# Patient Record
Sex: FEMALE | Race: WHITE | NOT HISPANIC OR LATINO | Employment: OTHER | URBAN - METROPOLITAN AREA
[De-identification: names, ages, dates, MRNs, and addresses within clinical notes are randomized per-mention and may not be internally consistent; named-entity substitution may affect disease eponyms.]

---

## 2017-01-05 ENCOUNTER — TELEPHONE (OUTPATIENT)
Dept: RHEUMATOLOGY | Facility: CLINIC | Age: 67
End: 2017-01-05

## 2017-01-05 NOTE — TELEPHONE ENCOUNTER
----- Message from Tiffany Grant sent at 1/5/2017  9:40 AM CST -----  Contact: pt georgie Pichardo   States he is calling to have pt's appt possibly moved to an earlier time and can be reached at [phone]

## 2017-01-11 ENCOUNTER — OFFICE VISIT (OUTPATIENT)
Dept: RHEUMATOLOGY | Facility: CLINIC | Age: 67
End: 2017-01-11
Payer: MEDICARE

## 2017-01-11 ENCOUNTER — LAB VISIT (OUTPATIENT)
Dept: LAB | Facility: HOSPITAL | Age: 67
End: 2017-01-11
Attending: PHYSICIAN ASSISTANT
Payer: MEDICARE

## 2017-01-11 VITALS
WEIGHT: 112.88 LBS | BODY MASS INDEX: 18.81 KG/M2 | HEIGHT: 65 IN | DIASTOLIC BLOOD PRESSURE: 83 MMHG | HEART RATE: 77 BPM | SYSTOLIC BLOOD PRESSURE: 125 MMHG

## 2017-01-11 DIAGNOSIS — N18.9 CKD (CHRONIC KIDNEY DISEASE), UNSPECIFIED STAGE: ICD-10-CM

## 2017-01-11 DIAGNOSIS — E87.5 HYPERKALEMIA: ICD-10-CM

## 2017-01-11 DIAGNOSIS — I71.40 ABDOMINAL AORTIC ANEURYSM (AAA) WITHOUT RUPTURE: Chronic | ICD-10-CM

## 2017-01-11 DIAGNOSIS — E79.0 HYPERURICEMIA: ICD-10-CM

## 2017-01-11 DIAGNOSIS — Z51.81 MEDICATION MONITORING ENCOUNTER: ICD-10-CM

## 2017-01-11 DIAGNOSIS — D63.8 ANEMIA OF OTHER CHRONIC DISEASE: ICD-10-CM

## 2017-01-11 DIAGNOSIS — N18.30 CKD (CHRONIC KIDNEY DISEASE) STAGE 3, GFR 30-59 ML/MIN: ICD-10-CM

## 2017-01-11 DIAGNOSIS — M15.9 PRIMARY OSTEOARTHRITIS INVOLVING MULTIPLE JOINTS: ICD-10-CM

## 2017-01-11 DIAGNOSIS — M81.8 OSTEOPOROSIS, IDIOPATHIC: Primary | ICD-10-CM

## 2017-01-11 DIAGNOSIS — N17.9 AKI (ACUTE KIDNEY INJURY): ICD-10-CM

## 2017-01-11 DIAGNOSIS — M81.8 OSTEOPOROSIS, IDIOPATHIC: ICD-10-CM

## 2017-01-11 LAB
25(OH)D3+25(OH)D2 SERPL-MCNC: 58 NG/ML
ALBUMIN SERPL BCP-MCNC: 3.7 G/DL
ALP SERPL-CCNC: 90 U/L
ALT SERPL W/O P-5'-P-CCNC: 12 U/L
ANION GAP SERPL CALC-SCNC: 12 MMOL/L
AST SERPL-CCNC: 28 U/L
BASOPHILS # BLD AUTO: 0.01 K/UL
BASOPHILS NFR BLD: 0.1 %
BILIRUB SERPL-MCNC: 0.4 MG/DL
BUN SERPL-MCNC: 30 MG/DL
CALCIUM SERPL-MCNC: 10.1 MG/DL
CHLORIDE SERPL-SCNC: 107 MMOL/L
CO2 SERPL-SCNC: 23 MMOL/L
CREAT SERPL-MCNC: 1.4 MG/DL
DIFFERENTIAL METHOD: ABNORMAL
EOSINOPHIL # BLD AUTO: 0.2 K/UL
EOSINOPHIL NFR BLD: 1.7 %
ERYTHROCYTE [DISTWIDTH] IN BLOOD BY AUTOMATED COUNT: 14.3 %
EST. GFR  (AFRICAN AMERICAN): 46 ML/MIN/1.73 M^2
EST. GFR  (NON AFRICAN AMERICAN): 40 ML/MIN/1.73 M^2
GLUCOSE SERPL-MCNC: 119 MG/DL
HCT VFR BLD AUTO: 34.7 %
HGB BLD-MCNC: 11.1 G/DL
LYMPHOCYTES # BLD AUTO: 2 K/UL
LYMPHOCYTES NFR BLD: 21.9 %
MCH RBC QN AUTO: 31.4 PG
MCHC RBC AUTO-ENTMCNC: 32 %
MCV RBC AUTO: 98 FL
MONOCYTES # BLD AUTO: 0.6 K/UL
MONOCYTES NFR BLD: 6.5 %
NEUTROPHILS # BLD AUTO: 6.4 K/UL
NEUTROPHILS NFR BLD: 69.8 %
PHOSPHATE SERPL-MCNC: 3.5 MG/DL
PLATELET # BLD AUTO: 239 K/UL
PMV BLD AUTO: 9.3 FL
POTASSIUM SERPL-SCNC: 4.2 MMOL/L
PROT SERPL-MCNC: 6.9 G/DL
RBC # BLD AUTO: 3.53 M/UL
SODIUM SERPL-SCNC: 142 MMOL/L
URATE SERPL-MCNC: 9.2 MG/DL
WBC # BLD AUTO: 9.22 K/UL

## 2017-01-11 PROCEDURE — 99499 UNLISTED E&M SERVICE: CPT | Mod: S$GLB,,, | Performed by: PHYSICIAN ASSISTANT

## 2017-01-11 PROCEDURE — 1159F MED LIST DOCD IN RCRD: CPT | Mod: S$GLB,,, | Performed by: PHYSICIAN ASSISTANT

## 2017-01-11 PROCEDURE — 3074F SYST BP LT 130 MM HG: CPT | Mod: S$GLB,,, | Performed by: PHYSICIAN ASSISTANT

## 2017-01-11 PROCEDURE — 3079F DIAST BP 80-89 MM HG: CPT | Mod: S$GLB,,, | Performed by: PHYSICIAN ASSISTANT

## 2017-01-11 PROCEDURE — 99214 OFFICE O/P EST MOD 30 MIN: CPT | Mod: S$GLB,,, | Performed by: PHYSICIAN ASSISTANT

## 2017-01-11 PROCEDURE — 99999 PR PBB SHADOW E&M-EST. PATIENT-LVL IV: CPT | Mod: PBBFAC,,, | Performed by: PHYSICIAN ASSISTANT

## 2017-01-11 PROCEDURE — 96372 THER/PROPH/DIAG INJ SC/IM: CPT | Mod: S$GLB,,, | Performed by: PHYSICIAN ASSISTANT

## 2017-01-11 RX ORDER — MUPIROCIN 20 MG/G
OINTMENT TOPICAL 2 TIMES DAILY
Qty: 30 G | Refills: 1 | Status: SHIPPED | OUTPATIENT
Start: 2017-01-11 | End: 2017-01-21

## 2017-01-11 RX ORDER — TRAMADOL HYDROCHLORIDE AND ACETAMINOPHEN 37.5; 325 MG/1; MG/1
1 TABLET, FILM COATED ORAL 2 TIMES DAILY
Refills: 0 | COMMUNITY
Start: 2016-12-28 | End: 2017-01-12 | Stop reason: SDUPTHER

## 2017-01-11 NOTE — PROGRESS NOTES
Prolia 60mg/cc to right lower abdomen. Labs checked: Calcium 10.1, Creatinine 1.4. Pt tolerated well. No acute reaction noted to site. Pt instructed on signs and symptoms of reaction to report. Pt verbalized understanding.     Lot:  [id number]  Exp:  1/2019

## 2017-01-11 NOTE — LETTER
January 11, 2017      Geovanna Sanchez MD  [address] [address] [address]           [facility] - Rheumatology  [address] [facility]  [place] [address]  Phone: [phone]  Fax: [phone]          Patient: Ibeth Schroeder   MR Number: [id number]   YOB: 1950   Date of Visit: 1/11/2017       Dear Dr. Geovanna Sanchez:    Thank you for referring Ibeth Schroeder to me for evaluation. Attached you will find relevant portions of my assessment and plan of care.    If you have questions, please do not hesitate to call me. I look forward to following Ibeth Schroeder along with you.    Sincerely,    Elizabeth Escalante, LPN    Enclosure  CC:  No Recipients    If you would like to receive this communication electronically, please contact [email or web address] or [phone] to request more information on [employer] Link access.    For providers and/or their staff who would like to refer a patient to [facility], please contact us through our one-stop-shop provider referral line, [facility] , at [phone].    If you feel you have received this communication in error or would no longer like to receive these types of communications, please e-mail [email or web address]

## 2017-01-11 NOTE — PROGRESS NOTES
Subjective:       Patient ID: Ibeth Schroeder is a 66 y.o. female.    Chief Complaint: Osteoporosis    HPI Comments: Ibeth is back for rheumatology follow-up she was last seen in the clinic 2013 she's been lost to follow-up.  At that time was supposed to start Prolia for osteoporosis.  She does have a history of hyperuricemia with gout attacks but none recently it's been almost 7 years since her last attack.  She is not on daily maintenance Treatment.  He pain level today 0/10.  She does have some chronic kidney disease so the bisphosphonates thought not to be the best option.  She did have some low calcium is now on over-the-counter supplement of calcium twice daily which includes vitamin D.  Calcium level is back within normal limits.  She did fall sustaining a pelvic fracture in the last year.  This has healed.  Had a repeat bone density done today and here to review results.  She does have osteoarthritis affecting multiple joints as well as her spine has had epidural injections in the past with some help.    Review of Systems   Constitutional: Negative.  Negative for activity change, appetite change, chills, fatigue and fever.   HENT: Negative.  Negative for mouth sores and trouble swallowing.         No dry mouth   Eyes: Negative.  Negative for photophobia, pain and redness.        No swollen or red eyes, no dry eye     Respiratory: Negative.  Negative for chest tightness, shortness of breath, wheezing and stridor.    Cardiovascular: Negative.  Negative for chest pain.   Gastrointestinal: Negative.  Negative for abdominal pain, blood in stool, diarrhea, nausea and vomiting.   Genitourinary: Negative.  Negative for dysuria, frequency, hematuria and urgency.   Musculoskeletal: Negative.  Negative for arthralgias, back pain, gait problem, joint swelling, myalgias, neck pain and neck stiffness.   Skin: Negative.  Negative for color change, pallor and rash.   Neurological: Negative.  Negative for weakness.  "  Hematological: Negative for adenopathy.   Psychiatric/Behavioral: Negative for suicidal ideas.         Objective:     Visit Vitals    /83    Pulse 77    Ht 5' 5" (1.651 m)    Wt 51.2 kg (112 lb 14 oz)    BMI 18.78 kg/m2        Physical Exam   Constitutional: She is oriented to person, place, and time and well-developed, well-nourished, and in no distress. No distress.   HENT:   Head: Normocephalic and atraumatic.   Right Ear: External ear normal.   Left Ear: External ear normal.   Mouth/Throat: No oropharyngeal exudate.   Eyes: Conjunctivae and EOM are normal. Pupils are equal, round, and reactive to light. No scleral icterus.   Neck: Normal range of motion. Neck supple. No thyromegaly present.   Cardiovascular: Normal rate, regular rhythm and normal heart sounds.    No murmur heard.  Pulmonary/Chest: Effort normal and breath sounds normal. She exhibits no tenderness.   Abdominal: Soft. Bowel sounds are normal.   Lymphadenopathy:     She has no cervical adenopathy.   Neurological: She is alert and oriented to person, place, and time. She displays normal reflexes. No cranial nerve deficit. She exhibits normal muscle tone. Gait normal.   Skin: Skin is warm and dry. No rash noted.     Musculoskeletal: Normal range of motion. She exhibits no edema or tenderness.   She does have some mild kyphosis in the thoracic spine but no tenderness to palpation  Some mild osteoarthritic changes in her hands                 Recent Results (from the past 168 hour(s))   Urinalysis    Collection Time: 01/11/17  2:28 PM   Result Value Ref Range    Specimen UA Urine, Clean Catch     Color, UA Yellow Yellow, Straw, Gina    Appearance, UA Clear Clear    pH, UA 6.0 5.0 - 8.0    Specific Gravity, UA 1.015 1.005 - 1.030    Protein, UA 1+ (A) Negative    Glucose, UA Negative Negative    Ketones, UA Negative Negative    Bilirubin (UA) Negative Negative    Occult Blood UA Trace (A) Negative    Nitrite, UA Negative Negative    " Leukocytes, UA 3+ (A) Negative   Urinalysis Microscopic    Collection Time: 01/11/17  2:28 PM   Result Value Ref Range    RBC, UA 2 0 - 4 /hpf    WBC, UA >100 (H) 0 - 5 /hpf    WBC Clumps, UA Occasional (A) None-Rare    Bacteria, UA Occasional None-Occ /hpf    Squam Epithel, UA 5 /hpf    Non-Squam Epith 3 (A) <1/hpf /hpf    Hyaline Casts, UA 0 0-1/lpf /lpf    Microscopic Comment SEE COMMENT    Comprehensive metabolic panel    Collection Time: 01/11/17  2:49 PM   Result Value Ref Range    Sodium 142 136 - 145 mmol/L    Potassium 4.2 3.5 - 5.1 mmol/L    Chloride 107 95 - 110 mmol/L    CO2 23 23 - 29 mmol/L    Glucose 119 (H) 70 - 110 mg/dL    BUN, Bld 30 (H) 8 - 23 mg/dL    Creatinine 1.4 0.5 - 1.4 mg/dL    Calcium 10.1 8.7 - 10.5 mg/dL    Total Protein 6.9 6.0 - 8.4 g/dL    Albumin 3.7 3.5 - 5.2 g/dL    Total Bilirubin 0.4 0.1 - 1.0 mg/dL    Alkaline Phosphatase 90 55 - 135 U/L    AST 28 10 - 40 U/L    ALT 12 10 - 44 U/L    Anion Gap 12 8 - 16 mmol/L    eGFR if African American 46 (A) >60 mL/min/1.73 m^2    eGFR if non African American 40 (A) >60 mL/min/1.73 m^2   Uric acid    Collection Time: 01/11/17  2:49 PM   Result Value Ref Range    Uric Acid 9.2 (H) 2.4 - 5.7 mg/dL   CBC auto differential    Collection Time: 01/11/17  2:49 PM   Result Value Ref Range    WBC 9.22 3.90 - 12.70 K/uL    RBC 3.53 (L) 4.00 - 5.40 M/uL    Hemoglobin 11.1 (L) 12.0 - 16.0 g/dL    Hematocrit 34.7 (L) 37.0 - 48.5 %    MCV 98 82 - 98 fL    MCH 31.4 (H) 27.0 - 31.0 pg    MCHC 32.0 32.0 - 36.0 %    RDW 14.3 11.5 - 14.5 %    Platelets 239 150 - 350 K/uL    MPV 9.3 9.2 - 12.9 fL    Gran # 6.4 1.8 - 7.7 K/uL    Lymph # 2.0 1.0 - 4.8 K/uL    Mono # 0.6 0.3 - 1.0 K/uL    Eos # 0.2 0.0 - 0.5 K/uL    Baso # 0.01 0.00 - 0.20 K/uL    Gran% 69.8 38.0 - 73.0 %    Lymph% 21.9 18.0 - 48.0 %    Mono% 6.5 4.0 - 15.0 %    Eosinophil% 1.7 0.0 - 8.0 %    Basophil% 0.1 0.0 - 1.9 %    Differential Method Automated    Phosphorus    Collection Time: 01/11/17   2:49 PM   Result Value Ref Range    Phosphorus 3.5 2.7 - 4.5 mg/dL       DEXA 7/6/16 total femur T score -2.7, femur neck -2.7, spine +1.9 impression osteoporosis  DEXA 6/5/13 total femur T score -3.1, spine +1.7 impression osteoporosis    Assessment:       1. Osteoporosis, idiopathic    2. CKD (chronic kidney disease) stage 3, GFR 30-59 ml/min    3. Primary osteoarthritis involving multiple joints    4. Medication monitoring encounter          1.  Severe osteoporosis at the hip with recent pelvic fracture not on treatment for the last 3 years because she has been lost to follow-up -- CKD so now on Prolia 1st injection done 7/6/16  2.  Hyperuricemia with no recent gout attacks currently not on long-term treatment last gout attack 7 years ago currently no indication to add treatment  3.  Chronic kidney disease  4.  Generalized osteoarthritis  5. Medication Monitoring- no current issues, no evidence of toxicity      Plan:         Ok for Prolia 60 mg subcutaneous injection today for osteoporosis   Continue Prolia Q 6 months  increase dietary intake of calcium for next 10 days and recheck bmp in 10 days     No need to treat hyperuricemia currently if she develops gout attacks then consider allopurinol    Avoid non-steroidal's, okay for Tylenol for pain    Return to clinic in 6 months with labs and Prolia    Call with any questions, changes, or concerns.

## 2017-01-11 NOTE — MR AVS SNAPSHOT
[facility] - Rheumatology  [address]  [place] [address]  Phone: [phone]  Fax: [phone]                  Ibeth Schroeder   2017 3:00 PM   Office Visit    Description:  Female : 1950   Provider:  Patria Melara PA-C   Department:  [facility] - Rheumatology           Reason for Visit     Osteoporosis           Diagnoses this Visit        Comments    Osteoporosis, idiopathic    -  Primary     CKD (chronic kidney disease) stage 3, GFR 30-59 ml/min         Primary osteoarthritis involving multiple joints         Medication monitoring encounter                To Do List           Future Appointments        Provider Department Dept Phone    2017 5:50 PM SPECIMEN, [facility] [phone]    2017 3:40 PM JUAN [facility] - Interventional Pain [phone]    2017 4:20 PM [facility]ATORY, [facility]     2017 3:00 PM MD JOAN [facility] - Nephrology [phone]    3/16/2017 2:45 PM ONLH XR1-DR [facility] [phone]      Goals (5 Years of Data)     None       These Medications        Disp Refills Start End    mupirocin (BACTROBAN) 2 % ointment 30 g 1 2017    Apply topically 2 (two) times daily. - Topical (Top)    Pharmacy: [facility] MICHAEL ALBRECHT [address] Ph #: [phone]         [facility]     [facility] On [facility] Nurse Care Line -  Assistance  Registered nurses in the [facility] On [facility] Center provide clinical advisement, health education, appointment booking, and other advisory services.  Call for this free service at [phone].             Medications           Message regarding Medications     Verify the changes and/or additions to your medication regime listed below are the same as discussed with your clinician today.  If any of these changes or additions are incorrect, please notify your healthcare  provider.        START taking these NEW medications        Refills    mupirocin (BACTROBAN) 2 % ointment 1    Sig: Apply topically 2 (two) times daily.    Class: Normal    Route: Topical (Top)           Verify that the below list of medications is an accurate representation of the medications you are currently taking.  If none reported, the list may be blank. If incorrect, please contact your healthcare provider. Carry this list with you in case of emergency.           Current Medications     acetaminophen (TYLENOL) 325 MG tablet Take 325 mg by mouth every 6 (six) hours as needed for Pain.    amlodipine (NORVASC) 5 MG tablet Take 1 tablet (5 mg total) by mouth once daily.    busPIRone (BUSPAR) 15 MG tablet 1 tab po tid    calcitRIOL (ROCALTROL) 0.25 MCG Cap Take 1 capsule (0.25 mcg total) by mouth once daily.    CALCIUM CARBONATE/VITAMIN D3 (CALTRATE 600 + D ORAL) Take 2 tablets by mouth once daily.    duloxetine (CYMBALTA) 60 MG capsule take 1 capsule by mouth once daily    ferrous sulfate 325 (65 FE) MG EC tablet Take 1 tablet (325 mg total) by mouth once a week.    fluticasone (FLONASE) 50 mcg/actuation nasal spray instill 2 sprays into each nostril once daily    fluticasone-salmeterol (ADVAIR HFA) 115-21 mcg/actuation HFAA inhale 2 puffs INTO THE LUNGS twice a day    ipratropium-albuterol (COMBIVENT RESPIMAT)  mcg/actuation inhaler inhale 1 puff INTO THE LUNGS four times a day    levothyroxine (SYNTHROID) 100 MCG tablet Take 1 tablet (100 mcg total) by mouth once daily.    metoprolol succinate (TOPROL-XL) 50 MG 24 hr tablet Take 1 tablet (50 mg total) by mouth once daily.    MULTIVITAMIN W-MINERALS/LUTEIN (CENTRUM SILVER ORAL) Take 1 tablet by mouth once daily.    pantoprazole (PROTONIX) 40 MG tablet Take 1 tablet (40 mg total) by mouth once daily.    pravastatin (PRAVACHOL) 10 MG tablet Take 1 tablet (10 mg total) by mouth once daily.    ropinirole (REQUIP) 1 MG tablet Take 1 tablet (1 mg total) by mouth  "nightly.    thiamine 250 MG tablet Take 500 mg by mouth once daily.    tramadol-acetaminophen 37.5-325 mg (ULTRACET) 37.5-325 mg Tab Take 1 tablet by mouth 2 (two) times daily.    diclofenac sodium (VOLTAREN) 1 % Gel Apply 4 g topically 4 (four) times daily. Pt has shoulder pain and knee pain    mupirocin (BACTROBAN) 2 % ointment Apply topically 2 (two) times daily.           Clinical Reference Information           Vital Signs - Last Recorded  Most recent update: 1/11/2017  3:11 PM by Elizabeth Escalante LPN    BP Pulse Ht Wt BMI    125/83 77 5' 5" (1.651 m) 51.2 kg (112 lb 14 oz) 18.78 kg/m2      Blood Pressure          Most Recent Value    BP  125/83      Allergies as of 1/11/2017     Nsaids (Non-steroidal Anti-inflammatory Drug)    Pcn [Penicillins]    Sulfa (Sulfonamide Antibiotics)    Macrodantin [Nitrofurantoin Macrocrystalline]    Wellbutrin [Bupropion Hcl]      Immunizations Administered on Date of Encounter - 1/11/2017     None      Orders Placed During Today's Visit     Recurring Lab Work Interval Expires    Basic metabolic panel   3/12/2018      "

## 2017-01-12 ENCOUNTER — OFFICE VISIT (OUTPATIENT)
Dept: PAIN MEDICINE | Facility: CLINIC | Age: 67
End: 2017-01-12
Payer: MEDICARE

## 2017-01-12 VITALS
HEIGHT: 65 IN | DIASTOLIC BLOOD PRESSURE: 87 MMHG | HEART RATE: 91 BPM | WEIGHT: 112 LBS | SYSTOLIC BLOOD PRESSURE: 133 MMHG | RESPIRATION RATE: 16 BRPM | BODY MASS INDEX: 18.66 KG/M2

## 2017-01-12 DIAGNOSIS — M47.817 LUMBOSACRAL SPONDYLOSIS WITHOUT MYELOPATHY: Primary | ICD-10-CM

## 2017-01-12 DIAGNOSIS — G89.4 CHRONIC PAIN DISORDER: ICD-10-CM

## 2017-01-12 DIAGNOSIS — R29.898 WEAKNESS OF BOTH HIPS: ICD-10-CM

## 2017-01-12 DIAGNOSIS — M47.817 SPONDYLOSIS OF LUMBOSACRAL REGION WITHOUT MYELOPATHY OR RADICULOPATHY: Primary | ICD-10-CM

## 2017-01-12 DIAGNOSIS — M51.36 DDD (DEGENERATIVE DISC DISEASE), LUMBAR: ICD-10-CM

## 2017-01-12 PROCEDURE — 99999 PR PBB SHADOW E&M-EST. PATIENT-LVL V: CPT | Mod: PBBFAC,,, | Performed by: PHYSICIAN ASSISTANT

## 2017-01-12 PROCEDURE — 3079F DIAST BP 80-89 MM HG: CPT | Mod: S$GLB,,, | Performed by: PHYSICIAN ASSISTANT

## 2017-01-12 PROCEDURE — 99499 UNLISTED E&M SERVICE: CPT | Mod: S$GLB,,, | Performed by: PHYSICIAN ASSISTANT

## 2017-01-12 PROCEDURE — 3075F SYST BP GE 130 - 139MM HG: CPT | Mod: S$GLB,,, | Performed by: PHYSICIAN ASSISTANT

## 2017-01-12 PROCEDURE — 1159F MED LIST DOCD IN RCRD: CPT | Mod: S$GLB,,, | Performed by: PHYSICIAN ASSISTANT

## 2017-01-12 PROCEDURE — 99214 OFFICE O/P EST MOD 30 MIN: CPT | Mod: S$GLB,,, | Performed by: PHYSICIAN ASSISTANT

## 2017-01-12 RX ORDER — TRAMADOL HYDROCHLORIDE AND ACETAMINOPHEN 37.5; 325 MG/1; MG/1
1 TABLET, FILM COATED ORAL 2 TIMES DAILY PRN
Qty: 60 TABLET | Refills: 2 | Status: SHIPPED | OUTPATIENT
Start: 2017-01-12 | End: 2017-01-12 | Stop reason: DRUGHIGH

## 2017-01-12 RX ORDER — TRAMADOL HYDROCHLORIDE 50 MG/1
50 TABLET ORAL 2 TIMES DAILY PRN
Qty: 60 TABLET | Refills: 2 | Status: SHIPPED | OUTPATIENT
Start: 2017-01-12 | End: 2017-02-11

## 2017-01-12 NOTE — MR AVS SNAPSHOT
[facility] - Interventional Pain  [address] [facility]  [place] [address]  Phone: [phone]  Fax: [phone]                  Ibeth Schroeder   2017 3:40 PM   Office Visit    Description:  Female : 1950   Provider:  Juana Schuster PA-C   Department:  [facility] - Interventional Pain           Reason for Visit     Low-back Pain           Diagnoses this Visit        Comments    Spondylosis of lumbosacral region without myelopathy or radiculopathy    -  Primary     DDD (degenerative disc disease), lumbar         Weakness of both hips         Chronic pain disorder                To Do List           Future Appointments        Provider Department Dept Phone    2017 4:20 PM [facility]ATORY, [facility]     2017 3:00 PM Cristina Crowder MD [facility] - Nephrology [phone]    3/16/2017 2:45 PM ONLH XR1-DR [facility] [phone]    3/16/2017 3:00 PM PULMONARY LAB, [facility] - Pulm Function [facility] [phone]    3/16/2017 3:20 PM PULMONARY LAB, [facility] - Pulm Function [facility] [phone]      Goals (5 Years of Data)     None      Follow-Up and Disposition     Return in about 12 weeks (around 2017) for Medication refill, Injection follow-up.       These Medications        Disp Refills Start End    tramadol (ULTRAM) 50 mg tablet 60 tablet 2 2017    Take 1 tablet (50 mg total) by mouth 2 (two) times daily as needed for Pain. - Oral    Pharmacy: [facility] [place] [id number] [facility] Ph #: [phone]         [facility]     [facility] On Call Nurse Care Line -  Assistance  Registered nurses in the [facility] Center provide clinical advisement, health education, appointment booking, and other advisory services.  Call for this free service at [phone].             Medications           Message regarding Medications     Verify the changes and/or additions to your  medication regime listed below are the same as discussed with your clinician today.  If any of these changes or additions are incorrect, please notify your healthcare provider.        START taking these NEW medications        Refills    tramadol (ULTRAM) 50 mg tablet 2    Sig: Take 1 tablet (50 mg total) by mouth 2 (two) times daily as needed for Pain.    Class: Print    Route: Oral      STOP taking these medications     tramadol-acetaminophen 37.5-325 mg (ULTRACET) 37.5-325 mg Tab Take 1 tablet by mouth 2 (two) times daily.           Verify that the below list of medications is an accurate representation of the medications you are currently taking.  If none reported, the list may be blank. If incorrect, please contact your healthcare provider. Carry this list with you in case of emergency.           Current Medications     acetaminophen (TYLENOL) 325 MG tablet Take 325 mg by mouth every 6 (six) hours as needed for Pain.    amlodipine (NORVASC) 5 MG tablet Take 1 tablet (5 mg total) by mouth once daily.    busPIRone (BUSPAR) 15 MG tablet 1 tab po tid    calcitRIOL (ROCALTROL) 0.25 MCG Cap Take 1 capsule (0.25 mcg total) by mouth once daily.    CALCIUM CARBONATE/VITAMIN D3 (CALTRATE 600 + D ORAL) Take 2 tablets by mouth once daily.    diclofenac sodium (VOLTAREN) 1 % Gel Apply 4 g topically 4 (four) times daily. Pt has shoulder pain and knee pain    duloxetine (CYMBALTA) 60 MG capsule take 1 capsule by mouth once daily    ferrous sulfate 325 (65 FE) MG EC tablet Take 1 tablet (325 mg total) by mouth once a week.    fluticasone (FLONASE) 50 mcg/actuation nasal spray instill 2 sprays into each nostril once daily    fluticasone-salmeterol (ADVAIR HFA) 115-21 mcg/actuation HFAA inhale 2 puffs INTO THE LUNGS twice a day    ipratropium-albuterol (COMBIVENT RESPIMAT)  mcg/actuation inhaler inhale 1 puff INTO THE LUNGS four times a day    levothyroxine (SYNTHROID) 100 MCG tablet Take 1 tablet (100 mcg total) by mouth  "once daily.    metoprolol succinate (TOPROL-XL) 50 MG 24 hr tablet Take 1 tablet (50 mg total) by mouth once daily.    MULTIVITAMIN W-MINERALS/LUTEIN (CENTRUM SILVER ORAL) Take 1 tablet by mouth once daily.    mupirocin (BACTROBAN) 2 % ointment Apply topically 2 (two) times daily.    pantoprazole (PROTONIX) 40 MG tablet Take 1 tablet (40 mg total) by mouth once daily.    pravastatin (PRAVACHOL) 10 MG tablet Take 1 tablet (10 mg total) by mouth once daily.    ropinirole (REQUIP) 1 MG tablet Take 1 tablet (1 mg total) by mouth nightly.    thiamine 250 MG tablet Take 500 mg by mouth once daily.    tramadol (ULTRAM) 50 mg tablet Take 1 tablet (50 mg total) by mouth 2 (two) times daily as needed for Pain.           Clinical Reference Information           Vital Signs - Last Recorded  Most recent update: 1/12/2017  3:26 PM by Chelsea Whelan MA    BP Pulse Resp Ht Wt BMI    133/87 (BP Location: Right arm, Patient Position: Sitting, BP Method: Automatic) 91 16 5' 5" (1.651 m) 50.8 kg (112 lb) 18.64 kg/m2      Blood Pressure          Most Recent Value    BP  133/87      Allergies as of 1/12/2017     Nsaids (Non-steroidal Anti-inflammatory Drug)    Pcn [Penicillins]    Sulfa (Sulfonamide Antibiotics)    Macrodantin [Nitrofurantoin Macrocrystalline]    Wellbutrin [Bupropion Hcl]      Immunizations Administered on Date of Encounter - 1/12/2017     None      Orders Placed During Today's Visit     Future Labs/Procedures Expected by Expires    IR Facet Inj Lumbar 1st Vert Uni  1/12/2017 1/12/2018    IR Facet Inj Lumbar 1st Vert Uni  1/12/2017 1/12/2018    IR Facet Inj Lumbar 2nd Vert Uni  1/12/2017 1/12/2018    IR Facet Inj Lumbar 2nd Vert Uni  1/12/2017 1/12/2018    IR Facet Inj Lumbar 3rd Vert Uni  1/12/2017 1/12/2018    IR Facet Inj Lumbar 3rd Vert Uni  1/12/2017 1/12/2018      "

## 2017-01-12 NOTE — PROGRESS NOTES
Chief Pain Complaint:  Low Back Pain    History of Present Illness:  This patient is a 66 y.o. female who presents today complaining of the above noted pain/s. The patient describes this pain as follows.    - duration of pain: > 7 years   - timing: intermittent   - character: sharp, aching  - radiating, dermatomal: pain is nonradiating  - antecedent trauma, prior spinal surgery: no prior trauma, no prior spinal surgery, left hip replacement & revision  - pertinent negatives: No fever, No chills, No weight loss, No bladder dysfunction, No bowel dysfunction, No extremity weakness, No saddle anesthesia  - pertinent positives: none    - medications, other therapies tried (physical therapy, injections):     >> Tylenol, Ultracet    >> Has previously undergone Physical Therapy    >> Has previously undergone spinal injection/s: what sounds like 2 lumbar MBBs at [facility] with great relief      IMAGING / Labs / Studies (reviewed on 1/12/2017):    8/11/16 X-Ray Lumbar Spine AP And Lateral  Comparison: 09/24/2013  Technique: AP, lateral, lateral flexion, lateral extension, bilateral oblique, and lumbosacral coned down views were obtained of the lumbar spine  Findings:   Vertebral body heights and alignment are within normal limits.  No change in spinal alignment with flexion or extension to suggest instability. Multilevel degenerative disc height loss again noted throughout the lumbar spine, most severe at the levels of T12-L1, L1.  Moderate disc loss present at L4-L5 and L5-S1.  There multilevel degenerative endplate changes and osteophyte production.  Findings appear essentially unchanged from prior.  Bilateral facet arthropathy present at L4-L5 and L5-S1.  No pars defects visualized.  Posterior elements appear grossly intact. No acute fractures or subluxations are demonstrated.  IVC filter again noted.  Aortic vascular calcifications present.  Previously described aneurysmal dilatation of the distal abdominal aorta  "appear similar to prior measuring up to approximately 4.2 cm in diameter.       9/24/13 X-Ray Lumbar Spine AP And Lateral    Narrative Findings:  Comparison is with 6/16/09.  Mild dextroscoliosis and diffuse changes of degenerative disk disease as well as diffuse facet joint hypertrophy are again demonstrated.  These changes of degenerative disk disease are particularly pronounced at the T12-L1, L1-2, L4-5 and L5-S1 levels.  AP alignment appears maintained with no acute compression fractures identified.  Again demonstrated are surgical clips consistent with prior cholecystectomy, IVC filter and left hip prosthesis.  Also again demonstrated is diffuse calcification of the abdominal aorta including distal aneurysmal dilatation measured at 4.4 cm in the AP axis at the level of the superior endplate of L4. This dilatation appears to have slightly increased in the interim previously measured at this level at approximately 3.3 cm.       Review of Systems:  CONSTITUTIONAL: patient denies any fever, chills, or weight loss  SKIN: patient denies any rash or itching  RESPIRATORY: patient denies having any shortness of breath  GASTROINTESTINAL: patient denies having any diarrhea, constipation, or bowel incontinence  GENITOURINARY: patient denies having any abnormal bladder function    MUSCULOSKELETAL:  - patient complains of the above noted pain/s (see chief pain complaint)    NEUROLOGICAL:   - pain as above  - strength in Lower extremities is intact, BILATERALLY  - sensation in Lower extremities is intact, BILATERALLY  - patient denies any loss of bowel or bladder control      PSYCHIATRIC: patient reports a history of anxiety and depression      Physical Exam:  Visit Vitals    /87 (BP Location: Right arm, Patient Position: Sitting, BP Method: Automatic)    Pulse 91    Resp 16    Ht 5' 5" (1.651 m)    Wt 50.8 kg (112 lb)    BMI 18.64 kg/m2    (reviewed on 1/12/2017)    General: alert and oriented, in no apparent " distress, poorly nourished  Gait: normal gait  Skin: No rashes, No discoloration, No obvious lesions  HEENT: EOMI  Respiratory: respirations nonlabored    Musculoskeletal:  - Any pain on flexion, extension, rotation:    >> pain on extension and rotation    >> facet loading causes pain bilaterally, R>L  - Straight Leg Raise:     >> LEFT :: negative    >> RIGHT :: negative  - Any tenderness to palpation across paraspinal muscles, joints, bursae:     >> across lumbar paraspinals    Neuro:  - Extremity Strength:     >> LEFT :: 5/5    >> RIGHT :: 5/5  - Extremity Reflexes:    >> LEFT  :: 2+    >> RIGHT :: 2+  - Sensory (sensation to light touch):    >> LEFT :: intact    >> RIGHT :: intact     Psych:  Mood and affect is appropriate      Assessment:  Lumbar Spondylosis   Lumbar facet arthropathy  Chronic Pain Syndrome      Plan:  Patient returns for follow-up. She complains of low back pain, lumbar facet pain primarily.   - Schedule right then left lumbar MBB.  - Will switch to tramadol 50mg BID PRN pain. D/c Ultracet 37.5mg BID PRN - due to inadequate relief.   - She has also been taking Tylenol OTC in addition to her pain medication. We will stop Ultracet since it has acetaminophen in it, and she was told to stop OTC. She reports having a liver issue a few years back.   - Also takes Cymbalta 60mg QD.  RTC in 3 months for injection follow-up and med refill. I discussed the risks, benefits, and alternatives to potential treatment options. All questions and concerns were fully addressed today in clinic. Dr. Rivera was consulted regarding the patient plan and agrees.        >> PDI:  1/12/2017 :: 48    >>Opioid Risk Tool:  1/12/2017 :: 1

## 2017-01-17 ENCOUNTER — TELEPHONE (OUTPATIENT)
Dept: PAIN MEDICINE | Facility: CLINIC | Age: 67
End: 2017-01-17

## 2017-01-17 NOTE — TELEPHONE ENCOUNTER
----- Message from Akilah Isaac sent at 1/17/2017 10:41 AM CST -----  Contact: Patient  Patient stated that she has a small bump that is in and around her nose she think it might be Impetigo. She said that she had it about two weeks but it is starting to clear up, Please call her at [phone].    Thanks  Td

## 2017-01-17 NOTE — TELEPHONE ENCOUNTER
Spoke with patient who states she had a problem with her big toenail bleeding last night.  She states she has had problems with it for about 2 weeks now.  States it is loose but not infected.  She states she is calling to make appt with podiatrist today.  Asking if still ok for injection.  I informed her that as long as there is no infection, she should be fine with the injection. She acknowledged.

## 2017-01-17 NOTE — TELEPHONE ENCOUNTER
Spoke with patient who states she has impetigo but has finished her antibiotic therapy. I spoke with Dr. Rivera who states she is good to go for her injection. I relayed this to the patient and she acknowledged.

## 2017-01-17 NOTE — TELEPHONE ENCOUNTER
----- Message from Vikki Sanches sent at 1/17/2017  9:43 AM CST -----  Pt at [phone]//states she is going to have a procedure tomorrow//1-18-17//she is needing to know about her toe that was bleeding last night//please call to discuss//dinah/saleem

## 2017-01-18 ENCOUNTER — SURGERY (OUTPATIENT)
Age: 67
End: 2017-01-18

## 2017-01-18 ENCOUNTER — HOSPITAL ENCOUNTER (OUTPATIENT)
Facility: HOSPITAL | Age: 67
Discharge: HOME | End: 2017-01-18
Attending: ANESTHESIOLOGY | Admitting: ANESTHESIOLOGY
Payer: MEDICARE

## 2017-01-18 ENCOUNTER — HOSPITAL ENCOUNTER (OUTPATIENT)
Dept: RADIOLOGY | Facility: HOSPITAL | Age: 67
Discharge: HOME | End: 2017-01-18
Attending: ANESTHESIOLOGY | Admitting: ANESTHESIOLOGY
Payer: MEDICARE

## 2017-01-18 VITALS
WEIGHT: 117 LBS | OXYGEN SATURATION: 99 % | HEIGHT: 65 IN | BODY MASS INDEX: 19.49 KG/M2 | SYSTOLIC BLOOD PRESSURE: 157 MMHG | RESPIRATION RATE: 14 BRPM | TEMPERATURE: 99 F | HEART RATE: 62 BPM | DIASTOLIC BLOOD PRESSURE: 99 MMHG

## 2017-01-18 DIAGNOSIS — M47.816 LUMBAR SPONDYLOSIS: ICD-10-CM

## 2017-01-18 DIAGNOSIS — M47.817 SPONDYLOSIS OF LUMBOSACRAL REGION WITHOUT MYELOPATHY OR RADICULOPATHY: Primary | ICD-10-CM

## 2017-01-18 DIAGNOSIS — M47.817 SPONDYLOSIS OF LUMBOSACRAL REGION WITHOUT MYELOPATHY OR RADICULOPATHY: ICD-10-CM

## 2017-01-18 PROCEDURE — 64495 INJ PARAVERT F JNT L/S 3 LEV: CPT | Mod: RT,,, | Performed by: ANESTHESIOLOGY

## 2017-01-18 PROCEDURE — 64495 INJ PARAVERT F JNT L/S 3 LEV: CPT

## 2017-01-18 PROCEDURE — 63600175 PHARM REV CODE 636 W HCPCS: Performed by: ANESTHESIOLOGY

## 2017-01-18 PROCEDURE — 64493 INJ PARAVERT F JNT L/S 1 LEV: CPT | Mod: RT,,, | Performed by: ANESTHESIOLOGY

## 2017-01-18 PROCEDURE — 63600175 PHARM REV CODE 636 W HCPCS

## 2017-01-18 PROCEDURE — 64494 INJ PARAVERT F JNT L/S 2 LEV: CPT

## 2017-01-18 PROCEDURE — 25000003 PHARM REV CODE 250: Performed by: ANESTHESIOLOGY

## 2017-01-18 PROCEDURE — 64494 INJ PARAVERT F JNT L/S 2 LEV: CPT | Mod: RT,,, | Performed by: ANESTHESIOLOGY

## 2017-01-18 PROCEDURE — 25000003 PHARM REV CODE 250

## 2017-01-18 PROCEDURE — 64493 INJ PARAVERT F JNT L/S 1 LEV: CPT

## 2017-01-18 RX ORDER — METHYLPREDNISOLONE ACETATE 80 MG/ML
INJECTION, SUSPENSION INTRA-ARTICULAR; INTRALESIONAL; INTRAMUSCULAR; SOFT TISSUE
Status: DISCONTINUED | OUTPATIENT
Start: 2017-01-18 | End: 2017-01-18 | Stop reason: HOSPADM

## 2017-01-18 RX ORDER — LIDOCAINE HYDROCHLORIDE 20 MG/ML
INJECTION, SOLUTION INFILTRATION; PERINEURAL
Status: DISCONTINUED | OUTPATIENT
Start: 2017-01-18 | End: 2017-01-18 | Stop reason: HOSPADM

## 2017-01-18 RX ADMIN — METHYLPREDNISOLONE ACETATE 80 MG: 80 INJECTION, SUSPENSION INTRA-ARTICULAR; INTRALESIONAL; INTRAMUSCULAR; SOFT TISSUE at 08:01

## 2017-01-18 RX ADMIN — LIDOCAINE HYDROCHLORIDE 10 ML: 20 INJECTION, SOLUTION INFILTRATION; PERINEURAL at 08:01

## 2017-01-18 NOTE — OP NOTE
Procedure: Lumbar Medial Branch Block under Fluorsocopic Guidance    Side: right     Level:  Sacral ala (Corresponding to the L5 dorsal ramus), L5 transverse process (Corresponding to the L4 medial branch), L4 transverse process (Corresponding to the L3 medial branch) and L3 transverse process (Corresponding to the L2 medial branch)     PROCEDURE DATE: 1/18/2017    Pre-operative Diagnosis: Lumbar Spondylosis  Post-operative Diagnosis: Lumbar Spondylosis    Provider: Hari Rivera MD  Assistant(s): none    Anesthesia: Local, No Sedation    >> 0 mg of VERSED    >> 0 mcg of FENTANYL     Indication: Low back pain without radiculopathy. Symptoms unresponsive to conservative treatments. Fluoroscopy was used to optimize visualization of needle placement and to maximize safety.     Procedure Description / Technique:  The patient was seen and identified in the preoperative area. Risks, benefits, complications, and alternatives were discussed with the patient. The patient agreed to proceed with the procedure and signed the consent. The site and side of the procedure was identified and marked. No iv was started.     The patient was taken to the procedural suite and positioned in prone orientation on the procedure table. A pillow was placed under the abdomen to reduce lumbar lordosis. A time out was performed. The procedure, site, side, and allergies were stated and agreed to by all present. The lumbosacral area was widely prepped with ChloraPrep. The procedural site was draped in usual sterile fashion. Vital signs were closely monitored throughout this procedure. Conscious sedation was NOT used for this procedure.    The Right sacral ala and superior articular process was identified and marked on AP fluoroscopic imaging. Subcutaneous tissues were localized using 1% PF Lidocaine to improve patient comfort. A 25 gauge 3.5 inch spinal needle was advance until the needle rested on OS at the interface of the sacral ala and the  "base of the sacral superior articular process. After negative aspiration, 0.75 mL of a solution containing 5 mL of 1% PF Lidocaine and 2 mL of Methylprednisolone (40 mg/mL) was injected. No pain or paresthesia was noted on injection. After right side injection, the fluoroscope was obliqued to the Right until the devin dog outline of the L5 vertebrae came into view. The spinal needle was advanced to the "eye of the devin dog" and after negative aspiration a 0.75 mL of the above noted solution was injected. No pain or paresthesia was noted. This technique was repeated at each of the above noted levels. The spinal needle was removed intact following injection at each targeted site. The stylet was replaced prior to needle removal at each site.    Description of Findings: Not applicable    Prosthetic devices, grafts, tissues, or devices implanted: None    Specimen Removed: No    ESTIMATED BLOOD LOSS: minimal    COMPLICATIONS: None    DISPOSITION / PLANS: The patient was transferred to the recovery area in a stable condition for observation. The patient was reexamined prior to discharge. There was no evidence of acute neurologic injury following the procedure.  Patient was discharged from the recovery room after meeting discharge criteria. Home discharge instructions were given to the patient by the staff.  "

## 2017-01-18 NOTE — INTERVAL H&P NOTE
The patient has been examined and the H&P has been reviewed:    I concur with the findings and no changes have occurred since H&P was written.    Anesthesia/Surgery risks, benefits and alternative options discussed and understood by patient/family.          Active Hospital Problems    Diagnosis  POA    Lumbar spondylosis [M47.816]  Yes     Priority: High      Resolved Hospital Problems    Diagnosis Date Resolved POA   No resolved problems to display.

## 2017-01-18 NOTE — PLAN OF CARE
Problem: Patient Care Overview  Goal: Plan of Care Review  Outcome: Outcome(s) achieved Date Met:  01/18/17  Patient d/c home in stable condition via wheelchair with ride. Verbalized understanding of d/c instructions. Patient voiced no complaints at this time. Patient stood at side of bed, walked steps with no new motor deficits. Neurologically intact.

## 2017-01-18 NOTE — DISCHARGE SUMMARY
[facility]  Discharge Note       Description of Procedure: Lumbar Medial Branch Block under Fluoroscopic Guidance    Procedure Date: 1/18/2017    Admit Date: 1/18/2017  Discharge Date: 1/18/2017     Attending Physician: Hari Rivera   Discharge Provider: Hari Rivera    Preoperative Diagnosis:   Active Hospital Problems    Diagnosis  POA    Lumbar spondylosis [M47.816]  Yes     Priority: High      Resolved Hospital Problems    Diagnosis Date Resolved POA   No resolved problems to display.     Postoperative Diagnosis:   Active Hospital Problems    Diagnosis  POA    Lumbar spondylosis [M47.816]  Yes     Priority: High      Resolved Hospital Problems    Diagnosis Date Resolved POA   No resolved problems to display.       Discharged Condition: Stable    Hospital Course: Patient was admitted for an outpatient procedure. The procedure was tolerated well with no complications.    Final Diagnoses: Same as principal problem.    Disposition: Home, self-care.    Follow up/Patient Instructions:  Follow-up in clinic in 2-3 weeks or as needed if not having pain or any pain related issues.    Medications: No medications were prescribed today. The patient was advised to resume normal medication regimen without change.  Specific information was provided regarding restarting any anticoagulants.    Discharge Procedure Orders (must include Diet, Follow-up, Activity):  Light activity for the remainder of the day, resume normal activity tomorrow. Resume normal diet. Follow-up in clinic in 2-3 weeks or as needed.

## 2017-01-20 ENCOUNTER — LAB VISIT (OUTPATIENT)
Dept: LAB | Facility: HOSPITAL | Age: 67
End: 2017-01-20
Attending: INTERNAL MEDICINE
Payer: MEDICARE

## 2017-01-20 DIAGNOSIS — N18.30 CKD (CHRONIC KIDNEY DISEASE) STAGE 3, GFR 30-59 ML/MIN: ICD-10-CM

## 2017-01-20 DIAGNOSIS — M81.8 OSTEOPOROSIS, IDIOPATHIC: ICD-10-CM

## 2017-01-20 DIAGNOSIS — Z51.81 MEDICATION MONITORING ENCOUNTER: ICD-10-CM

## 2017-01-20 LAB
ANION GAP SERPL CALC-SCNC: 9 MMOL/L
BUN SERPL-MCNC: 43 MG/DL
CALCIUM SERPL-MCNC: 10.5 MG/DL
CHLORIDE SERPL-SCNC: 108 MMOL/L
CO2 SERPL-SCNC: 25 MMOL/L
CREAT SERPL-MCNC: 1.7 MG/DL
EST. GFR  (AFRICAN AMERICAN): 36.2 ML/MIN/1.73 M^2
EST. GFR  (NON AFRICAN AMERICAN): 31.4 ML/MIN/1.73 M^2
GLUCOSE SERPL-MCNC: 136 MG/DL
POTASSIUM SERPL-SCNC: 4 MMOL/L
SODIUM SERPL-SCNC: 142 MMOL/L

## 2017-01-20 PROCEDURE — 80048 BASIC METABOLIC PNL TOTAL CA: CPT

## 2017-01-20 PROCEDURE — 36415 COLL VENOUS BLD VENIPUNCTURE: CPT | Mod: PO

## 2017-01-25 ENCOUNTER — OFFICE VISIT (OUTPATIENT)
Dept: NEPHROLOGY | Facility: CLINIC | Age: 67
End: 2017-01-25
Payer: MEDICARE

## 2017-01-25 VITALS
BODY MASS INDEX: 18.55 KG/M2 | WEIGHT: 111.31 LBS | HEART RATE: 80 BPM | DIASTOLIC BLOOD PRESSURE: 78 MMHG | SYSTOLIC BLOOD PRESSURE: 120 MMHG | HEIGHT: 65 IN

## 2017-01-25 DIAGNOSIS — N17.9 AKI (ACUTE KIDNEY INJURY): ICD-10-CM

## 2017-01-25 DIAGNOSIS — E87.5 HYPERKALEMIA: ICD-10-CM

## 2017-01-25 DIAGNOSIS — N18.30 CKD (CHRONIC KIDNEY DISEASE) STAGE 3, GFR 30-59 ML/MIN: Primary | ICD-10-CM

## 2017-01-25 DIAGNOSIS — N25.81 HYPERPARATHYROIDISM, SECONDARY RENAL: ICD-10-CM

## 2017-01-25 DIAGNOSIS — D63.8 ANEMIA OF OTHER CHRONIC DISEASE: ICD-10-CM

## 2017-01-25 PROCEDURE — 3078F DIAST BP <80 MM HG: CPT | Mod: S$GLB,,, | Performed by: INTERNAL MEDICINE

## 2017-01-25 PROCEDURE — 99214 OFFICE O/P EST MOD 30 MIN: CPT | Mod: S$GLB,,, | Performed by: INTERNAL MEDICINE

## 2017-01-25 PROCEDURE — 3074F SYST BP LT 130 MM HG: CPT | Mod: S$GLB,,, | Performed by: INTERNAL MEDICINE

## 2017-01-25 PROCEDURE — 99999 PR PBB SHADOW E&M-EST. PATIENT-LVL III: CPT | Mod: PBBFAC,,, | Performed by: INTERNAL MEDICINE

## 2017-01-25 PROCEDURE — 1159F MED LIST DOCD IN RCRD: CPT | Mod: S$GLB,,, | Performed by: INTERNAL MEDICINE

## 2017-01-25 PROCEDURE — 99499 UNLISTED E&M SERVICE: CPT | Mod: S$GLB,,, | Performed by: INTERNAL MEDICINE

## 2017-01-25 RX ORDER — DIPHENOXYLATE HYDROCHLORIDE AND ATROPINE SULFATE 2.5; .025 MG/1; MG/1
2 TABLET ORAL 4 TIMES DAILY PRN
Qty: 60 TABLET | Refills: 2 | Status: SHIPPED | OUTPATIENT
Start: 2017-01-25 | End: 2017-10-25 | Stop reason: SDUPTHER

## 2017-01-25 RX ORDER — CIPROFLOXACIN 500 MG/1
500 TABLET ORAL 2 TIMES DAILY
Qty: 20 TABLET | Refills: 0 | Status: SHIPPED | OUTPATIENT
Start: 2017-01-25 | End: 2017-02-04

## 2017-01-25 RX ORDER — METOPROLOL SUCCINATE 50 MG/1
50 TABLET, EXTENDED RELEASE ORAL DAILY
Qty: 90 TABLET | Refills: 3 | Status: SHIPPED | OUTPATIENT
Start: 2017-01-25 | End: 2017-08-09 | Stop reason: SDUPTHER

## 2017-01-25 RX ORDER — CALCITRIOL 0.25 UG/1
0.25 CAPSULE ORAL DAILY
Qty: 90 CAPSULE | Refills: 3 | Status: SHIPPED | OUTPATIENT
Start: 2017-01-25 | End: 2017-04-25

## 2017-01-25 NOTE — PATIENT INSTRUCTIONS
In order to prevent recurrent infections, we would recommend, frequent scheduled urinations which would help eliminate urine effectively without having any residual urine in the urinary bladder and help reduce the incidence of bladder infections.  Take cranberry juice at least half a glass 3 times a day to prevent bladder infections

## 2017-01-25 NOTE — MR AVS SNAPSHOT
[facility]  [address] [address]  Phone: [phone]  Fax: [phone]                  Ibeth Schroeder   2017 3:00 PM   Office Visit    Description:  Female : 1950   Provider:  Cristina Crowder MD   Department:  [facility]           Reason for Visit     Follow-up     Chronic Kidney Disease           Diagnoses this Visit        Comments    CKD (chronic kidney disease) stage 3, GFR 30-59 ml/min    -  Primary     Anemia of other chronic disease         Hyperparathyroidism, secondary renal         Hyperkalemia         CAYETANO (acute kidney injury)                To Do List           Future Appointments        Provider Department Dept Phone    2017 8:30 AM [facility] PAIN1 [facility] -  [phone]    3/16/2017 2:45 PM [facility] XR1- [facility] [phone]    3/16/2017 3:00 PM PULMONARY LAB, [facility] [phone]    3/16/2017 3:20 PM [facility], [facility] [phone]    3/22/2017 2:40 PM Andrea Barrett MD [facility] [phone]      Your Future Surgeries/Procedures     2017   Surgery with Hari Rivera MD   [facility] -  ([facility])    [address] [address] [phone]              Goals (5 Years of Data)     None      Follow-Up and Disposition     Return in about 3 months (around 2017).    Follow-up and Disposition History       These Medications        Disp Refills Start End    metoprolol succinate (TOPROL-XL) 50 MG 24 hr tablet 90 tablet 3 2017    Take 1 tablet (50 mg total) by mouth once daily. - Oral    Pharmacy: [facility] - [facility] [address] #: [phone]       calcitRIOL (ROCALTROL) 0.25 MCG Cap 90 capsule 3 2017    Take 1 capsule (0.25 mcg total) by mouth once daily. - Oral     Pharmacy: [facility] [address] #: [phone]       ciprofloxacin HCl (CIPRO) 500 MG tablet 20 tablet 0 1/25/2017 2/4/2017    Take 1 tablet (500 mg total) by mouth 2 (two) times daily. - Oral    Pharmacy: [id number] [address] Ph #: [phone]       diphenoxylate-atropine 2.5-0.025 mg (LOMOTIL) 2.5-0.025 mg per tablet 60 tablet 2 1/25/2017 2/4/2017    Take 2 tablets by mouth 4 (four) times daily as needed for Diarrhea. - Oral    Pharmacy: [id number] [address] Ph #: [phone]         [facility]     [facility] On Call Nurse Care Line - 24/7 Assistance  Registered nurses in the [facility] provide clinical advisement, health education, appointment booking, and other advisory services.  Call for this free service at [phone].             Medications           Message regarding Medications     Verify the changes and/or additions to your medication regime listed below are the same as discussed with your clinician today.  If any of these changes or additions are incorrect, please notify your healthcare provider.        START taking these NEW medications        Refills    ciprofloxacin HCl (CIPRO) 500 MG tablet 0    Sig: Take 1 tablet (500 mg total) by mouth 2 (two) times daily.    Class: Normal    Route: Oral    diphenoxylate-atropine 2.5-0.025 mg (LOMOTIL) 2.5-0.025 mg per tablet 2    Sig: Take 2 tablets by mouth 4 (four) times daily as needed for Diarrhea.    Class: Normal    Route: Oral      STOP taking these medications     CALCIUM CARBONATE/VITAMIN D3 (CALTRATE 600 + D ORAL) Take 2 tablets by mouth once daily.           Verify that the below list of medications is an accurate representation of the medications you are currently taking.  If none reported, the list may be blank. If incorrect, please contact your healthcare provider. Carry this  list with you in case of emergency.           Current Medications     acetaminophen (TYLENOL) 325 MG tablet Take 325 mg by mouth every 6 (six) hours as needed for Pain.    amlodipine (NORVASC) 5 MG tablet Take 1 tablet (5 mg total) by mouth once daily.    busPIRone (BUSPAR) 15 MG tablet 1 tab po tid    calcitRIOL (ROCALTROL) 0.25 MCG Cap Take 1 capsule (0.25 mcg total) by mouth once daily.    duloxetine (CYMBALTA) 60 MG capsule take 1 capsule by mouth once daily    ferrous sulfate 325 (65 FE) MG EC tablet Take 1 tablet (325 mg total) by mouth once a week.    fluticasone (FLONASE) 50 mcg/actuation nasal spray instill 2 sprays into each nostril once daily    fluticasone-salmeterol (ADVAIR HFA) 115-21 mcg/actuation HFAA inhale 2 puffs INTO THE LUNGS twice a day    ipratropium-albuterol (COMBIVENT RESPIMAT)  mcg/actuation inhaler inhale 1 puff INTO THE LUNGS four times a day    levothyroxine (SYNTHROID) 100 MCG tablet Take 1 tablet (100 mcg total) by mouth once daily.    metoprolol succinate (TOPROL-XL) 50 MG 24 hr tablet Take 1 tablet (50 mg total) by mouth once daily.    MULTIVITAMIN W-MINERALS/LUTEIN (CENTRUM SILVER ORAL) Take 1 tablet by mouth once daily.    pantoprazole (PROTONIX) 40 MG tablet Take 1 tablet (40 mg total) by mouth once daily.    pravastatin (PRAVACHOL) 10 MG tablet Take 1 tablet (10 mg total) by mouth once daily.    ropinirole (REQUIP) 1 MG tablet Take 1 tablet (1 mg total) by mouth nightly.    thiamine 250 MG tablet Take 500 mg by mouth once daily.    tramadol (ULTRAM) 50 mg tablet Take 1 tablet (50 mg total) by mouth 2 (two) times daily as needed for Pain.    ciprofloxacin HCl (CIPRO) 500 MG tablet Take 1 tablet (500 mg total) by mouth 2 (two) times daily.    diclofenac sodium (VOLTAREN) 1 % Gel Apply 4 g topically 4 (four) times daily. Pt has shoulder pain and knee pain    diphenoxylate-atropine 2.5-0.025 mg (LOMOTIL) 2.5-0.025 mg per tablet Take 2 tablets by mouth 4 (four) times daily as  "needed for Diarrhea.           Clinical Reference Information           Vital Signs - Last Recorded  Most recent update: 1/25/2017  3:02 PM by Gina Guo LPN    BP Pulse Ht Wt BMI    120/78 80 5' 5" (1.651 m) 50.5 kg (111 lb 5.3 oz) 18.53 kg/m2      Blood Pressure          Most Recent Value    BP  120/78      Allergies as of 1/25/2017     Nsaids (Non-steroidal Anti-inflammatory Drug)    Pcn [Penicillins]    Sulfa (Sulfonamide Antibiotics)    Macrodantin [Nitrofurantoin Macrocrystalline]    Wellbutrin [Bupropion Hcl]      Immunizations Administered on Date of Encounter - 1/25/2017     None      Instructions    In order to prevent recurrent infections, we would recommend, frequent scheduled urinations which would help eliminate urine effectively without having any residual urine in the urinary bladder and help reduce the incidence of bladder infections.  Take cranberry juice at least half a glass 3 times a day to prevent bladder infections           "

## 2017-01-30 ENCOUNTER — INITIAL CONSULT (OUTPATIENT)
Dept: PSYCHIATRY | Facility: CLINIC | Age: 67
End: 2017-01-30
Payer: MEDICARE

## 2017-01-30 DIAGNOSIS — F32.89 OTHER DEPRESSION: Primary | ICD-10-CM

## 2017-01-30 PROCEDURE — 99499 UNLISTED E&M SERVICE: CPT | Mod: S$GLB,,, | Performed by: PSYCHIATRY & NEUROLOGY

## 2017-01-30 PROCEDURE — 90792 PSYCH DIAG EVAL W/MED SRVCS: CPT | Mod: S$GLB,,, | Performed by: PSYCHIATRY & NEUROLOGY
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
## 2017-02-08 ENCOUNTER — HOSPITAL ENCOUNTER (OUTPATIENT)
Dept: RADIOLOGY | Facility: HOSPITAL | Age: 67
Discharge: HOME | End: 2017-02-08
Attending: ANESTHESIOLOGY | Admitting: ANESTHESIOLOGY
Payer: MEDICARE

## 2017-02-08 ENCOUNTER — SURGERY (OUTPATIENT)
Age: 67
End: 2017-02-08

## 2017-02-08 ENCOUNTER — HOSPITAL ENCOUNTER (OUTPATIENT)
Facility: HOSPITAL | Age: 67
Discharge: HOME | End: 2017-02-08
Attending: ANESTHESIOLOGY | Admitting: ANESTHESIOLOGY
Payer: MEDICARE

## 2017-02-08 VITALS
OXYGEN SATURATION: 99 % | HEIGHT: 65 IN | WEIGHT: 120 LBS | DIASTOLIC BLOOD PRESSURE: 88 MMHG | TEMPERATURE: 98 F | BODY MASS INDEX: 19.99 KG/M2 | SYSTOLIC BLOOD PRESSURE: 152 MMHG | HEART RATE: 70 BPM | RESPIRATION RATE: 16 BRPM

## 2017-02-08 DIAGNOSIS — M47.817 SPONDYLOSIS OF LUMBOSACRAL REGION WITHOUT MYELOPATHY OR RADICULOPATHY: ICD-10-CM

## 2017-02-08 DIAGNOSIS — M47.817 SPONDYLOSIS OF LUMBOSACRAL REGION WITHOUT MYELOPATHY OR RADICULOPATHY: Primary | ICD-10-CM

## 2017-02-08 DIAGNOSIS — M47.816 LUMBAR SPONDYLOSIS: ICD-10-CM

## 2017-02-08 PROCEDURE — 25000003 PHARM REV CODE 250

## 2017-02-08 PROCEDURE — 64494 INJ PARAVERT F JNT L/S 2 LEV: CPT

## 2017-02-08 PROCEDURE — 64493 INJ PARAVERT F JNT L/S 1 LEV: CPT | Mod: LT,,, | Performed by: ANESTHESIOLOGY

## 2017-02-08 PROCEDURE — 64493 INJ PARAVERT F JNT L/S 1 LEV: CPT

## 2017-02-08 PROCEDURE — 63600175 PHARM REV CODE 636 W HCPCS

## 2017-02-08 PROCEDURE — 63600175 PHARM REV CODE 636 W HCPCS: Performed by: ANESTHESIOLOGY

## 2017-02-08 PROCEDURE — 64495 INJ PARAVERT F JNT L/S 3 LEV: CPT | Mod: LT,,, | Performed by: ANESTHESIOLOGY

## 2017-02-08 PROCEDURE — 64494 INJ PARAVERT F JNT L/S 2 LEV: CPT | Mod: LT,,, | Performed by: ANESTHESIOLOGY

## 2017-02-08 PROCEDURE — 25000003 PHARM REV CODE 250: Performed by: ANESTHESIOLOGY

## 2017-02-08 PROCEDURE — 64495 INJ PARAVERT F JNT L/S 3 LEV: CPT

## 2017-02-08 RX ORDER — LIDOCAINE HYDROCHLORIDE 20 MG/ML
INJECTION, SOLUTION INFILTRATION; PERINEURAL
Status: DISCONTINUED | OUTPATIENT
Start: 2017-02-08 | End: 2017-02-08 | Stop reason: HOSPADM

## 2017-02-08 RX ORDER — METHYLPREDNISOLONE ACETATE 80 MG/ML
INJECTION, SUSPENSION INTRA-ARTICULAR; INTRALESIONAL; INTRAMUSCULAR; SOFT TISSUE
Status: DISCONTINUED | OUTPATIENT
Start: 2017-02-08 | End: 2017-02-08 | Stop reason: HOSPADM

## 2017-02-08 RX ADMIN — METHYLPREDNISOLONE ACETATE 80 MG: 80 INJECTION, SUSPENSION INTRA-ARTICULAR; INTRALESIONAL; INTRAMUSCULAR; SOFT TISSUE at 08:02

## 2017-02-08 RX ADMIN — LIDOCAINE HYDROCHLORIDE 10 ML: 20 INJECTION, SOLUTION INFILTRATION; PERINEURAL at 08:02

## 2017-02-08 NOTE — PLAN OF CARE
Problem: Patient Care Overview  Goal: Plan of Care Review  Outcome: Outcome(s) achieved Date Met:  02/08/17  Patient discharged home in stable condition via wheelchair with ride. Verbalized understanding of discharge instructions. Patient voiced no complaints at this time. Patient stood at side of bed, walked steps with no new motor or sensory deficits. Neurologically intact.

## 2017-02-08 NOTE — DISCHARGE SUMMARY
[facility]  Discharge Note       Description of Procedure: Lumbar Medial Branch Block under Fluoroscopic Guidance    Procedure Date: 2/8/2017    Admit Date: 2/8/2017  Discharge Date: 2/8/2017     Attending Physician: Hari Rivera   Discharge Provider: Hari Rivera    Preoperative Diagnosis:   Active Hospital Problems    Diagnosis  POA    Lumbar spondylosis [M47.816]  Yes     Priority: High      Resolved Hospital Problems    Diagnosis Date Resolved POA   No resolved problems to display.     Postoperative Diagnosis:   Active Hospital Problems    Diagnosis  POA    Lumbar spondylosis [M47.816]  Yes     Priority: High      Resolved Hospital Problems    Diagnosis Date Resolved POA   No resolved problems to display.       Discharged Condition: Stable    Hospital Course: Patient was admitted for an outpatient procedure. The procedure was tolerated well with no complications.    Final Diagnoses: Same as principal problem.    Disposition: Home, self-care.    Follow up/Patient Instructions:  Follow-up in clinic in 2-3 weeks or as needed if not having pain or any pain related issues.    Medications: No medications were prescribed today. The patient was advised to resume normal medication regimen without change.  Specific information was provided regarding restarting any anticoagulants.    Discharge Procedure Orders (must include Diet, Follow-up, Activity):  Light activity for the remainder of the day, resume normal activity tomorrow. Resume normal diet. Follow-up in clinic in 2-3 weeks or as needed.

## 2017-02-08 NOTE — OP NOTE
Procedure: Lumbar Medial Branch Block under Fluorsocopic Guidance    Side: left     Level:  Sacral ala (Corresponding to the L5 dorsal ramus), L5 transverse process (Corresponding to the L4 medial branch), L4 transverse process (Corresponding to the L3 medial branch) and L3 transverse process (Corresponding to the L2 medial branch)     PROCEDURE DATE: 2/8/2017    Pre-operative Diagnosis: Lumbar Spondylosis  Post-operative Diagnosis: Lumbar Spondylosis    Provider: Hari Rivera MD  Assistant(s): none    Anesthesia: Local, No Sedation    >> 0 mg of VERSED    >> 0 mcg of FENTANYL     Indication: Low back pain without radiculopathy. Symptoms unresponsive to conservative treatments. Fluoroscopy was used to optimize visualization of needle placement and to maximize safety.     Procedure Description / Technique:  The patient was seen and identified in the preoperative area. Risks, benefits, complications, and alternatives were discussed with the patient. The patient agreed to proceed with the procedure and signed the consent. The site and side of the procedure was identified and marked. No iv was started.     The patient was taken to the procedural suite and positioned in prone orientation on the procedure table. A pillow was placed under the abdomen to reduce lumbar lordosis. A time out was performed. The procedure, site, side, and allergies were stated and agreed to by all present. The lumbosacral area was widely prepped with ChloraPrep. The procedural site was draped in usual sterile fashion. Vital signs were closely monitored throughout this procedure. Conscious sedation was NOT used for this procedure.    The Left sacral ala and superior articular process was identified and marked on AP fluoroscopic imaging. Subcutaneous tissues were localized using 1% PF Lidocaine to improve patient comfort. A 25 gauge 3.5 inch spinal needle was advance until the needle rested on OS at the interface of the sacral ala and the base  "of the sacral superior articular process. After negative aspiration, 0.75 mL of a solution containing 5 mL of 1% PF Lidocaine and 2 mL of Methylprednisolone (40 mg/mL) was injected. No pain or paresthesia was noted on injection. After left side injection, the fluoroscope was obliqued to the Left until the devin dog outline of the L5 vertebrae came into view. The spinal needle was advanced to the "eye of the devin dog" and after negative aspiration a 0.75 mL of the above noted solution was injected. No pain or paresthesia was noted. This technique was repeated at each of the above noted levels. The spinal needle was removed intact following injection at each targeted site. The stylet was replaced prior to needle removal at each site.    Description of Findings: Not applicable    Prosthetic devices, grafts, tissues, or devices implanted: None    Specimen Removed: No    ESTIMATED BLOOD LOSS: minimal    COMPLICATIONS: None    DISPOSITION / PLANS: The patient was transferred to the recovery area in a stable condition for observation. The patient was reexamined prior to discharge. There was no evidence of acute neurologic injury following the procedure.  Patient was discharged from the recovery room after meeting discharge criteria. Home discharge instructions were given to the patient by the staff.    "

## 2017-02-13 ENCOUNTER — OFFICE VISIT (OUTPATIENT)
Dept: FAMILY MEDICINE | Facility: CLINIC | Age: 67
End: 2017-02-13
Payer: MEDICARE

## 2017-02-13 VITALS
WEIGHT: 109.13 LBS | HEART RATE: 78 BPM | DIASTOLIC BLOOD PRESSURE: 82 MMHG | SYSTOLIC BLOOD PRESSURE: 130 MMHG | OXYGEN SATURATION: 93 % | TEMPERATURE: 92 F | HEIGHT: 65 IN | BODY MASS INDEX: 18.18 KG/M2

## 2017-02-13 DIAGNOSIS — I71.20 THORACIC AORTIC ANEURYSM WITHOUT RUPTURE: Primary | ICD-10-CM

## 2017-02-13 DIAGNOSIS — K43.9 ABDOMINAL WALL HERNIA: ICD-10-CM

## 2017-02-13 DIAGNOSIS — F32.0 MILD SINGLE CURRENT EPISODE OF MAJOR DEPRESSIVE DISORDER: ICD-10-CM

## 2017-02-13 DIAGNOSIS — J44.9 COPD MIXED TYPE: ICD-10-CM

## 2017-02-13 PROCEDURE — 99999 PR PBB SHADOW E&M-EST. PATIENT-LVL III: CPT | Mod: PBBFAC,,, | Performed by: FAMILY MEDICINE

## 2017-02-13 PROCEDURE — 3075F SYST BP GE 130 - 139MM HG: CPT | Mod: S$GLB,,, | Performed by: FAMILY MEDICINE

## 2017-02-13 PROCEDURE — 3079F DIAST BP 80-89 MM HG: CPT | Mod: S$GLB,,, | Performed by: FAMILY MEDICINE

## 2017-02-13 PROCEDURE — 99214 OFFICE O/P EST MOD 30 MIN: CPT | Mod: S$GLB,,, | Performed by: FAMILY MEDICINE

## 2017-02-13 PROCEDURE — 99499 UNLISTED E&M SERVICE: CPT | Mod: S$GLB,,, | Performed by: FAMILY MEDICINE

## 2017-02-13 RX ORDER — TRAMADOL HYDROCHLORIDE 50 MG/1
50 TABLET ORAL EVERY 6 HOURS PRN
COMMUNITY
End: 2017-04-11 | Stop reason: CLARIF

## 2017-02-13 NOTE — PROGRESS NOTES
Subjective:       Patient ID: Ibeth Schroeder is a 66 y.o. female.    Chief Complaint: abdominal lump    HPI Comments: 66 y old  Copd, depression  , abdominal aortic aneurysm here for f.u but mainly to discuss lump on upper abdominal wall . Reg COPD , it is severe,;last  PFT was in 2015 , not on any oxygen ,  Non longer smoking ,  sob has improved . Due to see pulm in 1 m . Last aneurysm us/ was  On 10 /16 , it showed an abdominal aortic aneurysm with maximal diameter of 4 cm.seeing  Dr Liz, depression is fine , not crying , seeing  Psych also . Reg Knot , she noted it 6 m ago , non tender.     Review of Systems   Constitutional: Negative.    HENT: Negative.    Respiratory: Negative.    Cardiovascular: Negative.    Gastrointestinal: Negative.    Genitourinary: Negative.        Objective:      Physical Exam   Constitutional: She is oriented to person, place, and time. She appears well-developed and well-nourished. No distress.   HENT:   Head: Normocephalic and atraumatic.   Right Ear: External ear normal.   Left Ear: External ear normal.   Mouth/Throat: No oropharyngeal exudate.   Eyes: Conjunctivae and EOM are normal. Pupils are equal, round, and reactive to light. Right eye exhibits no discharge. Left eye exhibits no discharge. No scleral icterus.   Neck: Normal range of motion. Neck supple. No JVD present. No tracheal deviation present. No thyromegaly present.   Cardiovascular: Normal rate, regular rhythm and normal heart sounds.  Exam reveals no gallop and no friction rub.    No murmur heard.  Pulmonary/Chest: Effort normal and breath sounds normal. No stridor. No respiratory distress. She has no wheezes. She has no rales. She exhibits no tenderness.   Abdominal: Soft. Bowel sounds are normal. She exhibits mass. She exhibits no distension. There is no tenderness. There is no rebound and no guarding.       3 cm in diam  Non tender soft . nodule    Musculoskeletal: Normal range of motion.   Lymphadenopathy:      She has no cervical adenopathy.   Neurological: She is alert and oriented to person, place, and time.   Skin: Skin is warm and dry. She is not diaphoretic.   Psychiatric: She has a normal mood and affect. Her behavior is normal. Judgment and thought content normal.       Assessment:         Ibeth was seen today for abdominal lump.    Diagnoses and all orders for this visit:    Thoracic aortic aneurysm without rupture    Mild single current episode of major depressive disorder    COPD mixed type    Abdominal wall hernia  -     Ambulatory consult to General Surgery      Plan:     Ibeth was seen today for abdominal lump.    Diagnoses and all orders for this visit:    Thoracic aortic aneurysm without rupture    Mild single current episode of major depressive disorder    COPD mixed type     Stable . F.u   With Vasc. Con statin   Stable . Cont med .   Stable . F.u with pulm  See Gen surge

## 2017-02-13 NOTE — MR AVS SNAPSHOT
[facility]  [address]  [facility] [address]  Phone: [phone]  Fax: [phone]                  Ibeth Schroeder   2017 1:40 PM   Office Visit    Description:  Female : 1950   Provider:  Geovanna Sanchez MD   Department:  [facility]           Reason for Visit     abdominal lump           Diagnoses this Visit        Comments    Thoracic aortic aneurysm without rupture    -  Primary     Mild single current episode of major depressive disorder         COPD mixed type         Abdominal wall hernia                To Do List           Future Appointments        Provider Department Dept Phone    3/16/2017 1:00 PM Nj Angulo MD [facility] Surgery [phone]    3/16/2017 2:45 PM ONL XR1-DR [facility] [phone]    3/16/2017 3:00 PM PULMONARY LAB, [facility]m [facility] [phone]    3/16/2017 3:20 PM PULMONARY LAB, [facility] Pulm Function [facility] [phone]    3/22/2017 2:40 PM Andrea Barrett MD [facility] [phone]      Goals (5 Years of Data)     None      [facility]     [facility] - 24/7 Assistance  Registered nurses in the [facility] provide clinical advisement, health education, appointment booking, and other advisory services.  Call for this free service at [phone].             Medications           Message regarding Medications     Verify the changes and/or additions to your medication regime listed below are the same as discussed with your clinician today.  If any of these changes or additions are incorrect, please notify your healthcare provider.             Verify that the below list of medications is an accurate representation of the medications you are currently taking.  If none reported, the list may be blank. If incorrect, please contact your healthcare provider. Carry this list with you in case of  "emergency.           Current Medications     acetaminophen (TYLENOL) 325 MG tablet Take 325 mg by mouth every 6 (six) hours as needed for Pain.    amlodipine (NORVASC) 5 MG tablet Take 1 tablet (5 mg total) by mouth once daily.    busPIRone (BUSPAR) 15 MG tablet 1 tab po tid    calcitRIOL (ROCALTROL) 0.25 MCG Cap Take 1 capsule (0.25 mcg total) by mouth once daily.    ferrous sulfate 325 (65 FE) MG EC tablet Take 1 tablet (325 mg total) by mouth once a week.    fluticasone (FLONASE) 50 mcg/actuation nasal spray instill 2 sprays into each nostril once daily    fluticasone-salmeterol (ADVAIR HFA) 115-21 mcg/actuation HFAA inhale 2 puffs INTO THE LUNGS twice a day    ipratropium-albuterol (COMBIVENT RESPIMAT)  mcg/actuation inhaler inhale 1 puff INTO THE LUNGS four times a day    levothyroxine (SYNTHROID) 100 MCG tablet Take 1 tablet (100 mcg total) by mouth once daily.    metoprolol succinate (TOPROL-XL) 50 MG 24 hr tablet Take 1 tablet (50 mg total) by mouth once daily.    MULTIVITAMIN W-MINERALS/LUTEIN (CENTRUM SILVER ORAL) Take 1 tablet by mouth once daily.    pantoprazole (PROTONIX) 40 MG tablet Take 1 tablet (40 mg total) by mouth once daily.    ropinirole (REQUIP) 1 MG tablet Take 1 tablet (1 mg total) by mouth nightly.    thiamine 250 MG tablet Take 500 mg by mouth once daily.    tramadol (ULTRAM) 50 mg tablet Take 50 mg by mouth every 6 (six) hours as needed for Pain.    diclofenac sodium (VOLTAREN) 1 % Gel Apply 4 g topically 4 (four) times daily. Pt has shoulder pain and knee pain    duloxetine (CYMBALTA) 60 MG capsule take 1 capsule by mouth once daily    pravastatin (PRAVACHOL) 10 MG tablet Take 1 tablet (10 mg total) by mouth once daily.           Clinical Reference Information           Your Vitals Were     BP Pulse Temp Height Weight SpO2    130/82 (BP Location: Left arm, Patient Position: Sitting, BP Method: Manual) 78 92 °F (33.3 °C) (Tympanic) 5' 5" (1.651 m) 49.5 kg (109 lb 2 oz) 93%    BMI    "             18.16 kg/m2          Blood Pressure          Most Recent Value    BP  130/82      Allergies as of 2/13/2017     Nsaids (Non-steroidal Anti-inflammatory Drug)    Pcn [Penicillins]    Sulfa (Sulfonamide Antibiotics)    Aspirin    Macrodantin [Nitrofurantoin Macrocrystalline]    Wellbutrin [Bupropion Hcl]      Immunizations Administered on Date of Encounter - 2/13/2017     None      Orders Placed During Today's Visit      Normal Orders This Visit    Ambulatory consult to General Surgery       Language Assistance Services     ATTENTION: Language assistance services are available, free of charge. Please call [phone].      ATENCIÓN: Si habla ervin, tiene a chan disposición servicios gratuitos de asistencia lingüística. Llame al [phone].     CHÚ Ý: N?u b?n nói Ti?ng Vi?t, có các d?ch v? h? tr? ngôn ng? mi?n phí dành cho b?n. G?i s? [phone].         [facility] complies with applicable Federal civil rights laws and does not discriminate on the basis of race, color, national origin, age, disability, or sex.

## 2017-02-21 ENCOUNTER — TELEPHONE (OUTPATIENT)
Dept: FAMILY MEDICINE | Facility: CLINIC | Age: 67
End: 2017-02-21

## 2017-02-21 NOTE — TELEPHONE ENCOUNTER
----- Message from Tiffany Grant sent at 2/21/2017  9:35 AM CST -----  Contact: pt   States she is calling rg having a Dr's office call her on yesterday and has forgotten the name of the Dr/ pt states she would like to discuss what was discussed on the call yesterday / states she was referred to a Dr by Dr Christopher and can be reached at [phone]//thanks/dbw

## 2017-02-21 NOTE — TELEPHONE ENCOUNTER
Returned call. Spoke with patient. Rescheduled appt for patient, per patients request to see dr stoddard for hernia. She accidentally canceled because she forgot we had sent her to patient

## 2017-02-27 RX ORDER — DULOXETIN HYDROCHLORIDE 60 MG/1
60 CAPSULE, DELAYED RELEASE ORAL DAILY
Qty: 90 CAPSULE | Refills: 0 | OUTPATIENT
Start: 2017-02-27

## 2017-02-27 RX ORDER — DULOXETIN HYDROCHLORIDE 60 MG/1
CAPSULE, DELAYED RELEASE ORAL
Qty: 90 CAPSULE | Refills: 0 | Status: SHIPPED | OUTPATIENT
Start: 2017-02-27 | End: 2017-05-25 | Stop reason: SDUPTHER

## 2017-03-06 ENCOUNTER — OFFICE VISIT (OUTPATIENT)
Dept: SURGERY | Facility: CLINIC | Age: 67
End: 2017-03-06
Payer: MEDICARE

## 2017-03-06 VITALS — BODY MASS INDEX: 17.87 KG/M2 | TEMPERATURE: 98 F | WEIGHT: 107.38 LBS

## 2017-03-06 DIAGNOSIS — K43.2 INCISIONAL HERNIA, WITHOUT OBSTRUCTION OR GANGRENE: Primary | ICD-10-CM

## 2017-03-06 PROCEDURE — 99999 PR PBB SHADOW E&M-EST. PATIENT-LVL III: CPT | Mod: PBBFAC,,, | Performed by: SURGERY

## 2017-03-06 PROCEDURE — 99202 OFFICE O/P NEW SF 15 MIN: CPT | Mod: S$GLB,,, | Performed by: SURGERY

## 2017-03-06 PROCEDURE — 1160F RVW MEDS BY RX/DR IN RCRD: CPT | Mod: S$GLB,,, | Performed by: SURGERY

## 2017-03-06 PROCEDURE — 99499 UNLISTED E&M SERVICE: CPT | Mod: S$GLB,,, | Performed by: SURGERY

## 2017-03-06 NOTE — PROGRESS NOTES
Subjective:       Patient ID: Ibeth Schroeder is a 66 y.o. female.    Small incisional hernia    Chief Complaint: Hernia    HPI Comments: she noted a small lump on her upper abdomen.  This is a site of an old feeding tube.  The patient has fairly severe COPD.  The last time she required any intervention she was in the hospital for 7 weeks with liver and COPD issues    Review of Systems   Respiratory: Positive for cough and shortness of breath.    Gastrointestinal:        Small bulge of the upper abdomen       Objective:      Physical Exam   Constitutional: No distress.   Frail chronically ill in appearance   Cardiovascular: Normal rate.    Pulmonary/Chest: Effort normal. She has no wheezes. She has no rales.   Decreased breath sounds throughout   Abdominal: Soft.   There is a half centimeter opening in the upper outer mid in the epigastrium at the site of a old gastrostomy tube.  There is a slightly larger associated bowel   Vitals reviewed.      Assessment:        incisional hernia at the site of an old percutaneous endoscopic gastrostomy tube.  It is likely that subfascial there is a gastric connection a gastric tract that could be inadvertently opened if the hernia repair was attempted.  It is unlikely that anything will incarcerated through this as the fascial defect is approximately 1/2 cm in size   Plan:       If it her comorbidities I recommend observation.  I've asked the patient to notify us if the hernia becomes significantly larger, painful, area turns red or she develops drainage.

## 2017-03-06 NOTE — PATIENT INSTRUCTIONS
You have a small hernia at the site of-year-old feeding tube.  Given your lung issues I would not recommend any surgical intervention.    If the area gets significantly larger, turns red or becomes painful, or there is drainage let us know    It is okay to exercise    Hernia (Adult)    A hernia can happen when there is a weakness or defect in the wall of the abdomen or groin. Intestines or nearby tissues may move from their usual location and push through the weakness in the wall. This can cause a hernia (bulge) you may see or feel.  Causes and Risk Factors   A hernia may be present at birth. Or it may be caused by the wear and tear of daily living. Certain factors can make a hernia more likely. These can include:  · Heavy lifting  · Straining, whether from lifting, movement, or constipation  · Chronic cough  · Injury to the abdominal wall  · Excess weight  · Pregnancy  · Prior surgery  · Older age  · Family history of hernia  Symptoms  Symptoms of a hernia may come on suddenly. Or they may appear slowly over time. Some common symptoms include:  · Bulge in the groin area, around the navel, or in the scrotum (the bulge may get bigger when you stand and go away when you lie down)  · Pain or pressure around the bulge  · Pain during activities such as lifting, coughing, or sneezing  · A feeling of weakness or pressure in the groin  · Pain or swelling in the scrotum  Types of hernias  There are different types of hernia. The type you have depends on its location:  · Inguinal: This type is in the groin or scrotum. It is more common in men.  · Femoral: This type is in the groin, upper thigh (where the leg bends), or labia. It is more common in women.  · Ventral: This type is in the abdominal wall.  · Umbilical: This type occurs around the navel (belly button).  · Incisional: This type occurs at the site of a previous surgery.  The condition of the hernia can help determine how urgently it needs to be treated.  · Reducible:  It goes back in by itself, or it can be pushed back in.  · Irreducible: It cant be pushed back in.  · Incarcerated/Strangulated: The intestine is trapped (incarcerated). If this happens, you wont be able to push the bulge back in. If the incarcerated hernia isnt treated, it may become strangulated. This means the area loses blood supply and the tissue may die. This requires emergency surgery! Treatment is needed right away!  In most cases, a hernia will not heal on its own. Surgery is usually needed to repair the defect in the abdominal wall or groin. Youll be told more about surgery, if needed.  If your symptoms are not severe, treatment may sometimes be delayed. In such cases, regular follow-up visits with the provider will be needed. Youll be asked to keep track of your symptoms and to watch for signs of more serious problems. You may also be given guidelines similar to the home care instructions below.  Home Care  To help keep a hernia from getting worse, you may be advised to:  · Avoid heavy lifting and straining as directed.  · Take steps to prevent constipation, such as eating more fiber and drinking more water. This may help reduce straining that can occur when having a bowel movement. Reducing straining may help keep your symptoms from getting worse.  · Maintain a healthy weight or lose excess weight. This can help reduce strain on abdominal muscles and tissues.  · Stop smoking. This can help prevent coughing that may also strain abdominal muscles and tissues.  Follow-up care  Follow up with your healthcare provider, or as directed. If imaging tests were done, they will be reviewed a doctor. You will be told the results and any new findings that may affect your care.  When to seek medical advice  Call your healthcare provider right away if any of these occur:  · Hernia hardens, swells, or grows larger  · Hernia can no longer be pushed back in  · Pain moves to the lower right abdomen (just below the  waistline), or spreads to the back  Call 911  Call 911 right away if any of these occur:  · Nausea and vomiting  · Severe pain, redness, or tenderness in the area near the hernia  · Pain worsens quickly and doesnt get better  · Inability to have a bowel movement or pass gas  · Fever of 100.4°F (38°C) or higher  · Trouble breathing  · Fainting  · Rapid heart rate  · Vomiting blood  · Large amounts of blood in stool  Date Last Reviewed: 6/9/2015  © 7899-4080 [employer]. [address] [place] [address], [place] [address]. All rights reserved. This information is not intended as a substitute for professional medical care. Always follow your healthcare professional's instructions.

## 2017-03-06 NOTE — MR AVS SNAPSHOT
[facility]  [address]  [place] [address]  Phone: [phone]  Fax: [phone]                  Ibeth Schroeder   3/6/2017 4:40 PM   Office Visit    Description:  Female : 1950   Provider:  Nj Angulo MD   Department:  [facility]           Reason for Visit     Hernia                To Do List           Future Appointments        Provider Department Dept Phone    3/16/2017 2:45 PM ONLH XR1-DR [facility] [phone]    3/16/2017 3:00 PM PULMONARY LAB, [facility] - Pulm Function [facility] [phone]    3/16/2017 3:20 PM [facility] [phone]    3/22/2017 2:40 PM Andrea Barrett MD [facility] - Pulmonary Services [phone]    2017 2:40 PM Juana Schuster PA-C [facility] - Interventional Pain [phone]      Goals (5 Years of Data)     None      [facility]     [facility] Care [facility] -  Assistance  Registered nurses in the [facility] provide clinical advisement, health education, appointment booking, and other advisory services.  Call for this free service at [phone].             Medications           Message regarding Medications     Verify the changes and/or additions to your medication regime listed below are the same as discussed with your clinician today.  If any of these changes or additions are incorrect, please notify your healthcare provider.             Verify that the below list of medications is an accurate representation of the medications you are currently taking.  If none reported, the list may be blank. If incorrect, please contact your healthcare provider. Carry this list with you in case of emergency.           Current Medications     acetaminophen (TYLENOL) 325 MG tablet Take 325 mg by mouth every 6 (six) hours as needed for Pain.    amlodipine (NORVASC) 5 MG tablet Take 1 tablet (5 mg total) by mouth once daily.    busPIRone  (BUSPAR) 15 MG tablet 1 tab po tid    calcitRIOL (ROCALTROL) 0.25 MCG Cap Take 1 capsule (0.25 mcg total) by mouth once daily.    duloxetine (CYMBALTA) 60 MG capsule take 1 capsule by mouth once daily    ferrous sulfate 325 (65 FE) MG EC tablet Take 1 tablet (325 mg total) by mouth once a week.    fluticasone (FLONASE) 50 mcg/actuation nasal spray instill 2 sprays into each nostril once daily    fluticasone-salmeterol (ADVAIR HFA) 115-21 mcg/actuation HFAA inhale 2 puffs INTO THE LUNGS twice a day    ipratropium-albuterol (COMBIVENT RESPIMAT)  mcg/actuation inhaler inhale 1 puff INTO THE LUNGS four times a day    levothyroxine (SYNTHROID) 100 MCG tablet Take 1 tablet (100 mcg total) by mouth once daily.    metoprolol succinate (TOPROL-XL) 50 MG 24 hr tablet Take 1 tablet (50 mg total) by mouth once daily.    MULTIVITAMIN W-MINERALS/LUTEIN (CENTRUM SILVER ORAL) Take 1 tablet by mouth once daily.    pantoprazole (PROTONIX) 40 MG tablet Take 1 tablet (40 mg total) by mouth once daily.    pravastatin (PRAVACHOL) 10 MG tablet Take 1 tablet (10 mg total) by mouth once daily.    ropinirole (REQUIP) 1 MG tablet Take 1 tablet (1 mg total) by mouth nightly.    thiamine 250 MG tablet Take 500 mg by mouth once daily.    tramadol (ULTRAM) 50 mg tablet Take 50 mg by mouth every 6 (six) hours as needed for Pain.    diclofenac sodium (VOLTAREN) 1 % Gel Apply 4 g topically 4 (four) times daily. Pt has shoulder pain and knee pain           Clinical Reference Information           Your Vitals Were     Temp Weight BMI          98.2 °F (36.8 °C) (Oral) 48.7 kg (107 lb 5.8 oz) 17.87 kg/m2        Allergies as of 3/6/2017     Nsaids (Non-steroidal Anti-inflammatory Drug)    Pcn [Penicillins]    Sulfa (Sulfonamide Antibiotics)    Aspirin    Macrodantin [Nitrofurantoin Macrocrystalline]    Wellbutrin [Bupropion Hcl]      Immunizations Administered on Date of Encounter - 3/6/2017     None      Instructions    You have a small hernia at  the site of-year-old feeding tube.  Given your lung issues I would not recommend any surgical intervention.    If the area gets significantly larger, turns red or becomes painful, or there is drainage let us know    It is okay to exercise    Hernia (Adult)    A hernia can happen when there is a weakness or defect in the wall of the abdomen or groin. Intestines or nearby tissues may move from their usual location and push through the weakness in the wall. This can cause a hernia (bulge) you may see or feel.  Causes and Risk Factors   A hernia may be present at birth. Or it may be caused by the wear and tear of daily living. Certain factors can make a hernia more likely. These can include:  · Heavy lifting  · Straining, whether from lifting, movement, or constipation  · Chronic cough  · Injury to the abdominal wall  · Excess weight  · Pregnancy  · Prior surgery  · Older age  · Family history of hernia  Symptoms  Symptoms of a hernia may come on suddenly. Or they may appear slowly over time. Some common symptoms include:  · Bulge in the groin area, around the navel, or in the scrotum (the bulge may get bigger when you stand and go away when you lie down)  · Pain or pressure around the bulge  · Pain during activities such as lifting, coughing, or sneezing  · A feeling of weakness or pressure in the groin  · Pain or swelling in the scrotum  Types of hernias  There are different types of hernia. The type you have depends on its location:  · Inguinal: This type is in the groin or scrotum. It is more common in men.  · Femoral: This type is in the groin, upper thigh (where the leg bends), or labia. It is more common in women.  · Ventral: This type is in the abdominal wall.  · Umbilical: This type occurs around the navel (belly button).  · Incisional: This type occurs at the site of a previous surgery.  The condition of the hernia can help determine how urgently it needs to be treated.  · Reducible: It goes back in by itself,  or it can be pushed back in.  · Irreducible: It cant be pushed back in.  · Incarcerated/Strangulated: The intestine is trapped (incarcerated). If this happens, you wont be able to push the bulge back in. If the incarcerated hernia isnt treated, it may become strangulated. This means the area loses blood supply and the tissue may die. This requires emergency surgery! Treatment is needed right away!  In most cases, a hernia will not heal on its own. Surgery is usually needed to repair the defect in the abdominal wall or groin. Youll be told more about surgery, if needed.  If your symptoms are not severe, treatment may sometimes be delayed. In such cases, regular follow-up visits with the provider will be needed. Youll be asked to keep track of your symptoms and to watch for signs of more serious problems. You may also be given guidelines similar to the home care instructions below.  Home Care  To help keep a hernia from getting worse, you may be advised to:  · Avoid heavy lifting and straining as directed.  · Take steps to prevent constipation, such as eating more fiber and drinking more water. This may help reduce straining that can occur when having a bowel movement. Reducing straining may help keep your symptoms from getting worse.  · Maintain a healthy weight or lose excess weight. This can help reduce strain on abdominal muscles and tissues.  · Stop smoking. This can help prevent coughing that may also strain abdominal muscles and tissues.  Follow-up care  Follow up with your healthcare provider, or as directed. If imaging tests were done, they will be reviewed a doctor. You will be told the results and any new findings that may affect your care.  When to seek medical advice  Call your healthcare provider right away if any of these occur:  · Hernia hardens, swells, or grows larger  · Hernia can no longer be pushed back in  · Pain moves to the lower right abdomen (just below the waistline), or spreads to the  back  Call 911  Call 911 right away if any of these occur:  · Nausea and vomiting  · Severe pain, redness, or tenderness in the area near the hernia  · Pain worsens quickly and doesnt get better  · Inability to have a bowel movement or pass gas  · Fever of 100.4°F (38°C) or higher  · Trouble breathing  · Fainting  · Rapid heart rate  · Vomiting blood  · Large amounts of blood in stool  Date Last Reviewed: 6/9/2015 © 2000-2016 [employer]. [address] [place] [address], [place] [address]. All rights reserved. This information is not intended as a substitute for professional medical care. Always follow your healthcare professional's instructions.             Language Assistance Services     ATTENTION: Language assistance services are available, free of charge. Please call [phone].      ATENCIÓN: Si habla español, tiene a chan disposición servicios gratuitos de asistencia lingüística. Llame al [phone].     CHÚ Ý: N?u b?n nói Ti?ng Vi?t, có các d?ch v? h? tr? ngôn ng? mi?n phí dành cho b?n. G?i s? [phone].         [facility] complies with applicable Federal civil rights laws and does not discriminate on the basis of race, color, national origin, age, disability, or sex.

## 2017-03-06 NOTE — LETTER
March 6, 2017      Geovanna Sanchez [place]  [address] [address] [address]           [facility]  [address] [place] [address]  Phone: [phone]  Fax: [phone]          Patient: Ibeth Schroeder   MR Number: [id number]   YOB: 1950   Date of Visit: 3/6/2017       Dear Dr. Geovanna Sanchez:    Thank you for referring Ibeth Schroeder to me for evaluation. Attached you will find relevant portions of my assessment and plan of care.    If you have questions, please do not hesitate to call me. I look forward to following Ibeth Schroeder along with you.    Sincerely,    Nj Angulo MD    Enclosure  CC:  No Recipients    If you would like to receive this communication electronically, please contact [email or web address] or [phone] to request more information on [employer] access.    For providers and/or their staff who would like to refer a patient to [facility], please contact us through our one-stop-shop provider referral line, [facility], at [phone].    If you feel you have received this communication in error or would no longer like to receive these types of communications, please e-mail [email or web address]

## 2017-03-16 ENCOUNTER — HOSPITAL ENCOUNTER (OUTPATIENT)
Dept: RADIOLOGY | Facility: HOSPITAL | Age: 67
Discharge: HOME | End: 2017-03-16
Attending: INTERNAL MEDICINE
Payer: MEDICARE

## 2017-03-16 ENCOUNTER — PROCEDURE VISIT (OUTPATIENT)
Dept: PULMONOLOGY | Facility: CLINIC | Age: 67
End: 2017-03-16
Payer: MEDICARE

## 2017-03-16 VITALS — BODY MASS INDEX: 18.69 KG/M2 | HEIGHT: 65 IN | WEIGHT: 112.19 LBS

## 2017-03-16 DIAGNOSIS — J43.9 NOCTURNAL HYPOXEMIA DUE TO EMPHYSEMA: ICD-10-CM

## 2017-03-16 DIAGNOSIS — J44.9 MODERATE COPD (CHRONIC OBSTRUCTIVE PULMONARY DISEASE): Chronic | ICD-10-CM

## 2017-03-16 DIAGNOSIS — G47.36 NOCTURNAL HYPOXEMIA DUE TO EMPHYSEMA: ICD-10-CM

## 2017-03-16 PROCEDURE — 71020 XR CHEST PA AND LATERAL: CPT | Mod: 26,,, | Performed by: RADIOLOGY

## 2017-03-16 PROCEDURE — 94010 BREATHING CAPACITY TEST: CPT | Mod: 59,S$GLB,, | Performed by: INTERNAL MEDICINE

## 2017-03-16 PROCEDURE — 94620 PR PULMONARY STRESS TESTING,SIMPLE: CPT | Mod: S$GLB,,, | Performed by: INTERNAL MEDICINE

## 2017-03-16 NOTE — PROCEDURES
"[facility]  Six Minute Walk     SUMMARY     Ordering Provider: Dr. Murray   Interpreting Provider: Dr. Murray  Performing nurse/tech/RT: E[place]  Diagnosis: COPD  Height: 5' 5" (165.1 cm)  Weight: 50.9 kg (112 lb 3.4 oz)  BMI (Calculated): 18.7   Patient Race:             Phase Oxygen Assessment Supplemental O2 Heart   Rate Blood Pressure Fe Dyspnea Scale Rating   Resting 94 % Room Air 72 bpm 115/64 0   Exercise        Minute        1 92 % Room Air 77 bpm     2 87 % Room Air 102 bpm     3 92 % 2 L/M 78 bpm     4 88 % 2 L/M 102 bpm     5 89 % 3 L/M 103 bpm     6    3 L/M 103 bpm (!) 136/94 7-8   Recovery        Minute        1 94 % 3 L/M 94 bpm     2 97 % 3 L/M 82 bpm     3 97 % 3 L/M 79 bpm     4 97 % 3 L/M 48 bpm 116/76 0     Six Minute Walk Summary  6MWT Status: not completed  Patient Reported: Dyspnea     Interpretation:  Did the patient stop or pause?: Yes  How many times did the patient stop or pause?: 3  Stop Time 1: 120  Restart Time 1: 141  Pause Time 1: 21 seconds  Stop Time 2: 252  Restart Time 2: 261.6  Pause Time 2: 9.6 seconds  Stop Time 3: 319.2 seconds  Restart Time 3: 0 seconds  Pause Time 3: 40.8 seconds           Total Time Walked (Calculated): 288.6 seconds  Final Partial Lap Distance (feet): 150 feet  Total Distance Meters (Calculated): 289.56 meters  Predicted Distance Meters (Calculated): 528.88 meters  Percentage of Predicted (Calculated): 54.75  Peak VO2 (Calculated): 12.67  Mets: 3.62  Has The Patient Had a Previous Six Minute Walk Test?: No       Previous 6MWT Results  Has The Patient Had a Previous Six Minute Walk Test?: No     REPORT     Six minute walk distance is 289 / 528meters(54% predicted) with moderate dyspnea.   Peak VO2 during walking was 12.67 Ml/min/kg [ 3.62 METS].   During exercise, there was   significant desaturation while breathing room air.  SpO2 krishan was 87% at 2nd minute. Supplementary oxygen added 2-3 LPM.   Blood pressure destini to  " mmHg and Heart rate increased to 103bpm with walking.   The patient reported no non-pulmonary symptoms during exercise  .      The patient did complete the study, walking 288 seconds of the 360 second test.   No previous study performed.   Based upon age and body mass index, exercise capacity is LESS than predicted  Supplementary oxygen required.    Andrea Barrett MD

## 2017-03-17 LAB
PRE FEF 25 75: 0.26 L/S (ref 1.59–2.87)
PRE FET 100: 17.58 S
PRE FEV1 FVC: 36 %
PRE FEV1: 0.91 L (ref 2.24–2.84)
PRE FIF 50: 2.56 L/S
PRE FVC: 2.56 L (ref 2.95–3.67)
PRE PEF: 2.03 L/S (ref 5.33–7.09)
PREDICTED FEV1 FVC: 77.21 % (ref 72.31–82.11)
PREDICTED FEV1: 2.54 L (ref 2.24–2.84)
PREDICTED FVC: 3.31 L (ref 2.95–3.67)
PROVOCATION PROTOCOL: ABNORMAL

## 2017-03-21 ENCOUNTER — TELEPHONE (OUTPATIENT)
Dept: RHEUMATOLOGY | Facility: CLINIC | Age: 67
End: 2017-03-21

## 2017-03-21 DIAGNOSIS — M81.8 OSTEOPOROSIS, IDIOPATHIC: Primary | ICD-10-CM

## 2017-03-21 NOTE — TELEPHONE ENCOUNTER
----- Message from Elizabeth Escalante LPN sent at 1/12/2017 12:24 PM CST -----  Last prolia 1/11/17 due7/2017

## 2017-04-05 ENCOUNTER — OFFICE VISIT (OUTPATIENT)
Dept: PULMONOLOGY | Facility: CLINIC | Age: 67
End: 2017-04-05
Payer: MEDICARE

## 2017-04-05 VITALS
DIASTOLIC BLOOD PRESSURE: 62 MMHG | SYSTOLIC BLOOD PRESSURE: 110 MMHG | OXYGEN SATURATION: 93 % | HEART RATE: 42 BPM | WEIGHT: 109.81 LBS | BODY MASS INDEX: 18.3 KG/M2 | HEIGHT: 65 IN | RESPIRATION RATE: 18 BRPM

## 2017-04-05 DIAGNOSIS — J43.9 NOCTURNAL HYPOXEMIA DUE TO EMPHYSEMA: Chronic | ICD-10-CM

## 2017-04-05 DIAGNOSIS — G47.36 NOCTURNAL HYPOXEMIA DUE TO EMPHYSEMA: Chronic | ICD-10-CM

## 2017-04-05 DIAGNOSIS — J44.9 STAGE 3 SEVERE COPD BY GOLD CLASSIFICATION: Primary | Chronic | ICD-10-CM

## 2017-04-05 DIAGNOSIS — R09.02 EXERCISE HYPOXEMIA: ICD-10-CM

## 2017-04-05 PROCEDURE — 99499 UNLISTED E&M SERVICE: CPT | Mod: S$GLB,,, | Performed by: INTERNAL MEDICINE

## 2017-04-05 PROCEDURE — 3074F SYST BP LT 130 MM HG: CPT | Mod: S$GLB,,, | Performed by: INTERNAL MEDICINE

## 2017-04-05 PROCEDURE — 3078F DIAST BP <80 MM HG: CPT | Mod: S$GLB,,, | Performed by: INTERNAL MEDICINE

## 2017-04-05 PROCEDURE — 99999 PR PBB SHADOW E&M-EST. PATIENT-LVL III: CPT | Mod: PBBFAC,,, | Performed by: INTERNAL MEDICINE

## 2017-04-05 PROCEDURE — 1160F RVW MEDS BY RX/DR IN RCRD: CPT | Mod: S$GLB,,, | Performed by: INTERNAL MEDICINE

## 2017-04-05 PROCEDURE — 99214 OFFICE O/P EST MOD 30 MIN: CPT | Mod: S$GLB,,, | Performed by: INTERNAL MEDICINE

## 2017-04-05 NOTE — ASSESSMENT & PLAN NOTE
CAT score 21    COPD ROS: taking medications as instructed ( advair and Combivent respimat), no medication side effects noted, no significant ongoing wheezing or shortness of breath, using bronchodilator MDI less than twice a week.     New concerns: None.     Exam: appears well, vitals normal, no respiratory distress, acyanotic, normal RR, chest clear, no wheezing, crepitations, rhonchi, normal symmetric air entry.     Assessment: COPD reasonably well controlled.     Plan: current treatment plan is effective, no change in therapy.

## 2017-04-05 NOTE — MR AVS SNAPSHOT
[facility]  [address] [address] [address]  Phone: [phone]  Fax: [phone]                  Ibeth Schroeder   2017 3:40 PM   Office Visit    Description:  Female : 1950   Provider:  Andrea Barrett MD   Department:  [facility]           Reason for Visit     COPD           Diagnoses this Visit        Comments    Stage 3 severe COPD by GOLD classification    -  Primary     Nocturnal hypoxemia due to emphysema         Exercise hypoxemia                To Do List           Future Appointments        Provider Department Dept Phone    2017 2:40 PM Juana Schuster PA-C [facility] Inter[facility] [phone]    2017 3:30 PM SPECIMEN, [facility] [phone]    2017 3:40 PM LABORATORY, [address] [facility] [phone]    2017 3:00 PM Cristina Crowder MD [facility] [phone]    2017 4:00 PM LABORATORY, [address] [facility] [phone]      Goals (5 Years of Data)     None      Follow-Up and Disposition     Return in about 1 year (around 2018) for cxr, kamlesh, Oxygen, immunisations in fall.      [facility]     [facility] Nurse Care [facility] -  Assistance  Unless otherwise directed by your provider, please contact [facility], our nurse care line that is available for  assistance.     Registered nurses in the [facility] provide: appointment scheduling, clinical advisement, health education, and other advisory services.  Call: [phone] (toll free)               Medications           Message regarding Medications     Verify the changes and/or additions to your medication regime listed below are the same as discussed with your clinician today.  If any of these changes or additions are incorrect, please notify your healthcare provider.        STOP taking these medications     acetaminophen  (TYLENOL) 325 MG tablet Take 325 mg by mouth every 6 (six) hours as needed for Pain.           Verify that the below list of medications is an accurate representation of the medications you are currently taking.  If none reported, the list may be blank. If incorrect, please contact your healthcare provider. Carry this list with you in case of emergency.           Current Medications     amlodipine (NORVASC) 5 MG tablet Take 1 tablet (5 mg total) by mouth once daily.    busPIRone (BUSPAR) 15 MG tablet 1 tab po tid    calcitRIOL (ROCALTROL) 0.25 MCG Cap Take 1 capsule (0.25 mcg total) by mouth once daily.    duloxetine (CYMBALTA) 60 MG capsule take 1 capsule by mouth once daily    ferrous sulfate 325 (65 FE) MG EC tablet Take 1 tablet (325 mg total) by mouth once a week.    fluticasone (FLONASE) 50 mcg/actuation nasal spray instill 2 sprays into each nostril once daily    fluticasone-salmeterol (ADVAIR HFA) 115-21 mcg/actuation HFAA inhale 2 puffs INTO THE LUNGS twice a day    ipratropium-albuterol (COMBIVENT RESPIMAT)  mcg/actuation inhaler inhale 1 puff INTO THE LUNGS four times a day    levothyroxine (SYNTHROID) 100 MCG tablet Take 1 tablet (100 mcg total) by mouth once daily.    metoprolol succinate (TOPROL-XL) 50 MG 24 hr tablet Take 1 tablet (50 mg total) by mouth once daily.    MULTIVITAMIN W-MINERALS/LUTEIN (CENTRUM SILVER ORAL) Take 1 tablet by mouth once daily.    pantoprazole (PROTONIX) 40 MG tablet Take 1 tablet (40 mg total) by mouth once daily.    pravastatin (PRAVACHOL) 10 MG tablet Take 1 tablet (10 mg total) by mouth once daily.    ropinirole (REQUIP) 1 MG tablet Take 1 tablet (1 mg total) by mouth nightly.    thiamine 250 MG tablet Take 500 mg by mouth once daily.    tramadol (ULTRAM) 50 mg tablet Take 50 mg by mouth every 6 (six) hours as needed for Pain.    diclofenac sodium (VOLTAREN) 1 % Gel Apply 4 g topically 4 (four) times daily. Pt has shoulder pain and knee pain           Clinical  "Reference Information           Your Vitals Were     BP Pulse Resp Height Weight SpO2    110/62 42 18 5' 5" (1.651 m) 49.8 kg (109 lb 12.6 oz) 93%    BMI                18.27 kg/m2          Blood Pressure          Most Recent Value    BP  110/62      Allergies as of 4/5/2017     Nsaids (Non-steroidal Anti-inflammatory Drug)    Pcn [Penicillins]    Sulfa (Sulfonamide Antibiotics)    Aspirin    Macrodantin [Nitrofurantoin Macrocrystalline]    Wellbutrin [Bupropion Hcl]      Immunizations Administered on Date of Encounter - 4/5/2017     None      Orders Placed During Today's Visit      Normal Orders This Visit    OXYGEN FOR HOME USE     Future Labs/Procedures Expected by Expires    X-Ray Chest PA And Lateral  4/5/2017 4/5/2018    Spirometry with/without bronchodilator  As directed 4/5/2018    Stress test, pulmonary  As directed 4/5/2018      Language Assistance Services     ATTENTION: Language assistance services are available, free of charge. Please call [phone].      ATENCIÓN: Si habla español, tiene a chan disposición servicios gratuitos de asistencia lingüística. Llame al [phone].     CHÚ Ý: N?u b?n nói Ti?ng Vi?t, có các d?ch v? h? tr? ngôn ng? mi?n phí dành cho b?n. G?i s? [phone].         [facility] complies with applicable Federal civil rights laws and does not discriminate on the basis of race, color, national origin, age, disability, or sex.        "

## 2017-04-05 NOTE — PROGRESS NOTES
Subjective:      Ibeth Schroeder is a 66 y.o. female with known moderate to severe COPD  Follow-up appointment. Last visit 3/16/2016, spouse with her  COPD test score is 21  No respiratory symptoms no cough no wheezing no shortness of breath  Main concern: poor memory  Immunizations she got her flu shot at [facility]  She is on Advair and Combivent Respimat  She has nocturnal oxygen  She did  meet criteria for daytime oxygen based 6 minute walk  Her lowest oxygen saturation during ambulation was 87% 2 L to 3 L was added  X-ray was reviewed stable.  Spirometry did show some decline in FEV1  She is active walking her dog and also going to [employer]        The following portions of the patient's history were reviewed and updated as appropriate:   She  has a past medical history of Alcohol dependence; Allergy; Anemia; Anxiety; Arthritis (6/24/2013); Asthma; Colon polyp; Colon polyp; COPD (chronic obstructive pulmonary disease); Depression; Diverticulosis; Diverticulosis; Hiatal hernia; Hyperlipidemia; Hypertension; Hypocalcemia (7/6/2016); Hypothyroidism (6/24/2013); Osteoporosis; Pulmonary embolism; Restless leg syndrome; RLS (restless legs syndrome); Seizure (6/24/2013); Tobacco dependence; and Trouble in sleeping.  She  does not have any pertinent problems on file.  She  has a past surgical history that includes Finger surgery; Joint replacement; Tonsillectomy; Adenoidectomy; Cholecystectomy; Appendectomy; Hernia repair; Colonoscopy (2013); Total hip arthroplasty; and Hysterectomy.  Her family history includes Asthma in her father; Cancer in her maternal aunt, maternal aunt, maternal grandmother, and mother; Colon cancer in her maternal grandmother; Diabetes in her mother and son; Heart disease in her maternal aunt and maternal aunt; Hypertension in her mother and son; Kidney disease in her mother; Parkinsonism in her mother. There is no history of Stroke.  She  reports that she quit smoking about 6 years ago.  Her smoking use included Cigarettes. She has a 30.00 pack-year smoking history. She quit smokeless tobacco use about 6 years ago. She reports that she drinks about 0.6 - 1.2 oz of alcohol per week  She reports that she does not use illicit drugs.  She has a current medication list which includes the following prescription(s): amlodipine, buspirone, calcitriol, duloxetine, ferrous sulfate, fluticasone, fluticasone-salmeterol, ipratropium-albuterol, levothyroxine, metoprolol succinate, folic acid/multivit-min/lutein, pantoprazole, pravastatin, ropinirole, thiamine, tramadol, and diclofenac sodium, and the following Facility-Administered Medications: denosumab.  Current Outpatient Prescriptions on File Prior to Visit   Medication Sig Dispense Refill    amlodipine (NORVASC) 5 MG tablet Take 1 tablet (5 mg total) by mouth once daily. 90 tablet 3    busPIRone (BUSPAR) 15 MG tablet 1 tab po tid 90 tablet 1    calcitRIOL (ROCALTROL) 0.25 MCG Cap Take 1 capsule (0.25 mcg total) by mouth once daily. 90 capsule 3    duloxetine (CYMBALTA) 60 MG capsule take 1 capsule by mouth once daily 90 capsule 0    ferrous sulfate 325 (65 FE) MG EC tablet Take 1 tablet (325 mg total) by mouth once a week. 90 tablet 3    fluticasone (FLONASE) 50 mcg/actuation nasal spray instill 2 sprays into each nostril once daily 16 g 11    fluticasone-salmeterol (ADVAIR HFA) 115-21 mcg/actuation HFAA inhale 2 puffs INTO THE LUNGS twice a day 12 g 12    ipratropium-albuterol (COMBIVENT RESPIMAT)  mcg/actuation inhaler inhale 1 puff INTO THE LUNGS four times a day 3 Package 4    levothyroxine (SYNTHROID) 100 MCG tablet Take 1 tablet (100 mcg total) by mouth once daily. 90 tablet 3    metoprolol succinate (TOPROL-XL) 50 MG 24 hr tablet Take 1 tablet (50 mg total) by mouth once daily. 90 tablet 3    MULTIVITAMIN W-MINERALS/LUTEIN (CENTRUM SILVER ORAL) Take 1 tablet by mouth once daily.      pantoprazole (PROTONIX) 40 MG tablet Take 1 tablet  "(40 mg total) by mouth once daily. 90 tablet 3    pravastatin (PRAVACHOL) 10 MG tablet Take 1 tablet (10 mg total) by mouth once daily. 90 tablet 3    ropinirole (REQUIP) 1 MG tablet Take 1 tablet (1 mg total) by mouth nightly. 90 tablet 3    thiamine 250 MG tablet Take 500 mg by mouth once daily.      tramadol (ULTRAM) 50 mg tablet Take 50 mg by mouth every 6 (six) hours as needed for Pain.      diclofenac sodium (VOLTAREN) 1 % Gel Apply 4 g topically 4 (four) times daily. Pt has shoulder pain and knee pain 200 g 5    [DISCONTINUED] acetaminophen (TYLENOL) 325 MG tablet Take 325 mg by mouth every 6 (six) hours as needed for Pain.       Current Facility-Administered Medications on File Prior to Visit   Medication Dose Route Frequency Provider Last Rate Last Dose    denosumab (PROLIA) injection 60 mg  60 mg Subcutaneous Q6 Months Patria Melara PA-C   60 mg at 01/11/17 1610     She is allergic to nsaids (non-steroidal anti-inflammatory drug); pcn [penicillins]; sulfa (sulfonamide antibiotics); aspirin; macrodantin [nitrofurantoin macrocrystalline]; and wellbutrin [bupropion hcl]..    Review of Systems  A comprehensive review of systems was negative.       Objective:      /62  Pulse (!) 42  Resp 18  Ht 5' 5" (1.651 m)  Wt 49.8 kg (109 lb 12.6 oz)  SpO2 (!) 93%  BMI 18.27 kg/m2  FEV1: 0.91 L, 36 % of predicted  FEV1/FVC: 35 %  General appearance: alert, appears stated age and cooperative  Head: Normocephalic, without obvious abnormality  Eyes: negative findings: conjunctivae and sclerae normal  Nose: no discharge  Throat: lips, mucosa, and tongue normal; teeth and gums normal  Neck: no adenopathy, no carotid bruit, no JVD, supple, symmetrical, trachea midline and thyroid not enlarged, symmetric, no tenderness/mass/nodules  Lungs: clear to auscultation bilaterally and normal percussion bilaterally  Heart: regular rate and rhythm, S1, S2 normal, no murmur, click, rub or gallop  Abdomen: soft, " non-tender; bowel sounds normal; no masses,  no organomegaly  Extremities: extremities normal, atraumatic, no cyanosis or edema  Pulses: 2+ and symmetric  Skin: Skin color, texture, turgor normal. No rashes or lesions  Lymph nodes: Cervical, supraclavicular, and axillary nodes normal.  Neurologic: Grossly normal       Spirometry  FEV1 0.91 L (36% predicted) FVC is 2.56 L (72% predicted) FEV1/FVC was 35.    CXR  Findings: The cardiac and mediastinal silhouettes are within normal limits. Large hiatal hernia again demonstrated.  Bandlike opacities in the left lung base may represent scarring versus subsegmental atelectasis and appear unchanged from prior.  Right lung appears clear. Remote left-sided rib fracture deformities again noted.  There is a partially healed fracture involving the right posterior 5th rib which appears new from prior. Multilevel degenerative changes noted throughout the visualized spine.   Impression     As above.        6MWD  REPORT     Six minute walk distance is 289 / 528meters(54% predicted) with   moderate dyspnea.   Peak VO2 during walking was 12.67 Ml/min/kg [ 3.62 METS].   During exercise, there was   significant desaturation while   breathing room air.  SpO2 krishan was 87% at 2nd minute. Supplementary oxygen added 2-3   LPM.   Blood pressure destini to  mmHg and Heart rate increased to   103bpm with walking.   The patient reported no non-pulmonary symptoms during exercise  .      The patient did complete the study, walking 288 seconds of the   360 second test.   No previous study performed.   Based upon age and body mass index, exercise capacity is LESS   than predicted  Supplementary oxygen required.       Assessment:      Problem List Items Addressed This Visit     Nocturnal hypoxemia due to emphysema (Chronic)     Nocturnal Oxygen         Stage 3 severe COPD by GOLD classification - Primary (Chronic)     CAT score 21    COPD ROS: taking medications as instructed ( advair and  Combivent respimat), no medication side effects noted, no significant ongoing wheezing or shortness of breath, using bronchodilator MDI less than twice a week.     New concerns: None.     Exam: appears well, vitals normal, no respiratory distress, acyanotic, normal RR, chest clear, no wheezing, crepitations, rhonchi, normal symmetric air entry.     Assessment: COPD reasonably well controlled.     Plan: current treatment plan is effective, no change in therapy.            Relevant Orders    OXYGEN FOR HOME USE    X-Ray Chest PA And Lateral    Spirometry with/without bronchodilator    Stress test, pulmonary    Exercise hypoxemia    Relevant Orders    OXYGEN FOR HOME USE         STEPH Index Score = 4    Interpretation: This result is indicative of a 52 month mortality of approximately 60%.      Plan:    Return in about 1 year (around 4/5/2018) for cxr, kamlesh, Oxygen, immunisations in fall.  Continue exercise at [employer]  This note was prepared using voice recognition system and is likely to have sound alike errors that may have been overlooked even after proof reading.  Please call me with any questions    Discussed diagnosis, its evaluation, treatment and usual course. All questions answered.  Thank you for the courtesy of participating in the care of this patient    Andrea Barrett MD

## 2017-04-07 RX ORDER — BUSPIRONE HYDROCHLORIDE 15 MG/1
TABLET ORAL
Qty: 90 TABLET | Refills: 1 | Status: SHIPPED | OUTPATIENT
Start: 2017-04-07 | End: 2017-08-28 | Stop reason: SDUPTHER

## 2017-04-10 RX ORDER — AMLODIPINE BESYLATE 5 MG/1
TABLET ORAL
Qty: 90 TABLET | Refills: 3 | Status: SHIPPED | OUTPATIENT
Start: 2017-04-10 | End: 2017-04-19

## 2017-04-10 RX ORDER — PRAVASTATIN SODIUM 10 MG/1
TABLET ORAL
Qty: 30 TABLET | Refills: 0 | Status: SHIPPED | OUTPATIENT
Start: 2017-04-10 | End: 2017-05-31 | Stop reason: SDUPTHER

## 2017-04-11 ENCOUNTER — LAB VISIT (OUTPATIENT)
Dept: LAB | Facility: HOSPITAL | Age: 67
End: 2017-04-11
Attending: INTERNAL MEDICINE
Payer: MEDICARE

## 2017-04-11 ENCOUNTER — OFFICE VISIT (OUTPATIENT)
Dept: PAIN MEDICINE | Facility: CLINIC | Age: 67
End: 2017-04-11
Payer: MEDICARE

## 2017-04-11 VITALS
BODY MASS INDEX: 18.16 KG/M2 | HEIGHT: 65 IN | SYSTOLIC BLOOD PRESSURE: 111 MMHG | WEIGHT: 109 LBS | DIASTOLIC BLOOD PRESSURE: 77 MMHG | HEART RATE: 92 BPM

## 2017-04-11 DIAGNOSIS — M47.817 LUMBOSACRAL SPONDYLOSIS WITHOUT MYELOPATHY: Primary | ICD-10-CM

## 2017-04-11 DIAGNOSIS — G89.4 CHRONIC PAIN DISORDER: ICD-10-CM

## 2017-04-11 DIAGNOSIS — N18.30 CKD (CHRONIC KIDNEY DISEASE) STAGE 3, GFR 30-59 ML/MIN: ICD-10-CM

## 2017-04-11 DIAGNOSIS — E87.5 HYPERKALEMIA: ICD-10-CM

## 2017-04-11 DIAGNOSIS — I71.40 ABDOMINAL AORTIC ANEURYSM (AAA) WITHOUT RUPTURE: Chronic | ICD-10-CM

## 2017-04-11 DIAGNOSIS — D63.8 ANEMIA OF OTHER CHRONIC DISEASE: ICD-10-CM

## 2017-04-11 DIAGNOSIS — N17.9 AKI (ACUTE KIDNEY INJURY): ICD-10-CM

## 2017-04-11 DIAGNOSIS — M51.36 DDD (DEGENERATIVE DISC DISEASE), LUMBAR: ICD-10-CM

## 2017-04-11 DIAGNOSIS — M47.816 LUMBAR FACET ARTHROPATHY: ICD-10-CM

## 2017-04-11 LAB
ALBUMIN SERPL BCP-MCNC: 3.4 G/DL
ANION GAP SERPL CALC-SCNC: 11 MMOL/L
BASOPHILS # BLD AUTO: 0.03 K/UL
BASOPHILS NFR BLD: 0.3 %
BUN SERPL-MCNC: 31 MG/DL
CALCIUM SERPL-MCNC: 9.4 MG/DL
CHLORIDE SERPL-SCNC: 111 MMOL/L
CO2 SERPL-SCNC: 21 MMOL/L
CREAT SERPL-MCNC: 1.8 MG/DL
DIFFERENTIAL METHOD: ABNORMAL
EOSINOPHIL # BLD AUTO: 0.3 K/UL
EOSINOPHIL NFR BLD: 2.8 %
ERYTHROCYTE [DISTWIDTH] IN BLOOD BY AUTOMATED COUNT: 14.2 %
EST. GFR  (AFRICAN AMERICAN): 33.8 ML/MIN/1.73 M^2
EST. GFR  (NON AFRICAN AMERICAN): 29.3 ML/MIN/1.73 M^2
GLUCOSE SERPL-MCNC: 76 MG/DL
HCT VFR BLD AUTO: 38.8 %
HGB BLD-MCNC: 12.6 G/DL
LYMPHOCYTES # BLD AUTO: 2.8 K/UL
LYMPHOCYTES NFR BLD: 26.5 %
MCH RBC QN AUTO: 31.2 PG
MCHC RBC AUTO-ENTMCNC: 32.5 %
MCV RBC AUTO: 96 FL
MONOCYTES # BLD AUTO: 0.8 K/UL
MONOCYTES NFR BLD: 7.9 %
NEUTROPHILS # BLD AUTO: 6.4 K/UL
NEUTROPHILS NFR BLD: 61.9 %
PHOSPHATE SERPL-MCNC: 3 MG/DL
PLATELET # BLD AUTO: 278 K/UL
PMV BLD AUTO: 10.2 FL
POTASSIUM SERPL-SCNC: 4.5 MMOL/L
RBC # BLD AUTO: 4.04 M/UL
SODIUM SERPL-SCNC: 143 MMOL/L
WBC # BLD AUTO: 10.4 K/UL

## 2017-04-11 PROCEDURE — 99999 PR PBB SHADOW E&M-EST. PATIENT-LVL IV: CPT | Mod: PBBFAC,,, | Performed by: PHYSICIAN ASSISTANT

## 2017-04-11 PROCEDURE — 99499 UNLISTED E&M SERVICE: CPT | Mod: S$GLB,,, | Performed by: PHYSICIAN ASSISTANT

## 2017-04-11 PROCEDURE — 3074F SYST BP LT 130 MM HG: CPT | Mod: S$GLB,,, | Performed by: PHYSICIAN ASSISTANT

## 2017-04-11 PROCEDURE — 3078F DIAST BP <80 MM HG: CPT | Mod: S$GLB,,, | Performed by: PHYSICIAN ASSISTANT

## 2017-04-11 PROCEDURE — 1160F RVW MEDS BY RX/DR IN RCRD: CPT | Mod: S$GLB,,, | Performed by: PHYSICIAN ASSISTANT

## 2017-04-11 PROCEDURE — 99214 OFFICE O/P EST MOD 30 MIN: CPT | Mod: S$GLB,,, | Performed by: PHYSICIAN ASSISTANT

## 2017-04-11 RX ORDER — TRAMADOL HYDROCHLORIDE AND ACETAMINOPHEN 37.5; 325 MG/1; MG/1
TABLET, FILM COATED ORAL
Refills: 0 | COMMUNITY
Start: 2017-03-13 | End: 2017-04-11 | Stop reason: CLARIF

## 2017-04-11 RX ORDER — DIPHENOXYLATE HYDROCHLORIDE AND ATROPINE SULFATE 2.5; .025 MG/1; MG/1
TABLET ORAL
Refills: 0 | Status: ON HOLD | COMMUNITY
Start: 2017-03-10 | End: 2017-07-18 | Stop reason: HOSPADM

## 2017-04-11 RX ORDER — TRAMADOL HYDROCHLORIDE 50 MG/1
50 TABLET ORAL 2 TIMES DAILY PRN
Qty: 60 TABLET | Refills: 2 | Status: SHIPPED | OUTPATIENT
Start: 2017-04-11 | End: 2017-05-11

## 2017-04-11 NOTE — PROGRESS NOTES
Chief Pain Complaint:  Low Back Pain    History of Present Illness:  This patient is a 66 y.o. female who presents today complaining of the above noted pain/s. The patient describes this pain as follows.    - duration of pain: > 7 years   - timing: intermittent   - character: sharp, aching  - radiating, dermatomal: pain is nonradiating  - antecedent trauma, prior spinal surgery: no prior trauma, no prior spinal surgery, left hip replacement & revision  - pertinent negatives: No fever, No chills, No weight loss, No bladder dysfunction, No bowel dysfunction, No extremity weakness, No saddle anesthesia  - pertinent positives: none    - medications, other therapies tried (physical therapy, injections):     >> Tylenol, Ultracet    >> Has previously undergone Physical Therapy    >> Has previously undergone spinal injection/s: what sounds like 2 lumbar MBBs at [facility] with great relief; right lumbar MBB on 1-18-17 and left lumbar MBB on 2-8-17 with excellent relief      IMAGING / Labs / Studies (reviewed on 4/11/2017):    8/11/16 X-Ray Lumbar Spine AP And Lateral  Comparison: 09/24/2013  Technique: AP, lateral, lateral flexion, lateral extension, bilateral oblique, and lumbosacral coned down views were obtained of the lumbar spine  Findings:   Vertebral body heights and alignment are within normal limits.  No change in spinal alignment with flexion or extension to suggest instability. Multilevel degenerative disc height loss again noted throughout the lumbar spine, most severe at the levels of T12-L1, L1.  Moderate disc loss present at L4-L5 and L5-S1.  There multilevel degenerative endplate changes and osteophyte production.  Findings appear essentially unchanged from prior.  Bilateral facet arthropathy present at L4-L5 and L5-S1.  No pars defects visualized.  Posterior elements appear grossly intact. No acute fractures or subluxations are demonstrated.  IVC filter again noted.  Aortic vascular calcifications  "present.  Previously described aneurysmal dilatation of the distal abdominal aorta appear similar to prior measuring up to approximately 4.2 cm in diameter.       9/24/13 X-Ray Lumbar Spine AP And Lateral    Narrative Findings:  Comparison is with 6/16/09.  Mild dextroscoliosis and diffuse changes of degenerative disk disease as well as diffuse facet joint hypertrophy are again demonstrated.  These changes of degenerative disk disease are particularly pronounced at the T12-L1, L1-2, L4-5 and L5-S1 levels.  AP alignment appears maintained with no acute compression fractures identified.  Again demonstrated are surgical clips consistent with prior cholecystectomy, IVC filter and left hip prosthesis.  Also again demonstrated is diffuse calcification of the abdominal aorta including distal aneurysmal dilatation measured at 4.4 cm in the AP axis at the level of the superior endplate of L4. This dilatation appears to have slightly increased in the interim previously measured at this level at approximately 3.3 cm.       Review of Systems:  CONSTITUTIONAL: patient denies any fever, chills, or weight loss  SKIN: patient denies any rash or itching  RESPIRATORY: patient denies having any shortness of breath  GASTROINTESTINAL: patient denies having any diarrhea, constipation, or bowel incontinence  GENITOURINARY: patient denies having any abnormal bladder function    MUSCULOSKELETAL:  - patient complains of the above noted pain/s (see chief pain complaint)    NEUROLOGICAL:   - pain as above  - strength in Lower extremities is intact, BILATERALLY  - sensation in Lower extremities is intact, BILATERALLY  - patient denies any loss of bowel or bladder control      PSYCHIATRIC: patient reports a history of anxiety and depression      Physical Exam:    Vitals:  /77  Pulse 92  Ht 5' 5" (1.651 m)  Wt 49.4 kg (109 lb)  BMI 18.14 kg/m2   (reviewed on 4/11/2017)    General: alert and oriented, in no apparent distress, poorly " nourished  Gait: normal gait  Skin: No rashes, No discoloration, No obvious lesions  HEENT: EOMI  Respiratory: respirations nonlabored    Musculoskeletal:  - Any pain on flexion, extension, rotation:    >> pain on extension and rotation, improved since injection  - Straight Leg Raise:     >> LEFT :: negative    >> RIGHT :: negative  - Any tenderness to palpation across paraspinal muscles, joints, bursae:     >> across lumbar paraspinals, mild    Neuro:  - Extremity Strength:     >> LEFT :: 5/5    >> RIGHT :: 5/5  - Extremity Reflexes:    >> LEFT  :: 2+    >> RIGHT :: 2+  - Sensory (sensation to light touch):    >> LEFT :: intact    >> RIGHT :: intact     Psych:  Mood and affect is appropriate      Assessment:  Lumbar Spondylosis   Lumbar Facet Joint Syndrome  Lumbar DDD  Chronic Pain Syndrome        Plan:  Patient returns for follow-up. She complains of low back pain, lumbar facet pain primarily.   - S/p right lumbar MBB on 1-18-17 and left lumbar MBB on 2-8-17 with excellent relief. Her back pain feels better overall.  - She is mostly complaining of headaches at this time. She feels they have been more frequent, and she is going to see her PCP next week about this. We can order cervical x-ray at her next visit to evaluate once other sources have been mostly ruled out.  - Continue tramadol 50mg BID PRN pain x 3 months. Also takes Cymbalta 60mg QD.  - She continues to have liver dysfunction, which she follows up with primary care about. She inquires about NSAIDs that she can take since she feels Tylenol helps with her headaches, but I discouraged any NSAID use in light of her liver issues, which her  confirms.   - LA  appropriate.  RTC in 3 months for injection follow-up and med refill. I discussed the risks, benefits, and alternatives to potential treatment options. All questions and concerns were fully addressed today in clinic. Dr. Rivera was consulted regarding the patient plan and  agrees.        >>Pain Disability Questionnaire:   1/12/2017 :: 48    >> Pain Disability Index:  4/12/2017 :: 33    >>Opioid Risk Tool:  1/12/2017 :: 1

## 2017-04-11 NOTE — MR AVS SNAPSHOT
[facility]  [address] [facility] [address]  Phone: [phone]  Fax: [phone]                  Ibeth Schroeder   2017 3:40 PM   Office Visit    Description:  Female : 1950   Provider:  Juana Schuster PA-C   Department:  [facility]                To Do List           Future Appointments        Provider Department Dept Phone    2017 4:40 PM Geovanna Sanchez MD [facility] [phone]    2017 3:00 PM Cristina Crowder MD [facility] - Nephrology [phone]    2017 9:00 AM Juana Schuster PA-C [facility] - Interventional Pain [phone]    2017 4:00 PM LABORATORY, [address] [facility] [phone]    2017 4:20 PM Phoebe Saeed MD [facility] - Hematology Oncology [phone]      Goals (5 Years of Data)     None       These Medications        Disp Refills Start End    tramadol (ULTRAM) 50 mg tablet 60 tablet 2 2017    Take 1 tablet (50 mg total) by mouth 2 (two) times daily as needed for Pain. - Oral    Pharmacy: [facility] #: [phone]         [facility]     [facility] Nurse Care [facility] -  Assistance  Unless otherwise directed by your provider, please contact [facility], our nurse care line that is available for  assistance.     Registered nurses in the [facility] provide: appointment scheduling, clinical advisement, health education, and other advisory services.  Call: [phone] (toll free)               Medications           Message regarding Medications     Verify the changes and/or additions to your medication regime listed below are the same as discussed with your clinician today.  If any of these changes or additions are incorrect, please notify your healthcare provider.        START taking these NEW medications        Refills     tramadol (ULTRAM) 50 mg tablet 2    Sig: Take 1 tablet (50 mg total) by mouth 2 (two) times daily as needed for Pain.    Class: Print    Route: Oral      STOP taking these medications     tramadol-acetaminophen 37.5-325 mg (ULTRACET) 37.5-325 mg Tab            Verify that the below list of medications is an accurate representation of the medications you are currently taking.  If none reported, the list may be blank. If incorrect, please contact your healthcare provider. Carry this list with you in case of emergency.           Current Medications     amlodipine (NORVASC) 5 MG tablet TAKE 1 TABLET ONE TIME DAILY    busPIRone (BUSPAR) 15 MG tablet 1 tab po tid    calcitRIOL (ROCALTROL) 0.25 MCG Cap Take 1 capsule (0.25 mcg total) by mouth once daily.    diphenoxylate-atropine 2.5-0.025 mg (LOMOTIL) 2.5-0.025 mg per tablet     duloxetine (CYMBALTA) 60 MG capsule take 1 capsule by mouth once daily    ferrous sulfate 325 (65 FE) MG EC tablet Take 1 tablet (325 mg total) by mouth once a week.    fluticasone (FLONASE) 50 mcg/actuation nasal spray instill 2 sprays into each nostril once daily    fluticasone-salmeterol (ADVAIR HFA) 115-21 mcg/actuation HFAA inhale 2 puffs INTO THE LUNGS twice a day    ipratropium-albuterol (COMBIVENT RESPIMAT)  mcg/actuation inhaler inhale 1 puff INTO THE LUNGS four times a day    levothyroxine (SYNTHROID) 100 MCG tablet Take 1 tablet (100 mcg total) by mouth once daily.    metoprolol succinate (TOPROL-XL) 50 MG 24 hr tablet Take 1 tablet (50 mg total) by mouth once daily.    MULTIVITAMIN W-MINERALS/LUTEIN (CENTRUM SILVER ORAL) Take 1 tablet by mouth once daily.    pantoprazole (PROTONIX) 40 MG tablet Take 1 tablet (40 mg total) by mouth once daily.    pravastatin (PRAVACHOL) 10 MG tablet TAKE 1 TABLET ONE TIME DAILY    ropinirole (REQUIP) 1 MG tablet Take 1 tablet (1 mg total) by mouth nightly.    thiamine 250 MG tablet Take 500 mg by mouth once daily.    diclofenac sodium (VOLTAREN) 1  "% Gel Apply 4 g topically 4 (four) times daily. Pt has shoulder pain and knee pain    tramadol (ULTRAM) 50 mg tablet Take 1 tablet (50 mg total) by mouth 2 (two) times daily as needed for Pain.           Clinical Reference Information           Your Vitals Were     BP Pulse Height Weight BMI    111/77 92 5' 5" (1.651 m) 49.4 kg (109 lb) 18.14 kg/m2      Blood Pressure          Most Recent Value    BP  111/77      Allergies as of 4/11/2017     Nsaids (Non-steroidal Anti-inflammatory Drug)    Pcn [Penicillins]    Sulfa (Sulfonamide Antibiotics)    Aspirin    Macrodantin [Nitrofurantoin Macrocrystalline]    Wellbutrin [Bupropion Hcl]      Immunizations Administered on Date of Encounter - 4/11/2017     None      Language Assistance Services     ATTENTION: Language assistance services are available, free of charge. Please call [phone].      ATENCIÓN: Si habla ervin, tiene a chan disposición servicios gratuitos de asistencia lingüística. Llame al [phone].     CHÚ Ý: N?u b?n nói Ti?ng Vi?t, có các d?ch v? h? tr? ngôn ng? mi?n phí dành cho b?n. G?i s? [phone].         [facility] complies with applicable Federal civil rights laws and does not discriminate on the basis of race, color, national origin, age, disability, or sex.        "

## 2017-04-18 ENCOUNTER — TELEPHONE (OUTPATIENT)
Dept: FAMILY MEDICINE | Facility: CLINIC | Age: 67
End: 2017-04-18

## 2017-04-18 NOTE — TELEPHONE ENCOUNTER
----- Message from Tiffany Grant sent at 4/18/2017 10:44 AM CDT -----  Contact: Alyssa from [employer]   States she is calling for a refill req on amlodipine 5mg and can be reached at [phone] fax # [phone]/Thanks/dbw

## 2017-04-19 ENCOUNTER — LAB VISIT (OUTPATIENT)
Dept: LAB | Facility: HOSPITAL | Age: 67
End: 2017-04-19
Attending: FAMILY MEDICINE
Payer: MEDICARE

## 2017-04-19 ENCOUNTER — OFFICE VISIT (OUTPATIENT)
Dept: NEPHROLOGY | Facility: CLINIC | Age: 67
End: 2017-04-19
Payer: MEDICARE

## 2017-04-19 VITALS
HEIGHT: 64 IN | WEIGHT: 113.13 LBS | HEART RATE: 88 BPM | SYSTOLIC BLOOD PRESSURE: 100 MMHG | DIASTOLIC BLOOD PRESSURE: 60 MMHG | BODY MASS INDEX: 19.31 KG/M2

## 2017-04-19 DIAGNOSIS — E03.9 ACQUIRED HYPOTHYROIDISM: ICD-10-CM

## 2017-04-19 DIAGNOSIS — M1A.3710 CHRONIC GOUT DUE TO RENAL IMPAIRMENT INVOLVING TOE OF RIGHT FOOT WITHOUT TOPHUS: ICD-10-CM

## 2017-04-19 DIAGNOSIS — E78.5 DYSLIPIDEMIA: ICD-10-CM

## 2017-04-19 DIAGNOSIS — E87.20 ACIDOSIS: ICD-10-CM

## 2017-04-19 DIAGNOSIS — N18.30 CKD (CHRONIC KIDNEY DISEASE) STAGE 3, GFR 30-59 ML/MIN: Primary | ICD-10-CM

## 2017-04-19 DIAGNOSIS — E87.5 HYPERKALEMIA: ICD-10-CM

## 2017-04-19 DIAGNOSIS — R80.1 PERSISTENT PROTEINURIA: ICD-10-CM

## 2017-04-19 DIAGNOSIS — N25.81 HYPERPARATHYROIDISM, SECONDARY RENAL: ICD-10-CM

## 2017-04-19 LAB
ALBUMIN SERPL BCP-MCNC: 3.5 G/DL
ALP SERPL-CCNC: 92 U/L
ALT SERPL W/O P-5'-P-CCNC: 12 U/L
ANION GAP SERPL CALC-SCNC: 8 MMOL/L
AST SERPL-CCNC: 26 U/L
BILIRUB SERPL-MCNC: 0.6 MG/DL
BUN SERPL-MCNC: 35 MG/DL
CALCIUM SERPL-MCNC: 8.7 MG/DL
CHLORIDE SERPL-SCNC: 109 MMOL/L
CHOLEST/HDLC SERPL: 3.2 {RATIO}
CO2 SERPL-SCNC: 25 MMOL/L
CREAT SERPL-MCNC: 1.6 MG/DL
EST. GFR  (AFRICAN AMERICAN): 39 ML/MIN/1.73 M^2
EST. GFR  (NON AFRICAN AMERICAN): 33.8 ML/MIN/1.73 M^2
GLUCOSE SERPL-MCNC: 108 MG/DL
HDL/CHOLESTEROL RATIO: 31.4 %
HDLC SERPL-MCNC: 210 MG/DL
HDLC SERPL-MCNC: 66 MG/DL
LDLC SERPL CALC-MCNC: 113.4 MG/DL
NONHDLC SERPL-MCNC: 144 MG/DL
POTASSIUM SERPL-SCNC: 4.5 MMOL/L
PROT SERPL-MCNC: 6.9 G/DL
SODIUM SERPL-SCNC: 142 MMOL/L
TRIGL SERPL-MCNC: 153 MG/DL
TSH SERPL DL<=0.005 MIU/L-ACNC: 2.03 UIU/ML

## 2017-04-19 PROCEDURE — 1160F RVW MEDS BY RX/DR IN RCRD: CPT | Mod: S$GLB,,, | Performed by: INTERNAL MEDICINE

## 2017-04-19 PROCEDURE — 99999 PR PBB SHADOW E&M-EST. PATIENT-LVL IV: CPT | Mod: PBBFAC,,, | Performed by: INTERNAL MEDICINE

## 2017-04-19 PROCEDURE — 99499 UNLISTED E&M SERVICE: CPT | Mod: S$GLB,,, | Performed by: INTERNAL MEDICINE

## 2017-04-19 PROCEDURE — 99214 OFFICE O/P EST MOD 30 MIN: CPT | Mod: S$GLB,,, | Performed by: INTERNAL MEDICINE

## 2017-04-19 PROCEDURE — 3074F SYST BP LT 130 MM HG: CPT | Mod: S$GLB,,, | Performed by: INTERNAL MEDICINE

## 2017-04-19 PROCEDURE — 3078F DIAST BP <80 MM HG: CPT | Mod: S$GLB,,, | Performed by: INTERNAL MEDICINE

## 2017-04-19 RX ORDER — ALLOPURINOL 100 MG/1
100 TABLET ORAL DAILY
Qty: 90 TABLET | Refills: 5 | Status: SHIPPED | OUTPATIENT
Start: 2017-04-19 | End: 2017-08-28 | Stop reason: SDUPTHER

## 2017-04-19 NOTE — PROGRESS NOTES
Subjective:       Patient ID: Ibeth Schroeder is a 66 y.o. White female who presents for re-evaluation of progression of Chronic Kidney Disease and Hypertension    Hypertension   Pertinent negatives include no chest pain, headaches, palpitations or shortness of breath.        Patient is a 66-year-old white female with severe COPD and history of acute kidney injury status post hemodialysis in the year 2012.  She has been followed in the nephrology division.  She was last seen by Dr. Jara in August 2016 when her creatinine was higher.  Today she comes back for follow-up as a new patient to me.  Patient has been having some wheezing.  Patient has lost 5 pounds in last 6 months unintentionally.  Patient has not been drinking very well.  Her creatinine is higher at 1.9.  Patient overall is a very poor historian.  Denies any fevers and chills.  Denied any urinary symptoms.  Denies any cough or sputum.    September 2016 acute kidney injury--- ACE inhibitor was stopped    October 2016 creatinine came down to 1.6; renal ultrasound showed echogenic kidneys with possible renal artery stenosis; AAA of size of 4.0 cm    January 2017: Recurrent UTI instructions given, mild hypercalcemia over-the-counter vitamin D stopped, continued with calcitriol for hyperparathyroidism      Review of Systems   Constitutional: Negative for activity change, appetite change, chills, diaphoresis, fatigue, fever and unexpected weight change.   HENT: Negative for congestion, dental problem, drooling, postnasal drip, rhinorrhea and voice change.    Eyes: Negative for discharge.   Respiratory: Negative for apnea, cough, choking, chest tightness, shortness of breath, wheezing and stridor.    Cardiovascular: Negative for chest pain, palpitations and leg swelling.   Gastrointestinal: Negative for abdominal distention, blood in stool, constipation, diarrhea, nausea, rectal pain and vomiting.   Endocrine: Negative for cold intolerance, heat intolerance,  "polydipsia and polyuria.   Genitourinary: Negative for decreased urine volume, difficulty urinating, dysuria, enuresis, flank pain, frequency, hematuria and urgency.   Musculoskeletal: Negative for arthralgias, back pain, gait problem and joint swelling.        Hx of right elbow painful swelling and big toe swelling    Skin: Negative for rash.   Allergic/Immunologic: Negative for food allergies and immunocompromised state.   Neurological: Negative for dizziness, tremors, syncope, numbness and headaches.   Hematological: Does not bruise/bleed easily.   Psychiatric/Behavioral: Negative for agitation, behavioral problems and self-injury. The patient is not nervous/anxious and is not hyperactive.    All other systems reviewed and are negative.      Objective:     /60  Pulse 88  Ht 5' 4" (1.626 m)  Wt 51.3 kg (113 lb 1.5 oz)  BMI 19.41 kg/m2     Physical Exam   Constitutional: She is oriented to person, place, and time. She appears well-developed and well-nourished.   HENT:   Head: Normocephalic and atraumatic.   Eyes: Conjunctivae and EOM are normal. Pupils are equal, round, and reactive to light.   Neck: Normal range of motion and full passive range of motion without pain. Neck supple. Carotid bruit is not present. No edema present. No thyroid mass and no thyromegaly present.   Cardiovascular: Normal rate, regular rhythm, S1 normal, S2 normal, normal heart sounds, intact distal pulses and normal pulses.  PMI is not displaced.  Exam reveals no friction rub.    No murmur heard.  Pulmonary/Chest: Effort normal and breath sounds normal. No accessory muscle usage. No respiratory distress. She has no wheezes. She has no rales. She exhibits no tenderness.   Abdominal: Soft. Bowel sounds are normal. She exhibits no distension and no mass. There is no hepatosplenomegaly. There is no tenderness. There is no rebound and no CVA tenderness. No hernia.   Musculoskeletal: Normal range of motion. She exhibits no edema or " tenderness.   Toe swelling    Neurological: She is alert and oriented to person, place, and time. She has normal reflexes. She displays normal reflexes. No cranial nerve deficit or sensory deficit. She exhibits normal muscle tone. Coordination normal.   Skin: Skin is warm and dry. No bruising, no ecchymosis and no rash noted. No cyanosis or erythema. No pallor. Nails show no clubbing.   Psychiatric: She has a normal mood and affect. Her speech is normal and behavior is normal. Judgment and thought content normal.         Lab Results   Component Value Date    CREATININE 1.8 (H) 04/11/2017    BUN 31 (H) 04/11/2017     04/11/2017    K 4.5 04/11/2017     (H) 04/11/2017    CO2 21 (L) 04/11/2017     Lab Results   Component Value Date    WBC 10.40 04/11/2017    HGB 12.6 04/11/2017    HCT 38.8 04/11/2017    MCV 96 04/11/2017     04/11/2017     Lab Results   Component Value Date    .0 (H) 09/09/2016    CALCIUM 9.4 04/11/2017    CAION 0.90 (L) 09/18/2012    PHOS 3.0 04/11/2017     Lab Results   Component Value Date    URICACID 9.2 (H) 01/11/2017       Assessment:    )    1. CKD (chronic kidney disease) stage 3, GFR 30-59 ml/min    2. Hyperparathyroidism, secondary renal    3. Hyperkalemia    4. Acidosis    5. Persistent proteinuria    6. 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 Chronic kidney disease stage III/4: No heavy proteinuria.  Continue current conservative management.  No intervention for renal artery stenosis at this time.  Avoid ACE inhibitor and ARB at this time.  Check Cystatin C on follow-up; GFR 25-30 %       2.  Hypertension: Low BP today with + ETOH;  Possible TOMMY on USD.  No ACE inhibitor is in no ARB for now. Stop Norvasc for now and restartif BP >160     3.  Anemia:on PO iron and IV iron per dr. Saeed     4.  AAA last ultrasound show size was 4 cm.  We will repeat the ultrasound in 6 months --dr. Liz is is following  her       5.  Proteinuria: Stable without  ARB or ACE inhibitor    6.  Hyperparathyroidism:  calcitriol 0.25 mg ; patient's vitamin D levels are normal, hypercalcemia is better    7. Gout: start allopurinol 100 mg             Follow up 3-4 months     Cristina Crowder MD

## 2017-04-19 NOTE — MR AVS SNAPSHOT
[facility]  [address] [facility] [address]  Phone: [phone]  Fax: [phone]                  Ibeth Schroeder   2017 3:00 PM   Office Visit    Description:  Female : 1950   Provider:  Critsina Crowder MD   Department:  [facility]           Reason for Visit     Chronic Kidney Disease     Hypertension           Diagnoses this Visit        Comments    CKD (chronic kidney disease) stage 3, GFR 30-59 ml/min    -  Primary     Hyperparathyroidism, secondary renal         Hyperkalemia         Acidosis         Persistent proteinuria         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                To Do List           Future Appointments        Provider Department Dept Phone    2017 3:00 PM Cristina Crowder MD [facility] Nephrology [phone]    2017 9:00 AM Juana Schuster PA-C [facility] Interventional Pain [phone]    2017 4:00 PM LABORATORY, [address] [facility] [phone]    2017 4:20 PM Phoebe Saeed MD [facility] Hematology Oncology [phone]    2017 3:00 PM SPECIMEN, [facility] [facility] [phone]      Goals (5 Years of Data)     None      Follow-Up and Disposition     Return in about 3 months (around 2017).    Follow-up and Disposition History       These Medications        Disp Refills Start End    allopurinol (ZYLOPRIM) 100 MG tablet 90 tablet 5 2017     Take 1 tablet (100 mg total) by mouth once daily. - Oral    Pharmacy: [facility] [address] [place] [address] #: [phone]         [facility]     [facility] Care [facility] -  Assistance  Unless otherwise directed by your provider, please contact [facility], our nurse care line that is available for  assistance.     Registered nurses in the [facility] provide: appointment scheduling, clinical  advisement, health education, and other advisory services.  Call: [phone] (toll free)               Medications           Message regarding Medications     Verify the changes and/or additions to your medication regime listed below are the same as discussed with your clinician today.  If any of these changes or additions are incorrect, please notify your healthcare provider.        START taking these NEW medications        Refills    allopurinol (ZYLOPRIM) 100 MG tablet 5    Sig: Take 1 tablet (100 mg total) by mouth once daily.    Class: Normal    Route: Oral      These medications were administered today        Dose Freq    neomycin-bacitracin-polymyxin ointment  2 times daily    Sig: Apply topically 2 (two) times daily.    Class: Normal    Route: Topical      STOP taking these medications     amlodipine (NORVASC) 5 MG tablet TAKE 1 TABLET ONE TIME DAILY           Verify that the below list of medications is an accurate representation of the medications you are currently taking.  If none reported, the list may be blank. If incorrect, please contact your healthcare provider. Carry this list with you in case of emergency.           Current Medications     busPIRone (BUSPAR) 15 MG tablet 1 tab po tid    calcitRIOL (ROCALTROL) 0.25 MCG Cap Take 1 capsule (0.25 mcg total) by mouth once daily.    diphenoxylate-atropine 2.5-0.025 mg (LOMOTIL) 2.5-0.025 mg per tablet     duloxetine (CYMBALTA) 60 MG capsule take 1 capsule by mouth once daily    ferrous sulfate 325 (65 FE) MG EC tablet Take 1 tablet (325 mg total) by mouth once a week.    fluticasone (FLONASE) 50 mcg/actuation nasal spray instill 2 sprays into each nostril once daily    fluticasone-salmeterol (ADVAIR HFA) 115-21 mcg/actuation HFAA inhale 2 puffs INTO THE LUNGS twice a day    ipratropium-albuterol (COMBIVENT RESPIMAT)  mcg/actuation inhaler inhale 1 puff INTO THE LUNGS four times a day    levothyroxine (SYNTHROID) 100 MCG tablet Take 1 tablet  "(100 mcg total) by mouth once daily.    metoprolol succinate (TOPROL-XL) 50 MG 24 hr tablet Take 1 tablet (50 mg total) by mouth once daily.    MULTIVITAMIN W-MINERALS/LUTEIN (CENTRUM SILVER ORAL) Take 1 tablet by mouth once daily.    pantoprazole (PROTONIX) 40 MG tablet Take 1 tablet (40 mg total) by mouth once daily.    pravastatin (PRAVACHOL) 10 MG tablet TAKE 1 TABLET ONE TIME DAILY    ropinirole (REQUIP) 1 MG tablet Take 1 tablet (1 mg total) by mouth nightly.    thiamine 250 MG tablet Take 500 mg by mouth once daily.    tramadol (ULTRAM) 50 mg tablet Take 1 tablet (50 mg total) by mouth 2 (two) times daily as needed for Pain.    allopurinol (ZYLOPRIM) 100 MG tablet Take 1 tablet (100 mg total) by mouth once daily.    diclofenac sodium (VOLTAREN) 1 % Gel Apply 4 g topically 4 (four) times daily. Pt has shoulder pain and knee pain           Clinical Reference Information           Your Vitals Were     BP Pulse Height Weight BMI    100/60 88 5' 4" (1.626 m) 51.3 kg (113 lb 1.5 oz) 19.41 kg/m2      Blood Pressure          Most Recent Value    BP  100/60      Allergies as of 4/19/2017     Nsaids (Non-steroidal Anti-inflammatory Drug)    Pcn [Penicillins]    Sulfa (Sulfonamide Antibiotics)    Aspirin    Macrodantin [Nitrofurantoin Macrocrystalline]    Wellbutrin [Bupropion Hcl]      Immunizations Administered on Date of Encounter - 4/19/2017     None      Orders Placed During Today's Visit     Future Labs/Procedures Expected by Expires    Cystatin C, Serum  4/19/2017 6/18/2018    Recurring Lab Work Interval Expires    CBC auto differential   6/18/2018    PTH, intact   6/18/2018    Renal function panel   6/18/2018    Uric acid   6/18/2018    Protein / creatinine ratio, urine   6/18/2018    Urinalysis   6/18/2018      Instructions    Avoid nonsteroidals; no Advil or Motrin; okay to take Tylenol 1-2 per day for pain    1. Chronic kidney disease stage III/4: No heavy proteinuria.  Continue current conservative " management.  No intervention for renal artery stenosis at this time.  Avoid ACE inhibitor and ARB at this time.  Check Cystatin C on follow-up; GFR 25-30 %       2.  Hypertension: Low BP today with + ETOH;  Possible TOMMY on USD.  No ACE inhibitor is in no ARB for now. Stop Norvasc for now and restartif BP >160     3.  Anemia:on PO iron and IV iron per dr. Saeed     4.  AAA last ultrasound show size was 4 cm.  We will repeat the ultrasound in 6 months --dr. Liz is is following her       5.  Proteinuria: Stable without  ARB or ACE inhibitor    6.  Hyperparathyroidism:  calcitriol 0.25 mg ; patient's vitamin D levels are normal, hypercalcemia is better    7. Gout: start allopurinol 100 mg             Follow up 3-4 months              Language Assistance Services     ATTENTION: Language assistance services are available, free of charge. Please call [phone].      ATENCIÓN: Si habla ervin, tiene a chan disposición servicios gratuitos de asistencia lingüística. Llame al [phone].     [facility] Ý: N?u b?n nói Ti?ng Vi?t, có các d?ch v? h? tr? ngôn ng? mi?n phí dành cho b?n. G?i s? [phone].         [facility] complies with applicable Federal civil rights laws and does not discriminate on the basis of race, color, national origin, age, disability, or sex.

## 2017-04-19 NOTE — PATIENT INSTRUCTIONS
Avoid nonsteroidals; no Advil or Motrin; okay to take Tylenol 1-2 per day for pain    1. Chronic kidney disease stage III/4: No heavy proteinuria.  Continue current conservative management.  No intervention for renal artery stenosis at this time.  Avoid ACE inhibitor and ARB at this time.  Check Cystatin C on follow-up; GFR 25-30 %       2.  Hypertension: Low BP today with + ETOH;  Possible TOMMY on USD.  No ACE inhibitor is in no ARB for now. Stop Norvasc for now and restartif BP >160     3.  Anemia:on PO iron and IV iron per dr. Saeed     4.  AAA last ultrasound show size was 4 cm.  We will repeat the ultrasound in 6 months --dr. Liz is is following her       5.  Proteinuria: Stable without  ARB or ACE inhibitor    6.  Hyperparathyroidism:  calcitriol 0.25 mg ; patient's vitamin D levels are normal, hypercalcemia is better    7. Gout: start allopurinol 100 mg             Follow up 3-4 months

## 2017-05-25 DIAGNOSIS — J44.9 MODERATE COPD (CHRONIC OBSTRUCTIVE PULMONARY DISEASE): Chronic | ICD-10-CM

## 2017-05-25 RX ORDER — FLUTICASONE PROPIONATE AND SALMETEROL XINAFOATE 115; 21 UG/1; UG/1
AEROSOL, METERED RESPIRATORY (INHALATION)
Qty: 12 G | Refills: 12 | Status: SHIPPED | OUTPATIENT
Start: 2017-05-25 | End: 2018-03-07

## 2017-05-25 RX ORDER — DULOXETIN HYDROCHLORIDE 60 MG/1
60 CAPSULE, DELAYED RELEASE ORAL DAILY
Qty: 90 CAPSULE | Refills: 0 | Status: SHIPPED | OUTPATIENT
Start: 2017-05-25 | End: 2017-08-09 | Stop reason: SDUPTHER

## 2017-05-31 ENCOUNTER — PATIENT MESSAGE (OUTPATIENT)
Dept: FAMILY MEDICINE | Facility: CLINIC | Age: 67
End: 2017-05-31

## 2017-05-31 RX ORDER — PRAVASTATIN SODIUM 10 MG/1
TABLET ORAL
Qty: 30 TABLET | Refills: 0 | OUTPATIENT
Start: 2017-05-31

## 2017-05-31 RX ORDER — PRAVASTATIN SODIUM 10 MG/1
TABLET ORAL
Qty: 90 TABLET | Refills: 0 | Status: SHIPPED | OUTPATIENT
Start: 2017-05-31 | End: 2017-08-05 | Stop reason: SDUPTHER

## 2017-05-31 RX ORDER — LEVOTHYROXINE SODIUM 100 UG/1
TABLET ORAL
Qty: 90 TABLET | Refills: 3 | OUTPATIENT
Start: 2017-05-31

## 2017-05-31 RX ORDER — LEVOTHYROXINE SODIUM 100 UG/1
TABLET ORAL
Qty: 90 TABLET | Refills: 3 | Status: SHIPPED | OUTPATIENT
Start: 2017-05-31 | End: 2017-05-31 | Stop reason: SDUPTHER

## 2017-05-31 RX ORDER — LEVOTHYROXINE SODIUM 100 UG/1
TABLET ORAL
Qty: 90 TABLET | Refills: 3 | Status: SHIPPED | OUTPATIENT
Start: 2017-05-31 | End: 2017-08-09 | Stop reason: SDUPTHER

## 2017-07-06 RX ORDER — TRAMADOL HYDROCHLORIDE 50 MG/1
50 TABLET ORAL 2 TIMES DAILY PRN
Qty: 60 TABLET | Refills: 2 | Status: ON HOLD | OUTPATIENT
Start: 2017-07-06 | End: 2017-07-18 | Stop reason: HOSPADM

## 2017-07-07 ENCOUNTER — OFFICE VISIT (OUTPATIENT)
Dept: PAIN MEDICINE | Facility: CLINIC | Age: 67
End: 2017-07-07
Payer: MEDICARE

## 2017-07-07 VITALS — HEART RATE: 98 BPM | DIASTOLIC BLOOD PRESSURE: 92 MMHG | SYSTOLIC BLOOD PRESSURE: 129 MMHG

## 2017-07-07 DIAGNOSIS — M47.816 LUMBAR FACET ARTHROPATHY: ICD-10-CM

## 2017-07-07 DIAGNOSIS — G89.4 CHRONIC PAIN DISORDER: ICD-10-CM

## 2017-07-07 DIAGNOSIS — M47.817 LUMBOSACRAL SPONDYLOSIS WITHOUT MYELOPATHY: Primary | ICD-10-CM

## 2017-07-07 DIAGNOSIS — M47.817 SPONDYLOSIS OF LUMBOSACRAL REGION WITHOUT MYELOPATHY OR RADICULOPATHY: ICD-10-CM

## 2017-07-07 DIAGNOSIS — M51.36 DDD (DEGENERATIVE DISC DISEASE), LUMBAR: ICD-10-CM

## 2017-07-07 PROCEDURE — 99999 PR PBB SHADOW E&M-EST. PATIENT-LVL III: CPT | Mod: PBBFAC,,, | Performed by: PHYSICIAN ASSISTANT

## 2017-07-07 PROCEDURE — 99214 OFFICE O/P EST MOD 30 MIN: CPT | Mod: S$GLB,,, | Performed by: PHYSICIAN ASSISTANT

## 2017-07-07 PROCEDURE — 99499 UNLISTED E&M SERVICE: CPT | Mod: S$GLB,,, | Performed by: PHYSICIAN ASSISTANT

## 2017-07-07 RX ORDER — METHOCARBAMOL 500 MG/1
500 TABLET, FILM COATED ORAL 2 TIMES DAILY PRN
Qty: 60 TABLET | Refills: 1 | Status: ON HOLD | OUTPATIENT
Start: 2017-07-07 | End: 2017-07-18 | Stop reason: HOSPADM

## 2017-07-07 RX ORDER — METHOCARBAMOL 500 MG/1
500 TABLET, FILM COATED ORAL 2 TIMES DAILY PRN
Qty: 60 TABLET | Refills: 1 | Status: SHIPPED | OUTPATIENT
Start: 2017-07-07 | End: 2017-07-07 | Stop reason: SDUPTHER

## 2017-07-07 NOTE — PROGRESS NOTES
Chief Pain Complaint:  Low Back Pain    History of Present Illness:  This patient is a 66 y.o. female who presents today complaining of the above noted pain/s. The patient describes this pain as follows.    - duration of pain: > 7 years   - timing: intermittent   - character: sharp, aching  - radiating, dermatomal: pain is nonradiating  - antecedent trauma, prior spinal surgery: no prior trauma, no prior spinal surgery, left hip replacement & revision  - pertinent negatives: No fever, No chills, No weight loss, No bladder dysfunction, No bowel dysfunction, No extremity weakness, No saddle anesthesia  - pertinent positives: none    - medications, other therapies tried (physical therapy, injections):     >> Tylenol, Ultracet    >> Has previously undergone Physical Therapy    >> Has previously undergone spinal injection/s: what sounds like 2 lumbar MBBs at [facility] with great relief; right lumbar MBB on 1-18-17 and left lumbar MBB on 2-8-17 with excellent relief      IMAGING / Labs / Studies (reviewed on 7/7/2017):    8/11/16 X-Ray Lumbar Spine AP And Lateral  Comparison: 09/24/2013  Technique: AP, lateral, lateral flexion, lateral extension, bilateral oblique, and lumbosacral coned down views were obtained of the lumbar spine  Findings:   Vertebral body heights and alignment are within normal limits.  No change in spinal alignment with flexion or extension to suggest instability. Multilevel degenerative disc height loss again noted throughout the lumbar spine, most severe at the levels of T12-L1, L1.  Moderate disc loss present at L4-L5 and L5-S1.  There multilevel degenerative endplate changes and osteophyte production.  Findings appear essentially unchanged from prior.  Bilateral facet arthropathy present at L4-L5 and L5-S1.  No pars defects visualized.  Posterior elements appear grossly intact. No acute fractures or subluxations are demonstrated.  IVC filter again noted.  Aortic vascular calcifications  present.  Previously described aneurysmal dilatation of the distal abdominal aorta appear similar to prior measuring up to approximately 4.2 cm in diameter.       9/24/13 X-Ray Lumbar Spine AP And Lateral    Narrative Findings:  Comparison is with 6/16/09.  Mild dextroscoliosis and diffuse changes of degenerative disk disease as well as diffuse facet joint hypertrophy are again demonstrated.  These changes of degenerative disk disease are particularly pronounced at the T12-L1, L1-2, L4-5 and L5-S1 levels.  AP alignment appears maintained with no acute compression fractures identified.  Again demonstrated are surgical clips consistent with prior cholecystectomy, IVC filter and left hip prosthesis.  Also again demonstrated is diffuse calcification of the abdominal aorta including distal aneurysmal dilatation measured at 4.4 cm in the AP axis at the level of the superior endplate of L4. This dilatation appears to have slightly increased in the interim previously measured at this level at approximately 3.3 cm.       Review of Systems:  CONSTITUTIONAL: patient denies any fever, chills, or weight loss  SKIN: patient denies any rash or itching  RESPIRATORY: patient denies having any shortness of breath  GASTROINTESTINAL: patient denies having any diarrhea, constipation, or bowel incontinence  GENITOURINARY: patient denies having any abnormal bladder function    MUSCULOSKELETAL:  - patient complains of the above noted pain/s (see chief pain complaint)    NEUROLOGICAL:   - pain as above  - strength in Lower extremities is intact, BILATERALLY  - sensation in Lower extremities is intact, BILATERALLY  - patient denies any loss of bowel or bladder control      PSYCHIATRIC: patient reports a history of anxiety and depression      Physical Exam:    Vitals:  BP (!) 129/92 (BP Location: Right arm, Patient Position: Sitting, BP Method: Automatic)   Pulse 98    (reviewed on 7/7/2017)    General: alert and oriented, in no apparent  distress, poorly nourished  Gait: normal gait  Skin: No rashes, No discoloration, No obvious lesions  HEENT: EOMI  Respiratory: respirations nonlabored    Musculoskeletal:  - Any pain on flexion, extension, rotation:    >> pain on extension and rotation, improved since injection  - Straight Leg Raise:     >> LEFT :: negative    >> RIGHT :: negative  - Any tenderness to palpation across paraspinal muscles, joints, bursae:     >> across lumbar paraspinals, mild    Neuro:  - Extremity Strength:     >> LEFT :: 5/5    >> RIGHT :: 5/5  - Extremity Reflexes:    >> LEFT  :: 2+    >> RIGHT :: 2+  - Sensory (sensation to light touch):    >> LEFT :: intact    >> RIGHT :: intact     Psych:  Mood and affect is appropriate      Assessment:  Lumbar Spondylosis   Lumbar Facet Joint Syndrome  Lumbar DDD  Chronic Pain Syndrome        Plan:  Patient returns for follow-up. She complains of low back pain, lumbar facet pain primarily.   - S/p right lumbar MBB on 1-18-17 and left lumbar MBB on 2-8-17 with excellent relief. Her back pain feels better overall.  - She is mostly complaining of headaches at this time. She feels they have been more frequent, and she is going to see her PCP next week about this. We can order cervical x-ray at her next visit to evaluate once other sources have been mostly ruled out.  - Continue tramadol 50mg BID PRN pain x 3 months. Also takes Cymbalta 60mg QD.  - She continues to have liver dysfunction, which she follows up with primary care about. She inquires about NSAIDs that she can take since she feels Tylenol helps with her headaches, but I discouraged any NSAID use in light of her liver issues, which her  confirms.   - LA  appropriate.  RTC in 3 months for injection follow-up and med refill. I discussed the risks, benefits, and alternatives to potential treatment options. All questions and concerns were fully addressed today in clinic. Dr. Rivera was consulted regarding the patient plan and  agrees.        >>Pain Disability Questionnaire:   1/12/2017 :: 48    >> Pain Disability Index:  4/12/2017 :: 33    >>Opioid Risk Tool:  1/12/2017 :: 1

## 2017-07-07 NOTE — PROGRESS NOTES
Chief Pain Complaint:  Low Back Pain    History of Present Illness:  This patient is a 66 y.o. female who presents today complaining of the above noted pain/s. The patient describes this pain as follows.    - duration of pain: pain for years   - timing: intermittent   - character: sharp, aching  - radiating, dermatomal: pain is nonradiating  - antecedent trauma, prior spinal surgery: no prior trauma, no prior spinal surgery, left hip replacement & revision  - pertinent negatives: No fever, No chills, No weight loss, No bladder dysfunction, No bowel dysfunction, No extremity weakness, No saddle anesthesia  - pertinent positives: none    - medications, other therapies tried (physical therapy, injections):     >> Tylenol, Ultracet    >> Has previously undergone Physical Therapy    >> Has previously undergone spinal injection/s: what sounds like 2 lumbar [facility]s [facility] with great relief; right lumbar MBB on 1-18-17 and left lumbar MBB on 2-8-17 with excellent relief      IMAGING / Labs / Studies (reviewed on 7/7/2017):    8/11/16 X-Ray Lumbar Spine AP And Lateral  Comparison: 09/24/2013  Technique: AP, lateral, lateral flexion, lateral extension, bilateral oblique, and lumbosacral coned down views were obtained of the lumbar spine  Findings:   Vertebral body heights and alignment are within normal limits.  No change in spinal alignment with flexion or extension to suggest instability. Multilevel degenerative disc height loss again noted throughout the lumbar spine, most severe at the levels of T12-L1, L1.  Moderate disc loss present at L4-L5 and L5-S1.  There multilevel degenerative endplate changes and osteophyte production.  Findings appear essentially unchanged from prior.  Bilateral facet arthropathy present at L4-L5 and L5-S1.  No pars defects visualized.  Posterior elements appear grossly intact. No acute fractures or subluxations are demonstrated.  IVC filter again noted.  Aortic vascular calcifications  present.  Previously described aneurysmal dilatation of the distal abdominal aorta appear similar to prior measuring up to approximately 4.2 cm in diameter.       9/24/13 X-Ray Lumbar Spine AP And Lateral    Narrative Findings:  Comparison is with 6/16/09.  Mild dextroscoliosis and diffuse changes of degenerative disk disease as well as diffuse facet joint hypertrophy are again demonstrated.  These changes of degenerative disk disease are particularly pronounced at the T12-L1, L1-2, L4-5 and L5-S1 levels.  AP alignment appears maintained with no acute compression fractures identified.  Again demonstrated are surgical clips consistent with prior cholecystectomy, IVC filter and left hip prosthesis.  Also again demonstrated is diffuse calcification of the abdominal aorta including distal aneurysmal dilatation measured at 4.4 cm in the AP axis at the level of the superior endplate of L4. This dilatation appears to have slightly increased in the interim previously measured at this level at approximately 3.3 cm.       Review of Systems:  CONSTITUTIONAL: patient denies any fever, chills, or weight loss  SKIN: patient denies any rash or itching  RESPIRATORY: patient denies having any shortness of breath  GASTROINTESTINAL: patient denies having any diarrhea, constipation, or bowel incontinence  GENITOURINARY: patient denies having any abnormal bladder function    MUSCULOSKELETAL:  - patient complains of the above noted pain/s (see chief pain complaint)    NEUROLOGICAL:   - pain as above  - strength in Lower extremities is intact, BILATERALLY  - sensation in Lower extremities is intact, BILATERALLY  - patient denies any loss of bowel or bladder control      PSYCHIATRIC: patient reports a history of anxiety and depression      Physical Exam:  Vitals:  BP (!) 129/92 (BP Location: Right arm, Patient Position: Sitting, BP Method: Automatic)   Pulse 98    (reviewed on 7/7/2017)    General: alert and oriented, in no apparent  distress, poorly nourished  Gait: normal gait  Skin: No rashes, No discoloration, No obvious lesions  HEENT: EOMI  Respiratory: respirations nonlabored    Musculoskeletal:  - Any pain on flexion, extension, rotation:    >> pain on extension and rotation, improved since injection  - Straight Leg Raise:     >> LEFT :: negative    >> RIGHT :: negative  - Any tenderness to palpation across paraspinal muscles, joints, bursae:     >> across lumbar paraspinals, mild    Neuro:  - Extremity Strength:     >> LEFT :: 5/5    >> RIGHT :: 5/5  - Extremity Reflexes:    >> LEFT  :: 2+    >> RIGHT :: 2+  - Sensory (sensation to light touch):    >> LEFT :: intact    >> RIGHT :: intact     Psych:  Mood and affect is appropriate        Assessment:  Lumbar Spondylosis   Lumbar Facet Joint Syndrome  Lumbar DDD  Chronic Pain Syndrome        Plan:  Patient returns for follow-up. She complains of low back pain, lumbar facet pain primarily.   - She had great relief from right lumbar MBB on 1-18-17 and left lumbar MBB on 2-8-17. She feels it is still providing relief, but we can consider repeating this vs. RFA in the future for returning pain.  - She had a fall about a week ago when she missed a step. She fell on concrete, so she is a little sore.  - Will send in robaxin 500mg BID PRN for increased muscle pain after fall.  - Continue tramadol 50mg BID PRN pain x 3 months. Also takes Cymbalta 60mg QD.  - She continues to have liver dysfunction, which she follows up with primary care about. No NSAIDs.  - LA  appropriate. Last filled 5-13-17.  RTC in 3 months for injection follow-up and med refill. I discussed the risks, benefits, and alternatives to potential treatment options. All questions and concerns were fully addressed today in clinic. Dr. Rivera was consulted regarding the patient plan and agrees.        >>Pain Disability Questionnaire:   1/12/2017 :: 48    >> Pain Disability Index:  4/12/2017 :: 33  7/7/2017 :: 56    >>Opioid Risk  Tool:  1/12/2017 :: 1

## 2017-07-10 ENCOUNTER — HOSPITAL ENCOUNTER (INPATIENT)
Facility: HOSPITAL | Age: 67
LOS: 8 days | Discharge: NURSING, REHAB OR LONG-TERM CARE | DRG: 511 | End: 2017-07-18
Attending: EMERGENCY MEDICINE | Admitting: ORTHOPAEDIC SURGERY
Payer: MEDICARE

## 2017-07-10 DIAGNOSIS — R06.02 SOB (SHORTNESS OF BREATH): ICD-10-CM

## 2017-07-10 DIAGNOSIS — S52.202E RADIUS/ULNA FRACTURE, LEFT, OPEN TYPE I OR II, WITH ROUTINE HEALING, SUBSEQUENT ENCOUNTER: Primary | ICD-10-CM

## 2017-07-10 DIAGNOSIS — R00.0 TACHYCARDIA: ICD-10-CM

## 2017-07-10 DIAGNOSIS — S52.502A CLOSED FRACTURE OF DISTAL END OF LEFT RADIUS, UNSPECIFIED FRACTURE MORPHOLOGY, INITIAL ENCOUNTER: ICD-10-CM

## 2017-07-10 DIAGNOSIS — S52.92XE RADIUS/ULNA FRACTURE, LEFT, OPEN TYPE I OR II, WITH ROUTINE HEALING, SUBSEQUENT ENCOUNTER: Primary | ICD-10-CM

## 2017-07-10 DIAGNOSIS — S52.90XA RADIUS/ULNA FRACTURE: ICD-10-CM

## 2017-07-10 DIAGNOSIS — S52.209A RADIUS/ULNA FRACTURE: ICD-10-CM

## 2017-07-10 PROCEDURE — 11000001 HC ACUTE MED/SURG PRIVATE ROOM

## 2017-07-10 PROCEDURE — 25000003 PHARM REV CODE 250: Performed by: EMERGENCY MEDICINE

## 2017-07-10 PROCEDURE — 63600175 PHARM REV CODE 636 W HCPCS: Performed by: EMERGENCY MEDICINE

## 2017-07-10 PROCEDURE — 99284 EMERGENCY DEPT VISIT MOD MDM: CPT

## 2017-07-10 RX ORDER — MORPHINE SULFATE 2 MG/ML
2 INJECTION, SOLUTION INTRAMUSCULAR; INTRAVENOUS EVERY 4 HOURS PRN
Status: DISCONTINUED | OUTPATIENT
Start: 2017-07-10 | End: 2017-07-11

## 2017-07-10 RX ORDER — ACETAMINOPHEN 325 MG/1
650 TABLET ORAL EVERY 8 HOURS PRN
Status: DISCONTINUED | OUTPATIENT
Start: 2017-07-10 | End: 2017-07-18 | Stop reason: HOSPADM

## 2017-07-10 RX ORDER — HYDROCODONE BITARTRATE AND ACETAMINOPHEN 10; 325 MG/1; MG/1
1 TABLET ORAL
Status: COMPLETED | OUTPATIENT
Start: 2017-07-10 | End: 2017-07-10

## 2017-07-10 RX ORDER — ONDANSETRON 2 MG/ML
4 INJECTION INTRAMUSCULAR; INTRAVENOUS EVERY 12 HOURS PRN
Status: DISCONTINUED | OUTPATIENT
Start: 2017-07-10 | End: 2017-07-14

## 2017-07-10 RX ADMIN — HYDROCODONE BITARTRATE AND ACETAMINOPHEN 1 TABLET: 10; 325 TABLET ORAL at 10:07

## 2017-07-10 RX ADMIN — MORPHINE SULFATE 2 MG: 2 INJECTION, SOLUTION INTRAMUSCULAR; INTRAVENOUS at 06:07

## 2017-07-10 NOTE — NURSING
Patient states she is forgetful and that writer has to ask  about health.   is no longer at bedside.

## 2017-07-10 NOTE — ED NOTES
Patient reports pain to left arm.    Patient identifiers verified and correct for Ibeth Schroeder.    LOC: The patient is awake, alert and aware of environment with an appropriate affect, the patient is oriented x 3 and speaking appropriately.  APPEARANCE: Patient resting comfortably and in no acute distress, patient is clean and well groomed, patient's clothing is properly fastened.  SKIN: The skin is warm and dry, color consistent with ethnicity, patient has normal skin turgor and moist mucus membranes, skin intact, no breakdown noted. Bruising with swelling noted to left hand.  MUSCULOSKELETAL: Patient moving all extremities spontaneously. Splint to left arm.  RESPIRATORY: Airway is open and patent, respirations are spontaneous.  CARDIAC: Patient has a normal rate, no periphreal edema noted, capillary refill < 3 seconds.  ABDOMEN: Soft and non tender to palpation.

## 2017-07-10 NOTE — ED NOTES
The patient is sitting quietly in wheelchair by patient choice, arouses easily to stimuli. Airway is open and patent, respirations are spontaneous, normal respiratory effort and rate noted, skin warm and dry, appearance: in no acute distress and resting comfortably.

## 2017-07-10 NOTE — PLAN OF CARE
SW met with patient and spouse in ED. Patient spouse is her primary caregiver.  Patient does have issues with transportation (spouse has to work) however patient [facility] provides transportation sometimes.  Patient doesn't use her inhaler as she should, do to its cost. (correction) patient does have problems with the cost of RX.   Case mgt to follow up with d/c needs.     07/10/17 1519   Discharge Assessment   Assessment Type Discharge Planning Assessment   Confirmed/corrected address and phone number on facesheet? Yes   Assessment information obtained from? Patient;Caregiver   Current cognitive status: Alert/Oriented   Current Functional Status: Assistive Equipment   Arrived From home or self-care   Lives With spouse   Able to Return to Prior Arrangements yes   Is patient able to care for self after discharge? Unable to determine at this time (comments)   How many people do you have in your home that can help with your care after discharge? 1   Who are your caregiver(s) and their phone number(s)? Marino Alexandre (spouse) [phone]   Patient's perception of discharge disposition home or selfcare   Readmission Within The Last 30 Days no previous admission in last 30 days   Patient currently being followed by outpatient case management? No   Patient currently receives home health services? No   Does the patient currently use HME? Yes   Name and contact number for HME provider: [facility]   Patient currently receives private duty nursing? No   Patient currently receives any other outside agency services? No   Equipment Currently Used at Home wheelchair;walker, standard;oxygen;walker, rolling;respiratory supplies  (Patient stated that she has a long list of equipment from [facility] dme)   Do you have any problems affording any of your prescribed medications? No   Is the patient taking medications as prescribed? no   If no, which medications is patient not taking? patient not using inhealer as prescribed do to  cost.   Do you have any financial concerns preventing you from receiving the healthcare you need? No   Does the patient have transportation to healthcare appointments? Yes   Transportation Available family or friend will provide;agency transportation;other (see comments)  ( [facility] provides transportion sometimes. patient does have problems/issues with transportation. )   On Dialysis? No   Does the patient receive services at the [facility]? No   Discharge Plan A Home with family

## 2017-07-10 NOTE — ED PROVIDER NOTES
SCRIBE #1 NOTE: I, Ray Grace, am scribing for, and in the presence of, Angelo House MD. I have scribed the entire note.      History      Chief Complaint   Patient presents with    Arm Pain     patient seen on Saturday for a fall down the stairs and to follow up with Dr. Lizarraga, patient called Dr. Lizarraga this morning and he told her to check in the ED       Review of patient's allergies indicates:   Allergen Reactions    Nsaids (non-steroidal anti-inflammatory drug) Hives and Shortness Of Breath    Pcn [penicillins] Hives and Shortness Of Breath    Sulfa (sulfonamide antibiotics) Hives and Shortness Of Breath    Aspirin     Macrodantin [nitrofurantoin macrocrystalline] Hives    Wellbutrin [bupropion hcl]         HPI   HPI    7/10/2017, 10:20 AM   History obtained from the patient      History of Present Illness: Ibeth Schroeder is a 66 y.o. female patient who presents to the Emergency Department for left arm pain which onset gradually 2 days ago. Symptoms are constant and moderate in severity. Pt was seen in the ED 2 days ago for similar sx after falling down some stairs. Pt was supposed to f/u with Dr. Lizarraga today but was told to come into the ED because he was going to be in the ED. No mitigating or exacerbating factors reported. Associated sxs include back pain. Patient denies any fever, chills, n/v/d, HA, lightheadedness, dizziness, numbness, weakness, abdominal pain, CP, SOB, and all other sxs at this time. No further complaints or concerns at this time.         Arrival mode: Personal vehicle     PCP: SAGAR BETTS MD       Past Medical History:  Past Medical History:   Diagnosis Date    Alcohol dependence     per patient in the past    Allergy     Anemia     Anxiety     Arthritis 6/24/2013    Asthma     per patient    Colon polyp     Colon polyp     colonoscopy 8/26/2013    COPD (chronic obstructive pulmonary disease)     Depression     Diverticulosis     Diverticulosis      colonoscopy 8/26/2013    Hiatal hernia     CXR 2/25/2015--Large hiatal hernia.     Hyperlipidemia     Hypertension     Hypocalcemia 7/6/2016    Hypothyroidism 6/24/2013    Osteoporosis     Pulmonary embolism     per patient unsure of date    Restless leg syndrome     RLS (restless legs syndrome)     Seizure 6/24/2013    Tobacco dependence     resolved    Trouble in sleeping        Past Surgical History:  Past Surgical History:   Procedure Laterality Date    ADENOIDECTOMY      APPENDECTOMY      CHOLECYSTECTOMY      COLONOSCOPY  2013    FINGER SURGERY      HERNIA REPAIR      HYSTERECTOMY      JOINT REPLACEMENT      TONSILLECTOMY      TOTAL HIP ARTHROPLASTY      L x2         Family History:  Family History   Problem Relation Age of Onset    Diabetes Mother     Hypertension Mother     Cancer Mother     Kidney disease Mother      stones    Parkinsonism Mother     Asthma Father     Diabetes Son     Hypertension Son     Heart disease Maternal Aunt      CAD     Cancer Maternal Aunt      Breast Ca    Colon cancer Maternal Grandmother     Cancer Maternal Grandmother     Cancer Maternal Aunt      breast    Heart disease Maternal Aunt     Stroke Neg Hx        Social History:  Social History     Social History Main Topics    Smoking status: Former Smoker     Packs/day: 1.00     Years: 30.00     Types: Cigarettes     Quit date: 8/5/2010    Smokeless tobacco: Former User     Quit date: 8/5/2010    Alcohol use 0.6 - 1.2 oz/week     1 - 2 Shots of liquor per week    Drug use: No    Sexual activity: Not Currently     Partners: Male       ROS   Review of Systems   Constitutional: Negative for chills and fever.   HENT: Negative for sore throat.    Respiratory: Negative for shortness of breath.    Cardiovascular: Negative for chest pain.   Gastrointestinal: Negative for abdominal pain, diarrhea, nausea and vomiting.   Genitourinary: Negative for dysuria.   Musculoskeletal: Positive for back  "pain.        +  Left arm pain   Skin: Negative for rash.   Neurological: Negative for dizziness, weakness, light-headedness, numbness and headaches.   Hematological: Does not bruise/bleed easily.       Physical Exam      Initial Vitals [07/10/17 1020]   BP Pulse Resp Temp SpO2   100/67 98 16 97.9 °F (36.6 °C) (!) 93 %      MAP       78           Physical Exam  Nursing Notes and Vital Signs Reviewed.  Constitutional: Patient is in no acute distress. Well-developed and well-nourished.  Head: Atraumatic. Normocephalic.  Eyes: PERRL. EOM intact. Conjunctivae are not pale. No scleral icterus.  ENT: Mucous membranes are moist. Oropharynx is clear and symmetric.    Neck: Supple. Full ROM. No lymphadenopathy.  Cardiovascular: Regular rate. Regular rhythm. No murmurs, rubs, or gallops. Distal pulses are 2+ and symmetric.  Pulmonary/Chest: No respiratory distress. Clear to auscultation bilaterally. No wheezing, rales, or rhonchi.  Abdominal: Soft and non-distended.  There is no tenderness.  No rebound, guarding, or rigidity.  Musculoskeletal: Moves all extremities. No obvious deformities. No edema. Left upper extremity is in a sling and splint. Amputation of the left distal index finger.  Skin: Warm and dry.  Neurological:  Alert, awake, and appropriate.  Normal speech.  No acute focal neurological deficits are appreciated.  Psychiatric: Normal affect. Good eye contact. Appropriate in content.    ED Course    Procedures  ED Vital Signs:  Vitals:    07/10/17 1020 07/10/17 1217   BP: 100/67 111/70   Pulse: 98 78   Resp: 16 20   Temp: 97.9 °F (36.6 °C)    SpO2: (!) 93% 95%   Weight: 52.2 kg (115 lb)    Height: 5' 6" (1.676 m)                 The Emergency Provider reviewed the vital signs and test results, which are outlined above.    ED Discussion     1:23 PM: Dr. House discussed the pt's case with Dr. Lizarraga (Orthopedics) who recommends to admit the pt to him and keep the pt NPO after midnight.    1:23 PM: Discussed case with " Dr. Lizarraga (General Surgery). Dr. Lizarraga agrees with current care and management of pt and accepts admission.   Admitting Service: General Surgery  Admitting Physician: Dr. Lizarraga  Admit to: Med-surg    1:34 PM: Re-evaluated pt. I have discussed test results, shared treatment plan, and the need for admission with patient and family at bedside. Pt and family express understanding at this time and agree with all information. All questions answered. Pt and family have no further questions or concerns at this time. Pt is ready for admit.            ED Medication(s):  Medications   hydrocodone-acetaminophen 10-325mg per tablet 1 tablet (1 tablet Oral Given 7/10/17 1030)       New Prescriptions    No medications on file             Medical Decision Making              Scribe Attestation:   Scribe #1: I performed the above scribed service and the documentation accurately describes the services I performed. I attest to the accuracy of the note.    Attending:   Physician Attestation Statement for Scribe #1: I, Angelo House MD, personally performed the services described in this documentation, as scribed by Ray Grace, in my presence, and it is both accurate and complete.          Clinical Impression       ICD-10-CM ICD-9-CM   1. Radius/ulna fracture, left, open type I or II, with routine healing, subsequent encounter S52.502E V54.12    S52.602E     S52.202E        Disposition:   Disposition: Admitted  Condition: Fair         Angelo House MD  07/10/17 0267

## 2017-07-10 NOTE — TELEPHONE ENCOUNTER
Patient's prescriptions were printed and signed by Dr. Rivera prior to the patient's clinic appointment.

## 2017-07-11 ENCOUNTER — TELEPHONE (OUTPATIENT)
Dept: NEPHROLOGY | Facility: CLINIC | Age: 67
End: 2017-07-11

## 2017-07-11 ENCOUNTER — TELEPHONE (OUTPATIENT)
Dept: RHEUMATOLOGY | Facility: CLINIC | Age: 67
End: 2017-07-11

## 2017-07-11 PROBLEM — F10.11 HISTORY OF ETOH ABUSE: Status: ACTIVE | Noted: 2017-07-11

## 2017-07-11 LAB
ALBUMIN SERPL BCP-MCNC: 2.4 G/DL
ALP SERPL-CCNC: 79 U/L
ALT SERPL W/O P-5'-P-CCNC: 13 U/L
ANION GAP SERPL CALC-SCNC: 13 MMOL/L
AST SERPL-CCNC: 23 U/L
BASOPHILS # BLD AUTO: 0.02 K/UL
BASOPHILS NFR BLD: 0.2 %
BILIRUB SERPL-MCNC: 0.5 MG/DL
BUN SERPL-MCNC: 38 MG/DL
CALCIUM SERPL-MCNC: 8.7 MG/DL
CHLORIDE SERPL-SCNC: 104 MMOL/L
CO2 SERPL-SCNC: 21 MMOL/L
CREAT SERPL-MCNC: 1.7 MG/DL
DIFFERENTIAL METHOD: ABNORMAL
EOSINOPHIL # BLD AUTO: 0.1 K/UL
EOSINOPHIL NFR BLD: 1 %
ERYTHROCYTE [DISTWIDTH] IN BLOOD BY AUTOMATED COUNT: 13.7 %
EST. GFR  (AFRICAN AMERICAN): 36 ML/MIN/1.73 M^2
EST. GFR  (NON AFRICAN AMERICAN): 31 ML/MIN/1.73 M^2
GLUCOSE SERPL-MCNC: 130 MG/DL
HCT VFR BLD AUTO: 29.7 %
HGB BLD-MCNC: 9.7 G/DL
LYMPHOCYTES # BLD AUTO: 1.7 K/UL
LYMPHOCYTES NFR BLD: 16.9 %
MCH RBC QN AUTO: 31.8 PG
MCHC RBC AUTO-ENTMCNC: 32.7 %
MCV RBC AUTO: 97 FL
MONOCYTES # BLD AUTO: 1 K/UL
MONOCYTES NFR BLD: 9.6 %
NEUTROPHILS # BLD AUTO: 7.1 K/UL
NEUTROPHILS NFR BLD: 72.3 %
PLATELET # BLD AUTO: 192 K/UL
PMV BLD AUTO: 10.6 FL
POTASSIUM SERPL-SCNC: 4.3 MMOL/L
PROT SERPL-MCNC: 6.2 G/DL
RBC # BLD AUTO: 3.05 M/UL
SODIUM SERPL-SCNC: 138 MMOL/L
TSH SERPL DL<=0.005 MIU/L-ACNC: 1.27 UIU/ML
WBC # BLD AUTO: 9.86 K/UL

## 2017-07-11 PROCEDURE — 80053 COMPREHEN METABOLIC PANEL: CPT

## 2017-07-11 PROCEDURE — 84443 ASSAY THYROID STIM HORMONE: CPT

## 2017-07-11 PROCEDURE — 25000003 PHARM REV CODE 250: Performed by: ORTHOPAEDIC SURGERY

## 2017-07-11 PROCEDURE — 11000001 HC ACUTE MED/SURG PRIVATE ROOM

## 2017-07-11 PROCEDURE — 63600175 PHARM REV CODE 636 W HCPCS: Performed by: NURSE PRACTITIONER

## 2017-07-11 PROCEDURE — 85025 COMPLETE CBC W/AUTO DIFF WBC: CPT

## 2017-07-11 PROCEDURE — 36415 COLL VENOUS BLD VENIPUNCTURE: CPT

## 2017-07-11 PROCEDURE — 25000003 PHARM REV CODE 250: Performed by: NURSE PRACTITIONER

## 2017-07-11 PROCEDURE — 63600175 PHARM REV CODE 636 W HCPCS: Performed by: EMERGENCY MEDICINE

## 2017-07-11 PROCEDURE — 94640 AIRWAY INHALATION TREATMENT: CPT

## 2017-07-11 RX ORDER — SODIUM CHLORIDE 9 MG/ML
INJECTION, SOLUTION INTRAVENOUS CONTINUOUS
Status: DISCONTINUED | OUTPATIENT
Start: 2017-07-11 | End: 2017-07-18 | Stop reason: HOSPADM

## 2017-07-11 RX ORDER — DULOXETIN HYDROCHLORIDE 30 MG/1
60 CAPSULE, DELAYED RELEASE ORAL DAILY
Status: DISCONTINUED | OUTPATIENT
Start: 2017-07-12 | End: 2017-07-18 | Stop reason: HOSPADM

## 2017-07-11 RX ORDER — PROMETHAZINE HYDROCHLORIDE 25 MG/1
25 TABLET ORAL EVERY 6 HOURS PRN
Status: DISCONTINUED | OUTPATIENT
Start: 2017-07-11 | End: 2017-07-14

## 2017-07-11 RX ORDER — LEVOTHYROXINE SODIUM 100 UG/1
100 TABLET ORAL
Status: DISCONTINUED | OUTPATIENT
Start: 2017-07-12 | End: 2017-07-18 | Stop reason: HOSPADM

## 2017-07-11 RX ORDER — ARFORMOTEROL TARTRATE 15 UG/2ML
15 SOLUTION RESPIRATORY (INHALATION) 2 TIMES DAILY
Status: DISCONTINUED | OUTPATIENT
Start: 2017-07-11 | End: 2017-07-12

## 2017-07-11 RX ORDER — PANTOPRAZOLE SODIUM 40 MG/1
40 TABLET, DELAYED RELEASE ORAL DAILY
Status: DISCONTINUED | OUTPATIENT
Start: 2017-07-12 | End: 2017-07-18 | Stop reason: HOSPADM

## 2017-07-11 RX ORDER — MORPHINE SULFATE 4 MG/ML
4 INJECTION, SOLUTION INTRAMUSCULAR; INTRAVENOUS
Status: DISCONTINUED | OUTPATIENT
Start: 2017-07-12 | End: 2017-07-18 | Stop reason: HOSPADM

## 2017-07-11 RX ORDER — BUDESONIDE 0.25 MG/2ML
0.25 INHALANT ORAL EVERY 12 HOURS
Status: DISCONTINUED | OUTPATIENT
Start: 2017-07-11 | End: 2017-07-18 | Stop reason: HOSPADM

## 2017-07-11 RX ORDER — HYDROCODONE BITARTRATE AND ACETAMINOPHEN 10; 325 MG/1; MG/1
2 TABLET ORAL EVERY 6 HOURS PRN
Status: DISCONTINUED | OUTPATIENT
Start: 2017-07-11 | End: 2017-07-18 | Stop reason: HOSPADM

## 2017-07-11 RX ORDER — IPRATROPIUM BROMIDE AND ALBUTEROL SULFATE 2.5; .5 MG/3ML; MG/3ML
3 SOLUTION RESPIRATORY (INHALATION) EVERY 6 HOURS PRN
Status: DISCONTINUED | OUTPATIENT
Start: 2017-07-11 | End: 2017-07-18 | Stop reason: HOSPADM

## 2017-07-11 RX ORDER — METOPROLOL SUCCINATE 50 MG/1
50 TABLET, EXTENDED RELEASE ORAL DAILY
Status: DISCONTINUED | OUTPATIENT
Start: 2017-07-12 | End: 2017-07-14

## 2017-07-11 RX ORDER — PRAVASTATIN SODIUM 10 MG/1
10 TABLET ORAL NIGHTLY
Status: DISCONTINUED | OUTPATIENT
Start: 2017-07-11 | End: 2017-07-18 | Stop reason: HOSPADM

## 2017-07-11 RX ADMIN — PROMETHAZINE HYDROCHLORIDE 25 MG: 25 TABLET ORAL at 11:07

## 2017-07-11 RX ADMIN — ARFORMOTEROL TARTRATE 15 MCG: 15 SOLUTION RESPIRATORY (INHALATION) at 08:07

## 2017-07-11 RX ADMIN — HYDROCODONE BITARTRATE AND ACETAMINOPHEN 2 TABLET: 10; 325 TABLET ORAL at 11:07

## 2017-07-11 RX ADMIN — BUDESONIDE 0.25 MG: 0.25 SUSPENSION RESPIRATORY (INHALATION) at 08:07

## 2017-07-11 RX ADMIN — MORPHINE SULFATE 2 MG: 2 INJECTION, SOLUTION INTRAMUSCULAR; INTRAVENOUS at 12:07

## 2017-07-11 RX ADMIN — SODIUM CHLORIDE: 0.9 INJECTION, SOLUTION INTRAVENOUS at 09:07

## 2017-07-11 RX ADMIN — PRAVASTATIN SODIUM 10 MG: 10 TABLET ORAL at 08:07

## 2017-07-11 RX ADMIN — MORPHINE SULFATE 2 MG: 2 INJECTION, SOLUTION INTRAMUSCULAR; INTRAVENOUS at 09:07

## 2017-07-11 NOTE — CONSULTS
[facility] Hospital Medicine  Consult Note    Patient Name: Ibeth Schroeder  MRN: [id number]  Admission Date: 7/10/2017  Hospital Length of Stay: 1 days  Attending Physician: Binu Lizarraga Sr., MD   Primary Care Provider: SAGAR BETTS MD           Patient information was obtained from patient and ER records.     Consults  Subjective:     Principal Problem: Radius/ulna fracture    Chief Complaint:   Chief Complaint   Patient presents with    Arm Pain     patient seen on Saturday for a fall down the stairs and to follow up with Dr. Lizarraga, patient called Dr. Lizarraga this morning and he told her to check in the ED        HPI: Ibeth Schroeder is 66 year old female with PMHx of Hypothyroid, HTN, HLD, COPD, Asthma, and ETOH abuse who present for surgical intervention per Orthopedic Surgery for left wrist fracture post fall.  Hospital Medicine consulted for medical management.  Pt states that she is complaint with prescribed medications.  Pt reports drinking 1-2 alcoholic drinks per night made with Rum.  Pt is not followed outpatient by Pulmonology.      Past Medical History:   Diagnosis Date    Alcohol dependence     per patient in the past    Allergy     Anemia     Anxiety     Arthritis 6/24/2013    Asthma     per patient    Colon polyp     Colon polyp     colonoscopy 8/26/2013    COPD (chronic obstructive pulmonary disease)     Depression     Diverticulosis     Diverticulosis     colonoscopy 8/26/2013    Hiatal hernia     CXR 2/25/2015--Large hiatal hernia.     Hyperlipidemia     Hypertension     Hypocalcemia 7/6/2016    Hypothyroidism 6/24/2013    Osteoporosis     Pulmonary embolism     per patient unsure of date    Restless leg syndrome     RLS (restless legs syndrome)     Seizure 6/24/2013    Tobacco dependence     resolved    Trouble in sleeping        Past Surgical History:   Procedure Laterality Date    ADENOIDECTOMY      APPENDECTOMY      CHOLECYSTECTOMY       COLONOSCOPY  2013    FINGER SURGERY      HERNIA REPAIR      HYSTERECTOMY      JOINT REPLACEMENT      TONSILLECTOMY      TOTAL HIP ARTHROPLASTY      L x2       Review of patient's allergies indicates:   Allergen Reactions    Nsaids (non-steroidal anti-inflammatory drug) Hives and Shortness Of Breath    Pcn [penicillins] Hives and Shortness Of Breath    Sulfa (sulfonamide antibiotics) Hives and Shortness Of Breath    Aspirin     Macrodantin [nitrofurantoin macrocrystalline] Hives    Wellbutrin [bupropion hcl]        No current facility-administered medications on file prior to encounter.      Current Outpatient Prescriptions on File Prior to Encounter   Medication Sig    allopurinol (ZYLOPRIM) 100 MG tablet Take 1 tablet (100 mg total) by mouth once daily.    busPIRone (BUSPAR) 15 MG tablet 1 tab po tid    clindamycin (CLEOCIN) 150 MG capsule Take 2 capsules (300 mg total) by mouth every 8 (eight) hours.    diclofenac sodium (VOLTAREN) 1 % Gel Apply 4 g topically 4 (four) times daily. Pt has shoulder pain and knee pain    diphenoxylate-atropine 2.5-0.025 mg (LOMOTIL) 2.5-0.025 mg per tablet     duloxetine (CYMBALTA) 60 MG capsule Take 1 capsule (60 mg total) by mouth once daily.    ferrous sulfate 325 (65 FE) MG EC tablet Take 1 tablet (325 mg total) by mouth once a week.    fluticasone (FLONASE) 50 mcg/actuation nasal spray instill 2 sprays into each nostril once daily    fluticasone-salmeterol (ADVAIR HFA) 115-21 mcg/actuation HFAA inhale 2 puffs INTO THE LUNGS twice a day    hydrocodone-acetaminophen 7.5-325mg (NORCO) 7.5-325 mg per tablet Take 1 tablet by mouth every 6 (six) hours as needed for Pain.    ipratropium-albuterol (COMBIVENT RESPIMAT)  mcg/actuation inhaler inhale 1 puff INTO THE LUNGS four times a day    levothyroxine (SYNTHROID) 100 MCG tablet TAKE 1 TABLET ONE TIME DAILY    methocarbamol (ROBAXIN) 500 MG Tab Take 1 tablet (500 mg total) by mouth 2 (two) times daily as  needed (for muscle spasms.).    metoprolol succinate (TOPROL-XL) 50 MG 24 hr tablet Take 1 tablet (50 mg total) by mouth once daily.    MULTIVITAMIN W-MINERALS/LUTEIN (CENTRUM SILVER ORAL) Take 1 tablet by mouth once daily.    pantoprazole (PROTONIX) 40 MG tablet Take 1 tablet (40 mg total) by mouth once daily.    pravastatin (PRAVACHOL) 10 MG tablet TAKE 1 TABLET ONE TIME DAILY    ropinirole (REQUIP) 1 MG tablet Take 1 tablet (1 mg total) by mouth nightly.    thiamine 250 MG tablet Take 500 mg by mouth once daily.    tramadol (ULTRAM) 50 mg tablet Take 1 tablet (50 mg total) by mouth 2 (two) times daily as needed for Pain.     Family History     Problem Relation (Age of Onset)    Asthma Father    Cancer Mother, Maternal Aunt, Maternal Grandmother, Maternal Aunt    Colon cancer Maternal Grandmother    Diabetes Mother, Son    Heart disease Maternal Aunt, Maternal Aunt    Hypertension Mother, Son    Kidney disease Mother    Parkinsonism Mother        Social History Main Topics    Smoking status: Former Smoker     Packs/day: 1.00     Years: 30.00     Types: Cigarettes     Quit date: 8/5/2010    Smokeless tobacco: Former User     Quit date: 8/5/2010    Alcohol use 0.6 - 1.2 oz/week     1 - 2 Shots of liquor per week    Drug use: No    Sexual activity: Not Currently     Partners: Male     Review of Systems   Constitutional: Positive for activity change. Negative for appetite change, chills, fatigue and fever.   HENT: Negative for congestion, postnasal drip, rhinorrhea, sinus pressure, sneezing, sore throat and trouble swallowing.    Eyes: Negative.    Respiratory: Positive for wheezing. Negative for apnea, cough and shortness of breath.    Cardiovascular: Negative for chest pain, palpitations and leg swelling.   Gastrointestinal: Negative for abdominal distention, abdominal pain, diarrhea, nausea and vomiting.   Endocrine: Negative for cold intolerance and heat intolerance.   Genitourinary: Negative for  difficulty urinating, dysuria, flank pain, frequency and urgency.   Musculoskeletal: Positive for arthralgias and myalgias. Negative for back pain.   Skin: Positive for color change (LUE) and wound.   Allergic/Immunologic: Negative for environmental allergies and food allergies.   Neurological: Negative for dizziness, syncope, weakness and headaches.   Hematological: Does not bruise/bleed easily.   Psychiatric/Behavioral: Negative for agitation, confusion and sleep disturbance. The patient is not nervous/anxious.      Objective:     Vital Signs (Most Recent):  Temp: 97.8 °F (36.6 °C) (07/11/17 1125)  Pulse: 82 (07/11/17 1125)  Resp: 18 (07/11/17 1125)  BP: 139/61 (07/11/17 1125)  SpO2: 97 % (07/11/17 0732) Vital Signs (24h Range):  Temp:  [97.8 °F (36.6 °C)-98.5 °F (36.9 °C)] 97.8 °F (36.6 °C)  Pulse:  [] 82  Resp:  [18-20] 18  SpO2:  [95 %-99 %] 97 %  BP: (100-139)/(50-99) 139/61     Weight: 52.2 kg (115 lb)  Body mass index is 18.56 kg/m².    Physical Exam   Constitutional: She is oriented to person, place, and time. She appears well-developed and well-nourished.   HENT:   Head: Normocephalic.   Nose: Nose normal.   Mouth/Throat: Oropharynx is clear and moist.   Eyes: Conjunctivae and EOM are normal.   Neck: Normal range of motion. Neck supple.   Cardiovascular: Normal heart sounds and intact distal pulses.  Exam reveals no friction rub.    No murmur heard.  Pulmonary/Chest: Effort normal. No respiratory distress. She has wheezes. She has no rales.   Abdominal: Soft. Bowel sounds are normal. She exhibits no distension. There is no tenderness.   Genitourinary:   Genitourinary Comments: Deferred   Musculoskeletal: Normal range of motion. She exhibits edema (L hand 2+) and tenderness (LUE and L hand).   Limited ROM to LUE secondary to fracture  Soft cast in place   Neurological: She is alert and oriented to person, place, and time.   Skin: Skin is warm and dry. Capillary refill takes 2 to 3 seconds.    Psychiatric: She has a normal mood and affect. Her behavior is normal. Thought content normal.       Significant Labs: CBC: No results for input(s): WBC, HGB, HCT, PLT in the last 48 hours.  CMP: No results for input(s): NA, K, CL, CO2, GLU, BUN, CREATININE, CALCIUM, PROT, ALBUMIN, BILITOT, ALKPHOS, AST, ALT, ANIONGAP, EGFRNONAA in the last 48 hours.    Invalid input(s): [place]    Significant Imaging:   Imaging Results    None       Assessment/Plan:     History of ETOH abuse    -pt drinks 1-2 alcoholic drinks nightly  -will monitor for sxs of withdrawal  -pt counseled on alcohol cessation with understanding verbalized            Dyslipidemia    -Statin continued           Depression    -will resume home medications           Stage 3 severe COPD by GOLD classification    -Budesonide and arformoterol nebulizer treatment  -Duonebs prn               Anemia of other chronic disease    -H/H stable  -will monitor  -will transfuse if needed   -repeat CBC in am   -will resume iron supplementation post procedure          CKD (chronic kidney disease) stage 3, GFR 30-59 ml/min    -creatinine 1.7  -IV Hydration  -will continue to monitor  -repeat CMP in am           History of seizure    -no activity witnessed  -seizure precautions           Essential hypertension    -controlled  -Toprol continued          Hypothyroidism    -Synthroid continued  -TSH pending          * Radius/ulna fracture    -Pt admitted to Medical Surgical Unit   -Orthopedic Surgery -primary team  -analgesia prn   -neurovascular checks  -pt NPO after MN for planned procedure tomorrow  -Echo results pending due to hx of valvular regurgitation  -chest xray showed left lung base infiltrate and/or atelectasis and possible tiny left pleural effusion.  -EKG showed NSR with HR 77  -Functional Capacity: 4-10 METs  -pt has a low risk for major cardiac event including MI, Pulmonary Embolism, Ventricular fibrillation, primary cardiac risk, and complete heart block  based on the revised cardiac index during this intermediate risk surgery   -Surgeon should proceed with surgery as planned pending 2D ECHO results              VTE Risk Mitigation         Ordered     Place sequential compression device  Until discontinued      07/11/17 1727     Medium Risk of VTE  Once      07/10/17 1339        Thank you for your consult. I will follow-up with patient. Please contact us if you have any additional questions.    Yahaira Peguero NP  Department of Hospital Medicine   [facility]

## 2017-07-11 NOTE — ASSESSMENT & PLAN NOTE
-Pt admitted to Medical Surgical Unit   -Orthopedic Surgery -primary team  -analgesia prn   -neurovascular checks  -pt NPO after MN for planned procedure tomorrow  -Echo results pending due to hx of valvular regurgitation  -chest xray showed left lung base infiltrate and/or atelectasis and possible tiny left pleural effusion.  -EKG showed NSR with HR 77  -Functional Capacity: 4-10 METs  -pt has a low risk for major cardiac event including MI, Pulmonary Embolism, Ventricular fibrillation, primary cardiac risk, and complete heart block based on the revised cardiac index during this intermediate risk surgery   -Surgeon should proceed with surgery as planned pending 2D ECHO results

## 2017-07-11 NOTE — NURSING
Patient screaming for .  Went in room with charge nurse, LEANNA Freed.  Patient states staff is holding her prisoner and she does not know what is going on.  Patient refusing care.  Called  for patient to speak with.  Patient screaming at  on phone.  Threw phone across the room on the couch.  Explained to patient she should not yell or throw hospital equipment.  Charge nurse in room for entire interaction.  Will notify providers of patient behavior.  Will continue to monitor and follow any orders.

## 2017-07-11 NOTE — SUBJECTIVE & OBJECTIVE
Past Medical History:   Diagnosis Date    Alcohol dependence     per patient in the past    Allergy     Anemia     Anxiety     Arthritis 6/24/2013    Asthma     per patient    Colon polyp     Colon polyp     colonoscopy 8/26/2013    COPD (chronic obstructive pulmonary disease)     Depression     Diverticulosis     Diverticulosis     colonoscopy 8/26/2013    Hiatal hernia     CXR 2/25/2015--Large hiatal hernia.     Hyperlipidemia     Hypertension     Hypocalcemia 7/6/2016    Hypothyroidism 6/24/2013    Osteoporosis     Pulmonary embolism     per patient unsure of date    Restless leg syndrome     RLS (restless legs syndrome)     Seizure 6/24/2013    Tobacco dependence     resolved    Trouble in sleeping        Past Surgical History:   Procedure Laterality Date    ADENOIDECTOMY      APPENDECTOMY      CHOLECYSTECTOMY      COLONOSCOPY  2013    FINGER SURGERY      HERNIA REPAIR      HYSTERECTOMY      JOINT REPLACEMENT      TONSILLECTOMY      TOTAL HIP ARTHROPLASTY      L x2       Review of patient's allergies indicates:   Allergen Reactions    Nsaids (non-steroidal anti-inflammatory drug) Hives and Shortness Of Breath    Pcn [penicillins] Hives and Shortness Of Breath    Sulfa (sulfonamide antibiotics) Hives and Shortness Of Breath    Aspirin     Macrodantin [nitrofurantoin macrocrystalline] Hives    Wellbutrin [bupropion hcl]        No current facility-administered medications on file prior to encounter.      Current Outpatient Prescriptions on File Prior to Encounter   Medication Sig    allopurinol (ZYLOPRIM) 100 MG tablet Take 1 tablet (100 mg total) by mouth once daily.    busPIRone (BUSPAR) 15 MG tablet 1 tab po tid    clindamycin (CLEOCIN) 150 MG capsule Take 2 capsules (300 mg total) by mouth every 8 (eight) hours.    diclofenac sodium (VOLTAREN) 1 % Gel Apply 4 g topically 4 (four) times daily. Pt has shoulder pain and knee pain    diphenoxylate-atropine 2.5-0.025 mg  (LOMOTIL) 2.5-0.025 mg per tablet     duloxetine (CYMBALTA) 60 MG capsule Take 1 capsule (60 mg total) by mouth once daily.    ferrous sulfate 325 (65 FE) MG EC tablet Take 1 tablet (325 mg total) by mouth once a week.    fluticasone (FLONASE) 50 mcg/actuation nasal spray instill 2 sprays into each nostril once daily    fluticasone-salmeterol (ADVAIR HFA) 115-21 mcg/actuation HFAA inhale 2 puffs INTO THE LUNGS twice a day    hydrocodone-acetaminophen 7.5-325mg (NORCO) 7.5-325 mg per tablet Take 1 tablet by mouth every 6 (six) hours as needed for Pain.    ipratropium-albuterol (COMBIVENT RESPIMAT)  mcg/actuation inhaler inhale 1 puff INTO THE LUNGS four times a day    levothyroxine (SYNTHROID) 100 MCG tablet TAKE 1 TABLET ONE TIME DAILY    methocarbamol (ROBAXIN) 500 MG Tab Take 1 tablet (500 mg total) by mouth 2 (two) times daily as needed (for muscle spasms.).    metoprolol succinate (TOPROL-XL) 50 MG 24 hr tablet Take 1 tablet (50 mg total) by mouth once daily.    MULTIVITAMIN W-MINERALS/LUTEIN (CENTRUM SILVER ORAL) Take 1 tablet by mouth once daily.    pantoprazole (PROTONIX) 40 MG tablet Take 1 tablet (40 mg total) by mouth once daily.    pravastatin (PRAVACHOL) 10 MG tablet TAKE 1 TABLET ONE TIME DAILY    ropinirole (REQUIP) 1 MG tablet Take 1 tablet (1 mg total) by mouth nightly.    thiamine 250 MG tablet Take 500 mg by mouth once daily.    tramadol (ULTRAM) 50 mg tablet Take 1 tablet (50 mg total) by mouth 2 (two) times daily as needed for Pain.     Family History     Problem Relation (Age of Onset)    Asthma Father    Cancer Mother, Maternal Aunt, Maternal Grandmother, Maternal Aunt    Colon cancer Maternal Grandmother    Diabetes Mother, Son    Heart disease Maternal Aunt, Maternal Aunt    Hypertension Mother, Son    Kidney disease Mother    Parkinsonism Mother        Social History Main Topics    Smoking status: Former Smoker     Packs/day: 1.00     Years: 30.00     Types: Cigarettes      Quit date: 8/5/2010    Smokeless tobacco: Former User     Quit date: 8/5/2010    Alcohol use 0.6 - 1.2 oz/week     1 - 2 Shots of liquor per week    Drug use: No    Sexual activity: Not Currently     Partners: Male     Review of Systems   Constitutional: Positive for activity change. Negative for appetite change, chills, fatigue and fever.   HENT: Negative for congestion, postnasal drip, rhinorrhea, sinus pressure, sneezing, sore throat and trouble swallowing.    Eyes: Negative.    Respiratory: Positive for wheezing. Negative for apnea, cough and shortness of breath.    Cardiovascular: Negative for chest pain, palpitations and leg swelling.   Gastrointestinal: Negative for abdominal distention, abdominal pain, diarrhea, nausea and vomiting.   Endocrine: Negative for cold intolerance and heat intolerance.   Genitourinary: Negative for difficulty urinating, dysuria, flank pain, frequency and urgency.   Musculoskeletal: Positive for arthralgias and myalgias. Negative for back pain.   Skin: Positive for color change (LUE) and wound.   Allergic/Immunologic: Negative for environmental allergies and food allergies.   Neurological: Negative for dizziness, syncope, weakness and headaches.   Hematological: Does not bruise/bleed easily.   Psychiatric/Behavioral: Negative for agitation, confusion and sleep disturbance. The patient is not nervous/anxious.      Objective:     Vital Signs (Most Recent):  Temp: 97.8 °F (36.6 °C) (07/11/17 1125)  Pulse: 82 (07/11/17 1125)  Resp: 18 (07/11/17 1125)  BP: 139/61 (07/11/17 1125)  SpO2: 97 % (07/11/17 0732) Vital Signs (24h Range):  Temp:  [97.8 °F (36.6 °C)-98.5 °F (36.9 °C)] 97.8 °F (36.6 °C)  Pulse:  [] 82  Resp:  [18-20] 18  SpO2:  [95 %-99 %] 97 %  BP: (100-139)/(50-99) 139/61     Weight: 52.2 kg (115 lb)  Body mass index is 18.56 kg/m².    Physical Exam   Constitutional: She is oriented to person, place, and time. She appears well-developed and well-nourished.   HENT:    Head: Normocephalic.   Nose: Nose normal.   Mouth/Throat: Oropharynx is clear and moist.   Eyes: Conjunctivae and EOM are normal.   Neck: Normal range of motion. Neck supple.   Cardiovascular: Normal heart sounds and intact distal pulses.  Exam reveals no friction rub.    No murmur heard.  Pulmonary/Chest: Effort normal. No respiratory distress. She has wheezes. She has no rales.   Abdominal: Soft. Bowel sounds are normal. She exhibits no distension. There is no tenderness.   Genitourinary:   Genitourinary Comments: Deferred   Musculoskeletal: Normal range of motion. She exhibits edema (L hand 2+) and tenderness (LUE and L hand).   Limited ROM to LUE secondary to fracture  Soft cast in place   Neurological: She is alert and oriented to person, place, and time.   Skin: Skin is warm and dry. Capillary refill takes 2 to 3 seconds.   Psychiatric: She has a normal mood and affect. Her behavior is normal. Thought content normal.       Significant Labs: CBC: No results for input(s): WBC, HGB, HCT, PLT in the last 48 hours.  CMP: No results for input(s): NA, K, CL, CO2, GLU, BUN, CREATININE, CALCIUM, PROT, ALBUMIN, BILITOT, ALKPHOS, AST, ALT, ANIONGAP, EGFRNONAA in the last 48 hours.    Invalid input(s): [place]    Significant Imaging:   Imaging Results    None

## 2017-07-11 NOTE — TELEPHONE ENCOUNTER
Patient recently broke her wrist and is about to have surgery. Currently on Prolia. Appointment rescheduled for 08/17/2017 with Patria Melara PA-C for 1:30pm and lab for 1:00pm. Reminder letter mailed.

## 2017-07-11 NOTE — TELEPHONE ENCOUNTER
----- Message from Tiffany Grant sent at 7/11/2017  7:48 AM CDT -----  Contact: pt  - Marino   States he's calling to see if pt needs to have labs done prior to her appt in August with  and can be reached at [phone]//[email or web address]

## 2017-07-11 NOTE — PLAN OF CARE
"Problem: Patient Care Overview  Goal: Plan of Care Review  Outcome: Ongoing (interventions implemented as appropriate)  Pt alert to self. When asked where she was, she stated she was at a hotel. When asked what year we are in, pt stated, "1991." Reoriented pt as needed during shift. Pt complained of pain at beginning of shift, but refused medication. Bed alarm activated. Fall precautions in place. Generalized bruising noted. Incontinence care performed during shift. Pt repositioned independently with encouragement during shift. Pt NPO since midnight. VSS. Call light and personal items within reach. Will continue to monitor.         "

## 2017-07-11 NOTE — TELEPHONE ENCOUNTER
----- Message from Tiffany Grant sent at 7/11/2017  7:45 AM CDT -----  Contact: Pt  - kenan   [place] [facility] [place] and is having surgery and needs to resched . Wants to come in prior to the next avail in November. Can be reached at [phone]

## 2017-07-11 NOTE — H&P
CC:This is a 66 year old female that complains of left wrist.    HPI:The patient sustained a fracture to the left wrist in a fall.  Her fracture was reduced and placed in           A splint.  She was discharge to home and developed some dependent swelling.  She complains of tingling of the left hand.     PMH:    Past Medical History:   Diagnosis Date    Alcohol dependence     per patient in the past    Allergy     Anemia     Anxiety     Arthritis 6/24/2013    Asthma     per patient    Colon polyp     Colon polyp     colonoscopy 8/26/2013    COPD (chronic obstructive pulmonary disease)     Depression     Diverticulosis     Diverticulosis     colonoscopy 8/26/2013    Hiatal hernia     CXR 2/25/2015--Large hiatal hernia.     Hyperlipidemia     Hypertension     Hypocalcemia 7/6/2016    Hypothyroidism 6/24/2013    Osteoporosis     Pulmonary embolism     per patient unsure of date    Restless leg syndrome     RLS (restless legs syndrome)     Seizure 6/24/2013    Tobacco dependence     resolved    Trouble in sleeping        PSH:    Past Surgical History:   Procedure Laterality Date    ADENOIDECTOMY      APPENDECTOMY      CHOLECYSTECTOMY      COLONOSCOPY  2013    FINGER SURGERY      HERNIA REPAIR      HYSTERECTOMY      JOINT REPLACEMENT      TONSILLECTOMY      TOTAL HIP ARTHROPLASTY      L x2       Family Hx:    Family History   Problem Relation Age of Onset    Diabetes Mother     Hypertension Mother     Cancer Mother     Kidney disease Mother      stones    Parkinsonism Mother     Asthma Father     Diabetes Son     Hypertension Son     Heart disease Maternal Aunt      CAD     Cancer Maternal Aunt      Breast Ca    Colon cancer Maternal Grandmother     Cancer Maternal Grandmother     Cancer Maternal Aunt      breast    Heart disease Maternal Aunt     Stroke Neg Hx        Allergy:    Review of patient's allergies indicates:   Allergen Reactions    Nsaids (non-steroidal  "anti-inflammatory drug) Hives and Shortness Of Breath    Pcn [penicillins] Hives and Shortness Of Breath    Sulfa (sulfonamide antibiotics) Hives and Shortness Of Breath    Aspirin     Macrodantin [nitrofurantoin macrocrystalline] Hives    Wellbutrin [bupropion hcl]        Medication:    Current Facility-Administered Medications:     acetaminophen tablet 650 mg, 650 mg, Oral, Q8H PRN, Angelo House MD    morphine injection 2 mg, 2 mg, Intravenous, Q4H PRN, Angelo House MD, 2 mg at 07/10/17 1826    ondansetron injection 4 mg, 4 mg, Intravenous, Q12H PRN, Angelo House MD    promethazine (PHENERGAN) 6.25 mg in dextrose 5 % 50 mL IVPB, 6.25 mg, Intravenous, Q6H PRN, Angelo House MD    Social History:    Social History     Social History    Marital status:      Spouse name: N/A    Number of children: N/A    Years of education: N/A     Occupational History    Not on file.     Social History Main Topics    Smoking status: Former Smoker     Packs/day: 1.00     Years: 30.00     Types: Cigarettes     Quit date: 8/5/2010    Smokeless tobacco: Former User     Quit date: 8/5/2010    Alcohol use 0.6 - 1.2 oz/week     1 - 2 Shots of liquor per week    Drug use: No    Sexual activity: Not Currently     Partners: Male     Other Topics Concern    Not on file     Social History Narrative    No narrative on file       Vitals:   /62 (BP Location: Left arm, Patient Position: Lying, BP Method: Automatic)   Pulse 90   Temp 98.5 °F (36.9 °C) (Oral)   Resp 18   Ht 5' 6" (1.676 m)   Wt 52.2 kg (115 lb)   LMP  (LMP Unknown)   SpO2 96%   Breastfeeding? No   BMI 18.56 kg/m²      ROS:  GENERAL: No fever, chills, fatigability or weight loss.  SKIN: No rashes, itching or changes in color or texture of skin.  HEAD: No headaches or recent head trauma.  EYES: Visual acuity fine. No photophobia, ocular pain or diplopia.  EARS: Denies ear pain, discharge or vertigo.  NOSE: No loss of " smell, no epistaxis or postnasal drip.  MOUTH & THROAT: No hoarseness or change in voice. No excessive gum bleeding.  NODES: Denies swollen glands.  CHEST: Denies BRADY, cyanosis, wheezing, cough and sputum production.  CARDIOVASCULAR: Denies chest pain, PND, orthopnea or reduced exercise tolerance.  ABDOMEN: Appetite fine. No weight loss. Denies diarrhea, abdominal pain, hematemesis or blood in stool.  URINARY: No flank pain, dysuria or hematuria.  PERIPHERAL VASCULAR: No claudication or cyanosis.  NEUROLOGIC: No history of seizures, paralysis, alteration of gait or coordination.  MUSCULOSKELETAL: See HPI    PE:  APPEARANCE: Well nourished, well developed, in no acute distress.   HEAD: Normocephalic, atraumatic.  EYES: PERRL. EOMI.   EARS: TM's intact. Light reflex normal. No retraction or perforation.   NOSE: Mucosa pink. Airway clear.  MOUTH & THROAT: No tonsillar enlargement. No pharyngeal erythema or exudate. No stridor.  NECK: Supple.   NODES: No cervical, axillary or inguinal lymph node enlargement.  CHEST: Lungs clear to auscultation.  CARDIOVASCULAR: Normal S1, S2. No rubs, murmurs or gallops.  ABDOMEN: Bowel sounds normal. Not distended. Soft. No tenderness or masses.  NEUROLOGIC: Cranial Nerves: II-XII grossly intact, also see MUSCULOSKELETAL  MUSCULOSKELETAL:          Left  Wrist   Dressing intact, 2 plus radial  artery and ulnar artery pulses, light touch intact Left upper extremity.  All digits are warm. No erythema, no warmth, no drainage, mild swelling, no significant tenderness. Less than 2 seconds capillary refill all digits.            Assessment:           Diagnosis:              1.left distal radius fracture                    Diagnostic Studies  MRI-Yes  X-Ray-No  EMG/NCV-No  Arthrogram-No  Bone Scan-No  CT Scan-No  Doppler-No  ESR-No  CRP-No  CBC with Diff-No   Rheumatoid/Arthritis Panel-No      Plan:                                                 1. PT-no                                                  2.OT-no                                          3.NSAID-no                                        4. Narcotics-yes                                     5. Wound care-No                                 6. Rest-yes                                           7. Surgery-yes, left radius orif and possible Carpal tunnel release                                         8. MALICK Hose-no                                    9. Anticoagulation therapy-no               10. Elevation-no                                     11. Crutches-no                                    12. Walker-no             13. Cane no                        14. Referral-no                                     15.Injection-no                            16. Splint   /    Cast   /   Cast Shoe-No              17. RICE-none

## 2017-07-11 NOTE — PROGRESS NOTES
"   07/11/17 1015   Wound Care Screen Assessment   Mobility Bedbound   Continent of Bowel No   Continent of Bladder No   Contractures No   Previous History of pressure ulcers No   Sensory Perception 3-->slightly limited   Moisture 3-->occasionally moist   Activity 1-->bedfast   Mobility 2-->very limited   Nutrition 2-->probably inadequate   Friction and Shear 2-->potential problem   Jaylon Score 13   Feeding Tube No   S/P Flap or Graft Surgery No   Skin Condition intact   Wound(s) Present No   Wound acquired during current hospitalization No   Evaluation by Wound Care Team required? Yes     Wound Care Screen completed due to documented Jaylon Scale score <=14.  Skin assessment completed at this time. Bilateral heels intact with blanchable redness, no breakdown. Recommend floating off mattress with pillow.  Patient turned with max assist to left, sacrum/buttock intact with no redness or breakdown noted.  Recommend foam wedge for support.   Spoke with primary nurse JUNG Greenberg RN and discussed importance of documenting the indicated / performed skin interventions in EPIC flow sheet (ie: turning q 2 hours with actual position documented, heels floated, skin moisturizer utilized, all incontinence care, mattress surface, incontinence pad utilized, moisture barriers utilized, preventive dressings, etc.). Nurse verbalizes understanding.  Wound care recommends the following for Pressure Injury Prevention:  Skin Care Precautions / Pressure Injury Prevention:  1. Follow "Guidelines for Prevention of Pressure Ulcers in at Risk Patients"  2. Document wound assessment in [facility] using guidelines in Ludy's "Assessment : Wound" procedure  3. Limit the amount of linen/underpad between patient and mattress surface to ONE fitted sheet and ONE covidien underpad - NO draw sheet/briefs.  4. Obtain Easi Cleans Foam Wipes for providing meka care - avoid the use of wash cloths to areas affected by IAD.  5. Apply Clear Barrier Ointment to " perineal / perirectal areas in a thin even layer to clean dry skin BID and after each episode of pericare  6. Apply sween 24 moisturizer cream to all dry skin after daily bath and prn  7. Obtain foam wedge from materials management to assist with maintaining proper position changes at least q 2hours and document actual position in EPIC q 2hours  8. Please elevate heels off mattress and document in EPIC Flow Sheets every 2 hours.  9. Do NOT elevate HOB greater than 30 degrees unless contraindicated.

## 2017-07-11 NOTE — PLAN OF CARE
IMM form signed.         07/11/17 7570   Medicare Message   Important Message from Medicare regarding Discharge Appeal Rights Given to patient/caregiver;Explained to patient/caregiver;Signed/date by patient/caregiver   Date IMM was signed 07/11/17   Time IMM was signed [id number]

## 2017-07-11 NOTE — ASSESSMENT & PLAN NOTE
-pt drinks 1-2 alcoholic drinks nightly  -will monitor for sxs of withdrawal  -pt counseled on alcohol cessation with understanding verbalized

## 2017-07-11 NOTE — PLAN OF CARE
Problem: Patient Care Overview  Goal: Plan of Care Review  Outcome: Ongoing (interventions implemented as appropriate)  Patient cognitively impaired.  Needs reinforcement on plan of care. Call bell and personal items in reach.  Bed in low position and locked.  Side rails up x3.  Encouraged to call if any assistance needed.  Will continue to monitor.

## 2017-07-11 NOTE — ASSESSMENT & PLAN NOTE
-H/H stable  -will monitor  -will transfuse if needed   -repeat CBC in am   -will resume iron supplementation post procedure

## 2017-07-11 NOTE — HPI
Ibeth Schroeder is 66 year old female with PMHx of HTN, HLD, CKD, depression, hypothyroidism, COPD, and EtOH abuse, who presented to the ED with c/o left wrist swelling and pain due to left radial and ulnar fracture after suffering a fall down a stairway.  Hospital Medicine consulted for medical management.  Patient stated she was complaint with prescribed medications.  She reports drinking 1-2 alcoholic drinks per night made with Rum.  She is prescribed home oxygen by Pulmonology.  She denies any chest pain, palpitations, SOB/BRADY, orthopnea, PND, edema, lightheadedness/dizziness, N/V/D, syncope, or fever.

## 2017-07-12 ENCOUNTER — ANESTHESIA EVENT (OUTPATIENT)
Dept: SURGERY | Facility: HOSPITAL | Age: 67
DRG: 511 | End: 2017-07-12
Payer: MEDICARE

## 2017-07-12 ENCOUNTER — TELEPHONE (OUTPATIENT)
Dept: FAMILY MEDICINE | Facility: CLINIC | Age: 67
End: 2017-07-12

## 2017-07-12 ENCOUNTER — PATIENT MESSAGE (OUTPATIENT)
Dept: FAMILY MEDICINE | Facility: CLINIC | Age: 67
End: 2017-07-12

## 2017-07-12 ENCOUNTER — ANESTHESIA (OUTPATIENT)
Dept: SURGERY | Facility: HOSPITAL | Age: 67
DRG: 511 | End: 2017-07-12
Payer: MEDICARE

## 2017-07-12 LAB
ALBUMIN SERPL BCP-MCNC: 2.3 G/DL
ALBUMIN SERPL BCP-MCNC: 2.6 G/DL
ALLENS TEST: ABNORMAL
ALP SERPL-CCNC: 68 U/L
ALP SERPL-CCNC: 81 U/L
ALT SERPL W/O P-5'-P-CCNC: 10 U/L
ALT SERPL W/O P-5'-P-CCNC: 12 U/L
AMMONIA PLAS-SCNC: 16 UMOL/L
ANION GAP SERPL CALC-SCNC: 11 MMOL/L
ANION GAP SERPL CALC-SCNC: 12 MMOL/L
APTT BLDCRRT: 28.5 SEC
AST SERPL-CCNC: 22 U/L
AST SERPL-CCNC: 23 U/L
BASOPHILS # BLD AUTO: 0.01 K/UL
BASOPHILS # BLD AUTO: 0.01 K/UL
BASOPHILS NFR BLD: 0.1 %
BASOPHILS NFR BLD: 0.1 %
BILIRUB SERPL-MCNC: 0.5 MG/DL
BILIRUB SERPL-MCNC: 0.6 MG/DL
BNP SERPL-MCNC: 86 PG/ML
BUN SERPL-MCNC: 29 MG/DL
BUN SERPL-MCNC: 34 MG/DL
CALCIUM SERPL-MCNC: 8.3 MG/DL
CALCIUM SERPL-MCNC: 8.6 MG/DL
CHLORIDE SERPL-SCNC: 107 MMOL/L
CHLORIDE SERPL-SCNC: 108 MMOL/L
CK MB SERPL-MCNC: 3.9 NG/ML
CK MB SERPL-RTO: 3.6 %
CK SERPL-CCNC: 108 U/L
CO2 SERPL-SCNC: 18 MMOL/L
CO2 SERPL-SCNC: 21 MMOL/L
CREAT SERPL-MCNC: 1.3 MG/DL
CREAT SERPL-MCNC: 1.3 MG/DL
D DIMER PPP IA.FEU-MCNC: 8.44 MG/L FEU
DELSYS: ABNORMAL
DIFFERENTIAL METHOD: ABNORMAL
DIFFERENTIAL METHOD: ABNORMAL
EOSINOPHIL # BLD AUTO: 0.1 K/UL
EOSINOPHIL # BLD AUTO: 0.2 K/UL
EOSINOPHIL NFR BLD: 1.6 %
EOSINOPHIL NFR BLD: 1.7 %
ERYTHROCYTE [DISTWIDTH] IN BLOOD BY AUTOMATED COUNT: 13.8 %
ERYTHROCYTE [DISTWIDTH] IN BLOOD BY AUTOMATED COUNT: 14 %
EST. GFR  (AFRICAN AMERICAN): 50 ML/MIN/1.73 M^2
EST. GFR  (AFRICAN AMERICAN): 50 ML/MIN/1.73 M^2
EST. GFR  (NON AFRICAN AMERICAN): 43 ML/MIN/1.73 M^2
EST. GFR  (NON AFRICAN AMERICAN): 43 ML/MIN/1.73 M^2
ESTIMATED PA SYSTOLIC PRESSURE: 50.25
FLOW: 2
GLUCOSE SERPL-MCNC: 164 MG/DL
GLUCOSE SERPL-MCNC: 95 MG/DL
HCO3 UR-SCNC: 20.4 MMOL/L (ref 24–28)
HCT VFR BLD AUTO: 29.3 %
HCT VFR BLD AUTO: 30.5 %
HGB BLD-MCNC: 9.4 G/DL
HGB BLD-MCNC: 9.9 G/DL
INR PPP: 1
INR PPP: 1
LYMPHOCYTES # BLD AUTO: 1.2 K/UL
LYMPHOCYTES # BLD AUTO: 2 K/UL
LYMPHOCYTES NFR BLD: 15.3 %
LYMPHOCYTES NFR BLD: 23 %
MCH RBC QN AUTO: 31.9 PG
MCH RBC QN AUTO: 32.3 PG
MCHC RBC AUTO-ENTMCNC: 32.1 %
MCHC RBC AUTO-ENTMCNC: 32.5 %
MCV RBC AUTO: 101 FL
MCV RBC AUTO: 98 FL
MODE: ABNORMAL
MONOCYTES # BLD AUTO: 0.8 K/UL
MONOCYTES # BLD AUTO: 1.2 K/UL
MONOCYTES NFR BLD: 13.4 %
MONOCYTES NFR BLD: 9.2 %
NEUTROPHILS # BLD AUTO: 5.3 K/UL
NEUTROPHILS # BLD AUTO: 6 K/UL
NEUTROPHILS NFR BLD: 61.8 %
NEUTROPHILS NFR BLD: 73.8 %
PCO2 BLDA: 34.9 MMHG (ref 35–45)
PH SMN: 7.38 [PH] (ref 7.35–7.45)
PLATELET # BLD AUTO: 163 K/UL
PLATELET # BLD AUTO: 194 K/UL
PMV BLD AUTO: 10.3 FL
PMV BLD AUTO: 9.9 FL
PO2 BLDA: 69 MMHG (ref 80–100)
POC BE: -5 MMOL/L
POC SATURATED O2: 93 % (ref 95–100)
POTASSIUM SERPL-SCNC: 4 MMOL/L
POTASSIUM SERPL-SCNC: 4.2 MMOL/L
PROT SERPL-MCNC: 5.5 G/DL
PROT SERPL-MCNC: 6.4 G/DL
PROTHROMBIN TIME: 10.3 SEC
PROTHROMBIN TIME: 10.5 SEC
RBC # BLD AUTO: 2.91 M/UL
RBC # BLD AUTO: 3.1 M/UL
RETIRED EF AND QEF - SEE NOTES: 55 (ref 55–65)
SAMPLE: ABNORMAL
SITE: ABNORMAL
SODIUM SERPL-SCNC: 137 MMOL/L
SODIUM SERPL-SCNC: 140 MMOL/L
TROPONIN I SERPL DL<=0.01 NG/ML-MCNC: 0.01 NG/ML
WBC # BLD AUTO: 8.12 K/UL
WBC # BLD AUTO: 8.58 K/UL

## 2017-07-12 PROCEDURE — 63600175 PHARM REV CODE 636 W HCPCS: Performed by: NURSE PRACTITIONER

## 2017-07-12 PROCEDURE — 94761 N-INVAS EAR/PLS OXIMETRY MLT: CPT

## 2017-07-12 PROCEDURE — 93306 TTE W/DOPPLER COMPLETE: CPT | Mod: 26,,, | Performed by: INTERNAL MEDICINE

## 2017-07-12 PROCEDURE — 80053 COMPREHEN METABOLIC PANEL: CPT | Mod: 91

## 2017-07-12 PROCEDURE — 85025 COMPLETE CBC W/AUTO DIFF WBC: CPT

## 2017-07-12 PROCEDURE — 0PSL04Z REPOSITION LEFT ULNA WITH INTERNAL FIXATION DEVICE, OPEN APPROACH: ICD-10-PCS | Performed by: ORTHOPAEDIC SURGERY

## 2017-07-12 PROCEDURE — 63600175 PHARM REV CODE 636 W HCPCS: Performed by: INTERNAL MEDICINE

## 2017-07-12 PROCEDURE — 25607 OPTX DST RD XARTC FX/EPI SEP: CPT | Mod: LT,,, | Performed by: ORTHOPAEDIC SURGERY

## 2017-07-12 PROCEDURE — 85610 PROTHROMBIN TIME: CPT

## 2017-07-12 PROCEDURE — 36600 WITHDRAWAL OF ARTERIAL BLOOD: CPT

## 2017-07-12 PROCEDURE — 84484 ASSAY OF TROPONIN QUANT: CPT

## 2017-07-12 PROCEDURE — 27201423 OPTIME MED/SURG SUP & DEVICES STERILE SUPPLY: Performed by: ORTHOPAEDIC SURGERY

## 2017-07-12 PROCEDURE — 11000001 HC ACUTE MED/SURG PRIVATE ROOM

## 2017-07-12 PROCEDURE — 37000009 HC ANESTHESIA EA ADD 15 MINS: Performed by: ORTHOPAEDIC SURGERY

## 2017-07-12 PROCEDURE — C1769 GUIDE WIRE: HCPCS | Performed by: ORTHOPAEDIC SURGERY

## 2017-07-12 PROCEDURE — 85379 FIBRIN DEGRADATION QUANT: CPT

## 2017-07-12 PROCEDURE — 0PSJ04Z REPOSITION LEFT RADIUS WITH INTERNAL FIXATION DEVICE, OPEN APPROACH: ICD-10-PCS | Performed by: ORTHOPAEDIC SURGERY

## 2017-07-12 PROCEDURE — 96372 THER/PROPH/DIAG INJ SC/IM: CPT

## 2017-07-12 PROCEDURE — 37000008 HC ANESTHESIA 1ST 15 MINUTES: Performed by: ORTHOPAEDIC SURGERY

## 2017-07-12 PROCEDURE — 83880 ASSAY OF NATRIURETIC PEPTIDE: CPT

## 2017-07-12 PROCEDURE — 36000710: Performed by: ORTHOPAEDIC SURGERY

## 2017-07-12 PROCEDURE — 82803 BLOOD GASES ANY COMBINATION: CPT

## 2017-07-12 PROCEDURE — 63600175 PHARM REV CODE 636 W HCPCS: Performed by: NURSE ANESTHETIST, CERTIFIED REGISTERED

## 2017-07-12 PROCEDURE — 82553 CREATINE MB FRACTION: CPT

## 2017-07-12 PROCEDURE — 25000003 PHARM REV CODE 250: Performed by: INTERNAL MEDICINE

## 2017-07-12 PROCEDURE — 93306 TTE W/DOPPLER COMPLETE: CPT

## 2017-07-12 PROCEDURE — 94640 AIRWAY INHALATION TREATMENT: CPT

## 2017-07-12 PROCEDURE — 93010 ELECTROCARDIOGRAM REPORT: CPT | Mod: ,,, | Performed by: INTERNAL MEDICINE

## 2017-07-12 PROCEDURE — 80053 COMPREHEN METABOLIC PANEL: CPT

## 2017-07-12 PROCEDURE — 82140 ASSAY OF AMMONIA: CPT

## 2017-07-12 PROCEDURE — 25000003 PHARM REV CODE 250: Performed by: NURSE PRACTITIONER

## 2017-07-12 PROCEDURE — 85610 PROTHROMBIN TIME: CPT | Mod: 91

## 2017-07-12 PROCEDURE — 99900035 HC TECH TIME PER 15 MIN (STAT)

## 2017-07-12 PROCEDURE — 25652 OPTX ULNAR STYLOID FRACTURE: CPT | Mod: 51,LT,, | Performed by: ORTHOPAEDIC SURGERY

## 2017-07-12 PROCEDURE — 93005 ELECTROCARDIOGRAM TRACING: CPT

## 2017-07-12 PROCEDURE — 36415 COLL VENOUS BLD VENIPUNCTURE: CPT

## 2017-07-12 PROCEDURE — 63600175 PHARM REV CODE 636 W HCPCS: Performed by: ORTHOPAEDIC SURGERY

## 2017-07-12 PROCEDURE — 25000003 PHARM REV CODE 250: Performed by: ORTHOPAEDIC SURGERY

## 2017-07-12 PROCEDURE — 25000003 PHARM REV CODE 250: Performed by: NURSE ANESTHETIST, CERTIFIED REGISTERED

## 2017-07-12 PROCEDURE — 71000033 HC RECOVERY, INTIAL HOUR: Performed by: ORTHOPAEDIC SURGERY

## 2017-07-12 PROCEDURE — C1713 ANCHOR/SCREW BN/BN,TIS/BN: HCPCS | Performed by: ORTHOPAEDIC SURGERY

## 2017-07-12 PROCEDURE — 27000221 HC OXYGEN, UP TO 24 HOURS

## 2017-07-12 PROCEDURE — 36000711: Performed by: ORTHOPAEDIC SURGERY

## 2017-07-12 PROCEDURE — 85730 THROMBOPLASTIN TIME PARTIAL: CPT

## 2017-07-12 DEVICE — K-WIRE TRCR PT1.25MM D 150MM L: Type: IMPLANTABLE DEVICE | Site: RADIUS | Status: FUNCTIONAL

## 2017-07-12 DEVICE — SCREW LOCKING 2.4 X 20MM: Type: IMPLANTABLE DEVICE | Site: RADIUS | Status: FUNCTIONAL

## 2017-07-12 DEVICE — SCREW LOCKING 2.4 X 14MM: Type: IMPLANTABLE DEVICE | Site: RADIUS | Status: FUNCTIONAL

## 2017-07-12 DEVICE — SCREW LOCKING 2.4 X 18MM: Type: IMPLANTABLE DEVICE | Site: RADIUS | Status: FUNCTIONAL

## 2017-07-12 DEVICE — SCREW VA LOCK STARDRIVE 2.4X12: Type: IMPLANTABLE DEVICE | Site: RADIUS | Status: FUNCTIONAL

## 2017-07-12 DEVICE — SCREW STRDRV REC T8 2.7X12 SS: Type: IMPLANTABLE DEVICE | Site: RADIUS | Status: FUNCTIONAL

## 2017-07-12 DEVICE — SCREW STRDRV REC T8 2.7X10 SS: Type: IMPLANTABLE DEVICE | Site: RADIUS | Status: FUNCTIONAL

## 2017-07-12 DEVICE — IMPLANTABLE DEVICE: Type: IMPLANTABLE DEVICE | Site: RADIUS | Status: FUNCTIONAL

## 2017-07-12 DEVICE — SCREW LOCKING 2.4 X 16MM: Type: IMPLANTABLE DEVICE | Site: RADIUS | Status: FUNCTIONAL

## 2017-07-12 RX ORDER — PROPOFOL 10 MG/ML
VIAL (ML) INTRAVENOUS
Status: DISCONTINUED | OUTPATIENT
Start: 2017-07-12 | End: 2017-07-12

## 2017-07-12 RX ORDER — DIPHENHYDRAMINE HYDROCHLORIDE 50 MG/ML
12.5 INJECTION INTRAMUSCULAR; INTRAVENOUS EVERY 6 HOURS PRN
Status: DISCONTINUED | OUTPATIENT
Start: 2017-07-12 | End: 2017-07-12 | Stop reason: HOSPADM

## 2017-07-12 RX ORDER — ONDANSETRON 8 MG/1
8 TABLET, ORALLY DISINTEGRATING ORAL EVERY 8 HOURS PRN
Status: DISCONTINUED | OUTPATIENT
Start: 2017-07-12 | End: 2017-07-18 | Stop reason: HOSPADM

## 2017-07-12 RX ORDER — BUSPIRONE HYDROCHLORIDE 5 MG/1
15 TABLET ORAL 3 TIMES DAILY
Status: DISCONTINUED | OUTPATIENT
Start: 2017-07-12 | End: 2017-07-18 | Stop reason: HOSPADM

## 2017-07-12 RX ORDER — ENOXAPARIN SODIUM 100 MG/ML
1 INJECTION SUBCUTANEOUS
Status: DISCONTINUED | OUTPATIENT
Start: 2017-07-12 | End: 2017-07-12

## 2017-07-12 RX ORDER — OXYCODONE AND ACETAMINOPHEN 10; 325 MG/1; MG/1
1 TABLET ORAL EVERY 4 HOURS PRN
Qty: 90 TABLET | Refills: 0 | OUTPATIENT
Start: 2017-07-12 | End: 2017-07-18 | Stop reason: HOSPADM

## 2017-07-12 RX ORDER — MORPHINE SULFATE 10 MG/ML
2 INJECTION INTRAMUSCULAR; INTRAVENOUS; SUBCUTANEOUS EVERY 5 MIN PRN
Status: DISCONTINUED | OUTPATIENT
Start: 2017-07-12 | End: 2017-07-12 | Stop reason: HOSPADM

## 2017-07-12 RX ORDER — HYDROMORPHONE HYDROCHLORIDE 2 MG/ML
0.2 INJECTION, SOLUTION INTRAMUSCULAR; INTRAVENOUS; SUBCUTANEOUS EVERY 5 MIN PRN
Status: DISCONTINUED | OUTPATIENT
Start: 2017-07-12 | End: 2017-07-12 | Stop reason: HOSPADM

## 2017-07-12 RX ORDER — QUETIAPINE FUMARATE 25 MG/1
25 TABLET, FILM COATED ORAL NIGHTLY PRN
Status: DISCONTINUED | OUTPATIENT
Start: 2017-07-12 | End: 2017-07-13

## 2017-07-12 RX ORDER — ONDANSETRON 2 MG/ML
4 INJECTION INTRAMUSCULAR; INTRAVENOUS DAILY PRN
Status: DISCONTINUED | OUTPATIENT
Start: 2017-07-12 | End: 2017-07-12 | Stop reason: HOSPADM

## 2017-07-12 RX ORDER — ENOXAPARIN SODIUM 100 MG/ML
1 INJECTION SUBCUTANEOUS
Status: DISCONTINUED | OUTPATIENT
Start: 2017-07-13 | End: 2017-07-18 | Stop reason: HOSPADM

## 2017-07-12 RX ORDER — ROPIVACAINE HYDROCHLORIDE 5 MG/ML
INJECTION, SOLUTION EPIDURAL; INFILTRATION; PERINEURAL
Status: DISPENSED
Start: 2017-07-12 | End: 2017-07-13

## 2017-07-12 RX ORDER — MEPERIDINE HYDROCHLORIDE 50 MG/ML
12.5 INJECTION INTRAMUSCULAR; INTRAVENOUS; SUBCUTANEOUS ONCE AS NEEDED
Status: DISCONTINUED | OUTPATIENT
Start: 2017-07-12 | End: 2017-07-12 | Stop reason: HOSPADM

## 2017-07-12 RX ORDER — MORPHINE SULFATE 10 MG/ML
2 INJECTION INTRAMUSCULAR; INTRAVENOUS; SUBCUTANEOUS EVERY 4 HOURS PRN
Status: DISCONTINUED | OUTPATIENT
Start: 2017-07-12 | End: 2017-07-18 | Stop reason: HOSPADM

## 2017-07-12 RX ORDER — ONDANSETRON 2 MG/ML
INJECTION INTRAMUSCULAR; INTRAVENOUS
Status: DISCONTINUED | OUTPATIENT
Start: 2017-07-12 | End: 2017-07-12

## 2017-07-12 RX ORDER — SODIUM CHLORIDE 0.9 % (FLUSH) 0.9 %
3 SYRINGE (ML) INJECTION
Status: DISCONTINUED | OUTPATIENT
Start: 2017-07-12 | End: 2017-07-12 | Stop reason: HOSPADM

## 2017-07-12 RX ORDER — LIDOCAINE HCL/PF 100 MG/5ML
SYRINGE (ML) INTRAVENOUS
Status: DISCONTINUED | OUTPATIENT
Start: 2017-07-12 | End: 2017-07-12

## 2017-07-12 RX ORDER — OXYCODONE HYDROCHLORIDE 5 MG/1
10 TABLET ORAL
Status: DISCONTINUED | OUTPATIENT
Start: 2017-07-12 | End: 2017-07-18 | Stop reason: HOSPADM

## 2017-07-12 RX ORDER — LORAZEPAM 2 MG/ML
1 INJECTION INTRAMUSCULAR
Status: DISCONTINUED | OUTPATIENT
Start: 2017-07-12 | End: 2017-07-18 | Stop reason: HOSPADM

## 2017-07-12 RX ORDER — CLINDAMYCIN PHOSPHATE 900 MG/50ML
900 INJECTION, SOLUTION INTRAVENOUS
Status: COMPLETED | OUTPATIENT
Start: 2017-07-12 | End: 2017-07-12

## 2017-07-12 RX ORDER — PROPOFOL 10 MG/ML
VIAL (ML) INTRAVENOUS CONTINUOUS PRN
Status: DISCONTINUED | OUTPATIENT
Start: 2017-07-12 | End: 2017-07-12

## 2017-07-12 RX ORDER — LORAZEPAM 2 MG/ML
1 INJECTION INTRAMUSCULAR EVERY 6 HOURS PRN
Status: DISCONTINUED | OUTPATIENT
Start: 2017-07-12 | End: 2017-07-12

## 2017-07-12 RX ORDER — HALOPERIDOL 5 MG/ML
2 INJECTION INTRAMUSCULAR 3 TIMES DAILY PRN
Status: DISCONTINUED | OUTPATIENT
Start: 2017-07-12 | End: 2017-07-18 | Stop reason: HOSPADM

## 2017-07-12 RX ORDER — ROPIVACAINE HYDROCHLORIDE 2 MG/ML
INJECTION, SOLUTION EPIDURAL; INFILTRATION; PERINEURAL
Status: DISCONTINUED
Start: 2017-07-12 | End: 2017-07-12 | Stop reason: WASHOUT

## 2017-07-12 RX ORDER — MUPIROCIN 20 MG/G
1 OINTMENT TOPICAL
Status: DISCONTINUED | OUTPATIENT
Start: 2017-07-12 | End: 2017-07-12

## 2017-07-12 RX ORDER — MIDAZOLAM HYDROCHLORIDE 5 MG/ML
INJECTION INTRAMUSCULAR; INTRAVENOUS
Status: DISCONTINUED | OUTPATIENT
Start: 2017-07-12 | End: 2017-07-12

## 2017-07-12 RX ORDER — LORAZEPAM 2 MG/ML
2 INJECTION INTRAMUSCULAR EVERY 10 MIN PRN
Status: DISCONTINUED | OUTPATIENT
Start: 2017-07-12 | End: 2017-07-18 | Stop reason: HOSPADM

## 2017-07-12 RX ORDER — SODIUM CHLORIDE, SODIUM LACTATE, POTASSIUM CHLORIDE, CALCIUM CHLORIDE 600; 310; 30; 20 MG/100ML; MG/100ML; MG/100ML; MG/100ML
INJECTION, SOLUTION INTRAVENOUS CONTINUOUS PRN
Status: DISCONTINUED | OUTPATIENT
Start: 2017-07-12 | End: 2017-07-12

## 2017-07-12 RX ADMIN — LORAZEPAM 1 MG: 2 INJECTION INTRAMUSCULAR; INTRAVENOUS at 06:07

## 2017-07-12 RX ADMIN — ENOXAPARIN SODIUM 50 MG: 100 INJECTION SUBCUTANEOUS at 10:07

## 2017-07-12 RX ADMIN — SODIUM CHLORIDE: 0.9 INJECTION, SOLUTION INTRAVENOUS at 07:07

## 2017-07-12 RX ADMIN — PROPOFOL 35 MCG/KG/MIN: 10 INJECTION, EMULSION INTRAVENOUS at 01:07

## 2017-07-12 RX ADMIN — Medication 1000 MG: at 06:07

## 2017-07-12 RX ADMIN — LIDOCAINE HYDROCHLORIDE 80 MG: 20 INJECTION, SOLUTION INTRAVENOUS at 01:07

## 2017-07-12 RX ADMIN — QUETIAPINE FUMARATE 25 MG: 25 TABLET, FILM COATED ORAL at 08:07

## 2017-07-12 RX ADMIN — ONDANSETRON 4 MG: 2 INJECTION, SOLUTION INTRAMUSCULAR; INTRAVENOUS at 01:07

## 2017-07-12 RX ADMIN — PROPOFOL 20 MG: 10 INJECTION, EMULSION INTRAVENOUS at 01:07

## 2017-07-12 RX ADMIN — BUDESONIDE 0.25 MG: 0.25 SUSPENSION RESPIRATORY (INHALATION) at 08:07

## 2017-07-12 RX ADMIN — SODIUM CHLORIDE 650 ML: 0.9 INJECTION, SOLUTION INTRAVENOUS at 09:07

## 2017-07-12 RX ADMIN — ARFORMOTEROL TARTRATE 15 MCG: 15 SOLUTION RESPIRATORY (INHALATION) at 08:07

## 2017-07-12 RX ADMIN — SODIUM CHLORIDE: 0.9 INJECTION, SOLUTION INTRAVENOUS at 10:07

## 2017-07-12 RX ADMIN — FOLIC ACID: 5 INJECTION, SOLUTION INTRAMUSCULAR; INTRAVENOUS; SUBCUTANEOUS at 01:07

## 2017-07-12 RX ADMIN — HALOPERIDOL LACTATE 2 MG: 5 INJECTION, SOLUTION INTRAMUSCULAR at 08:07

## 2017-07-12 RX ADMIN — SODIUM CHLORIDE, SODIUM LACTATE, POTASSIUM CHLORIDE, AND CALCIUM CHLORIDE: 600; 310; 30; 20 INJECTION, SOLUTION INTRAVENOUS at 01:07

## 2017-07-12 RX ADMIN — CLINDAMYCIN IN 5 PERCENT DEXTROSE 900 MG: 18 INJECTION, SOLUTION INTRAVENOUS at 01:07

## 2017-07-12 RX ADMIN — MORPHINE SULFATE 4 MG: 4 INJECTION, SOLUTION INTRAMUSCULAR; INTRAVENOUS at 12:07

## 2017-07-12 RX ADMIN — BUSPIRONE HYDROCHLORIDE 15 MG: 5 TABLET ORAL at 09:07

## 2017-07-12 RX ADMIN — MIDAZOLAM HYDROCHLORIDE 2 MG: 5 INJECTION, SOLUTION INTRAMUSCULAR; INTRAVENOUS at 01:07

## 2017-07-12 RX ADMIN — DULOXETINE 60 MG: 30 CAPSULE, DELAYED RELEASE ORAL at 08:07

## 2017-07-12 RX ADMIN — PRAVASTATIN SODIUM 10 MG: 10 TABLET ORAL at 08:07

## 2017-07-12 RX ADMIN — LORAZEPAM 1 MG: 2 INJECTION INTRAMUSCULAR; INTRAVENOUS at 01:07

## 2017-07-12 NOTE — ANESTHESIA PROCEDURE NOTES
Peripheral    Patient location during procedure: pre-op    Start time: 7/12/2017 1:02 PM  Timeout: 7/12/2017 1:00 PM   End time: 7/12/2017 1:12 PM  Surgery related to: Lt  Radias fracture   Preanesthetic Checklist  Completed: patient identified, site marked, surgical consent, pre-op evaluation, timeout performed, IV checked, risks and benefits discussed and monitors and equipment checked  Peripheral Block  Patient position: supine  Prep: ChloraPrep  Patient monitoring: heart rate, continuous pulse ox and frequent blood pressure checks  Block type: axillary  Laterality: left  Injection technique: single shot  Needle  Needle gauge: 22 G  Needle length: 2 in  Needle localization: nerve stimulator and ultrasound guidance  Needle insertion depth: 1.6 cm     Assessment  Injection assessment: negative aspiration and negative parasthesia (Nerve stimulaotr ,loss of twitch at .6 MA,no parasthesia, injection increment of 3 cc ,total 30 cc of 0.5% ropivacaine)  Paresthesia pain: none  Heart rate change: no  Slow fractionated injection: no  Medications:  Bolus administered: 30 mL of 0.5 ropivacaine  Epinephrine added: none

## 2017-07-12 NOTE — PROGRESS NOTES
Spoke with Dr Marquis regarding clinical findings of hand. Will increase PRN pain meds, add ice, place in shoulder splint, and continue to monitor.

## 2017-07-12 NOTE — PLAN OF CARE
Pt resting on stretcher s/p ORIF to lt radius and ulna performed under MAC anesthesia by Dr. Lizarraga. Respirations even and unlabored n 2.5 L O2 via NC with O2 sats of 98%. VSS. Neurovascular checks remain intact. Will cont to monitor. See flow sheet for detailed assessment.

## 2017-07-12 NOTE — PROGRESS NOTES
Pt's pain has not decreased with PRN morphine and appears to have increased in swelling. Asked Martin, RONN, to assess pts' LUE. Will continue to monitor.

## 2017-07-12 NOTE — OP NOTE
OPERATIVE DICTATION:    DATE OF SERVICE: 07/12/2017      Preoperative Diagnosis: Left radius and ulna fracture     Postoperative Diagnosis:   Left radius and ulna fracture    Procedure: Open reduction and internal fixation of left radius and ulna fracture    Indication for surgery: Left radius and ulna fracture    Anesthesia:  Gen. anesthesia    Complications:  None    Surgeon: Binu Lizarraga M.D.     Specimen: None    Assistant: AMRITA Murray.  His assistance was critical and necessary with positioning of the extremity throughout the surgical procedure.   The physician assistant allowed me to access areas of the knee that could not be possible without the assistance of a trained orthopedic physician assistant.  His assistance was critical to allowing me to provide the highest level of care to the patient.      Operative procedure:  Open reduction and internal fixation of left radius and ulna fracture    The patient was brought to operating room after appropriate consent and placed under general anesthesia with intubation.  A zigzag incision was made over the volar aspect of the distal radius extending 6 cm.  Dissection carried through the subcutaneous tissue and the flexor carpi radialis was identified.  Blunt dissection was carried down to the pronator quadratus which was reflected over the anterior aspect of the radius towards the ulnar side.  The fracture site identified.  The patient noted to have significant comminution at the distal radius fracture site.  Distraction performed the volar flexion performed and the radial plate was contoured to the anterior aspect of the radius.  9 appropriate length screws were placed through the plate to stabilize the fracture.  Intraoperative x-ray showed good alignment of the distal radius fracture site with restoration of the radial length.  There was some restoration of the volar tilt of the articular surfaces well.  Irrigation was performed.  The subtenon's tissue  opposed with #1 Vicryl sutures.  The overlying skin opposed with a running 3-0 nylon suture.  An incision made over the dorsal aspect of the ulna.  The dissection carried down to the distal ulna.  The extensor carpi ulnaris was displaced radially.  The capsule incised.  The fracture identified and reduced.  The sensory branch of the ulnar nerve was identified and protected.  2 percutaneous pins were placed across the fracture site which stabilized the ulna styloid fracture.  The ulna styloid was rotated to its normal anatomic location.  The patient did have comminution of the distal ulna fracture with loss of height.  The small comminuted fragments were excised.  Irrigation was performed.  The overlying skin opposed with a running 3-0 nylon suture.  Betadine placed over the incision sites.  The patient placed with a cast padding and a long arm coaptation splint.  The tourniquet deflated and the patient noted to have less than 2 seconds capillary refill of all digits.  She was placed in a shoulder immobilizer and left the operating room in good condition.                 Binu Lizarraga M.D.

## 2017-07-12 NOTE — PROGRESS NOTES
Gave pt Norco and Phenergan per MD order. Placed shoulder in splint and applied ice to affected extremity. Pt is extremely irritable, confused,  and refused to be repositioned. Will continue to monitor.

## 2017-07-12 NOTE — PROGRESS NOTES
Notified Dr Persaud of agitation and forgetfulness. Pt thinks she is here for her leg, and not arm. Can't remember how she came to be here. Ativan and banana bag ordered.

## 2017-07-12 NOTE — PLAN OF CARE
Problem: Patient Care Overview  Goal: Plan of Care Review  Outcome: Ongoing (interventions implemented as appropriate)  Fall precautions maintained, pt free from injuries/fall, side rails x 3 up, bed alarm on, assisted in repositioning. Pt disoriented to time, place, and situation. Pt had surgery on left upper extremity today. POC and meds reviewed w/ pt and spouse, spouse verbalizes understanding. Chart check done. Will cont to monitor.

## 2017-07-12 NOTE — ANESTHESIA POSTPROCEDURE EVALUATION
"Anesthesia Post Evaluation    Patient: Ibeth Schroeder    Procedure(s) Performed: Procedure(s) (LRB):  OPEN REDUCTION INTERNAL FIXATION-RADIUS (Left)  OPEN REDUCTION INTERNAL FIXATION-ULNA (Left)    Final Anesthesia Type: regional  Patient location during evaluation: PACU  Patient participation: Yes- Able to Participate  Level of consciousness: awake  Post-procedure vital signs: reviewed and stable  Pain management: adequate  Airway patency: patent  PONV status at discharge: No PONV  Anesthetic complications: no      Cardiovascular status: stable  Respiratory status: unassisted and nasal cannula          Visit Vitals  /85   Pulse 82   Temp 36.6 °C (97.9 °F) (Temporal)   Resp 15   Ht 5' 6" (1.676 m)   Wt 52.2 kg (115 lb)   LMP  (LMP Unknown)   SpO2 98%   Breastfeeding? No   BMI 18.56 kg/m²       Pain/Kylah Score: Pain Assessment Performed: Yes (7/12/2017  5:56 PM)  Presence of Pain: denies (7/12/2017  5:56 PM)  Pain Rating Prior to Med Admin: 10 (7/12/2017 12:51 AM)  Pain Rating Post Med Admin: 7 (7/12/2017  1:21 AM)  Kylah Score: 9 (7/12/2017  4:00 PM)      "
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
## 2017-07-12 NOTE — PROGRESS NOTES
"Pt extremely agitated and refuses me to assess her skin on her sacral area/heels for skin breakdown. Pt is verbalizing anger at the doctor for not "fixing her broken leg." She also kept grabbing at her dressing to LUE and telling me, "Get my phone out of my bandage" multiple times. Attempted to re-orient pt several times. Pt's LUE appears swollen, with cap refill greater than 3 seconds. It is red and hot to the touch. It is extremely tender. Pt is able to wiggle fingers and can feel pressure when I press on fingers. Will continue to monitor.  "
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
## 2017-07-12 NOTE — PLAN OF CARE
Problem: Patient Care Overview  Goal: Plan of Care Review  Outcome: Ongoing (interventions implemented as appropriate)  Bed alarm activated. Pt refused to turn during shift. Pt disoriented during shift. Neurovascular checks to LUE Q4 during shift. Ativan given for agitation during shift. VSS. Will continue to monitor.

## 2017-07-12 NOTE — PROGRESS NOTES
P/t c/o increase pain to LUE. Pt has increased swelling to L hand with redness, decreased cap refill, and decreased sensation. Dr Persaud assessed hand, ordered to call Ortho and make Ortho aware of pt situation. Dr Marquis paged at this time.

## 2017-07-12 NOTE — INTERVAL H&P NOTE
The patient has been examined and the H&P has been reviewed:    I concur with the findings and no changes have occurred since H&P was written.    Anesthesia/Surgery risks, benefits and alternative options discussed and understood by patient/family.          Active Hospital Problems    Diagnosis  POA    *Radius/ulna fracture [S52.509A, S52.609A]  Yes    History of ETOH abuse [Z87.898]  Unknown    Dyslipidemia [E78.5]  Yes     Chronic    Depression [F32.9]  Yes    Stage 3 severe COPD by GOLD classification [J44.9]  Yes     Chronic     Spirometry 3/16/2016---Physician Interpretation  Moderately severe obstruction (FEV1>50 and <59% of predicted).compared to previous study  performed 2/25/2015 FEV1 improved by 24%, FVC improved by 15%.      Anemia of other chronic disease [D63.8]  Yes    CKD (chronic kidney disease) stage 3, GFR 30-59 ml/min [N18.3]  Yes    Hypothyroidism [E03.9]  Yes     Chronic    Essential hypertension [I10]  Yes     Chronic    History of seizure [Z87.898]  Not Applicable     Chronic      Resolved Hospital Problems    Diagnosis Date Resolved POA   No resolved problems to display.

## 2017-07-12 NOTE — ANESTHESIA PREPROCEDURE EVALUATION
07/12/2017  Ibeth Schroeder is a 66 y.o., female.    Anesthesia Evaluation    I have reviewed the Patient Summary Reports.    I have reviewed the Nursing Notes.   I have reviewed the Medications.     Review of Systems  Anesthesia Hx:  No problems with previous Anesthesia  History of prior surgery of interest to airway management or planning: Denies Family Hx of Anesthesia complications.   Denies Personal Hx of Anesthesia complications.   Social:  Former Smoker, Alcohol Use Alcohol Dependence   Hematology/Oncology:         -- Anemia:   Cardiovascular:   Hypertension hyperlipidemia ECG has been reviewed. EKG 07/17  Normal sinus rhythm  Normal ECG  When compared with ECG of 23-FEB-2013 13:10,   Pulmonary:   COPD Asthma Hx PE (pt now has IVC Filter)   Renal/:   Chronic Renal Disease, CRI Renal cyst   Hepatic/GI:   Hiatal Hernia, Diverticulosis    IBS   Musculoskeletal:   Arthritis  Lumbar spondylosis    Osteoporosis Spine Disorders: lumbar    Neurological:   Neuromuscular Disease, Seizures Restless leg syndrome    Memory loss   Endocrine:   Hypothyroidism    Psych:   Psychiatric History anxiety depression          Physical Exam  General:  Cachexia    Airway/Jaw/Neck:  Airway Findings: Mouth Opening: Normal Tongue: Normal  General Airway Assessment: Adult  Mallampati: II  TM Distance: Normal, at least 6 cm          Chest/Lungs:  Chest/Lungs Findings: Normal Respiratory Rate     Heart/Vascular:  Heart Findings: Rate: Normal             Anesthesia Plan  Type of Anesthesia, risks & benefits discussed:  Anesthesia Type:  general  Patient's Preference:   Intra-op Monitoring Plan: standard ASA monitors  Intra-op Monitoring Plan Comments:   Post Op Pain Control Plan:   Post Op Pain Control Plan Comments:   Induction:   IV  Beta Blocker:  Patient is on a Beta-Blocker and has received one dose within the past 24 hours  (No further documentation required).       Informed Consent: Patient understands risks and agrees with Anesthesia plan.  Questions answered. Anesthesia consent signed with patient.  ASA Score: 3     Day of Surgery Review of History & Physical:    H&P update referred to the surgeon.         Ready For Surgery From Anesthesia Perspective.

## 2017-07-12 NOTE — TELEPHONE ENCOUNTER
Pt fell down the stairs and now is admitted to [facility]. The  wants to come in and speak with you in regards to the patient present condition and the possibility of her being admitted to rehab due to the fall after she gets released from the hospital. Please advise

## 2017-07-12 NOTE — TRANSFER OF CARE
"Anesthesia Transfer of Care Note    Patient: Ibeth Schroeder    Procedure(s) Performed: Procedure(s) (LRB):  OPEN REDUCTION INTERNAL FIXATION-RADIUS (Left)  OPEN REDUCTION INTERNAL FIXATION-ULNA (Left)    Patient location: PACU    Anesthesia Type: regional    Transport from OR: Transported from OR on room air with adequate spontaneous ventilation    Post pain: adequate analgesia    Post assessment: no apparent anesthetic complications and tolerated procedure well    Post vital signs: stable    Level of consciousness: awake and confused    Nausea/Vomiting: no nausea/vomiting    Complications: none          Last vitals:   Visit Vitals  BP (!) 134/100   Pulse 83   Temp 36.4 °C (97.5 °F) (Temporal)   Resp 18   Ht 5' 6" (1.676 m)   Wt 52.2 kg (115 lb)   LMP  (LMP Unknown)   SpO2 96%   Breastfeeding? No   BMI 18.56 kg/m²     "

## 2017-07-13 PROBLEM — R41.0 DELIRIUM: Status: ACTIVE | Noted: 2017-07-13

## 2017-07-13 PROBLEM — F10.939 ALCOHOL WITHDRAWAL: Status: ACTIVE | Noted: 2017-07-13

## 2017-07-13 PROBLEM — D62 ACUTE BLOOD LOSS ANEMIA: Status: ACTIVE | Noted: 2017-07-13

## 2017-07-13 LAB
ALBUMIN SERPL BCP-MCNC: 2.5 G/DL
ANION GAP SERPL CALC-SCNC: 12 MMOL/L
BASOPHILS # BLD AUTO: 0 K/UL
BASOPHILS NFR BLD: 0 %
BUN SERPL-MCNC: 25 MG/DL
CALCIUM SERPL-MCNC: 8.2 MG/DL
CHLORIDE SERPL-SCNC: 108 MMOL/L
CO2 SERPL-SCNC: 20 MMOL/L
CREAT SERPL-MCNC: 1.3 MG/DL
DIFFERENTIAL METHOD: ABNORMAL
EOSINOPHIL # BLD AUTO: 0 K/UL
EOSINOPHIL NFR BLD: 0.4 %
ERYTHROCYTE [DISTWIDTH] IN BLOOD BY AUTOMATED COUNT: 13.6 %
EST. GFR  (AFRICAN AMERICAN): 50 ML/MIN/1.73 M^2
EST. GFR  (NON AFRICAN AMERICAN): 43 ML/MIN/1.73 M^2
GLUCOSE SERPL-MCNC: 147 MG/DL
HCT VFR BLD AUTO: 27.4 %
HGB BLD-MCNC: 9 G/DL
LYMPHOCYTES # BLD AUTO: 0.9 K/UL
LYMPHOCYTES NFR BLD: 9.5 %
MCH RBC QN AUTO: 31.7 PG
MCHC RBC AUTO-ENTMCNC: 32.8 %
MCV RBC AUTO: 97 FL
MONOCYTES # BLD AUTO: 0.9 K/UL
MONOCYTES NFR BLD: 9.2 %
NEUTROPHILS # BLD AUTO: 7.7 K/UL
NEUTROPHILS NFR BLD: 80.9 %
PHOSPHATE SERPL-MCNC: 2 MG/DL
PLATELET # BLD AUTO: 191 K/UL
PMV BLD AUTO: 10.1 FL
POTASSIUM SERPL-SCNC: 3.8 MMOL/L
RBC # BLD AUTO: 2.84 M/UL
SODIUM SERPL-SCNC: 140 MMOL/L
WBC # BLD AUTO: 9.49 K/UL

## 2017-07-13 PROCEDURE — 27000221 HC OXYGEN, UP TO 24 HOURS

## 2017-07-13 PROCEDURE — 25000003 PHARM REV CODE 250: Performed by: INTERNAL MEDICINE

## 2017-07-13 PROCEDURE — 36415 COLL VENOUS BLD VENIPUNCTURE: CPT

## 2017-07-13 PROCEDURE — 97163 PT EVAL HIGH COMPLEX 45 MIN: CPT

## 2017-07-13 PROCEDURE — 63600175 PHARM REV CODE 636 W HCPCS: Performed by: INTERNAL MEDICINE

## 2017-07-13 PROCEDURE — 25000242 PHARM REV CODE 250 ALT 637 W/ HCPCS: Performed by: NURSE PRACTITIONER

## 2017-07-13 PROCEDURE — 94640 AIRWAY INHALATION TREATMENT: CPT

## 2017-07-13 PROCEDURE — 94761 N-INVAS EAR/PLS OXIMETRY MLT: CPT

## 2017-07-13 PROCEDURE — G8979 MOBILITY GOAL STATUS: HCPCS | Mod: CL

## 2017-07-13 PROCEDURE — G8978 MOBILITY CURRENT STATUS: HCPCS | Mod: CM

## 2017-07-13 PROCEDURE — 80069 RENAL FUNCTION PANEL: CPT

## 2017-07-13 PROCEDURE — 25000003 PHARM REV CODE 250: Performed by: NURSE PRACTITIONER

## 2017-07-13 PROCEDURE — 97165 OT EVAL LOW COMPLEX 30 MIN: CPT

## 2017-07-13 PROCEDURE — G8988 SELF CARE GOAL STATUS: HCPCS | Mod: CL

## 2017-07-13 PROCEDURE — 85025 COMPLETE CBC W/AUTO DIFF WBC: CPT

## 2017-07-13 PROCEDURE — 97530 THERAPEUTIC ACTIVITIES: CPT

## 2017-07-13 PROCEDURE — 21400001 HC TELEMETRY ROOM

## 2017-07-13 PROCEDURE — G8987 SELF CARE CURRENT STATUS: HCPCS | Mod: CN

## 2017-07-13 PROCEDURE — 11000001 HC ACUTE MED/SURG PRIVATE ROOM

## 2017-07-13 PROCEDURE — 63600175 PHARM REV CODE 636 W HCPCS: Performed by: ORTHOPAEDIC SURGERY

## 2017-07-13 RX ORDER — FUROSEMIDE 10 MG/ML
40 INJECTION INTRAMUSCULAR; INTRAVENOUS ONCE
Status: COMPLETED | OUTPATIENT
Start: 2017-07-13 | End: 2017-07-13

## 2017-07-13 RX ORDER — QUETIAPINE FUMARATE 25 MG/1
25 TABLET, FILM COATED ORAL NIGHTLY
Status: DISCONTINUED | OUTPATIENT
Start: 2017-07-13 | End: 2017-07-18 | Stop reason: HOSPADM

## 2017-07-13 RX ADMIN — BUDESONIDE 0.25 MG: 0.25 SUSPENSION RESPIRATORY (INHALATION) at 08:07

## 2017-07-13 RX ADMIN — SODIUM CHLORIDE: 0.9 INJECTION, SOLUTION INTRAVENOUS at 08:07

## 2017-07-13 RX ADMIN — ENOXAPARIN SODIUM 50 MG: 100 INJECTION SUBCUTANEOUS at 11:07

## 2017-07-13 RX ADMIN — PRAVASTATIN SODIUM 10 MG: 10 TABLET ORAL at 08:07

## 2017-07-13 RX ADMIN — BUDESONIDE 0.25 MG: 0.25 SUSPENSION RESPIRATORY (INHALATION) at 09:07

## 2017-07-13 RX ADMIN — IPRATROPIUM BROMIDE AND ALBUTEROL SULFATE 3 ML: .5; 3 SOLUTION RESPIRATORY (INHALATION) at 09:07

## 2017-07-13 RX ADMIN — LEVOTHYROXINE SODIUM 100 MCG: 25 TABLET ORAL at 05:07

## 2017-07-13 RX ADMIN — LORAZEPAM 1 MG: 2 INJECTION INTRAMUSCULAR; INTRAVENOUS at 04:07

## 2017-07-13 RX ADMIN — BUSPIRONE HYDROCHLORIDE 15 MG: 5 TABLET ORAL at 02:07

## 2017-07-13 RX ADMIN — ENOXAPARIN SODIUM 50 MG: 100 INJECTION SUBCUTANEOUS at 10:07

## 2017-07-13 RX ADMIN — FUROSEMIDE 40 MG: 10 INJECTION, SOLUTION INTRAVENOUS at 02:07

## 2017-07-13 RX ADMIN — QUETIAPINE FUMARATE 25 MG: 25 TABLET, FILM COATED ORAL at 08:07

## 2017-07-13 RX ADMIN — BUSPIRONE HYDROCHLORIDE 15 MG: 5 TABLET ORAL at 05:07

## 2017-07-13 RX ADMIN — BUSPIRONE HYDROCHLORIDE 15 MG: 5 TABLET ORAL at 09:07

## 2017-07-13 NOTE — PROGRESS NOTES
Referrals sent via [facility] care to [facility] and [facility] for SNF placement.      CM spoke with Em in admissions at [facility] who informed CM that they currently have no SNF beds available.  No open beds anticipated until 7/19/17.    CM spoke to Patria at [facility] who notified that facility is currently not willing to accept the pt after review of records and seeing that pt was recorded as being agitated, refusing care, and having a history of alcohol abuse.      CM requested that facility look at pt again, and Patria states they would be willing to review updated notes on pt in 1 to 2 days, which may reflect decrease in agitation and compliance with care.      [facility] called in and level 1 PASRR faxed to [facility].

## 2017-07-13 NOTE — PT/OT/SLP EVAL
Physical Therapy  Evaluation    Ibeth Schroeder   MRN: [id number]   Admitting Diagnosis: Radius/ulna fracture    PT Received On: 07/13/17  PT Start Time: 1005     PT Stop Time: 1035    PT Total Time (min): 30 min       Billable Minutes:  Evaluation 15 and Therapeutic Activity 15    Diagnosis: Radius/ulna fracture  P.T. DX: DEBILITY    Past Medical History:   Diagnosis Date    Alcohol dependence     per patient in the past    Allergy     Anemia     Anxiety     Arthritis 6/24/2013    Asthma     per patient    Colon polyp     Colon polyp     colonoscopy 8/26/2013    COPD (chronic obstructive pulmonary disease)     Depression     Diverticulosis     Diverticulosis     colonoscopy 8/26/2013    Hiatal hernia     CXR 2/25/2015--Large hiatal hernia.     Hyperlipidemia     Hypertension     Hypocalcemia 7/6/2016    Hypothyroidism 6/24/2013    Osteoporosis     Pulmonary embolism     per patient unsure of date    Restless leg syndrome     RLS (restless legs syndrome)     Seizure 6/24/2013    Tobacco dependence     resolved    Trouble in sleeping       Past Surgical History:   Procedure Laterality Date    ADENOIDECTOMY      APPENDECTOMY      CHOLECYSTECTOMY      COLONOSCOPY  2013    FINGER SURGERY      HERNIA REPAIR      HYSTERECTOMY      JOINT REPLACEMENT      TONSILLECTOMY      TOTAL HIP ARTHROPLASTY      L x2       Referring physician: DEV  Date referred to PT: 7/13/2017      General Precautions: Standard, fall  Orthopedic Precautions: LUE non weight bearing   Braces:  (L UE IN SOFT CAST)            Patient History:  Lives With: spouse  Living Arrangements: house  Home Accessibility: stairs within home  Number of Stairs Within Home: 10  Stair Railings at Home: inside, present at both sides  Transportation Available: family or friend will provide  Living Environment Comment: PT WAS LIVING AT HOME WITH  AND WAS LIVING ON 2ND STORY D/T 1ST FLOOR BEING DAMAGED IN FLOOD  DME owned (not  currently used): none    Previous Level of Function:  Ambulation Skills:  (PT PLOF UNKNOWN)    Subjective:  Communicated with NURSE AND EPIC CHART REVIEW  prior to session.   PT AGREED TO EVAL AND TX   Chief Complaint: NONE STATED   Patient goals: NONE STATED     Pain/Comfort  Pain Rating 1: 0/10      Objective:   Patient found with: peripheral IV, bed alarm, SCD     Cognitive Exam:  Oriented to: Person    Follows Commands/attention: Inattentive  Communication: UNCLEAR/ MUMBLING   Safety awareness/insight to disability: impaired    Physical Exam:  Postural examination/scapula alignment: Rounded shoulder, Head forward and Kyphosis    Skin integrity: Bruising of BACK  Edema: None noted     Sensation:   Intact      Lower Extremity Range of Motion:  Right Lower Extremity: LIMITED  Left Lower Extremity: LIMITED    Lower Extremity Strength:  Right Lower Extremity: LIMITED  Left Lower Extremity: LIMITED       Functional Mobility:  Bed Mobility:  Rolling/Turning to Left: Total assistance  Scooting/Bridging: Total Assistance  Supine to Sit: Total Assistance  Sit to Supine: Total Assistance    Transfers:  Sit <> Stand Assistance: Total Assistance    Gait:   Gait Distance: UNABLE    Therapeutic Activities and Exercises:  PT SEATED EOB AND ROM ASSESSED. PT SLING DONNED APPROPRIATELY  PT STOOD WITH TOTAL A X 2 AND POST LEAN WITH PT EYES CLOSED. PT RETURNED SUP IN BED AND LEFT WITH SCD'S ON AND BED ALARM ON .     AM-PAC 6 CLICK MOBILITY  How much help from another person does this patient currently need?   1 = Unable, Total/Dependent Assistance  2 = A lot, Maximum/Moderate Assistance  3 = A little, Minimum/Contact Guard/Supervision  4 = None, Modified [place]/Independent    Turning over in bed (including adjusting bedclothes, sheets and blankets)?: 1  Sitting down on and standing up from a chair with arms (e.g., wheelchair, bedside commode, etc.): 1  Moving from lying on back to sitting on the side of the bed?: 1  Moving to  and from a bed to a chair (including a wheelchair)?: 1  Need to walk in hospital room?: 1  Climbing 3-5 steps with a railing?: 1  Total Score: 6     AM-PAC Raw Score CMS G-Code Modifier Level of Impairment Assistance   6 % Total / Unable   7 - 9 CM 80 - 100% Maximal Assist   10 - 14 CL 60 - 80% Moderate Assist   15 - 19 CK 40 - 60% Moderate Assist   20 - 22 CJ 20 - 40% Minimal Assist   23 CI 1-20% SBA / CGA   24 CH 0% Independent/ Mod I     Patient left supine with call button in reach.    Assessment:   Ibeth Schroeder is a 66 y.o. female with a medical diagnosis of Radius/ulna fracture and presents with DEBILITY AND WILL BENEFIT FROM P.T. TO ADDRESS IMPAIRMENTS LISTED.     Rehab identified problem list/impairments: Rehab identified problem list/impairments: weakness, impaired endurance, gait instability, impaired functional mobilty, impaired self care skills, impaired balance, decreased coordination, decreased lower extremity function, decreased upper extremity function, decreased safety awareness, decreased ROM, impaired skin, orthopedic precautions, impaired cognition    Rehab potential is fair.    Activity tolerance: Poor    Discharge recommendations: Discharge Facility/Level Of Care Needs: nursing facility, skilled     Barriers to discharge: Barriers to Discharge: Decreased caregiver support, Inaccessible home environment    Equipment recommendations:       GOALS:    Physical Therapy Goals        Problem: Physical Therapy Goal    Goal Priority Disciplines Outcome Goal Variances Interventions   Physical Therapy Goal     PT/OT, PT      Description:  PT WILL BE SEEN FOR P.T. FOR A MIN OF 5 OUT OF 7 DAYS A WEEK  LT17  1. PT WILL COMPLETE BED MOBILITY WITH MOD A.  2. PT WILL STAND MAX A X 1.  3. PT WILL T/F TO CHAIR WITH MAX A X 1  4. PT WILL COMPLETE B LE TE X 20 REPS FOR STRENGTHENING.  5. PT WILL GT TRAIN WITH APPROPRIATE AD X 10' WITH MAX A.                      PLAN:    Patient to be seen   to  address the above listed problems via gait training, therapeutic activities, therapeutic exercises  Plan of Care expires: 07/20/17  Plan of Care reviewed with: patient    Functional Assessment Tool Used: TALI SNIDER PAC  Score: CM  Functional Limitation: Mobility: Walking and moving around  Mobility: Walking and Moving Around Current Status (): CM  Mobility: Walking and Moving Around Goal Status (): TATIANA Reardon, PT  07/13/2017

## 2017-07-13 NOTE — SUBJECTIVE & OBJECTIVE
Interval History:  Episode yesterday evening of confusion and agitation.  Plan for surgical repair of left forearm fracture today.    Review of Systems   Constitutional: Negative for chills and fever.   HENT: Negative for congestion and sore throat.    Eyes: Negative for visual disturbance.   Respiratory: Negative for cough, shortness of breath and wheezing.    Cardiovascular: Negative for chest pain, palpitations and leg swelling.   Gastrointestinal: Negative for abdominal pain, blood in stool, constipation, diarrhea, nausea and vomiting.   Genitourinary: Negative for dysuria and hematuria.   Musculoskeletal: Positive for arthralgias. Negative for back pain.   Skin: Negative for rash and wound.   Neurological: Negative for dizziness, weakness, light-headedness and numbness.   Hematological: Negative for adenopathy.   Psychiatric/Behavioral: Positive for agitation, confusion and hallucinations.     Objective:     Vital Signs (Most Recent):  Temp: 98.4 °F (36.9 °C) (07/13/17 0242)  Pulse: (!) 129 (07/13/17 0242)  Resp: 17 (07/13/17 0242)  BP: (!) 165/104 (07/13/17 0241)  SpO2: 96 % (07/13/17 0242) Vital Signs (24h Range):  Temp:  [97.1 °F (36.2 °C)-98.6 °F (37 °C)] 98.4 °F (36.9 °C)  Pulse:  [] 129  Resp:  [12-25] 17  SpO2:  [92 %-98 %] 96 %  BP: (125-165)/() 165/104     Weight: 52.2 kg (115 lb)  Body mass index is 18.56 kg/m².    Intake/Output Summary (Last 24 hours) at 07/13/17 0318  Last data filed at 07/12/17 2200   Gross per 24 hour   Intake          3203.75 ml   Output              205 ml   Net          2998.75 ml      Physical Exam   Constitutional: She appears well-developed and well-nourished. No distress.   HENT:   Head: Normocephalic and atraumatic.   Mouth/Throat: Oropharynx is clear and moist.   Eyes: Conjunctivae and EOM are normal. Pupils are equal, round, and reactive to light.   Neck: Neck supple. No JVD present. No thyromegaly present.   Cardiovascular: Normal rate and regular rhythm.   Exam reveals no gallop and no friction rub.    No murmur heard.  Pulmonary/Chest: Effort normal and breath sounds normal. She has no wheezes. She has no rales.   Abdominal: Soft. Bowel sounds are normal. She exhibits no distension. There is no tenderness. There is no rebound and no guarding.   Musculoskeletal: Normal range of motion. She exhibits no edema or deformity.   Left arm dressed clean and intact.   Lymphadenopathy:     She has no cervical adenopathy.   Neurological: She is alert. She has normal reflexes.   Skin: Skin is warm and dry. No rash noted.   Psychiatric:   Disorganized thoughts.   Nursing note and vitals reviewed.      Significant Labs:   CBC:   Recent Labs  Lab 07/11/17  1614 07/12/17  0540 07/12/17  2117   WBC 9.86 8.58 8.12   HGB 9.7* 9.4* 9.9*   HCT 29.7* 29.3* 30.5*    163 194     CMP:   Recent Labs  Lab 07/11/17  1614 07/12/17  0540 07/12/17  2117    137 140   K 4.3 4.2 4.0    108 107   CO2 21* 18* 21*   * 95 164*   BUN 38* 34* 29*   CREATININE 1.7* 1.3 1.3   CALCIUM 8.7 8.3* 8.6*   PROT 6.2 5.5* 6.4   ALBUMIN 2.4* 2.3* 2.6*   BILITOT 0.5 0.5 0.6   ALKPHOS 79 68 81   AST 23 23 22   ALT 13 10 12   ANIONGAP 13 11 12   EGFRNONAA 31* 43* 43*       Significant Imaging: I have reviewed all pertinent imaging results/findings within the past 24 hours.

## 2017-07-13 NOTE — PROGRESS NOTES
[facility] Hospital Medicine  Progress Note    Patient Name: Ibeth Schroeder  MRN: [id number]  Patient Class: IP- Inpatient   Admission Date: 7/10/2017  Length of Stay: 3 days  Attending Physician: Binu Lizarraga Sr., MD  Primary Care Provider: SAGAR BETTS MD        Subjective:     Principal Problem:Radius/ulna fracture    HPI:  Ibeth Schroeder is 66 year old female with PMHx of HTN, HLD, CKD, depression, hypothyroidism, COPD, and EtOH abuse, who presented to the ED with c/o left wrist swelling and pain due to left radial and ulnar fracture after suffering a fall.  Hospital Medicine consulted for medical management.  Patient states she is complaint with prescribed medications.  She reports drinking 1-2 alcoholic drinks per night made with Rum.  She does not follow with Pulmonology.  She denies any chest pain, palpitations, SOB/BRADY, orthopnea, PND, edema, lightheadedness/dizziness, N/V/D, syncope, or fever.    Hospital Course:  Patient was admitted per Ortho Surgery.  2D echo revealed normal LVEF of 55%, CLVH, and PAP 50 mmHg.  Patient ok to proceed with surgery at low risk for CV events.  7/12, she underwent ORIF of left wrist.  She tolerated procedure well, and was transferred to Surgical Unit for monitoring.  Social work was consulted for SNF placement at [place].  Once on surgical floor, patient had an episode of delirium with hallucinations.  Patient very agitated and combative.  PRN IV Ativan and Haldol ordered for suspected EtOH withdrawal.  Patient appears comfortable this AM, sleeping well in bed.  She is oriented and calm.  Denies any pain.  Monitoring for DTs.      Interval History:  Episode yesterday evening of confusion and agitation.  Plan for surgical repair of left forearm fracture today.    Review of Systems   Constitutional: Negative for chills, diaphoresis, fatigue and fever.   HENT: Negative for congestion and sore throat.    Eyes: Negative for visual disturbance.    Respiratory: Negative for apnea, cough, chest tightness, shortness of breath and wheezing.    Cardiovascular: Negative for chest pain, palpitations and leg swelling.   Gastrointestinal: Negative for abdominal distention, abdominal pain, blood in stool, constipation, diarrhea, nausea and vomiting.   Genitourinary: Negative for dysuria and hematuria.   Musculoskeletal: Positive for arthralgias. Negative for back pain.   Skin: Negative for pallor, rash and wound.   Neurological: Negative for dizziness, tremors, seizures, syncope, speech difficulty, weakness, light-headedness, numbness and headaches.   Hematological: Negative for adenopathy.   Psychiatric/Behavioral: Negative for agitation, confusion and hallucinations.   All other systems reviewed and are negative.    Objective:     Vital Signs (Most Recent):  Temp: 97.4 °F (36.3 °C) (07/13/17 1600)  Pulse: 103 (07/13/17 1600)  Resp: 16 (07/13/17 1600)  BP: (!) 146/87 (07/13/17 1600)  SpO2: 98 % (07/13/17 1600) Vital Signs (24h Range):  Temp:  [97.4 °F (36.3 °C)-98.6 °F (37 °C)] 97.4 °F (36.3 °C)  Pulse:  [] 103  Resp:  [14-20] 16  SpO2:  [92 %-98 %] 98 %  BP: (118-166)/() 146/87     Weight: 52.2 kg (115 lb)  Body mass index is 18.56 kg/m².    Intake/Output Summary (Last 24 hours) at 07/13/17 1737  Last data filed at 07/13/17 1202   Gross per 24 hour   Intake          2106.25 ml   Output                0 ml   Net          2106.25 ml      Physical Exam   Constitutional: She appears well-developed and well-nourished. No distress.   HENT:   Head: Normocephalic and atraumatic.   Eyes: Conjunctivae and EOM are normal. Pupils are equal, round, and reactive to light.   Neck: Normal range of motion. Neck supple. No JVD present.   Cardiovascular: Normal rate, regular rhythm, normal heart sounds and intact distal pulses.  Exam reveals no gallop and no friction rub.    No murmur heard.  Pulmonary/Chest: Effort normal and breath sounds normal. No respiratory  distress. She has no wheezes. She has no rales.   Abdominal: Soft. Bowel sounds are normal. She exhibits no distension. There is no tenderness.   Musculoskeletal: She exhibits tenderness. She exhibits no edema or deformity.   Left arm with surgical wrap/dressing, CDI.  Limited ROM to LUE due to sx.   Neurological: She is alert. No cranial nerve deficit.   Skin: Skin is warm and dry. No rash noted.   Psychiatric:   No confusion, agitation, or hallucinations noted this AM.   Nursing note and vitals reviewed.      Significant Labs:   CBC:     Recent Labs  Lab 07/12/17 0540 07/12/17 2117 07/13/17  0546   WBC 8.58 8.12 9.49   HGB 9.4* 9.9* 9.0*   HCT 29.3* 30.5* 27.4*    194 191     CMP:     Recent Labs  Lab 07/12/17 0540 07/12/17 2117 07/13/17  0546    140 140   K 4.2 4.0 3.8    107 108   CO2 18* 21* 20*   GLU 95 164* 147*   BUN 34* 29* 25*   CREATININE 1.3 1.3 1.3   CALCIUM 8.3* 8.6* 8.2*   PROT 5.5* 6.4  --    ALBUMIN 2.3* 2.6* 2.5*   BILITOT 0.5 0.6  --    ALKPHOS 68 81  --    AST 23 22  --    ALT 10 12  --    ANIONGAP 11 12 12   EGFRNONAA 43* 43* 43*       Significant Imaging: I have reviewed all pertinent imaging results/findings within the past 24 hours.    Assessment/Plan:      * Radius/ulna fracture    - S/P left radius and ulna ORIF (POD #1).  - Post-op management per Orthopedic Surgery (primary team).  - Analgesia prn.   - Social work consulted for SNF placement at discharge.        Delirium    - Suspect secondary to EtOH withdrawal.  - PRN Ativan.  - Monitor for DTs.          Acute blood loss anemia    H&H stable.  Monitor.          History of ETOH abuse    - PRN Ativan as above.  Monitor for DTs.  - Counseled on cessation.  - Scheduled Seroquel at bedtime and Haldol as needed for psychosis.        Dyslipidemia    - Statin continued.          Depression    - Resume current medications.         Stage 3 severe COPD by GOLD classification    - Budesonide and arformoterol nebulizer  treatment.  - Duonebs PRN.  - Continue home dose O2 @ 2L per NC nightly.              Anemia of other chronic disease    -H/H stable  -will monitor  -will transfuse if needed   -repeat CBC in am   -will resume iron supplementation post procedure          CKD (chronic kidney disease) stage 3, GFR 30-59 ml/min    - Cr. Stable.  Monitor.        History of seizure    - Seizure precautions.          Essential hypertension    - BP controlled.  - Toprol continued.          Hypothyroidism    - Synthroid continued.            VTE Risk Mitigation         Ordered     Medium Risk of VTE  Once      07/12/17 1644     Place MALICK hose  Until discontinued      07/12/17 1644     Place sequential compression device  Until discontinued      07/12/17 1644     Place sequential compression device  Until discontinued      07/11/17 1727          SJ Childers  Department of Hospital Medicine   [facility]

## 2017-07-13 NOTE — HOSPITAL COURSE
Patient was admitted per Ortho Surgery.  2D echo revealed normal LVEF of 55%, CLVH, and PAP 50 mmHg. Patient ok to proceed with surgery at low risk for CV events.  She underwent uneventful ORIF of left wrist.  to assist with SNF placement at [place].  During stay,  patient confusion & delirium with hallucinations.  PRN IV Ativan and Haldol ordered for suspected EtOH withdrawal.  Patient disoriented and hallucinating continued. Magnesium and phosphorous were repleted. Pt had pain and antalgic gait and CT of ABD/Pelvis revealed a left comminuted ilius fracture. Further discussion with  revealed pt has had diminishing mental status and has been evaluated by [facility] with negative findings within the last 2 months. He mentioned they found she was low on thiamine.

## 2017-07-13 NOTE — PROGRESS NOTES
[facility] Hospital Medicine  Progress Note    Patient Name: Ibeth Schroeder  MRN: [id number]  Patient Class: IP- Inpatient   Admission Date: 7/10/2017  Length of Stay: 3 days  Attending Physician: Binu Lizarraga Sr., MD  Primary Care Provider: SAGAR BETTS MD        Subjective:     Principal Problem:Radius/ulna fracture    HPI:  Ibeth Schroeder is 66 year old female with PMHx of Hypothyroid, HTN, HLD, COPD, Asthma, and ETOH abuse who present for surgical intervention per Orthopedic Surgery for left wrist fracture post fall.  Hospital Medicine consulted for medical management.  Pt states that she is complaint with prescribed medications.  Pt reports drinking 1-2 alcoholic drinks per night made with Rum.  Pt is not followed outpatient by Pulmonology.      Hospital Course:  No notes on file    Interval History:  Episode yesterday evening of confusion and agitation.  Plan for surgical repair of left forearm fracture today.    Review of Systems   Constitutional: Negative for chills and fever.   HENT: Negative for congestion and sore throat.    Eyes: Negative for visual disturbance.   Respiratory: Negative for cough, shortness of breath and wheezing.    Cardiovascular: Negative for chest pain, palpitations and leg swelling.   Gastrointestinal: Negative for abdominal pain, blood in stool, constipation, diarrhea, nausea and vomiting.   Genitourinary: Negative for dysuria and hematuria.   Musculoskeletal: Positive for arthralgias. Negative for back pain.   Skin: Negative for rash and wound.   Neurological: Negative for dizziness, weakness, light-headedness and numbness.   Hematological: Negative for adenopathy.   Psychiatric/Behavioral: Positive for agitation, confusion and hallucinations.     Objective:     Vital Signs (Most Recent):  Temp: 98.4 °F (36.9 °C) (07/13/17 0242)  Pulse: (!) 129 (07/13/17 0242)  Resp: 17 (07/13/17 0242)  BP: (!) 165/104 (07/13/17 0241)  SpO2: 96 % (07/13/17 0242) Vital  Signs (24h Range):  Temp:  [97.1 °F (36.2 °C)-98.6 °F (37 °C)] 98.4 °F (36.9 °C)  Pulse:  [] 129  Resp:  [12-25] 17  SpO2:  [92 %-98 %] 96 %  BP: (125-165)/() 165/104     Weight: 52.2 kg (115 lb)  Body mass index is 18.56 kg/m².    Intake/Output Summary (Last 24 hours) at 07/13/17 0318  Last data filed at 07/12/17 2200   Gross per 24 hour   Intake          3203.75 ml   Output              205 ml   Net          2998.75 ml      Physical Exam   Constitutional: She appears well-developed and well-nourished. No distress.   HENT:   Head: Normocephalic and atraumatic.   Mouth/Throat: Oropharynx is clear and moist.   Eyes: Conjunctivae and EOM are normal. Pupils are equal, round, and reactive to light.   Neck: Neck supple. No JVD present. No thyromegaly present.   Cardiovascular: Normal rate and regular rhythm.  Exam reveals no gallop and no friction rub.    No murmur heard.  Pulmonary/Chest: Effort normal and breath sounds normal. She has no wheezes. She has no rales.   Abdominal: Soft. Bowel sounds are normal. She exhibits no distension. There is no tenderness. There is no rebound and no guarding.   Musculoskeletal: Normal range of motion. She exhibits no edema or deformity.   Left arm dressed clean and intact.   Lymphadenopathy:     She has no cervical adenopathy.   Neurological: She is alert. She has normal reflexes.   Skin: Skin is warm and dry. No rash noted.   Psychiatric:   Disorganized thoughts.   Nursing note and vitals reviewed.      Significant Labs:   CBC:   Recent Labs  Lab 07/11/17  1614 07/12/17  0540 07/12/17  2117   WBC 9.86 8.58 8.12   HGB 9.7* 9.4* 9.9*   HCT 29.7* 29.3* 30.5*    163 194     CMP:   Recent Labs  Lab 07/11/17  1614 07/12/17  0540 07/12/17  2117    137 140   K 4.3 4.2 4.0    108 107   CO2 21* 18* 21*   * 95 164*   BUN 38* 34* 29*   CREATININE 1.7* 1.3 1.3   CALCIUM 8.7 8.3* 8.6*   PROT 6.2 5.5* 6.4   ALBUMIN 2.4* 2.3* 2.6*   BILITOT 0.5 0.5 0.6    ALKPHOS 79 68 81   AST 23 23 22   ALT 13 10 12   ANIONGAP 13 11 12   EGFRNONAA 31* 43* 43*       Significant Imaging: I have reviewed all pertinent imaging results/findings within the past 24 hours.    Assessment/Plan:      History of ETOH abuse    -pt drinks 1-2 alcoholic drinks nightly  -will monitor for sxs of withdrawal with Lorazepam as needed on order  -pt counseled on alcohol cessation with understanding verbalized  -Scheduled Seroquel at bedtime and Haldol as needed for psychosis        Dyslipidemia    -Statin continued           Depression    -will resume home medications           Stage 3 severe COPD by GOLD classification    -Budesonide and arformoterol nebulizer treatment  -Duonebs prn               Anemia of other chronic disease    -H/H stable  -will monitor  -will transfuse if needed   -repeat CBC in am   -will resume iron supplementation post procedure          CKD (chronic kidney disease) stage 3, GFR 30-59 ml/min    -stable  -will continue to monitor  -repeat CMP in am           History of seizure    -no activity witnessed  -seizure precautions           Essential hypertension    -controlled  -Toprol continued          Hypothyroidism    -Synthroid continued  -TSH pending          * Radius/ulna fracture    -Pt admitted to Medical Surgical Unit   -Orthopedic Surgery -primary team  -analgesia prn   -neurovascular checks  -pt NPO after MN for planned procedure tomorrow  -Echo results pending due to hx of valvular regurgitation  -chest xray showed left lung base infiltrate and/or atelectasis and possible tiny left pleural effusion.  -EKG showed NSR with HR 77  -Functional Capacity: 4-10 METs  -pt has a low risk for major cardiac event including MI, Pulmonary Embolism, Ventricular fibrillation, primary cardiac risk, and complete heart block based on the revised cardiac index during this intermediate risk surgery   -Surgeon should proceed with surgery as planned pending 2D ECHO results              VTE  Risk Mitigation         Ordered     Medium Risk of VTE  Once      07/12/17 1644     Place MALICK hose  Until discontinued      07/12/17 1644     Place sequential compression device  Until discontinued      07/12/17 1644     Place sequential compression device  Until discontinued      07/11/17 1727          Kosta Ramachandran MD  Department of Hospital Medicine   [facility]

## 2017-07-13 NOTE — ASSESSMENT & PLAN NOTE
- S/P left radius and ulna ORIF (POD #1).  - Post-op management per Orthopedic Surgery (primary team).  - Analgesia prn.   - Social work consulted for SNF placement at discharge.

## 2017-07-13 NOTE — PLAN OF CARE
Problem: Patient Care Overview  Goal: Plan of Care Review  PT REQUIRES TOTAL A FOR GROSS FUNC MOBILITY    Outcome: Ongoing (interventions implemented as appropriate)  Fall precautions maintained, pt free from injuries/fall, bed alarm on, avasys in place, pt repositioned every 2 hours, heels floated. C/o left arm pain during repositioning and activity, moderately relieved by rest, elevation, quiet environment. Incontinent x 2, checked during hourly rounding and changed as needed. Pt disoriented x 3, has periods of orientation and knows self, year, location, and the president but most of the time is confused, non combative. Denies nausea/vomiting. Left arm swollen, encouraged to elevate but pt repositions arm, pt denies increased pain, denies numbness/tingling. Dressing dry and intact. IVF given as needed. Tachy on tele, physician aware. POC and meds reviewed w/ pt and spouse, spouse verbalizes understanding, pt needs reinforcement. Side rails x 3, bed locked and low. Chart check done. Will cont to monitor.

## 2017-07-13 NOTE — PROGRESS NOTES
Notified Dr Persaud, on behalf of LEANNA Gotti, that pt is tachycardic and hypoxic. Orders as noted.

## 2017-07-13 NOTE — PLAN OF CARE
Problem: Patient Care Overview  Goal: Plan of Care Review  Outcome: Ongoing (interventions implemented as appropriate)  Fall prevention precautions maintained, pt remained free of falls throughout shift, call bell and personal items within reach, pt disoriented x4. 24 hour chart check completed. Will continue to monitor
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
## 2017-07-13 NOTE — ASSESSMENT & PLAN NOTE
- Budesonide and arformoterol nebulizer treatment.  - Duonebs PRN.  - Continue home dose O2 @ 2L per NC nightly.

## 2017-07-13 NOTE — PROGRESS NOTES
SUBJECTIVE:  Patient is status post Left Radius and ulna ORIF .  The patient is alert and oriented ×3.  She is receiving her breathing treatments.    The patient seems agitated about wanting to use the restroom.  Patient complains of  6 /10 pain that is better with the  pain meds and aggravated with movement.          Past Medical History:   Diagnosis Date    Alcohol dependence     per patient in the past    Allergy     Anemia     Anxiety     Arthritis 6/24/2013    Asthma     per patient    Colon polyp     Colon polyp     colonoscopy 8/26/2013    COPD (chronic obstructive pulmonary disease)     Depression     Diverticulosis     Diverticulosis     colonoscopy 8/26/2013    Hiatal hernia     CXR 2/25/2015--Large hiatal hernia.     Hyperlipidemia     Hypertension     Hypocalcemia 7/6/2016    Hypothyroidism 6/24/2013    Osteoporosis     Pulmonary embolism     per patient unsure of date    Restless leg syndrome     RLS (restless legs syndrome)     Seizure 6/24/2013    Tobacco dependence     resolved    Trouble in sleeping      Past Surgical History:   Procedure Laterality Date    ADENOIDECTOMY      APPENDECTOMY      CHOLECYSTECTOMY      COLONOSCOPY  2013    FINGER SURGERY      HERNIA REPAIR      HYSTERECTOMY      JOINT REPLACEMENT      TONSILLECTOMY      TOTAL HIP ARTHROPLASTY      L x2     Review of patient's allergies indicates:   Allergen Reactions    Nsaids (non-steroidal anti-inflammatory drug) Hives and Shortness Of Breath    Pcn [penicillins] Hives and Shortness Of Breath    Sulfa (sulfonamide antibiotics) Hives and Shortness Of Breath    Aspirin     Macrodantin [nitrofurantoin macrocrystalline] Hives    Wellbutrin [bupropion hcl]      No current facility-administered medications on file prior to encounter.      Current Outpatient Prescriptions on File Prior to Encounter   Medication Sig Dispense Refill    allopurinol (ZYLOPRIM) 100 MG tablet Take 1 tablet (100 mg total) by  mouth once daily. 90 tablet 5    busPIRone (BUSPAR) 15 MG tablet 1 tab po tid 90 tablet 1    clindamycin (CLEOCIN) 150 MG capsule Take 2 capsules (300 mg total) by mouth every 8 (eight) hours. 42 capsule 0    diclofenac sodium (VOLTAREN) 1 % Gel Apply 4 g topically 4 (four) times daily. Pt has shoulder pain and knee pain 200 g 5    diphenoxylate-atropine 2.5-0.025 mg (LOMOTIL) 2.5-0.025 mg per tablet   0    duloxetine (CYMBALTA) 60 MG capsule Take 1 capsule (60 mg total) by mouth once daily. 90 capsule 0    ferrous sulfate 325 (65 FE) MG EC tablet Take 1 tablet (325 mg total) by mouth once a week. 90 tablet 3    fluticasone (FLONASE) 50 mcg/actuation nasal spray instill 2 sprays into each nostril once daily 16 g 11    fluticasone-salmeterol (ADVAIR HFA) 115-21 mcg/actuation HFAA inhale 2 puffs INTO THE LUNGS twice a day 12 g 12    hydrocodone-acetaminophen 7.5-325mg (NORCO) 7.5-325 mg per tablet Take 1 tablet by mouth every 6 (six) hours as needed for Pain. 15 tablet 0    ipratropium-albuterol (COMBIVENT RESPIMAT)  mcg/actuation inhaler inhale 1 puff INTO THE LUNGS four times a day 3 Package 4    levothyroxine (SYNTHROID) 100 MCG tablet TAKE 1 TABLET ONE TIME DAILY 90 tablet 3    methocarbamol (ROBAXIN) 500 MG Tab Take 1 tablet (500 mg total) by mouth 2 (two) times daily as needed (for muscle spasms.). 60 tablet 1    metoprolol succinate (TOPROL-XL) 50 MG 24 hr tablet Take 1 tablet (50 mg total) by mouth once daily. 90 tablet 3    MULTIVITAMIN W-MINERALS/LUTEIN (CENTRUM SILVER ORAL) Take 1 tablet by mouth once daily.      pantoprazole (PROTONIX) 40 MG tablet Take 1 tablet (40 mg total) by mouth once daily. 90 tablet 3    pravastatin (PRAVACHOL) 10 MG tablet TAKE 1 TABLET ONE TIME DAILY 90 tablet 0    ropinirole (REQUIP) 1 MG tablet Take 1 tablet (1 mg total) by mouth nightly. 90 tablet 3    thiamine 250 MG tablet Take 500 mg by mouth once daily.      tramadol (ULTRAM) 50 mg tablet Take 1  "tablet (50 mg total) by mouth 2 (two) times daily as needed for Pain. 60 tablet 2     /72 (BP Location: Right arm, Patient Position: Lying, BP Method: Automatic)   Pulse 73   Temp 98.5 °F (36.9 °C) (Oral)   Resp 16   Ht 5' 6" (1.676 m)   Wt 52.2 kg (115 lb)   LMP  (LMP Unknown)   SpO2 (!) 92%   Breastfeeding? No   BMI 18.56 kg/m²    ROS:  GENERAL: No fever, chills, fatigability or weight loss.  SKIN: No rashes, itching or changes in color or texture of skin.  HEAD: No headaches or recent head trauma.  EYES: Visual acuity fine. No photophobia, ocular pain or diplopia.  EARS: Denies ear pain, discharge or vertigo.  NOSE: No loss of smell, no epistaxis or postnasal drip.  MOUTH & THROAT: No hoarseness or change in voice. No excessive gum bleeding.  NODES: Denies swollen glands.  CHEST: Denies BRADY, cyanosis, wheezing, cough and sputum production.  CARDIOVASCULAR: Denies chest pain, PND, orthopnea or reduced exercise tolerance.  ABDOMEN: Appetite fine. No weight loss. Denies diarrhea, abdominal pain, hematemesis or blood in stool.  URINARY: No flank pain, dysuria or hematuria.  PERIPHERAL VASCULAR: No claudication or cyanosis.  NEUROLOGIC: No history of seizures, paralysis, alteration of gait or coordination.  MUSCULOSKELETAL: See HPI    PE:  APPEARANCE: Well nourished, well developed, in no acute distress.   HEAD: Normocephalic, atraumatic.  EYES: PERRL. EOMI.   EARS: TM's intact. Light reflex normal. No retraction or perforation.   NOSE: Mucosa pink. Airway clear.  MOUTH & THROAT: No tonsillar enlargement. No pharyngeal erythema or exudate. No stridor.  NECK: Supple.   NODES: No cervical, axillary or inguinal lymph node enlargement.  CHEST: Lungs clear to auscultation.  CARDIOVASCULAR: Normal S1, S2. No rubs, murmurs or gallops.  ABDOMEN: Bowel sounds normal. Not distended. Soft. No tenderness or masses.  NEUROLOGIC: Cranial Nerves: II-XII grossly intact, also see MUSCULOSKELETAL  MUSCULOSKELETAL:     BP " "118/72 (BP Location: Right arm, Patient Position: Lying, BP Method: Automatic)   Pulse 73   Temp 98.5 °F (36.9 °C) (Oral)   Resp 16   Ht 5' 6" (1.676 m)   Wt 52.2 kg (115 lb)   LMP  (LMP Unknown)   SpO2 (!) 92%   Breastfeeding? No   BMI 18.56 kg/m²         Left  Wrist   Dressing intact, 2 plus radial  artery and ulnar artery pulses, light touch intact Left upper extremity.  All digits are warm. No erythema, no warmth, no drainage, No swelling, no significant tenderness.      ASSESSMENT:  The patient is holding the left upper extremity in the dependent position.  I have made her aware that doing so can increase the swelling of the left upper extremity.  The patient is stable and improving.      PLAN:  Discharged to home, today.  Continue pain medication  Continue wound care  Continue occupational therapy    "

## 2017-07-13 NOTE — ASSESSMENT & PLAN NOTE
- PRN Ativan as above.  Monitor for DTs.  - Counseled on cessation.  - Scheduled Seroquel at bedtime and Haldol as needed for psychosis.

## 2017-07-13 NOTE — ASSESSMENT & PLAN NOTE
-pt drinks 1-2 alcoholic drinks nightly  -will monitor for sxs of withdrawal with Lorazepam as needed on order  -pt counseled on alcohol cessation with understanding verbalized  -Scheduled Seroquel at bedtime and Haldol as needed for psychosis

## 2017-07-13 NOTE — SUBJECTIVE & OBJECTIVE
Interval History:  Episode yesterday evening of confusion and agitation.  Plan for surgical repair of left forearm fracture today.    Review of Systems   Constitutional: Negative for chills, diaphoresis, fatigue and fever.   HENT: Negative for congestion and sore throat.    Eyes: Negative for visual disturbance.   Respiratory: Negative for apnea, cough, chest tightness, shortness of breath and wheezing.    Cardiovascular: Negative for chest pain, palpitations and leg swelling.   Gastrointestinal: Negative for abdominal distention, abdominal pain, blood in stool, constipation, diarrhea, nausea and vomiting.   Genitourinary: Negative for dysuria and hematuria.   Musculoskeletal: Positive for arthralgias. Negative for back pain.   Skin: Negative for pallor, rash and wound.   Neurological: Negative for dizziness, tremors, seizures, syncope, speech difficulty, weakness, light-headedness, numbness and headaches.   Hematological: Negative for adenopathy.   Psychiatric/Behavioral: Negative for agitation, confusion and hallucinations.   All other systems reviewed and are negative.    Objective:     Vital Signs (Most Recent):  Temp: 97.4 °F (36.3 °C) (07/13/17 1600)  Pulse: 103 (07/13/17 1600)  Resp: 16 (07/13/17 1600)  BP: (!) 146/87 (07/13/17 1600)  SpO2: 98 % (07/13/17 1600) Vital Signs (24h Range):  Temp:  [97.4 °F (36.3 °C)-98.6 °F (37 °C)] 97.4 °F (36.3 °C)  Pulse:  [] 103  Resp:  [14-20] 16  SpO2:  [92 %-98 %] 98 %  BP: (118-166)/() 146/87     Weight: 52.2 kg (115 lb)  Body mass index is 18.56 kg/m².    Intake/Output Summary (Last 24 hours) at 07/13/17 1737  Last data filed at 07/13/17 1202   Gross per 24 hour   Intake          2106.25 ml   Output                0 ml   Net          2106.25 ml      Physical Exam   Constitutional: She appears well-developed and well-nourished. No distress.   HENT:   Head: Normocephalic and atraumatic.   Eyes: Conjunctivae and EOM are normal. Pupils are equal, round, and  reactive to light.   Neck: Normal range of motion. Neck supple. No JVD present.   Cardiovascular: Normal rate, regular rhythm, normal heart sounds and intact distal pulses.  Exam reveals no gallop and no friction rub.    No murmur heard.  Pulmonary/Chest: Effort normal and breath sounds normal. No respiratory distress. She has no wheezes. She has no rales.   Abdominal: Soft. Bowel sounds are normal. She exhibits no distension. There is no tenderness.   Musculoskeletal: She exhibits tenderness. She exhibits no edema or deformity.   Left arm with surgical wrap/dressing, CDI.  Limited ROM to LUE due to sx.   Neurological: She is alert. No cranial nerve deficit.   Skin: Skin is warm and dry. No rash noted.   Psychiatric:   No confusion, agitation, or hallucinations noted this AM.   Nursing note and vitals reviewed.      Significant Labs:   CBC:     Recent Labs  Lab 07/12/17  0540 07/12/17 2117 07/13/17  0546   WBC 8.58 8.12 9.49   HGB 9.4* 9.9* 9.0*   HCT 29.3* 30.5* 27.4*    194 191     CMP:     Recent Labs  Lab 07/12/17 0540 07/12/17 2117 07/13/17  0546    140 140   K 4.2 4.0 3.8    107 108   CO2 18* 21* 20*   GLU 95 164* 147*   BUN 34* 29* 25*   CREATININE 1.3 1.3 1.3   CALCIUM 8.3* 8.6* 8.2*   PROT 5.5* 6.4  --    ALBUMIN 2.3* 2.6* 2.5*   BILITOT 0.5 0.6  --    ALKPHOS 68 81  --    AST 23 22  --    ALT 10 12  --    ANIONGAP 11 12 12   EGFRNONAA 43* 43* 43*       Significant Imaging: I have reviewed all pertinent imaging results/findings within the past 24 hours.

## 2017-07-14 LAB
BASOPHILS # BLD AUTO: 0.01 K/UL
BASOPHILS NFR BLD: 0.1 %
DIFFERENTIAL METHOD: ABNORMAL
EOSINOPHIL # BLD AUTO: 0.1 K/UL
EOSINOPHIL NFR BLD: 1.1 %
ERYTHROCYTE [DISTWIDTH] IN BLOOD BY AUTOMATED COUNT: 13 %
HCT VFR BLD AUTO: 27.8 %
HGB BLD-MCNC: 9 G/DL
LYMPHOCYTES # BLD AUTO: 1.5 K/UL
LYMPHOCYTES NFR BLD: 12.3 %
MCH RBC QN AUTO: 32.3 PG
MCHC RBC AUTO-ENTMCNC: 32.4 %
MCV RBC AUTO: 100 FL
MONOCYTES # BLD AUTO: 1 K/UL
MONOCYTES NFR BLD: 8.1 %
NEUTROPHILS # BLD AUTO: 9.6 K/UL
NEUTROPHILS NFR BLD: 78.4 %
PLATELET # BLD AUTO: 212 K/UL
PMV BLD AUTO: 10.4 FL
RBC # BLD AUTO: 2.79 M/UL
WBC # BLD AUTO: 12.22 K/UL

## 2017-07-14 PROCEDURE — 96372 THER/PROPH/DIAG INJ SC/IM: CPT

## 2017-07-14 PROCEDURE — 25000003 PHARM REV CODE 250: Performed by: NURSE PRACTITIONER

## 2017-07-14 PROCEDURE — 97535 SELF CARE MNGMENT TRAINING: CPT

## 2017-07-14 PROCEDURE — 94640 AIRWAY INHALATION TREATMENT: CPT

## 2017-07-14 PROCEDURE — 97530 THERAPEUTIC ACTIVITIES: CPT

## 2017-07-14 PROCEDURE — 25000242 PHARM REV CODE 250 ALT 637 W/ HCPCS: Performed by: NURSE PRACTITIONER

## 2017-07-14 PROCEDURE — 21400001 HC TELEMETRY ROOM

## 2017-07-14 PROCEDURE — 63600175 PHARM REV CODE 636 W HCPCS: Performed by: INTERNAL MEDICINE

## 2017-07-14 PROCEDURE — 27000221 HC OXYGEN, UP TO 24 HOURS

## 2017-07-14 PROCEDURE — 63600175 PHARM REV CODE 636 W HCPCS: Performed by: ORTHOPAEDIC SURGERY

## 2017-07-14 PROCEDURE — 11000001 HC ACUTE MED/SURG PRIVATE ROOM

## 2017-07-14 PROCEDURE — 99900035 HC TECH TIME PER 15 MIN (STAT)

## 2017-07-14 PROCEDURE — 94761 N-INVAS EAR/PLS OXIMETRY MLT: CPT

## 2017-07-14 PROCEDURE — 36415 COLL VENOUS BLD VENIPUNCTURE: CPT

## 2017-07-14 PROCEDURE — 25000003 PHARM REV CODE 250: Performed by: INTERNAL MEDICINE

## 2017-07-14 PROCEDURE — 85025 COMPLETE CBC W/AUTO DIFF WBC: CPT

## 2017-07-14 RX ORDER — METOPROLOL TARTRATE 50 MG/1
50 TABLET ORAL 2 TIMES DAILY
Status: DISCONTINUED | OUTPATIENT
Start: 2017-07-14 | End: 2017-07-14

## 2017-07-14 RX ORDER — THIAMINE HCL 100 MG
100 TABLET ORAL DAILY
Status: DISCONTINUED | OUTPATIENT
Start: 2017-07-14 | End: 2017-07-18 | Stop reason: HOSPADM

## 2017-07-14 RX ORDER — METOPROLOL TARTRATE 50 MG/1
50 TABLET ORAL 2 TIMES DAILY
Status: DISCONTINUED | OUTPATIENT
Start: 2017-07-14 | End: 2017-07-14 | Stop reason: SDUPTHER

## 2017-07-14 RX ORDER — PROMETHAZINE HYDROCHLORIDE 25 MG/1
25 TABLET ORAL EVERY 6 HOURS PRN
Status: DISCONTINUED | OUTPATIENT
Start: 2017-07-14 | End: 2017-07-18 | Stop reason: HOSPADM

## 2017-07-14 RX ORDER — METOPROLOL TARTRATE 50 MG/1
50 TABLET ORAL 2 TIMES DAILY
Status: DISCONTINUED | OUTPATIENT
Start: 2017-07-14 | End: 2017-07-18 | Stop reason: HOSPADM

## 2017-07-14 RX ADMIN — PANTOPRAZOLE SODIUM 40 MG: 40 TABLET, DELAYED RELEASE ORAL at 09:07

## 2017-07-14 RX ADMIN — HALOPERIDOL LACTATE 2 MG: 5 INJECTION, SOLUTION INTRAMUSCULAR at 09:07

## 2017-07-14 RX ADMIN — METOPROLOL TARTRATE 50 MG: 50 TABLET ORAL at 01:07

## 2017-07-14 RX ADMIN — PRAVASTATIN SODIUM 10 MG: 10 TABLET ORAL at 09:07

## 2017-07-14 RX ADMIN — LEVOTHYROXINE SODIUM 100 MCG: 25 TABLET ORAL at 05:07

## 2017-07-14 RX ADMIN — IPRATROPIUM BROMIDE AND ALBUTEROL SULFATE 3 ML: .5; 3 SOLUTION RESPIRATORY (INHALATION) at 09:07

## 2017-07-14 RX ADMIN — ENOXAPARIN SODIUM 50 MG: 100 INJECTION SUBCUTANEOUS at 09:07

## 2017-07-14 RX ADMIN — BUSPIRONE HYDROCHLORIDE 15 MG: 5 TABLET ORAL at 05:07

## 2017-07-14 RX ADMIN — Medication 100 MG: at 01:07

## 2017-07-14 RX ADMIN — QUETIAPINE FUMARATE 25 MG: 25 TABLET, FILM COATED ORAL at 09:07

## 2017-07-14 RX ADMIN — BUDESONIDE 0.25 MG: 0.25 SUSPENSION RESPIRATORY (INHALATION) at 07:07

## 2017-07-14 RX ADMIN — BUSPIRONE HYDROCHLORIDE 15 MG: 5 TABLET ORAL at 01:07

## 2017-07-14 RX ADMIN — SODIUM CHLORIDE: 0.9 INJECTION, SOLUTION INTRAVENOUS at 10:07

## 2017-07-14 RX ADMIN — BUSPIRONE HYDROCHLORIDE 15 MG: 5 TABLET ORAL at 09:07

## 2017-07-14 RX ADMIN — METOPROLOL TARTRATE 50 MG: 50 TABLET ORAL at 09:07

## 2017-07-14 RX ADMIN — BUDESONIDE 0.25 MG: 0.25 SUSPENSION RESPIRATORY (INHALATION) at 09:07

## 2017-07-14 RX ADMIN — SODIUM CHLORIDE: 0.9 INJECTION, SOLUTION INTRAVENOUS at 09:07

## 2017-07-14 RX ADMIN — DULOXETINE 60 MG: 30 CAPSULE, DELAYED RELEASE ORAL at 09:07

## 2017-07-14 RX ADMIN — METOPROLOL SUCCINATE 50 MG: 50 TABLET, EXTENDED RELEASE ORAL at 09:07

## 2017-07-14 RX ADMIN — LORAZEPAM 1 MG: 2 INJECTION INTRAMUSCULAR; INTRAVENOUS at 10:07

## 2017-07-14 NOTE — PLAN OF CARE
Problem: Patient Care Overview  Goal: Plan of Care Review  PT REQUIRES TOTAL A FOR GROSS FUNC MOBILITY    Outcome: Ongoing (interventions implemented as appropriate)  PT REQUIRES MAX A X 2 FOR SIT>STAND HHA.

## 2017-07-14 NOTE — PLAN OF CARE
Problem: Patient Care Overview  Goal: Plan of Care Review  PT REQUIRES TOTAL A FOR GROSS FUNC MOBILITY    Outcome: Ongoing (interventions implemented as appropriate)  Fall precautions maintained, pt free from injuries/fall, bed alarm on, avasys in place, pt repositioned every 2 hours, heels floated. C/o left arm pain during repositioning and activity, moderately relieved by rest, elevation, quiet environment. Incontinent x 2, checked during hourly rounding and changed as needed. Pt pleasant, AAOX3 at this time. Left arm swollen, encouraged to elevate but pt repositions arm, pt denies increased pain, denies numbness/tingling. Dressing dry and intact. IVF given as needed. NSR on tele. Instructed to use IS, pt demonstrated understanding, coughed up clear sputum. Spouse not at bedside. POC and meds reviewed w/ pt, pt verbalizes understanding, needs reinforcement. Side rails x 3, bed locked and low. Chart check done. Will cont to monitor.

## 2017-07-14 NOTE — PT/OT/SLP PROGRESS
Physical Therapy  Treatment    Ibeth Schroeder   MRN: [id number]   Admitting Diagnosis: Radius/ulna fracture    PT Received On: 07/14/17  PT Start Time: 1049     PT Stop Time: 1115    PT Total Time (min): 26 min       Billable Minutes:  Therapeutic Activity 26    Treatment Type: Treatment  PT/PTA: PT             General Precautions: Standard, fall  Orthopedic Precautions: LUE non weight bearing   Braces:           Subjective:  Communicated with NURSE MAGANA AND EPIC CHART REVIEW prior to session.   PT AGREED TO TX     Pain/Comfort  Pain Rating 1: 5/10  Location - Side 1: Left  Location 1: arm  Pain Rating Post-Intervention 1: 5/10  Pain Rating 2: 5/10  Location - Side 2: Left  Location 2: leg  Pain Rating Post-Intervention 2: 5/10    Objective:   Patient found with: peripheral IV, bed alarm, SCD    Functional Mobility:  Bed Mobility:   Rolling/Turning to Left: Maximum assistance  Scooting/Bridging: Maximum Assistance  Supine to Sit: Maximum Assistance  Sit to Supine: Maximum Assistance    Transfers:  Sit <> Stand Assistance: Maximum Assistance (X2)    Gait:   Gait Distance: UNABLE    Therapeutic Activities and Exercises:   PT COMPLETED BED MOBILITY AND STOOD X 2 TRIALS WITH MAX A X 2 AND SEVERE POST LEAN. PT WITH ANT WT SHIFT CUES  AND SIDE TO SIDE SWAY TO PROGRESS STANDING BALANCE. PT SEATED FOR REST. PT COMPLETED AP AND TKE X 10 REPS FOR ROM. PT SUP IN BED WITH MAX A AND SCOOTED TO HOB WITH MAX A. PT LEFT WITH ALL NEEDS MET AND CALL BELL IN REACH.      AM-PAC 6 CLICK MOBILITY  How much help from another person does this patient currently need?   1 = Unable, Total/Dependent Assistance  2 = A lot, Maximum/Moderate Assistance  3 = A little, Minimum/Contact Guard/Supervision  4 = None, Modified Byers/Independent    Turning over in bed (including adjusting bedclothes, sheets and blankets)?: 2  Sitting down on and standing up from a chair with arms (e.g., wheelchair, bedside commode, etc.): 2  Moving from lying on  back to sitting on the side of the bed?: 2  Moving to and from a bed to a chair (including a wheelchair)?: 2  Need to walk in hospital room?: 1  Climbing 3-5 steps with a railing?: 1  Total Score: 10    AM-PAC Raw Score CMS G-Code Modifier Level of Impairment Assistance   6 % Total / Unable   7 - 9 CM 80 - 100% Maximal Assist   10 - 14 CL 60 - 80% Moderate Assist   15 - 19 CK 40 - 60% Moderate Assist   20 - 22 CJ 20 - 40% Minimal Assist   23 CI 1-20% SBA / CGA   24 CH 0% Independent/ Mod I     Patient left supine with call button in reach.    Assessment:  PT PROGRESSING WITH MOBILITY. PT WITH INC ALERTNESS TODAY.    Rehab identified problem list/impairments: Rehab identified problem list/impairments: weakness, impaired endurance, gait instability, impaired functional mobilty, impaired self care skills, impaired balance, pain, decreased ROM, decreased safety awareness, edema, impaired skin, orthopedic precautions    Rehab potential is good.    Activity tolerance: Fair    Discharge recommendations: Discharge Facility/Level Of Care Needs: nursing facility, skilled     Barriers to discharge: Barriers to Discharge: Decreased caregiver support, Inaccessible home environment    Equipment recommendations: Equipment Needed After Discharge: none     GOALS:    Physical Therapy Goals        Problem: Physical Therapy Goal    Goal Priority Disciplines Outcome Goal Variances Interventions   Physical Therapy Goal     PT/OT, PT      Description:  PT WILL BE SEEN FOR P.T. FOR A MIN OF 5 OUT OF 7 DAYS A WEEK  LT17  1. PT WILL COMPLETE BED MOBILITY WITH MOD A.  2. PT WILL STAND MAX A X 1.  3. PT WILL T/F TO CHAIR WITH MAX A X 1  4. PT WILL COMPLETE B LE TE X 20 REPS FOR STRENGTHENING.  5. PT WILL GT TRAIN WITH APPROPRIATE AD X 10' WITH MAX A.                      PLAN:    Patient to be seen    to address the above listed problems via gait training, therapeutic activities, therapeutic exercises  Plan of Care expires:  07/20/17  Plan of Care reviewed with: patient         Piper Reardon, PT  07/14/2017

## 2017-07-14 NOTE — PT/OT/SLP PROGRESS
Occupational Therapy  Treatment    Ibeth Schroeder   MRN: [id number]   Admitting Diagnosis: Radius/ulna fracture    OT Date of Treatment: 07/14/17   OT Start Time: 1055  OT Stop Time: 1125  OT Total Time (min): 30 min    Billable Minutes:  Self Care/Home Management 15 minutes and Therapeutic Activity 15 minutes    General Precautions: Standard, fall  Orthopedic Precautions: LUE non weight bearing  Braces:           Subjective:  Communicated with nurse herman and epic chart review prior to session.    Pain/Comfort  Pain Rating 1: 6/10  Location - Side 1: Left  Location - Orientation 1: upper  Location 1: arm    Objective:  Patient found with: peripheral IV, bed alarm, SCD     Functional Mobility:  Bed Mobility:  Rolling/Turning to Left: With assist of 2, With leg lift, Maximum assistance  Rolling/Turning Right: With leg lift, Maximum assistance, With assist of 2  Scooting/Bridging: Maximum Assistance, With assist of 2, With leg lift  Supine to Sit: Maximum Assistance, With assist of 2, With leg lift  Sit to Supine: Maximum Assistance, With assist of 2, With leg lift    Transfers:   Sit <> Stand Assistance: Maximum Assistance  Sit <> Stand Assistive Device: Rolling Walker    Functional Ambulation: na    Activities of Daily Living:     Feeding adaptive equipment: na     UE adaptive equipment: na     LE adaptive equipment: na  Grooming Position: Seated, EOB  Grooming Level of Assistance: Moderate assistance              Bathing adaptive equipment: na    Balance:   Static Sit: POOR: Needs MODERATE assist to maintain  Dynamic Sit: POOR: N/A  Static Stand: 0: Needs MAXIMAL assist to maintain   Dynamic stand: 0: N/A    Therapeutic Activities and Exercises:  Pt seen in room sitting EOB. Pt pt req max a x2 with bed mobility and with poor sitting balance eob . Pt req mod a with hair grooming seated eob and req max a x 2 with sit<>stand t/f's with posterior lean during standing activity  AM-PAC 6 CLICK ADL   How much help from  "another person does this patient currently need?   1 = Unable, Total/Dependent Assistance  2 = A lot, Maximum/Moderate Assistance  3 = A little, Minimum/Contact Guard/Supervision  4 = None, Modified Elkins/Independent    Putting on and taking off regular lower body clothing? : 2  Bathing (including washing, rinsing, drying)?: 1  Toileting, which includes using toilet, bedpan, or urinal? : 1  Putting on and taking off regular upper body clothing?: 2  Taking care of personal grooming such as brushing teeth?: 2  Eating meals?: 3  Total Score: 11     AM-PAC Raw Score CMS "G-Code Modifier Level of Impairment Assistance   6 % Total / Unable   7 - 8 CM 80 - 100% Maximal Assist   9-13 CL 60 - 80% Moderate Assist   14 - 19 CK 40 - 60% Moderate Assist   20 - 22 CJ 20 - 40% Minimal Assist   23 CI 1-20% SBA / CGA   24 CH 0% Independent/ Mod I       Patient left HOB elevated with all lines intact, call button in reach, bed alarm on and nurse virgil notified    ASSESSMENT:  Ibeth Schroeder is a 66 y.o. female with a medical diagnosis of Radius/ulna fracture and presents with debility and generalized weakness. Pt may continue to benefit from skilled o.t..    Rehab identified problem list/impairments: Rehab identified problem list/impairments: weakness, impaired self care skills, impaired balance, decreased safety awareness, decreased ROM, impaired endurance, impaired functional mobilty, gait instability    Rehab potential is good.    Activity tolerance: Fair    Discharge recommendations: Discharge Facility/Level Of Care Needs: nursing facility, skilled     Barriers to discharge: Barriers to Discharge: Decreased caregiver support    Equipment recommendations:  (to be determined)     GOALS:    Occupational Therapy Goals        Problem: Occupational Therapy Goal    Goal Priority Disciplines Outcome Interventions   Occupational Therapy Goal     OT, PT/OT Ongoing (interventions implemented as appropriate)    Description:  ot " goals to be met by 7-20-17  1. Mod a with ue dressing  2. Mod a with le dressing  3. Mod a with supine<>sit t/f's   4. Mod a with g/h task seated eob with fair sitting balance                    Plan:  Patient to be seen 3 x/week to address the above listed problems via self-care/home management, therapeutic activities, therapeutic exercises  Plan of Care expires: 07/20/17  Plan of Care reviewed with: patient         Charito Lindsey, OT  07/14/2017

## 2017-07-14 NOTE — PROGRESS NOTES
SNF referral faxed to [facility], [facility], [facility], and [facility] via [facility].     Mario from [facility] came to office for clinical documents and to assess pt.  Will also forward referral to [facility] to inquire about placement.     Em from [facility] followed up - bed unexpectedly available - CM faxed updated clinical documents to facility for review.    Clinicals reviewed by DON at [facility] and unable to accept due to history of alcohol abuse and agitated behaviors.    EDIS spoke with Aisha at [facility] who received referral.  She states that pt is appropriate for facility and submitted request for authorization with pt insurer, [employer].      Contacted office of Kristi Samuels LCSW to make referral for therapy - she is out of office today will follow up with CM on Monday.      CM followed up with Rebeca at [facility] - pt has been accepted for SNF and they will submit request for auth to the pt's insurance.    Pt's insurance has SNF co-pay starting on day 21 ($164.50) regardless of where she is accepted.

## 2017-07-14 NOTE — ASSESSMENT & PLAN NOTE
- S/P left radius and ulna ORIF (POD #1).  - Post-op management per Orthopedic Surgery (primary team).  - Analgesia prn.   - Case discussed with social work.  SNF placement pending.

## 2017-07-14 NOTE — ASSESSMENT & PLAN NOTE
- PRN Ativan and Haldol as above.  Monitor for DTs.  - Counseled on cessation.  - Scheduled Seroquel at bedtime.  - Will add home thiamine.

## 2017-07-14 NOTE — PROGRESS NOTES
[facility] Hospital Medicine  Progress Note    Patient Name: Ibeth Schroeder  MRN: [id number]  Patient Class: IP- Inpatient   Admission Date: 7/10/2017  Length of Stay: 4 days  Attending Physician: Binu Lizarraga Sr., MD  Primary Care Provider: SAGAR BETTS MD        Subjective:     Principal Problem:Radius/ulna fracture    HPI:  Ibeth Schroeder is 66 year old female with PMHx of HTN, HLD, CKD, depression, hypothyroidism, COPD, and EtOH abuse, who presented to the ED with c/o left wrist swelling and pain due to left radial and ulnar fracture after suffering a fall.  Hospital Medicine consulted for medical management.  Patient states she is complaint with prescribed medications.  She reports drinking 1-2 alcoholic drinks per night made with Rum.  She does not follow with Pulmonology.  She denies any chest pain, palpitations, SOB/BRADY, orthopnea, PND, edema, lightheadedness/dizziness, N/V/D, syncope, or fever.    Hospital Course:  Patient was admitted per Ortho Surgery.  2D echo revealed normal LVEF of 55%, CLVH, and PAP 50 mmHg.  Patient ok to proceed with surgery at low risk for CV events.  7/12, she underwent ORIF of left wrist.  She tolerated procedure well, and was transferred to Surgical Unit for monitoring.  Social work was consulted for SNF placement at [facility].  Once on surgical floor, patient had an episode of delirium with hallucinations.  PRN IV Ativan and Haldol ordered for suspected EtOH withdrawal.  Patient calm and cooperative over past 48 hours.  Patient appears comfortable this AM, sleeping well in bed.  She is oriented and calm.  Denies any pain.  Monitoring for DTs.  Awaiting SNF placement.      Review of Systems   Constitutional: Negative for chills, diaphoresis, fatigue and fever.   HENT: Negative for congestion and sore throat.    Eyes: Negative for visual disturbance.   Respiratory: Negative for apnea, cough, chest tightness, shortness of breath and wheezing.     Cardiovascular: Negative for chest pain, palpitations and leg swelling.   Gastrointestinal: Negative for abdominal distention, abdominal pain, blood in stool, constipation, diarrhea, nausea and vomiting.   Genitourinary: Negative for dysuria and hematuria.   Musculoskeletal: Positive for arthralgias. Negative for back pain.   Skin: Negative for pallor, rash and wound.   Neurological: Negative for dizziness, tremors, seizures, syncope, speech difficulty, weakness, light-headedness, numbness and headaches.   Hematological: Negative for adenopathy.   Psychiatric/Behavioral: Positive for decreased concentration. Negative for agitation, confusion and hallucinations.        Memory loss   All other systems reviewed and are negative.    Objective:     Vital Signs (Most Recent):  Temp: 99.1 °F (37.3 °C) (07/14/17 0918)  Pulse: 109 (07/14/17 0918)  Resp: 20 (07/14/17 0918)  BP: (!) 147/91 (07/14/17 0918)  SpO2: (!) 94 % (07/14/17 0918) Vital Signs (24h Range):  Temp:  [97.4 °F (36.3 °C)-100.3 °F (37.9 °C)] 99.1 °F (37.3 °C)  Pulse:  [] 109  Resp:  [12-20] 20  SpO2:  [92 %-98 %] 94 %  BP: (118-151)/(72-99) 147/91     Weight: 52.2 kg (115 lb)  Body mass index is 18.56 kg/m².    Intake/Output Summary (Last 24 hours) at 07/14/17 1121  Last data filed at 07/14/17 0527   Gross per 24 hour   Intake              935 ml   Output                0 ml   Net              935 ml      Physical Exam   Constitutional: She appears well-developed and well-nourished. No distress.   HENT:   Head: Normocephalic and atraumatic.   Eyes: Conjunctivae and EOM are normal. Pupils are equal, round, and reactive to light.   Neck: Normal range of motion. Neck supple. No JVD present.   Cardiovascular: Normal rate, regular rhythm, normal heart sounds and intact distal pulses.  Exam reveals no gallop and no friction rub.    No murmur heard.  Pulmonary/Chest: Effort normal and breath sounds normal. No respiratory distress. She has no wheezes. She has  no rales.   Abdominal: Soft. Bowel sounds are normal. She exhibits no distension. There is no tenderness.   Musculoskeletal: She exhibits tenderness. She exhibits no edema or deformity.   Left arm with surgical wrap/dressing, CDI.  Limited ROM to LUE due to sx.   Neurological: She is alert. No cranial nerve deficit.   Skin: Skin is warm and dry. No rash noted.   Psychiatric:   No agitation or hallucinations.  Confusion.  Disorganized thoughts.  Nursing note and vitals reviewed.      Significant Labs:   CBC:     Recent Labs  Lab 07/12/17 2117 07/13/17  0546 07/14/17  0855   WBC 8.12 9.49 12.22   HGB 9.9* 9.0* 9.0*   HCT 30.5* 27.4* 27.8*    191 212     CMP:     Recent Labs  Lab 07/12/17 2117 07/13/17  0546    140   K 4.0 3.8    108   CO2 21* 20*   * 147*   BUN 29* 25*   CREATININE 1.3 1.3   CALCIUM 8.6* 8.2*   PROT 6.4  --    ALBUMIN 2.6* 2.5*   BILITOT 0.6  --    ALKPHOS 81  --    AST 22  --    ALT 12  --    ANIONGAP 12 12   EGFRNONAA 43* 43*       Significant Imaging: I have reviewed all pertinent imaging results/findings within the past 24 hours.    Assessment/Plan:      * Radius/ulna fracture    - S/P left radius and ulna ORIF (POD #1).  - Post-op management per Orthopedic Surgery (primary team).  - Analgesia prn.   - Case discussed with social work.  SNF placement pending.        Delirium    - Suspect secondary to EtOH withdrawal.  - PRN Ativan and Haldol.  - Monitor for DTs.        Acute blood loss anemia    H&H stable.  Monitor.          History of ETOH abuse    - PRN Ativan and Haldol as above.  Monitor for DTs.  - Counseled on cessation.  - Scheduled Seroquel at bedtime.  - Will add home thiamine.        Dyslipidemia    - Statin continued.          Depression    - Resume current medications.         Stage 3 severe COPD by GOLD classification    - Budesonide and arformoterol nebulizer treatment.  - Duonebs PRN.  - Continue home dose O2 @ 2L per NC nightly.        Anemia of other  chronic disease    -H/H stable  -will monitor  -will transfuse if needed   -repeat CBC in am   -will resume iron supplementation post procedure          CKD (chronic kidney disease) stage 3, GFR 30-59 ml/min    - Cr. Stable.  Monitor.        History of seizure    - Seizure precautions.          Essential hypertension    - BP suboptimally controlled.  - Will switch Toprol to Lopressor 50mg BID.  Monitor BP trends.          Hypothyroidism    - Synthroid continued.            VTE Risk Mitigation         Ordered     Medium Risk of VTE  Once      07/12/17 1644     Place MALICK hose  Until discontinued      07/12/17 1644     Place sequential compression device  Until discontinued      07/12/17 1644     Place sequential compression device  Until discontinued      07/11/17 1727          SJ Childers  Department of Hospital Medicine   [facility]

## 2017-07-14 NOTE — SUBJECTIVE & OBJECTIVE
Review of Systems   Constitutional: Negative for chills, diaphoresis, fatigue and fever.   HENT: Negative for congestion and sore throat.    Eyes: Negative for visual disturbance.   Respiratory: Negative for apnea, cough, chest tightness, shortness of breath and wheezing.    Cardiovascular: Negative for chest pain, palpitations and leg swelling.   Gastrointestinal: Negative for abdominal distention, abdominal pain, blood in stool, constipation, diarrhea, nausea and vomiting.   Genitourinary: Negative for dysuria and hematuria.   Musculoskeletal: Positive for arthralgias. Negative for back pain.   Skin: Negative for pallor, rash and wound.   Neurological: Negative for dizziness, tremors, seizures, syncope, speech difficulty, weakness, light-headedness, numbness and headaches.   Hematological: Negative for adenopathy.   Psychiatric/Behavioral: Positive for decreased concentration. Negative for agitation, confusion and hallucinations.        Memory loss   All other systems reviewed and are negative.    Objective:     Vital Signs (Most Recent):  Temp: 99.1 °F (37.3 °C) (07/14/17 0918)  Pulse: 109 (07/14/17 0918)  Resp: 20 (07/14/17 0918)  BP: (!) 147/91 (07/14/17 0918)  SpO2: (!) 94 % (07/14/17 0918) Vital Signs (24h Range):  Temp:  [97.4 °F (36.3 °C)-100.3 °F (37.9 °C)] 99.1 °F (37.3 °C)  Pulse:  [] 109  Resp:  [12-20] 20  SpO2:  [92 %-98 %] 94 %  BP: (118-151)/(72-99) 147/91     Weight: 52.2 kg (115 lb)  Body mass index is 18.56 kg/m².    Intake/Output Summary (Last 24 hours) at 07/14/17 1121  Last data filed at 07/14/17 0500   Gross per 24 hour   Intake              935 ml   Output                0 ml   Net              935 ml      Physical Exam   Constitutional: She appears well-developed and well-nourished. No distress.   HENT:   Head: Normocephalic and atraumatic.   Eyes: Conjunctivae and EOM are normal. Pupils are equal, round, and reactive to light.   Neck: Normal range of motion. Neck supple. No JVD  present.   Cardiovascular: Normal rate, regular rhythm, normal heart sounds and intact distal pulses.  Exam reveals no gallop and no friction rub.    No murmur heard.  Pulmonary/Chest: Effort normal and breath sounds normal. No respiratory distress. She has no wheezes. She has no rales.   Abdominal: Soft. Bowel sounds are normal. She exhibits no distension. There is no tenderness.   Musculoskeletal: She exhibits tenderness. She exhibits no edema or deformity.   Left arm with surgical wrap/dressing, CDI.  Limited ROM to LUE due to sx.   Neurological: She is alert. No cranial nerve deficit.   Skin: Skin is warm and dry. No rash noted.   Psychiatric:   No agitation or hallucinations.  Confusion.   Nursing note and vitals reviewed.      Significant Labs:   CBC:     Recent Labs  Lab 07/12/17 2117 07/13/17  0546 07/14/17  0855   WBC 8.12 9.49 12.22   HGB 9.9* 9.0* 9.0*   HCT 30.5* 27.4* 27.8*    191 212     CMP:     Recent Labs  Lab 07/12/17 2117 07/13/17  0546    140   K 4.0 3.8    108   CO2 21* 20*   * 147*   BUN 29* 25*   CREATININE 1.3 1.3   CALCIUM 8.6* 8.2*   PROT 6.4  --    ALBUMIN 2.6* 2.5*   BILITOT 0.6  --    ALKPHOS 81  --    AST 22  --    ALT 12  --    ANIONGAP 12 12   EGFRNONAA 43* 43*       Significant Imaging: I have reviewed all pertinent imaging results/findings within the past 24 hours.

## 2017-07-15 PROBLEM — E83.51 HYPOCALCEMIA: Status: ACTIVE | Noted: 2017-07-15

## 2017-07-15 LAB
25(OH)D3+25(OH)D2 SERPL-MCNC: 37 NG/ML
ANION GAP SERPL CALC-SCNC: 11 MMOL/L
BASOPHILS # BLD AUTO: 0.01 K/UL
BASOPHILS NFR BLD: 0.1 %
BUN SERPL-MCNC: 16 MG/DL
CALCIUM SERPL-MCNC: 7.5 MG/DL
CHLORIDE SERPL-SCNC: 113 MMOL/L
CO2 SERPL-SCNC: 18 MMOL/L
CREAT SERPL-MCNC: 0.9 MG/DL
DIFFERENTIAL METHOD: ABNORMAL
EOSINOPHIL # BLD AUTO: 0.1 K/UL
EOSINOPHIL NFR BLD: 1.1 %
ERYTHROCYTE [DISTWIDTH] IN BLOOD BY AUTOMATED COUNT: 13.5 %
EST. GFR  (AFRICAN AMERICAN): >60 ML/MIN/1.73 M^2
EST. GFR  (NON AFRICAN AMERICAN): >60 ML/MIN/1.73 M^2
GLUCOSE SERPL-MCNC: 116 MG/DL
HCT VFR BLD AUTO: 33.8 %
HGB BLD-MCNC: 10.5 G/DL
LYMPHOCYTES # BLD AUTO: 0.9 K/UL
LYMPHOCYTES NFR BLD: 11.2 %
MAGNESIUM SERPL-MCNC: 1 MG/DL
MCH RBC QN AUTO: 29.7 PG
MCHC RBC AUTO-ENTMCNC: 31.1 %
MCV RBC AUTO: 96 FL
MONOCYTES # BLD AUTO: 0.7 K/UL
MONOCYTES NFR BLD: 8.7 %
NEUTROPHILS # BLD AUTO: 6.4 K/UL
NEUTROPHILS NFR BLD: 78.9 %
PHOSPHATE SERPL-MCNC: 1.7 MG/DL
PLATELET # BLD AUTO: 204 K/UL
PMV BLD AUTO: 10.5 FL
POTASSIUM SERPL-SCNC: 4.4 MMOL/L
RBC # BLD AUTO: 3.54 M/UL
SODIUM SERPL-SCNC: 142 MMOL/L
WBC # BLD AUTO: 8.12 K/UL

## 2017-07-15 PROCEDURE — 25000003 PHARM REV CODE 250: Performed by: NURSE PRACTITIONER

## 2017-07-15 PROCEDURE — 63600175 PHARM REV CODE 636 W HCPCS: Performed by: ORTHOPAEDIC SURGERY

## 2017-07-15 PROCEDURE — 96372 THER/PROPH/DIAG INJ SC/IM: CPT

## 2017-07-15 PROCEDURE — 94640 AIRWAY INHALATION TREATMENT: CPT

## 2017-07-15 PROCEDURE — 63600175 PHARM REV CODE 636 W HCPCS: Performed by: INTERNAL MEDICINE

## 2017-07-15 PROCEDURE — 94761 N-INVAS EAR/PLS OXIMETRY MLT: CPT

## 2017-07-15 PROCEDURE — 99900035 HC TECH TIME PER 15 MIN (STAT)

## 2017-07-15 PROCEDURE — 82306 VITAMIN D 25 HYDROXY: CPT

## 2017-07-15 PROCEDURE — 63600175 PHARM REV CODE 636 W HCPCS: Performed by: NURSE PRACTITIONER

## 2017-07-15 PROCEDURE — 25000003 PHARM REV CODE 250: Performed by: INTERNAL MEDICINE

## 2017-07-15 PROCEDURE — 97110 THERAPEUTIC EXERCISES: CPT

## 2017-07-15 PROCEDURE — 21400001 HC TELEMETRY ROOM

## 2017-07-15 PROCEDURE — 83735 ASSAY OF MAGNESIUM: CPT

## 2017-07-15 PROCEDURE — 80048 BASIC METABOLIC PNL TOTAL CA: CPT

## 2017-07-15 PROCEDURE — 36415 COLL VENOUS BLD VENIPUNCTURE: CPT

## 2017-07-15 PROCEDURE — 97530 THERAPEUTIC ACTIVITIES: CPT

## 2017-07-15 PROCEDURE — 84100 ASSAY OF PHOSPHORUS: CPT

## 2017-07-15 PROCEDURE — 85025 COMPLETE CBC W/AUTO DIFF WBC: CPT

## 2017-07-15 PROCEDURE — 11000001 HC ACUTE MED/SURG PRIVATE ROOM

## 2017-07-15 RX ORDER — MAGNESIUM SULFATE HEPTAHYDRATE 40 MG/ML
2 INJECTION, SOLUTION INTRAVENOUS ONCE
Status: COMPLETED | OUTPATIENT
Start: 2017-07-15 | End: 2017-07-15

## 2017-07-15 RX ORDER — CHLORDIAZEPOXIDE HYDROCHLORIDE 25 MG/1
50 CAPSULE, GELATIN COATED ORAL ONCE
Status: DISCONTINUED | OUTPATIENT
Start: 2017-07-15 | End: 2017-07-15

## 2017-07-15 RX ORDER — CHLORDIAZEPOXIDE HYDROCHLORIDE 25 MG/1
25 CAPSULE, GELATIN COATED ORAL 3 TIMES DAILY
Status: DISCONTINUED | OUTPATIENT
Start: 2017-07-15 | End: 2017-07-15

## 2017-07-15 RX ADMIN — ENOXAPARIN SODIUM 50 MG: 100 INJECTION SUBCUTANEOUS at 09:07

## 2017-07-15 RX ADMIN — Medication 100 MG: at 09:07

## 2017-07-15 RX ADMIN — METOPROLOL TARTRATE 50 MG: 50 TABLET ORAL at 09:07

## 2017-07-15 RX ADMIN — PANTOPRAZOLE SODIUM 40 MG: 40 TABLET, DELAYED RELEASE ORAL at 09:07

## 2017-07-15 RX ADMIN — QUETIAPINE FUMARATE 25 MG: 25 TABLET, FILM COATED ORAL at 09:07

## 2017-07-15 RX ADMIN — BUSPIRONE HYDROCHLORIDE 15 MG: 5 TABLET ORAL at 06:07

## 2017-07-15 RX ADMIN — DULOXETINE 60 MG: 30 CAPSULE, DELAYED RELEASE ORAL at 09:07

## 2017-07-15 RX ADMIN — PRAVASTATIN SODIUM 10 MG: 10 TABLET ORAL at 09:07

## 2017-07-15 RX ADMIN — BUSPIRONE HYDROCHLORIDE 15 MG: 5 TABLET ORAL at 02:07

## 2017-07-15 RX ADMIN — SODIUM PHOSPHATE, MONOBASIC, MONOHYDRATE 20.01 MMOL: 276; 142 INJECTION, SOLUTION INTRAVENOUS at 09:07

## 2017-07-15 RX ADMIN — BUDESONIDE 0.25 MG: 0.25 SUSPENSION RESPIRATORY (INHALATION) at 08:07

## 2017-07-15 RX ADMIN — BUSPIRONE HYDROCHLORIDE 15 MG: 5 TABLET ORAL at 09:07

## 2017-07-15 RX ADMIN — LEVOTHYROXINE SODIUM 100 MCG: 25 TABLET ORAL at 06:07

## 2017-07-15 RX ADMIN — MAGNESIUM SULFATE IN WATER 2 G: 40 INJECTION, SOLUTION INTRAVENOUS at 07:07

## 2017-07-15 RX ADMIN — BUDESONIDE 0.25 MG: 0.25 SUSPENSION RESPIRATORY (INHALATION) at 09:07

## 2017-07-15 RX ADMIN — LORAZEPAM 1 MG: 2 INJECTION INTRAMUSCULAR; INTRAVENOUS at 04:07

## 2017-07-15 NOTE — PT/OT/SLP PROGRESS
Occupational Therapy  Treatment    Ibeth Schroeder   MRN: [id number]   Admitting Diagnosis: Radius/ulna fracture    OT Date of Treatment: 07/15/17   OT Start Time: 0815  OT Stop Time: 0838  OT Total Time (min): 23 min    Billable Minutes:  Therapeutic Activity  x 15 min and Therapeutic Exercise  x 8 min    General Precautions: Standard, fall  Orthopedic Precautions: LUE non weight bearing  Braces:  (LUE in soft cast)         Subjective:  Communicated with pt, and nurse- Yane prior to session.    Pain/Comfort  Pain Rating 1: 7/10  Location - Side 1: Left  Location - Orientation 1: upper  Location 1: arm  Pain Addressed 1: Reposition, Distraction, Pre-medicate for activity    Objective:  Patient found with: peripheral IV, bed alarm, SCD     Functional Mobility:  Bed Mobility:  Rolling/Turning to Left: With assist of 2, Maximum assistance  Rolling/Turning Right: Maximum assistance, With assist of 2  Scooting/Bridging: Maximum Assistance  Supine to Sit: Maximum Assistance, With assist of 2  Sit to Supine: Maximum Assistance, With assist of 2    Transfers:   Sit <> Stand Assistance: Moderate Assistance (assist of 2)  Sit <> Stand Assistive Device: Other (see comments) (hand held assistance)  Bed <> Chair Transfer Assistance: Activity did not occur    Functional Ambulation:     Activities of Daily Living:     Feeding adaptive equipment:      UE adaptive equipment:      LE adaptive equipment:                     Bathing adaptive equipment:     Balance:   Static Sit: POOR: Needs MODERATE assist to maintain  Dynamic Sit: POOR: N/A  Static Stand: 0: Needs MAXIMAL assist to maintain   Dynamic stand: 0: N/A    Therapeutic Activities and Exercises: Pt requires mod A x 2 for sit to stand, exhibits posterior lean,requires mod A x 2 for standing balance,  tolerated 5 min of static standing with HHA. In Supine, pt completed therapeutic ex to RUE of AROM/AAROM and PROM/retrograde massage to LUE to all available planes to help  "with joint mobility, swelling. Pt's LUE elevated on 3 pillows to help with swelling. Pt educated to keep LUE elevated but demonstrated poor follow thru. Pt appears to have confused, disoriented, and made several delusional statements.     AM-PAC 6 CLICK ADL   How much help from another person does this patient currently need?   1 = Unable, Total/Dependent Assistance  2 = A lot, Maximum/Moderate Assistance  3 = A little, Minimum/Contact Guard/Supervision  4 = None, Modified [place]/Independent    Putting on and taking off regular lower body clothing? : 1  Bathing (including washing, rinsing, drying)?: 1  Toileting, which includes using toilet, bedpan, or urinal? : 1  Putting on and taking off regular upper body clothing?: 1  Taking care of personal grooming such as brushing teeth?: 1  Eating meals?: 1  Total Score: 6     AM-PAC Raw Score CMS "G-Code Modifier Level of Impairment Assistance   6 % Total / Unable   7 - 8 CM 80 - 100% Maximal Assist   9-13 CL 60 - 80% Moderate Assist   14 - 19 CK 40 - 60% Moderate Assist   20 - 22 CJ 20 - 40% Minimal Assist   23 CI 1-20% SBA / CGA   24 CH 0% Independent/ Mod I       Patient left supine with all lines intact, call button in reach and bed alarm on    ASSESSMENT:  Ibeth Schroeder is a 66 y.o. female with a medical diagnosis of Radius/ulna fracture and presents with impaired self care and fx mobility. Pt would benefit skilled OT to max indep and to address impairments.     Rehab identified problem list/impairments: Rehab identified problem list/impairments: impaired endurance, weakness, impaired self care skills, impaired functional mobilty, decreased coordination, impaired cognition, impaired balance, gait instability, decreased upper extremity function, decreased lower extremity function, decreased safety awareness, orthopedic precautions    Rehab potential is fair.    Activity tolerance: Fair    Discharge recommendations: Discharge Facility/Level Of Care Needs: " nursing facility, skilled     Barriers to discharge: Barriers to Discharge: Decreased caregiver support    Equipment recommendations: other (see comments) (TBA at later time)     GOALS:    Occupational Therapy Goals        Problem: Occupational Therapy Goal    Goal Priority Disciplines Outcome Interventions   Occupational Therapy Goal     OT, PT/OT Ongoing (interventions implemented as appropriate)    Description:  ot goals to be met by 7-20-17  1. Mod a with ue dressing  2. Mod a with le dressing  3. Mod a with supine<>sit t/f's   4. Mod a with g/h task seated eob with fair sitting balance                    Plan:  Patient to be seen 3 x/week to address the above listed problems via self-care/home management, therapeutic exercises, therapeutic activities  Plan of Care expires: 07/20/17  Plan of Care reviewed with: patient         Ishpark Aung, OT  07/15/2017

## 2017-07-15 NOTE — PLAN OF CARE
Problem: Patient Care Overview  Goal: Plan of Care Review  PT REQUIRES TOTAL A FOR GROSS FUNC MOBILITY    Outcome: Ongoing (interventions implemented as appropriate)  Pt is tolerating NC well.Pt is receiving nebulized medications as ordered with [facility] and also requiring/requesting PRN respiratory treatments at this time.

## 2017-07-15 NOTE — ASSESSMENT & PLAN NOTE
Most likely due to alcoholism   Calcium level 7.5  Will check Serum Phosphorus and Magnesium  Vitamin D level  Will replace   BMP in AM

## 2017-07-15 NOTE — PT/OT/SLP PROGRESS
Physical Therapy  Treatment    Ibeth Schroeder   MRN: [id number]   Admitting Diagnosis: Radius/ulna fracture    PT Received On: 07/15/17  PT Start Time: 0800     PT Stop Time: 0825    PT Total Time (min): 25 min       Billable Minutes:  Therapeutic Activity 25    Treatment Type: Treatment  PT/PTA: PTA     PTA Visit Number: 1       General Precautions: Standard, fall (Simultaneous filing. User may not have seen previous data.)  Orthopedic Precautions: LUE non weight bearing (Simultaneous filing. User may not have seen previous data.)   Braces:  (L UE in partial splint with ace wrap. Sling Simultaneous filing. User may not have seen previous data.)         Subjective:  Communicated with epic and nurse Katerina prior to session. Pt was cooperative with tx .      Pain/Comfort  Pain Rating 1:  (pt reportes pain but too confused to elaborate. Simultaneous filing. User may not have seen previous data.)    Objective:   Patient found with: SCD, bed alarm, peripheral IV (Simultaneous filing. User may not have seen previous data.)    Functional Mobility:  Bed Mobility:   Rolling/Turning to Left: Maximum assistance  Rolling/Turning Right: Maximum assistance  Scooting/Bridging: Moderate Assistance (seated scooting to move closer to the eob)  Supine to Sit: Maximum Assistance  Sit to Supine: Maximum Assistance   ** all bed mobility required max a due to patients level of confusion and inability to follow commands consistantly    Transfers:  Sit <> Stand Assistive Device: No Assistive Device (Bilateral HHA), MODERATE Assist. Leans posterior , only able to correct minimally.     Gait:   Gait Distance: took 2-3 steps at the eob. Too confused and balance too impaired to ambulate at this time.   Assistance 1: Moderate assistance, With assist of two  Gait Assistive Device:  (HHA of 2. 2nd person supporting L ARM)  Gait Deviation(s):  (pt has a very posterior lean , unable to correct wtih verbal or tacitile  facilitation)    Stairs:      Balance:   Static Sit: POOR+: Needs MINIMAL assist to maintain  Dynamic Sit: FAIR: Cannot move trunk without losing balance  Static Stand: POOR: Needs MODERATE assist to maintain  Dynamic stand: POOR: N/A     Therapeutic Activities and Exercises:  Worked on static sitting balance at the eob with L arm supported on a pillow and arm sling in place. Pt has a posterior lean and required increased verbal and tactile cues to correct. Sat eob ~ 10 min. Performed Bilateral TKE x 10 reps for ROM and strengthening but with increased difficulty due to confusion. S     AM-PAC 6 CLICK MOBILITY  How much help from another person does this patient currently need?   1 = Unable, Total/Dependent Assistance  2 = A lot, Maximum/Moderate Assistance  3 = A little, Minimum/Contact Guard/Supervision  4 = None, Modified [place]/Independent    Turning over in bed (including adjusting bedclothes, sheets and blankets)?: 2  Sitting down on and standing up from a chair with arms (e.g., wheelchair, bedside commode, etc.): 2  Moving from lying on back to sitting on the side of the bed?: 2  Moving to and from a bed to a chair (including a wheelchair)?: 2  Need to walk in hospital room?: 2  Climbing 3-5 steps with a railing?: 1  Total Score: 11    AM-PAC Raw Score CMS G-Code Modifier Level of Impairment Assistance   6 % Total / Unable   7 - 9 CM 80 - 100% Maximal Assist   10 - 14 CL 60 - 80% Moderate Assist   15 - 19 CK 40 - 60% Moderate Assist   20 - 22 CJ 20 - 40% Minimal Assist   23 CI 1-20% SBA / CGA   24 CH 0% Independent/ Mod I     Patient left HOB elevated with all lines intact, call button in reach, chair alarm on and NURSE notified. Breakfast tray set up in front of her.    Assessment:  Ibeth Schroeder is a 66 y.o. female with a medical diagnosis of Radius/ulna fracture and presents with increased confusion. She is very pleasant and is cooperative but greatly limited by her confusion. Progressed a  little today with mobility.     Rehab identified problem list/impairments: Rehab identified problem list/impairments: weakness, impaired self care skills, impaired balance, decreased coordination, decreased ROM, decreased safety awareness, impaired endurance, impaired functional mobilty, decreased upper extremity function, pain, gait instability, impaired cognition, decreased lower extremity function, orthopedic precautions (Simultaneous filing. User may not have seen previous data.)    Rehab potential is fair.    Activity tolerance: Fair    Discharge recommendations: Discharge Facility/Level Of Care Needs: nursing facility, skilled (Simultaneous filing. User may not have seen previous data.)     Barriers to discharge: Barriers to Discharge: Decreased caregiver support (Simultaneous filing. User may not have seen previous data.)    Equipment recommendations: Equipment Needed After Discharge: none (Simultaneous filing. User may not have seen previous data.)     GOALS:    Physical Therapy Goals        Problem: Physical Therapy Goal    Goal Priority Disciplines Outcome Goal Variances Interventions   Physical Therapy Goal     PT/OT, PT      Description:  PT WILL BE SEEN FOR P.T. FOR A MIN OF 5 OUT OF 7 DAYS A WEEK  LT17  1. PT WILL COMPLETE BED MOBILITY WITH MOD A.  2. PT WILL STAND MAX A X 1.  3. PT WILL T/F TO CHAIR WITH MAX A X 1  4. PT WILL COMPLETE B LE TE X 20 REPS FOR STRENGTHENING.  5. PT WILL GT TRAIN WITH APPROPRIATE AD X 10' WITH MAX A.                      PLAN:    Patient to be seen  (pt will be seen a minimum of 5-7 times as week as tolerated. )  to address the above listed problems via therapeutic activities, therapeutic exercises, gait training  Plan of Care expires: 17  Plan of Care reviewed with: patient         Katey Cee, PTA  07/15/2017

## 2017-07-15 NOTE — PLAN OF CARE
Problem: Patient Care Overview  Goal: Plan of Care Review  PT REQUIRES TOTAL A FOR GROSS FUNC MOBILITY    Outcome: Ongoing (interventions implemented as appropriate)  Patient disoriented to place, time, and situation. Confused, cursing, belittling, and yelling episodes at beginning of shift. [employer] monitor alarmed as patient was attempting to get out of bed and monitor tech called later to report pt was taking off her ACE bandage from broken arm.  Patient was hallucinating/talking to someone not present in room.  Treated with haldol and ativan, which took several hours before calming patient enough for her to fall asleep.    IVF administered as ordered. Pt. Moves herself in the bed, but does require help repositioning.   Remains free of injury. Fall precautions maintained with bed low and wheels locked. Chart review for 24 hours completed. Will continue to monitor till discharge.

## 2017-07-15 NOTE — ASSESSMENT & PLAN NOTE
- Suspect secondary to EtOH withdrawal.  - PRN Ativan and Haldol.  - Monitor for DTs  -Will consider adding Librium po

## 2017-07-15 NOTE — PROGRESS NOTES
Patient's spouse requesting update on SNF placement.  SW reviewed notes in EPIC and updated patient's spouse.  There are two accepting facilities, [facility] and [facility], per notes.  Patient's spouse (Marino) stated that he would f/u with [facility] on Monday.  Patient's spouse feels that [facility] maybe too far.  Maira Howard LMSW, [facility], [facility]  07/15/2017

## 2017-07-15 NOTE — ASSESSMENT & PLAN NOTE
- S/P left radius and ulna ORIF  - Post-op management per Orthopedic Surgery (primary team).  - Analgesia prn.   - Case discussed with social work.  SNF placement pending.  -PT/OT

## 2017-07-15 NOTE — SUBJECTIVE & OBJECTIVE
Interval History: During the night pt. Became more agitated and confused, was given Haldol and Ativan and later calmed down. This AM pt. Confused and hallucinating. Calcium level 7.5, Phosphorus and mag levels pending.     Review of Systems   Constitutional: Negative for chills, diaphoresis, fatigue and fever.   HENT: Negative for congestion and sore throat.    Eyes: Negative for visual disturbance.   Respiratory: Negative for apnea, cough, chest tightness, shortness of breath and wheezing.    Cardiovascular: Negative for chest pain, palpitations and leg swelling.   Gastrointestinal: Negative for abdominal distention, abdominal pain, blood in stool, constipation, diarrhea, nausea and vomiting.   Genitourinary: Negative for dysuria and hematuria.   Musculoskeletal: Positive for arthralgias. Negative for back pain.   Skin: Negative for pallor, rash and wound.   Neurological: Negative for dizziness, tremors, seizures, syncope, speech difficulty, weakness, light-headedness, numbness and headaches.   Hematological: Negative for adenopathy.   Psychiatric/Behavioral: Positive for decreased concentration. Negative for agitation, confusion and hallucinations.        Memory loss   All other systems reviewed and are negative.    Objective:     Vital Signs (Most Recent):  Temp: 98.2 °F (36.8 °C) (07/15/17 1135)  Pulse: 100 (07/15/17 1135)  Resp: 18 (07/15/17 1135)  BP: (!) 140/84 (07/15/17 1135)  SpO2: (!) 91 % (07/15/17 1135) Vital Signs (24h Range):  Temp:  [98.2 °F (36.8 °C)-99.8 °F (37.7 °C)] 98.2 °F (36.8 °C)  Pulse:  [] 100  Resp:  [16-20] 18  SpO2:  [91 %-100 %] 91 %  BP: (121-151)/(47-88) 140/84     Weight: 52.2 kg (115 lb)  Body mass index is 18.56 kg/m².    Intake/Output Summary (Last 24 hours) at 07/15/17 1232  Last data filed at 07/15/17 0800   Gross per 24 hour   Intake          2586.25 ml   Output                0 ml   Net          2586.25 ml      Physical Exam   Constitutional: She appears well-developed  and well-nourished. No distress.   HENT:   Head: Normocephalic and atraumatic.   Eyes: Conjunctivae and EOM are normal. Pupils are equal, round, and reactive to light.   Neck: Normal range of motion. Neck supple. No JVD present.   Cardiovascular: Normal rate, regular rhythm, normal heart sounds and intact distal pulses.  Exam reveals no gallop and no friction rub.    No murmur heard.  Pulmonary/Chest: Effort normal and breath sounds normal. No respiratory distress. She has no wheezes. She has no rales.   Abdominal: Soft. Bowel sounds are normal. She exhibits no distension. There is no tenderness.   Musculoskeletal: She exhibits tenderness. She exhibits no edema or deformity.   Left arm with ace wrap dressing, CDI. + swelling and Limited ROM. 2+ Radial pulse, capillary refill  <2.   Neurological: She is alert. She is disoriented. No cranial nerve deficit.   Skin: Skin is warm and dry. No rash noted.   Psychiatric:   Positive for hallucinations and confusion.   Nursing note and vitals reviewed.      Significant Labs:   BMP:   Recent Labs  Lab 07/15/17  1031   *      K 4.4   *   CO2 18*   BUN 16   CREATININE 0.9   CALCIUM 7.5*     CBC:   Recent Labs  Lab 07/14/17  0855 07/15/17  1031   WBC 12.22 8.12   HGB 9.0* 10.5*   HCT 27.8* 33.8*    204     CMP:   Recent Labs  Lab 07/15/17  1031      K 4.4   *   CO2 18*   *   BUN 16   CREATININE 0.9   CALCIUM 7.5*   ANIONGAP 11   EGFRNONAA >60     All pertinent labs within the past 24 hours have been reviewed.    Significant Imaging:  Imaging Results          CT Head Without Contrast (Final result)  Result time 07/13/17 10:50:19    Final result by Zoltan Alfonso Jr., MD (07/13/17 10:50:19)                 Impression:     No acute findings evident.  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        Electronically signed by: KAELYN ARVIZU MD  Date:     07/13/17  Time:    10:50              Narrative:    Exam: CT HEAD WITHOUT CONTRAST    History:  r/o PE .  Change in level of consciousness    Technique: Noncontrast axial images of the head were obtained.    Comparison:10/03/2012    Findings: Ventricular system is normal.  No hydrocephalus.  No midline shift.  No abnormal density to indicate acute major vascular distribution ischemic infarction or hemorrhage.  Old area of infarction/encephalomalacia in the anterior aspect of the right corona radiata/right frontal lobe..  No mass effect.  No extra-axial fluid collections.     Visualized paranasal sinuses and mastoid air cells appear clear.      No calvarial fracture.  Previously seen na hole in the right frontal calvarium, is less evident.                             NM Lung Ventilation Perfusion Imaging (Final result)  Result time 07/13/17 11:43:55    Final result by Laxmi Frank MD (07/13/17 11:43:55)                 Impression:         Low probability VQ scan.      Electronically signed by: LAXMI FRANK MD  Date:     07/13/17  Time:    11:43              Narrative:    Technique: Paired ventilation and perfusion gamma camera images were obtained in multiple projections following the administration of 1 mCi technetium 99m DTPA in inhaled aerosol for the ventilation portion of the exam and the administration of 6 mCi technetium 99m MAA intravenously for the perfusion portion of the study.      Results: Heterogeneous distribution is seen on the ventilation portion which limits the exam. There are no mismatched segmental or subsegmental perfusion defects.                             X-Ray Chest 1 View (Final result)  Result time 07/12/17 21:30:10    Final result by Antony Iverson MD (07/12/17 21:30:10)                 Impression:      Increasing right perihilar atelectasis.   Otherwise stable chest x-ray.      Electronically signed by: SHELBY DICK MD  Date:     07/12/17  Time:    21:30              Narrative:    Exam: XR CHEST 1 VIEW,    Date:  07/12/17 21:02:05    History: tachycardia    Comparison:  Comparison with 07/11/2017.    Findings: Mild cardiomegaly.  Large retrocardiac hiatal hernia with atelectasis left lung base.  Right suprahilar or perihilar atelectasis.                             X-Ray Wrist 2 View Left (Final result)  Result time 07/12/17 15:10:37    Final result by Laxmi De Luna MD (07/12/17 15:10:37)                 Narrative:    Findings/impression: Fluoroscopy was provided for 25.1 seconds. Cumulative dose = 0.007 mGy. See operative report.          Electronically signed by: LAXMI DE LUNA MD  Date:     07/12/17  Time:    15:10                              FL Greater Than 1 Hour (Final result)  Result time 07/13/17 13:31:49    Final result by RADIOLOGISTARMANDO (07/13/17 13:31:49)                 Impression:     Please see above.      Electronically signed by: VIRTUAL RADIOLOGIST  Date:     07/13/17  Time:    13:31              Narrative:    Fluoroscopy used for procedure.  Please see physician notes for details.                             X-Ray Chest 1 View (Final result)  Result time 07/11/17 20:30:29    Final result by GENNA Leach Sr., MD (07/11/17 20:30:29)                 Impression:        1. There is a mild amount of haziness in the base of the left lung that silhouettes the dome of the left hemidiaphragm. This is consistent with the patient's history and characteristic of pneumonia.  2. There is blunting of the costophrenic angles bilaterally. This is characteristic of a tiny pleural effusions.  3. There are findings characteristic of a large hiatal hernia.        Electronically signed by: GENNA LEACH MD  Date:     07/11/17  Time:    20:30              Narrative:    One-view chest x-ray    Clinical History: Pneumonia    Finding: Comparison was  made to prior examination performed on 7/8/2017. The size of the heart is normal. There is a mild amount of haziness in the base of the left lung that silhouettes the dome of the left hemidiaphragm. There is blunting of the costophrenic angles bilaterally. There is no focal pulmonary infiltrate visualized in the right lung. There is no pneumothorax. There are findings characteristic of a large hiatal hernia.

## 2017-07-15 NOTE — PROGRESS NOTES
[facility] Hospital Medicine  Progress Note    Patient Name: Ibeth Schroeder  MRN: [id number]  Patient Class: IP- Inpatient   Admission Date: 7/10/2017  Length of Stay: 5 days  Attending Physician: Binu Lizarraga Sr., MD  Primary Care Provider: SAGAR BETTS MD        Subjective:     Principal Problem:Radius/ulna fracture    HPI:  Ibeth Schroeder is 66 year old female with PMHx of HTN, HLD, CKD, depression, hypothyroidism, COPD, and EtOH abuse, who presented to the ED with c/o left wrist swelling and pain due to left radial and ulnar fracture after suffering a fall.  Hospital Medicine consulted for medical management.  Patient states she is complaint with prescribed medications.  She reports drinking 1-2 alcoholic drinks per night made with Rum.  She does not follow with Pulmonology.  She denies any chest pain, palpitations, SOB/BRADY, orthopnea, PND, edema, lightheadedness/dizziness, N/V/D, syncope, or fever.    Hospital Course:  Patient was admitted per Ortho Surgery.  2D echo revealed normal LVEF of 55%, CLVH, and PAP 50 mmHg. Patient ok to proceed with surgery at low risk for CV events.  7/12, she underwent ORIF of left wrist.  She tolerated procedure well, and was transferred to Surgical Unit for monitoring.  Social work was consulted for SNF placement at [place].  Once on surgical floor, patient had an episode of delirium with hallucinations.  PRN IV Ativan and Haldol ordered for suspected EtOH withdrawal.  Patient disoriented and hallucinating on 7/15/2017; Calcium 7.5, Serum Phosphorus, and Magnesium pending. Will replace Calcium.  Will continue to Monitor closely for DTs. [facility] patient for SNF and they will submit request for auth to the pt's insurance.     Interval History: During the night pt. Became more agitated and confused, was given Haldol and Ativan and later calmed down. This AM pt. Confused and hallucinating. Calcium level 7.5, Phosphorus and mag levels pending.      Review of Systems   Constitutional: Negative for chills, diaphoresis, fatigue and fever.   HENT: Negative for congestion and sore throat.    Eyes: Negative for visual disturbance.   Respiratory: Negative for apnea, cough, chest tightness, shortness of breath and wheezing.    Cardiovascular: Negative for chest pain, palpitations and leg swelling.   Gastrointestinal: Negative for abdominal distention, abdominal pain, blood in stool, constipation, diarrhea, nausea and vomiting.   Genitourinary: Negative for dysuria and hematuria.   Musculoskeletal: Positive for arthralgias. Negative for back pain.   Skin: Negative for pallor, rash and wound.   Neurological: Negative for dizziness, tremors, seizures, syncope, speech difficulty, weakness, light-headedness, numbness and headaches.   Hematological: Negative for adenopathy.   Psychiatric/Behavioral: Positive for decreased concentration. Negative for agitation, confusion and hallucinations.        Memory loss   All other systems reviewed and are negative.    Objective:     Vital Signs (Most Recent):  Temp: 98.2 °F (36.8 °C) (07/15/17 1135)  Pulse: 100 (07/15/17 1135)  Resp: 18 (07/15/17 1135)  BP: (!) 140/84 (07/15/17 1135)  SpO2: (!) 91 % (07/15/17 1135) Vital Signs (24h Range):  Temp:  [98.2 °F (36.8 °C)-99.8 °F (37.7 °C)] 98.2 °F (36.8 °C)  Pulse:  [] 100  Resp:  [16-20] 18  SpO2:  [91 %-100 %] 91 %  BP: (121-151)/(47-88) 140/84     Weight: 52.2 kg (115 lb)  Body mass index is 18.56 kg/m².    Intake/Output Summary (Last 24 hours) at 07/15/17 1232  Last data filed at 07/15/17 0800   Gross per 24 hour   Intake          2586.25 ml   Output                0 ml   Net          2586.25 ml      Physical Exam   Constitutional: She appears well-developed and well-nourished. No distress.   HENT:   Head: Normocephalic and atraumatic.   Eyes: Conjunctivae and EOM are normal. Pupils are equal, round, and reactive to light.   Neck: Normal range of motion. Neck supple. No JVD  present.   Cardiovascular: Normal rate, regular rhythm, normal heart sounds and intact distal pulses.  Exam reveals no gallop and no friction rub.    No murmur heard.  Pulmonary/Chest: Effort normal and breath sounds normal. No respiratory distress. She has no wheezes. She has no rales.   Abdominal: Soft. Bowel sounds are normal. She exhibits no distension. There is no tenderness.   Musculoskeletal: She exhibits tenderness. She exhibits no edema or deformity.   Left arm with partial splint with ace wrap, CDI. + swelling and Limited ROM. 2+ Radial pulse, capillary refill  <2.   Neurological: She is alert. She is disoriented. No cranial nerve deficit.   Skin: Skin is warm and dry. No rash noted.   Psychiatric:   Positive for hallucinations and confusion.   Nursing note and vitals reviewed.      Significant Labs:   BMP:   Recent Labs  Lab 07/15/17  1031   *      K 4.4   *   CO2 18*   BUN 16   CREATININE 0.9   CALCIUM 7.5*     CBC:   Recent Labs  Lab 07/14/17  0855 07/15/17  1031   WBC 12.22 8.12   HGB 9.0* 10.5*   HCT 27.8* 33.8*    204     CMP:   Recent Labs  Lab 07/15/17  1031      K 4.4   *   CO2 18*   *   BUN 16   CREATININE 0.9   CALCIUM 7.5*   ANIONGAP 11   EGFRNONAA >60     All pertinent labs within the past 24 hours have been reviewed.    Significant Imaging:  Imaging Results          CT Head Without Contrast (Final result)  Result time 07/13/17 10:50:19    Final result by Zoltan Alfonso Jr., MD (07/13/17 10:50:19)                 Impression:     No acute findings evident.  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        Electronically signed by: ZOLTAN ALFONSO MD  Date:     07/13/17  Time:    10:50              Narrative:    Exam: CT HEAD WITHOUT  CONTRAST    History:  r/o PE .  Change in level of consciousness    Technique: Noncontrast axial images of the head were obtained.    Comparison:10/03/2012    Findings: Ventricular system is normal.  No hydrocephalus.  No midline shift.  No abnormal density to indicate acute major vascular distribution ischemic infarction or hemorrhage.  Old area of infarction/encephalomalacia in the anterior aspect of the right corona radiata/right frontal lobe..  No mass effect.  No extra-axial fluid collections.     Visualized paranasal sinuses and mastoid air cells appear clear.      No calvarial fracture.  Previously seen na hole in the right frontal calvarium, is less evident.                             NM Lung Ventilation Perfusion Imaging (Final result)  Result time 07/13/17 11:43:55    Final result by Laxmi Frank MD (07/13/17 11:43:55)                 Impression:         Low probability VQ scan.      Electronically signed by: LAXMI FRANK MD  Date:     07/13/17  Time:    11:43              Narrative:    Technique: Paired ventilation and perfusion gamma camera images were obtained in multiple projections following the administration of 1 mCi technetium 99m DTPA in inhaled aerosol for the ventilation portion of the exam and the administration of 6 mCi technetium 99m MAA intravenously for the perfusion portion of the study.      Results: Heterogeneous distribution is seen on the ventilation portion which limits the exam. There are no mismatched segmental or subsegmental perfusion defects.                             X-Ray Chest 1 View (Final result)  Result time 07/12/17 21:30:10    Final result by Antony Dick MD (07/12/17 21:30:10)                 Impression:      Increasing right perihilar atelectasis.  Otherwise stable chest x-ray.      Electronically signed by: ANTONY DICK MD  Date:     07/12/17  Time:    21:30              Narrative:    Exam: XR CHEST 1 VIEW,    Date:  07/12/17 21:02:05    History:  tachycardia    Comparison:  Comparison with 07/11/2017.    Findings: Mild cardiomegaly.  Large retrocardiac hiatal hernia with atelectasis left lung base.  Right suprahilar or perihilar atelectasis.                             X-Ray Wrist 2 View Left (Final result)  Result time 07/12/17 15:10:37    Final result by Ray Frank MD (07/12/17 15:10:37)                 Narrative:    Findings/impression: Fluoroscopy was provided for 25.1 seconds. Cumulative dose = 0.007 mGy. See operative report.          Electronically signed by: RAY FRANK MD  Date:     07/12/17  Time:    15:10                              FL Greater Than 1 Hour (Final result)  Result time 07/13/17 13:31:49    Final result by RADIOLOGISTARMANDO (07/13/17 13:31:49)                 Impression:     Please see above.      Electronically signed by: ARMANDO RADIOLOGIST  Date:     07/13/17  Time:    13:31              Narrative:    Fluoroscopy used for procedure.  Please see physician notes for details.                             X-Ray Chest 1 View (Final result)  Result time 07/11/17 20:30:29    Final result by GENNA Leach Sr., MD (07/11/17 20:30:29)                 Impression:        1. There is a mild amount of haziness in the base of the left lung that silhouettes the dome of the left hemidiaphragm. This is consistent with the patient's history and characteristic of pneumonia.  2. There is blunting of the costophrenic angles bilaterally. This is characteristic of a tiny pleural effusions.  3. There are findings characteristic of a large hiatal hernia.        Electronically signed by: GENNA LEACH MD  Date:     07/11/17  Time:    20:30              Narrative:    One-view chest x-ray    Clinical History: Pneumonia    Finding: Comparison was made to prior examination performed on 7/8/2017. The size of the heart is normal. There is a mild amount of haziness in the base of the left lung that silhouettes the dome of the left hemidiaphragm. There is  blunting of the costophrenic angles bilaterally. There is no focal pulmonary infiltrate visualized in the right lung. There is no pneumothorax. There are findings characteristic of a large hiatal hernia.                            Assessment/Plan:      Hypocalcemia    Most likely due to alcoholism   Calcium level 7.5  Will check Serum Phosphorus and Magnesium  Vitamin D level  Will replace   BMP in AM          Acute blood loss anemia    H&H stable.  Monitor.          Delirium    - Suspect secondary to EtOH withdrawal.  - PRN Ativan and Haldol.  - Monitor for DTs  -Will consider adding Librium po         History of ETOH abuse    - PRN Ativan and Haldol as above.  Monitor for DTs.  - Counseled on cessation.  - Scheduled Seroquel at bedtime.  - Will add home thiamine.        Dyslipidemia    - Statin continued.          Depression    - Resume current medications.         Stage 3 severe COPD by GOLD classification    - Budesonide and arformoterol nebulizer treatment.  - Duonebs PRN.  - Continue home dose O2 @ 2L per [place] nightly.        Anemia of other chronic disease    -H/H stable  -will monitor  -will transfuse if needed   -repeat CBC in am   -will resume iron supplementation post procedure          CKD (chronic kidney disease) stage 3, GFR 30-59 ml/min    - Cr. Stable.  Monitor.        History of seizure    - Seizure precautions.          Essential hypertension    - BP suboptimally controlled.  - Will switch Toprol to Lopressor 50mg BID.  Monitor BP trends.          Hypothyroidism    - Synthroid continued.          * Radius/ulna fracture    - S/P left radius and ulna ORIF  - Post-op management per Orthopedic Surgery (primary team).  - Analgesia prn.   - Case discussed with social work.  SNF placement pending.  -PT/OT             VTE Risk Mitigation         Ordered     Medium Risk of VTE  Once      07/12/17 1644     Place MALICK hose  Until discontinued      07/12/17 1644     Place sequential compression device  Until  discontinued      07/12/17 1644     Place sequential compression device  Until discontinued      07/11/17 1727          Camilla Urena NP  Department of Hospital Medicine   [facility]

## 2017-07-16 LAB
BASOPHILS # BLD AUTO: 0.01 K/UL
BASOPHILS NFR BLD: 0.1 %
DIFFERENTIAL METHOD: ABNORMAL
EOSINOPHIL # BLD AUTO: 0.3 K/UL
EOSINOPHIL NFR BLD: 3.9 %
ERYTHROCYTE [DISTWIDTH] IN BLOOD BY AUTOMATED COUNT: 13.8 %
HCT VFR BLD AUTO: 23.8 %
HGB BLD-MCNC: 7.8 G/DL
LYMPHOCYTES # BLD AUTO: 1.4 K/UL
LYMPHOCYTES NFR BLD: 18.6 %
MAGNESIUM SERPL-MCNC: 1.3 MG/DL
MCH RBC QN AUTO: 31.7 PG
MCHC RBC AUTO-ENTMCNC: 32.8 %
MCV RBC AUTO: 97 FL
MONOCYTES # BLD AUTO: 0.7 K/UL
MONOCYTES NFR BLD: 9.8 %
NEUTROPHILS # BLD AUTO: 5 K/UL
NEUTROPHILS NFR BLD: 67.6 %
PHOSPHATE SERPL-MCNC: 3.3 MG/DL
PLATELET # BLD AUTO: 273 K/UL
PMV BLD AUTO: 10 FL
RBC # BLD AUTO: 2.46 M/UL
WBC # BLD AUTO: 7.46 K/UL

## 2017-07-16 PROCEDURE — 97530 THERAPEUTIC ACTIVITIES: CPT

## 2017-07-16 PROCEDURE — 63600175 PHARM REV CODE 636 W HCPCS: Performed by: INTERNAL MEDICINE

## 2017-07-16 PROCEDURE — 99900035 HC TECH TIME PER 15 MIN (STAT)

## 2017-07-16 PROCEDURE — 63600175 PHARM REV CODE 636 W HCPCS: Performed by: ORTHOPAEDIC SURGERY

## 2017-07-16 PROCEDURE — 96372 THER/PROPH/DIAG INJ SC/IM: CPT

## 2017-07-16 PROCEDURE — 25000003 PHARM REV CODE 250: Performed by: NURSE PRACTITIONER

## 2017-07-16 PROCEDURE — 25000003 PHARM REV CODE 250: Performed by: INTERNAL MEDICINE

## 2017-07-16 PROCEDURE — 63600175 PHARM REV CODE 636 W HCPCS: Performed by: NURSE PRACTITIONER

## 2017-07-16 PROCEDURE — 84100 ASSAY OF PHOSPHORUS: CPT

## 2017-07-16 PROCEDURE — 25000242 PHARM REV CODE 250 ALT 637 W/ HCPCS: Performed by: NURSE PRACTITIONER

## 2017-07-16 PROCEDURE — 36415 COLL VENOUS BLD VENIPUNCTURE: CPT

## 2017-07-16 PROCEDURE — 21400001 HC TELEMETRY ROOM

## 2017-07-16 PROCEDURE — 85025 COMPLETE CBC W/AUTO DIFF WBC: CPT

## 2017-07-16 PROCEDURE — 83735 ASSAY OF MAGNESIUM: CPT

## 2017-07-16 PROCEDURE — 94640 AIRWAY INHALATION TREATMENT: CPT

## 2017-07-16 PROCEDURE — 11000001 HC ACUTE MED/SURG PRIVATE ROOM

## 2017-07-16 RX ORDER — MAGNESIUM SULFATE HEPTAHYDRATE 40 MG/ML
2 INJECTION, SOLUTION INTRAVENOUS ONCE
Status: COMPLETED | OUTPATIENT
Start: 2017-07-16 | End: 2017-07-16

## 2017-07-16 RX ORDER — SODIUM,POTASSIUM PHOSPHATES 280-250MG
1 POWDER IN PACKET (EA) ORAL
Status: DISCONTINUED | OUTPATIENT
Start: 2017-07-16 | End: 2017-07-18 | Stop reason: HOSPADM

## 2017-07-16 RX ORDER — LANOLIN ALCOHOL/MO/W.PET/CERES
400 CREAM (GRAM) TOPICAL DAILY
Status: DISCONTINUED | OUTPATIENT
Start: 2017-07-17 | End: 2017-07-17

## 2017-07-16 RX ADMIN — METOPROLOL TARTRATE 50 MG: 50 TABLET ORAL at 09:07

## 2017-07-16 RX ADMIN — BUDESONIDE 0.25 MG: 0.25 SUSPENSION RESPIRATORY (INHALATION) at 07:07

## 2017-07-16 RX ADMIN — BUSPIRONE HYDROCHLORIDE 15 MG: 5 TABLET ORAL at 03:07

## 2017-07-16 RX ADMIN — QUETIAPINE FUMARATE 25 MG: 25 TABLET, FILM COATED ORAL at 09:07

## 2017-07-16 RX ADMIN — MAGNESIUM SULFATE IN WATER 2 G: 40 INJECTION, SOLUTION INTRAVENOUS at 03:07

## 2017-07-16 RX ADMIN — POTASSIUM & SODIUM PHOSPHATES POWDER PACK 280-160-250 MG 1 PACKET: 280-160-250 PACK at 09:07

## 2017-07-16 RX ADMIN — DULOXETINE 60 MG: 30 CAPSULE, DELAYED RELEASE ORAL at 09:07

## 2017-07-16 RX ADMIN — BUSPIRONE HYDROCHLORIDE 15 MG: 5 TABLET ORAL at 05:07

## 2017-07-16 RX ADMIN — LEVOTHYROXINE SODIUM 100 MCG: 25 TABLET ORAL at 05:07

## 2017-07-16 RX ADMIN — BUSPIRONE HYDROCHLORIDE 15 MG: 5 TABLET ORAL at 09:07

## 2017-07-16 RX ADMIN — LORAZEPAM 1 MG: 2 INJECTION INTRAMUSCULAR; INTRAVENOUS at 05:07

## 2017-07-16 RX ADMIN — LORAZEPAM 1 MG: 2 INJECTION INTRAMUSCULAR; INTRAVENOUS at 08:07

## 2017-07-16 RX ADMIN — PRAVASTATIN SODIUM 10 MG: 10 TABLET ORAL at 09:07

## 2017-07-16 RX ADMIN — IPRATROPIUM BROMIDE AND ALBUTEROL SULFATE 3 ML: .5; 3 SOLUTION RESPIRATORY (INHALATION) at 03:07

## 2017-07-16 RX ADMIN — ENOXAPARIN SODIUM 50 MG: 100 INJECTION SUBCUTANEOUS at 09:07

## 2017-07-16 RX ADMIN — Medication 100 MG: at 09:07

## 2017-07-16 RX ADMIN — LORAZEPAM 1 MG: 2 INJECTION INTRAMUSCULAR; INTRAVENOUS at 03:07

## 2017-07-16 RX ADMIN — PANTOPRAZOLE SODIUM 40 MG: 40 TABLET, DELAYED RELEASE ORAL at 09:07

## 2017-07-16 NOTE — PROGRESS NOTES
SUBJECTIVE:  Patient is status post Left Radius and ulna ORIF .  The patient is resting well.She has been confused. She is being treated for alcohol withdraw.    Patient complains of  6 /10 pain that is better with the  pain meds and aggravated with movement.          Past Medical History:   Diagnosis Date    Alcohol dependence       per patient in the past    Allergy      Anemia      Anxiety      Arthritis 6/24/2013    Asthma       per patient    Colon polyp      Colon polyp       colonoscopy 8/26/2013    COPD (chronic obstructive pulmonary disease)      Depression      Diverticulosis      Diverticulosis       colonoscopy 8/26/2013    Hiatal hernia       CXR 2/25/2015--Large hiatal hernia.     Hyperlipidemia      Hypertension      Hypocalcemia 7/6/2016    Hypothyroidism 6/24/2013    Osteoporosis      Pulmonary embolism       per patient unsure of date    Restless leg syndrome      RLS (restless legs syndrome)      Seizure 6/24/2013    Tobacco dependence       resolved    Trouble in sleeping              Past Surgical History:   Procedure Laterality Date    ADENOIDECTOMY        APPENDECTOMY        CHOLECYSTECTOMY        COLONOSCOPY   2013    FINGER SURGERY        HERNIA REPAIR        HYSTERECTOMY        JOINT REPLACEMENT        TONSILLECTOMY        TOTAL HIP ARTHROPLASTY         L x2           Review of patient's allergies indicates:   Allergen Reactions    Nsaids (non-steroidal anti-inflammatory drug) Hives and Shortness Of Breath    Pcn [penicillins] Hives and Shortness Of Breath    Sulfa (sulfonamide antibiotics) Hives and Shortness Of Breath    Aspirin      Macrodantin [nitrofurantoin macrocrystalline] Hives    Wellbutrin [bupropion hcl]        No current facility-administered medications on file prior to encounter.              Current Outpatient Prescriptions on File Prior to Encounter   Medication Sig Dispense Refill    allopurinol (ZYLOPRIM) 100 MG tablet Take 1  tablet (100 mg total) by mouth once daily. 90 tablet 5    busPIRone (BUSPAR) 15 MG tablet 1 tab po tid 90 tablet 1    clindamycin (CLEOCIN) 150 MG capsule Take 2 capsules (300 mg total) by mouth every 8 (eight) hours. 42 capsule 0    diclofenac sodium (VOLTAREN) 1 % Gel Apply 4 g topically 4 (four) times daily. Pt has shoulder pain and knee pain 200 g 5    diphenoxylate-atropine 2.5-0.025 mg (LOMOTIL) 2.5-0.025 mg per tablet     0    duloxetine (CYMBALTA) 60 MG capsule Take 1 capsule (60 mg total) by mouth once daily. 90 capsule 0    ferrous sulfate 325 (65 FE) MG EC tablet Take 1 tablet (325 mg total) by mouth once a week. 90 tablet 3    fluticasone (FLONASE) 50 mcg/actuation nasal spray instill 2 sprays into each nostril once daily 16 g 11    fluticasone-salmeterol (ADVAIR HFA) 115-21 mcg/actuation HFAA inhale 2 puffs INTO THE LUNGS twice a day 12 g 12    hydrocodone-acetaminophen 7.5-325mg (NORCO) 7.5-325 mg per tablet Take 1 tablet by mouth every 6 (six) hours as needed for Pain. 15 tablet 0    ipratropium-albuterol (COMBIVENT RESPIMAT)  mcg/actuation inhaler inhale 1 puff INTO THE LUNGS four times a day 3 Package 4    levothyroxine (SYNTHROID) 100 MCG tablet TAKE 1 TABLET ONE TIME DAILY 90 tablet 3    methocarbamol (ROBAXIN) 500 MG Tab Take 1 tablet (500 mg total) by mouth 2 (two) times daily as needed (for muscle spasms.). 60 tablet 1    metoprolol succinate (TOPROL-XL) 50 MG 24 hr tablet Take 1 tablet (50 mg total) by mouth once daily. 90 tablet 3    MULTIVITAMIN W-MINERALS/LUTEIN (CENTRUM SILVER ORAL) Take 1 tablet by mouth once daily.        pantoprazole (PROTONIX) 40 MG tablet Take 1 tablet (40 mg total) by mouth once daily. 90 tablet 3    pravastatin (PRAVACHOL) 10 MG tablet TAKE 1 TABLET ONE TIME DAILY 90 tablet 0    ropinirole (REQUIP) 1 MG tablet Take 1 tablet (1 mg total) by mouth nightly. 90 tablet 3    thiamine 250 MG tablet Take 500 mg by mouth once daily.        tramadol  "(ULTRAM) 50 mg tablet Take 1 tablet (50 mg total) by mouth 2 (two) times daily as needed for Pain. 60 tablet 2      /72 (BP Location: Right arm, Patient Position: Lying, BP Method: Automatic)   Pulse 73   Temp 98.5 °F (36.9 °C) (Oral)   Resp 16   Ht 5' 6" (1.676 m)   Wt 52.2 kg (115 lb)   LMP  (LMP Unknown)   SpO2 (!) 92%   Breastfeeding? No   BMI 18.56 kg/m²    ROS:  GENERAL: No fever, chills, fatigability or weight loss.  SKIN: No rashes, itching or changes in color or texture of skin.  HEAD: No headaches or recent head trauma.  EYES: Visual acuity fine. No photophobia, ocular pain or diplopia.  EARS: Denies ear pain, discharge or vertigo.  NOSE: No loss of smell, no epistaxis or postnasal drip.  MOUTH & THROAT: No hoarseness or change in voice. No excessive gum bleeding.  NODES: Denies swollen glands.  CHEST: Denies BRADY, cyanosis, wheezing, cough and sputum production.  CARDIOVASCULAR: Denies chest pain, PND, orthopnea or reduced exercise tolerance.  ABDOMEN: Appetite fine. No weight loss. Denies diarrhea, abdominal pain, hematemesis or blood in stool.  URINARY: No flank pain, dysuria or hematuria.  PERIPHERAL VASCULAR: No claudication or cyanosis.  NEUROLOGIC: No history of seizures, paralysis, alteration of gait or coordination.  MUSCULOSKELETAL: See HPI     PE:  APPEARANCE: Well nourished, well developed, in no acute distress.   HEAD: Normocephalic, atraumatic.  EYES: PERRL. EOMI.   EARS: TM's intact. Light reflex normal. No retraction or perforation.   NOSE: Mucosa pink. Airway clear.  MOUTH & THROAT: No tonsillar enlargement. No pharyngeal erythema or exudate. No stridor.  NECK: Supple.   NODES: No cervical, axillary or inguinal lymph node enlargement.  CHEST: Lungs clear to auscultation.  CARDIOVASCULAR: Normal S1, S2. No rubs, murmurs or gallops.  ABDOMEN: Bowel sounds normal. Not distended. Soft. No tenderness or masses.  NEUROLOGIC: Cranial Nerves: II-XII grossly intact, also see " "MUSCULOSKELETAL  MUSCULOSKELETAL:       BP (!) 152/98   Pulse 99   Temp 98.3 °F (36.8 °C) (Oral)   Resp 16   Ht 5' 6" (1.676 m)   Wt 52.2 kg (115 lb)   LMP  (LMP Unknown)   SpO2 (!) 93%   Breastfeeding? No   BMI 18.56 kg/m²             Left  Wrist   Dressing intact, 2 plus radial  artery and ulnar artery pulses, light touch intact Left upper extremity.  All digits are warm. No erythema, no warmth, no drainage, No swelling, no significant tenderness.        ASSESSMENT:   The patient is stable        PLAN:  Discharged to home, today.  Continue pain medication\  Continue occupational therapy    "

## 2017-07-16 NOTE — ASSESSMENT & PLAN NOTE
Most likely due to alcoholism   Calcium Correction 9.1  Phosphorus and Magnesium replaced  Vitamin D level normal  Will recheck levels today

## 2017-07-16 NOTE — PLAN OF CARE
Problem: Patient Care Overview  Goal: Plan of Care Review  PT REQUIRES TOTAL A FOR GROSS FUNC MOBILITY    Outcome: Ongoing (interventions implemented as appropriate)  Room air, respirations even and unlabored, no distress noted on assessment, no pain nausea or shortness of breath, left arm cast, left hand swollen, oriented to person, JAZZ sys monitor system in use

## 2017-07-16 NOTE — PLAN OF CARE
Problem: Patient Care Overview  Goal: Plan of Care Review  PT REQUIRES TOTAL A FOR GROSS FUNC MOBILITY    Outcome: Ongoing (interventions implemented as appropriate)  POC reviewed with pt, pt verbalized understanding. Pt oriented to self, disoriented to time, place, and situation. Pt pleasantly confused for most of day, became agitated later and the day. Ativan was given and tolerated well, pt resting in bed quietly. Avasys monitoring used. Pt denies nausea and pain at this time. Pt turned Q2H, remains free of falls/injuries at this time. L arm dressing CDI with swelling and redness to hand. Pt rounds made for safety, bed in lowest position, call bell remains within reach. WCTM

## 2017-07-16 NOTE — PROGRESS NOTES
[facility] Hospital Medicine  Progress Note    Patient Name: Ibeth Schroeder  MRN: [id number]  Patient Class: IP- Inpatient   Admission Date: 7/10/2017  Length of Stay: 6 days  Attending Physician: Binu Lizarraga Sr., MD  Primary Care Provider: SAGAR BETTS MD        Subjective:     Principal Problem:Radius/ulna fracture    HPI:  Ibeth Schroeder is 66 year old female with PMHx of HTN, HLD, CKD, depression, hypothyroidism, COPD, and EtOH abuse, who presented to the ED with c/o left wrist swelling and pain due to left radial and ulnar fracture after suffering a fall.  Hospital Medicine consulted for medical management.  Patient states she is complaint with prescribed medications.  She reports drinking 1-2 alcoholic drinks per night made with Rum.  She does not follow with Pulmonology.  She denies any chest pain, palpitations, SOB/BRADY, orthopnea, PND, edema, lightheadedness/dizziness, N/V/D, syncope, or fever.    Hospital Course:  Patient was admitted per Ortho Surgery.  2D echo revealed normal LVEF of 55%, CLVH, and PAP 50 mmHg. Patient ok to proceed with surgery at low risk for CV events.  7/12, she underwent ORIF of left wrist.  She tolerated procedure well, and was transferred to Surgical Unit for monitoring.  Social work was consulted for SNF placement at [place].  Once on surgical floor, patient had an episode of delirium with hallucinations.  PRN IV Ativan and Haldol ordered for suspected EtOH withdrawal.  Patient disoriented and hallucinating on 7/15/2017; Calcium 7.5, Serum Phosphorus, and Magnesium pending. Calcium Correction 9.1.  Will continue to Monitor closely for DTs. [facility] accepted patient for SNF and they will submit request for auth to the pt's insurance. Magnesium and Phosphorus replaced. Patient oriented today. Patient c/o left hip on ROM, CT of hip pending.     Interval History: She is alert and oriented X3. Patient complains of  6 /10 pain in Left hip. CT of left hip  pending.      Review of Systems   Constitutional: Negative for chills, diaphoresis, fatigue and fever.   HENT: Negative for congestion and sore throat.    Eyes: Negative for visual disturbance.   Respiratory: Negative for apnea, cough, chest tightness, shortness of breath and wheezing.    Cardiovascular: Negative for chest pain, palpitations and leg swelling.   Gastrointestinal: Negative for abdominal distention, abdominal pain, blood in stool, constipation, diarrhea, nausea and vomiting.   Genitourinary: Negative for dysuria and hematuria.   Musculoskeletal: Positive for arthralgias. Negative for back pain.   Skin: Negative for pallor, rash and wound.   Neurological: Negative for dizziness, tremors, seizures, syncope, speech difficulty, weakness, light-headedness, numbness and headaches.   Hematological: Negative for adenopathy.   Psychiatric/Behavioral: Positive for decreased concentration. Negative for agitation, confusion and hallucinations.        Memory loss   All other systems reviewed and are negative.    Objective:     Vital Signs (Most Recent):  Temp: 98.9 °F (37.2 °C) (07/16/17 0910)  Pulse: 76 (07/16/17 0910)  Resp: 14 (07/16/17 0910)  BP: 131/82 (07/16/17 0910)  SpO2: (!) 93 % (07/16/17 0910) Vital Signs (24h Range):  Temp:  [98.2 °F (36.8 °C)-99.9 °F (37.7 °C)] 98.9 °F (37.2 °C)  Pulse:  [] 76  Resp:  [14-18] 14  SpO2:  [88 %-94 %] 93 %  BP: (131-152)/(77-98) 131/82     Weight: 52.2 kg (115 lb)  Body mass index is 18.56 kg/m².    Intake/Output Summary (Last 24 hours) at 07/16/17 1129  Last data filed at 07/16/17 1000   Gross per 24 hour   Intake             1240 ml   Output                0 ml   Net             1240 ml      Physical Exam   Constitutional: She appears well-developed and well-nourished. No distress.   HENT:   Head: Normocephalic and atraumatic.   Eyes: Conjunctivae and EOM are normal. Pupils are equal, round, and reactive to light.   Neck: Normal range of motion. Neck supple. No  JVD present.   Cardiovascular: Normal rate, regular rhythm, normal heart sounds and intact distal pulses.  Exam reveals no gallop and no friction rub.    No murmur heard.  Pulses:       Dorsalis pedis pulses are 2+ on the right side, and 2+ on the left side.        Posterior tibial pulses are 2+ on the right side, and 2+ on the left side.   Pulmonary/Chest: Effort normal and breath sounds normal. No respiratory distress. She has no wheezes. She has no rales.   Abdominal: Soft. Bowel sounds are normal. She exhibits no distension. There is no tenderness.   Musculoskeletal: She exhibits tenderness. She exhibits no edema or deformity.        Left hip: She exhibits tenderness (Pain with ROM ).   Left arm with ace wrap dressing, CDI. + swelling and Limited ROM. 2+ Radial pulse, capillary refill  <2.   Neurological: She is alert. She is not disoriented. No cranial nerve deficit.   Skin: Skin is warm and dry. No rash noted.   Psychiatric:   Positive for hallucinations and confusion.   Nursing note and vitals reviewed.      Significant Labs:   BMP:   Recent Labs  Lab 07/15/17  1031 07/15/17  1243   *  --      --    K 4.4  --    *  --    CO2 18*  --    BUN 16  --    CREATININE 0.9  --    CALCIUM 7.5*  --    MG  --  1.0*     CBC:   Recent Labs  Lab 07/15/17  1031 07/16/17  0547   WBC 8.12 7.46   HGB 10.5* 7.8*   HCT 33.8* 23.8*    273     CMP:   Recent Labs  Lab 07/15/17  1031      K 4.4   *   CO2 18*   *   BUN 16   CREATININE 0.9   CALCIUM 7.5*   ANIONGAP 11   EGFRNONAA >60     All pertinent labs within the past 24 hours have been reviewed.    Significant Imaging:   Imaging Results          CT Head Without Contrast (Final result)  Result time 07/13/17 10:50:19    Final result by Zoltan Alfonso Jr., MD (07/13/17 10:50:19)                 Impression:     No acute findings evident.  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        Electronically signed by: KAELYN ARVIZU MD  Date:     07/13/17  Time:    10:50              Narrative:    Exam: CT HEAD WITHOUT CONTRAST    History:  r/o PE .  Change in level of consciousness    Technique: Noncontrast axial images of the head were obtained.    Comparison:10/03/2012    Findings: Ventricular system is normal.  No hydrocephalus.  No midline shift.  No abnormal density to indicate acute major vascular distribution ischemic infarction or hemorrhage.  Old area of infarction/encephalomalacia in the anterior aspect of the right corona radiata/right frontal lobe..  No mass effect.  No extra-axial fluid collections.     Visualized paranasal sinuses and mastoid air cells appear clear.      No calvarial fracture.  Previously seen na hole in the right frontal calvarium, is less evident.                             NM Lung Ventilation Perfusion Imaging (Final result)  Result time 07/13/17 11:43:55    Final result by Laxmi Farnk MD (07/13/17 11:43:55)                 Impression:         Low probability VQ scan.      Electronically signed by: LAXMI FRANK MD  Date:     07/13/17  Time:    11:43              Narrative:    Technique: Paired ventilation and perfusion gamma camera images were obtained in multiple projections following the administration of 1 mCi technetium 99m DTPA in inhaled aerosol for the ventilation portion of the exam and the administration of 6 mCi technetium 99m MAA intravenously for the perfusion portion of the study.      Results: Heterogeneous distribution is seen on the ventilation portion which limits the exam. There are no mismatched segmental or subsegmental perfusion defects.                             X-Ray Chest 1 View (Final result)  Result time 07/12/17 21:30:10    Final result by Antony Iverson MD  (07/12/17 21:30:10)                 Impression:      Increasing right perihilar atelectasis.  Otherwise stable chest x-ray.      Electronically signed by: SHELBY DICK MD  Date:     07/12/17  Time:    21:30              Narrative:    Exam: XR CHEST 1 VIEW,    Date:  07/12/17 21:02:05    History: tachycardia    Comparison:  Comparison with 07/11/2017.    Findings: Mild cardiomegaly.  Large retrocardiac hiatal hernia with atelectasis left lung base.  Right suprahilar or perihilar atelectasis.                             X-Ray Wrist 2 View Left (Final result)  Result time 07/12/17 15:10:37    Final result by Laxmi De Luna MD (07/12/17 15:10:37)                 Narrative:    Findings/impression: Fluoroscopy was provided for 25.1 seconds. Cumulative dose = 0.007 mGy. See operative report.          Electronically signed by: LAXMI DE LUNA MD  Date:     07/12/17  Time:    15:10                              FL Greater Than 1 Hour (Final result)  Result time 07/13/17 13:31:49    Final result by RADIOLOGISTARMANDO (07/13/17 13:31:49)                 Impression:     Please see above.      Electronically signed by: VIRTUAL RADIOLOGIST  Date:     07/13/17  Time:    13:31              Narrative:    Fluoroscopy used for procedure.  Please see physician notes for details.                             X-Ray Chest 1 View (Final result)  Result time 07/11/17 20:30:29    Final result by GENNA Leach Sr., MD (07/11/17 20:30:29)                 Impression:        1. There is a mild amount of haziness in the base of the left lung that silhouettes the dome of the left hemidiaphragm. This is consistent with the patient's history and characteristic of pneumonia.  2. There is blunting of the costophrenic angles bilaterally. This is characteristic of a tiny pleural effusions.  3. There are findings characteristic of a large hiatal hernia.        Electronically signed by: GENNA LEACH MD  Date:     07/11/17  Time:    20:30               Narrative:    One-view chest x-ray    Clinical History: Pneumonia    Finding: Comparison was made to prior examination performed on 7/8/2017. The size of the heart is normal. There is a mild amount of haziness in the base of the left lung that silhouettes the dome of the left hemidiaphragm. There is blunting of the costophrenic angles bilaterally. There is no focal pulmonary infiltrate visualized in the right lung. There is no pneumothorax. There are findings characteristic of a large hiatal hernia.                            Assessment/Plan:      Hypocalcemia    Most likely due to alcoholism   Calcium Correction 9.1  Phosphorus and Magnesium replaced  Vitamin D level normal  Will recheck levels today           Acute blood loss anemia    H&H 7.8/23.8  Will transfuse if pt. Becomes symptomatic   Will continue to monitor            Delirium    - Suspect secondary to EtOH withdrawal.  - PRN Ativan and Haldol.  - Monitor closely for DTs          History of ETOH abuse    - PRN Ativan and Haldol as above.  Monitor for DTs.  - Counseled on cessation.  - Scheduled Seroquel at bedtime.  - Will add home thiamine.        Dyslipidemia    - Statin continued.          Depression    - Resume current medications.         Stage 3 severe COPD by GOLD classification    - Budesonide and arformoterol nebulizer treatment.  - Duonebs PRN.  - Continue home dose O2 @ 2L per NC nightly.        Anemia of other chronic disease    -H/H stable  -will monitor  -will transfuse if needed   -repeat CBC in am   -will resume iron supplementation post procedure          CKD (chronic kidney disease) stage 3, GFR 30-59 ml/min    - Cr. Stable.  Monitor.        History of seizure    - Seizure precautions.          Essential hypertension    - BP suboptimally controlled.  - Will switch Toprol to Lopressor 50mg BID.  Monitor BP trends.          Hypothyroidism    - Synthroid continued.          * Radius/ulna fracture    - S/P left radius and ulna ORIF  -  Post-op management per Orthopedic Surgery (primary team).  - Analgesia prn.   - Case discussed with social work. SNF placement pending.  -PT/OT             VTE Risk Mitigation         Ordered     Medium Risk of VTE  Once      07/12/17 1644     Place MALICK hose  Until discontinued      07/12/17 1644     Place sequential compression device  Until discontinued      07/12/17 1644          Camilla Urena NP  Department of Hospital Medicine   [facility]

## 2017-07-16 NOTE — SUBJECTIVE & OBJECTIVE
Interval History: She is alert and oriented X3. Patient complains of  6 /10 pain in Left hip. CT of left hip pending.      Review of Systems   Constitutional: Negative for chills, diaphoresis, fatigue and fever.   HENT: Negative for congestion and sore throat.    Eyes: Negative for visual disturbance.   Respiratory: Negative for apnea, cough, chest tightness, shortness of breath and wheezing.    Cardiovascular: Negative for chest pain, palpitations and leg swelling.   Gastrointestinal: Negative for abdominal distention, abdominal pain, blood in stool, constipation, diarrhea, nausea and vomiting.   Genitourinary: Negative for dysuria and hematuria.   Musculoskeletal: Positive for arthralgias. Negative for back pain.   Skin: Negative for pallor, rash and wound.   Neurological: Negative for dizziness, tremors, seizures, syncope, speech difficulty, weakness, light-headedness, numbness and headaches.   Hematological: Negative for adenopathy.   Psychiatric/Behavioral: Positive for decreased concentration. Negative for agitation, confusion and hallucinations.        Memory loss   All other systems reviewed and are negative.    Objective:     Vital Signs (Most Recent):  Temp: 98.9 °F (37.2 °C) (07/16/17 0910)  Pulse: 76 (07/16/17 0910)  Resp: 14 (07/16/17 0910)  BP: 131/82 (07/16/17 0910)  SpO2: (!) 93 % (07/16/17 0910) Vital Signs (24h Range):  Temp:  [98.2 °F (36.8 °C)-99.9 °F (37.7 °C)] 98.9 °F (37.2 °C)  Pulse:  [] 76  Resp:  [14-18] 14  SpO2:  [88 %-94 %] 93 %  BP: (131-152)/(77-98) 131/82     Weight: 52.2 kg (115 lb)  Body mass index is 18.56 kg/m².    Intake/Output Summary (Last 24 hours) at 07/16/17 1129  Last data filed at 07/16/17 1000   Gross per 24 hour   Intake             1240 ml   Output                0 ml   Net             1240 ml      Physical Exam   Constitutional: She appears well-developed and well-nourished. No distress.   HENT:   Head: Normocephalic and atraumatic.   Eyes: Conjunctivae and EOM  are normal. Pupils are equal, round, and reactive to light.   Neck: Normal range of motion. Neck supple. No JVD present.   Cardiovascular: Normal rate, regular rhythm, normal heart sounds and intact distal pulses.  Exam reveals no gallop and no friction rub.    No murmur heard.  Pulses:       Dorsalis pedis pulses are 2+ on the right side, and 2+ on the left side.        Posterior tibial pulses are 2+ on the right side, and 2+ on the left side.   Pulmonary/Chest: Effort normal and breath sounds normal. No respiratory distress. She has no wheezes. She has no rales.   Abdominal: Soft. Bowel sounds are normal. She exhibits no distension. There is no tenderness.   Musculoskeletal: She exhibits tenderness. She exhibits no edema or deformity.        Left hip: She exhibits tenderness (Pain with ROM ).   Left arm with ace wrap dressing, CDI. + swelling and Limited ROM. 2+ Radial pulse, capillary refill  <2.   Neurological: She is alert. She is not disoriented. No cranial nerve deficit.   Skin: Skin is warm and dry. No rash noted.   Psychiatric:   Positive for hallucinations and confusion.   Nursing note and vitals reviewed.      Significant Labs:   BMP:   Recent Labs  Lab 07/15/17  1031 07/15/17  1243   *  --      --    K 4.4  --    *  --    CO2 18*  --    BUN 16  --    CREATININE 0.9  --    CALCIUM 7.5*  --    MG  --  1.0*     CBC:   Recent Labs  Lab 07/15/17  1031 07/16/17  0547   WBC 8.12 7.46   HGB 10.5* 7.8*   HCT 33.8* 23.8*    273     CMP:   Recent Labs  Lab 07/15/17  1031      K 4.4   *   CO2 18*   *   BUN 16   CREATININE 0.9   CALCIUM 7.5*   ANIONGAP 11   EGFRNONAA >60     All pertinent labs within the past 24 hours have been reviewed.    Significant Imaging:   Imaging Results          CT Head Without Contrast (Final result)  Result time 07/13/17 10:50:19    Final result by Zoltan Alfonso Jr., MD (07/13/17 10:50:19)                 Impression:     No acute findings  evident.  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        Electronically signed by: KAELYN ARVIZU MD  Date:     07/13/17  Time:    10:50              Narrative:    Exam: CT HEAD WITHOUT CONTRAST    History:  r/o PE .  Change in level of consciousness    Technique: Noncontrast axial images of the head were obtained.    Comparison:10/03/2012    Findings: Ventricular system is normal.  No hydrocephalus.  No midline shift.  No abnormal density to indicate acute major vascular distribution ischemic infarction or hemorrhage.  Old area of infarction/encephalomalacia in the anterior aspect of the right corona radiata/right frontal lobe..  No mass effect.  No extra-axial fluid collections.     Visualized paranasal sinuses and mastoid air cells appear clear.      No calvarial fracture.  Previously seen na hole in the right frontal calvarium, is less evident.                             NM Lung Ventilation Perfusion Imaging (Final result)  Result time 07/13/17 11:43:55    Final result by Ray Frank MD (07/13/17 11:43:55)                 Impression:         Low probability VQ scan.      Electronically signed by: RAY FRANK MD  Date:     07/13/17  Time:    11:43              Narrative:    Technique: Paired ventilation and perfusion gamma camera images were obtained in multiple projections following the administration of 1 mCi technetium 99m DTPA in inhaled aerosol for the ventilation portion of the exam and the administration of 6 mCi technetium 99m MAA intravenously for the perfusion portion of the study.      Results: Heterogeneous distribution is seen on the ventilation portion which limits the exam. There are no mismatched segmental or subsegmental perfusion defects.                             X-Ray  Chest 1 View (Final result)  Result time 07/12/17 21:30:10    Final result by Antony Dick MD (07/12/17 21:30:10)                 Impression:      Increasing right perihilar atelectasis.  Otherwise stable chest x-ray.      Electronically signed by: ANTONY DICK MD  Date:     07/12/17  Time:    21:30              Narrative:    Exam: XR CHEST 1 VIEW,    Date:  07/12/17 21:02:05    History: tachycardia    Comparison:  Comparison with 07/11/2017.    Findings: Mild cardiomegaly.  Large retrocardiac hiatal hernia with atelectasis left lung base.  Right suprahilar or perihilar atelectasis.                             X-Ray Wrist 2 View Left (Final result)  Result time 07/12/17 15:10:37    Final result by Laxmi De Luna MD (07/12/17 15:10:37)                 Narrative:    Findings/impression: Fluoroscopy was provided for 25.1 seconds. Cumulative dose = 0.007 mGy. See operative report.          Electronically signed by: LAXMI DE LUNA MD  Date:     07/12/17  Time:    15:10                              FL Greater Than 1 Hour (Final result)  Result time 07/13/17 13:31:49    Final result by RADIOLOGISTARMANDO (07/13/17 13:31:49)                 Impression:     Please see above.      Electronically signed by: VIRTUAL RADIOLOGIST  Date:     07/13/17  Time:    13:31              Narrative:    Fluoroscopy used for procedure.  Please see physician notes for details.                             X-Ray Chest 1 View (Final result)  Result time 07/11/17 20:30:29    Final result by GENNA Leach Sr., MD (07/11/17 20:30:29)                 Impression:        1. There is a mild amount of haziness in the base of the left lung that silhouettes the dome of the left hemidiaphragm. This is consistent with the patient's history and characteristic of pneumonia.  2. There is blunting of the costophrenic angles bilaterally. This is characteristic of a tiny pleural effusions.  3. There are findings characteristic of a large hiatal  hernia.        Electronically signed by: GENNA MEJIA MD  Date:     07/11/17  Time:    20:30              Narrative:    One-view chest x-ray    Clinical History: Pneumonia    Finding: Comparison was made to prior examination performed on 7/8/2017. The size of the heart is normal. There is a mild amount of haziness in the base of the left lung that silhouettes the dome of the left hemidiaphragm. There is blunting of the costophrenic angles bilaterally. There is no focal pulmonary infiltrate visualized in the right lung. There is no pneumothorax. There are findings characteristic of a large hiatal hernia.

## 2017-07-16 NOTE — PROGRESS NOTES
SUBJECTIVE:  Patient is status post Left Radius and ulna ORIF .  She has had left hip pain for one week.    The patient is resting well.She is alert and oriented times three. She is being treated for alcohol withdraw.    Patient complains of  6 /10 pain that is better with the  pain meds and aggravated with movement.              Past Medical History:   Diagnosis Date    Alcohol dependence       per patient in the past    Allergy      Anemia      Anxiety      Arthritis 6/24/2013    Asthma       per patient    Colon polyp      Colon polyp       colonoscopy 8/26/2013    COPD (chronic obstructive pulmonary disease)      Depression      Diverticulosis      Diverticulosis       colonoscopy 8/26/2013    Hiatal hernia       CXR 2/25/2015--Large hiatal hernia.     Hyperlipidemia      Hypertension      Hypocalcemia 7/6/2016    Hypothyroidism 6/24/2013    Osteoporosis      Pulmonary embolism       per patient unsure of date    Restless leg syndrome      RLS (restless legs syndrome)      Seizure 6/24/2013    Tobacco dependence       resolved    Trouble in sleeping                  Past Surgical History:   Procedure Laterality Date    ADENOIDECTOMY        APPENDECTOMY        CHOLECYSTECTOMY        COLONOSCOPY   2013    FINGER SURGERY        HERNIA REPAIR        HYSTERECTOMY        JOINT REPLACEMENT        TONSILLECTOMY        TOTAL HIP ARTHROPLASTY         L x2              Review of patient's allergies indicates:   Allergen Reactions    Nsaids (non-steroidal anti-inflammatory drug) Hives and Shortness Of Breath    Pcn [penicillins] Hives and Shortness Of Breath    Sulfa (sulfonamide antibiotics) Hives and Shortness Of Breath    Aspirin      Macrodantin [nitrofurantoin macrocrystalline] Hives    Wellbutrin [bupropion hcl]        No current facility-administered medications on file prior to encounter.                   Current Outpatient Prescriptions on File Prior to Encounter    Medication Sig Dispense Refill    allopurinol (ZYLOPRIM) 100 MG tablet Take 1 tablet (100 mg total) by mouth once daily. 90 tablet 5    busPIRone (BUSPAR) 15 MG tablet 1 tab po tid 90 tablet 1    clindamycin (CLEOCIN) 150 MG capsule Take 2 capsules (300 mg total) by mouth every 8 (eight) hours. 42 capsule 0    diclofenac sodium (VOLTAREN) 1 % Gel Apply 4 g topically 4 (four) times daily. Pt has shoulder pain and knee pain 200 g 5    diphenoxylate-atropine 2.5-0.025 mg (LOMOTIL) 2.5-0.025 mg per tablet     0    duloxetine (CYMBALTA) 60 MG capsule Take 1 capsule (60 mg total) by mouth once daily. 90 capsule 0    ferrous sulfate 325 (65 FE) MG EC tablet Take 1 tablet (325 mg total) by mouth once a week. 90 tablet 3    fluticasone (FLONASE) 50 mcg/actuation nasal spray instill 2 sprays into each nostril once daily 16 g 11    fluticasone-salmeterol (ADVAIR HFA) 115-21 mcg/actuation HFAA inhale 2 puffs INTO THE LUNGS twice a day 12 g 12    hydrocodone-acetaminophen 7.5-325mg (NORCO) 7.5-325 mg per tablet Take 1 tablet by mouth every 6 (six) hours as needed for Pain. 15 tablet 0    ipratropium-albuterol (COMBIVENT RESPIMAT)  mcg/actuation inhaler inhale 1 puff INTO THE LUNGS four times a day 3 Package 4    levothyroxine (SYNTHROID) 100 MCG tablet TAKE 1 TABLET ONE TIME DAILY 90 tablet 3    methocarbamol (ROBAXIN) 500 MG Tab Take 1 tablet (500 mg total) by mouth 2 (two) times daily as needed (for muscle spasms.). 60 tablet 1    metoprolol succinate (TOPROL-XL) 50 MG 24 hr tablet Take 1 tablet (50 mg total) by mouth once daily. 90 tablet 3    MULTIVITAMIN W-MINERALS/LUTEIN (CENTRUM SILVER ORAL) Take 1 tablet by mouth once daily.        pantoprazole (PROTONIX) 40 MG tablet Take 1 tablet (40 mg total) by mouth once daily. 90 tablet 3    pravastatin (PRAVACHOL) 10 MG tablet TAKE 1 TABLET ONE TIME DAILY 90 tablet 0    ropinirole (REQUIP) 1 MG tablet Take 1 tablet (1 mg total) by mouth nightly. 90 tablet  "3    thiamine 250 MG tablet Take 500 mg by mouth once daily.        tramadol (ULTRAM) 50 mg tablet Take 1 tablet (50 mg total) by mouth 2 (two) times daily as needed for Pain. 60 tablet 2      /72 (BP Location: Right arm, Patient Position: Lying, BP Method: Automatic)   Pulse 73   Temp 98.5 °F (36.9 °C) (Oral)   Resp 16   Ht 5' 6" (1.676 m)   Wt 52.2 kg (115 lb)   LMP  (LMP Unknown)   SpO2 (!) 92%   Breastfeeding? No   BMI 18.56 kg/m²    ROS:  GENERAL: No fever, chills, fatigability or weight loss.  SKIN: No rashes, itching or changes in color or texture of skin.  HEAD: No headaches or recent head trauma.  EYES: Visual acuity fine. No photophobia, ocular pain or diplopia.  EARS: Denies ear pain, discharge or vertigo.  NOSE: No loss of smell, no epistaxis or postnasal drip.  MOUTH & THROAT: No hoarseness or change in voice. No excessive gum bleeding.  NODES: Denies swollen glands.  CHEST: Denies BRADY, cyanosis, wheezing, cough and sputum production.  CARDIOVASCULAR: Denies chest pain, PND, orthopnea or reduced exercise tolerance.  ABDOMEN: Appetite fine. No weight loss. Denies diarrhea, abdominal pain, hematemesis or blood in stool.  URINARY: No flank pain, dysuria or hematuria.  PERIPHERAL VASCULAR: No claudication or cyanosis.  NEUROLOGIC: No history of seizures, paralysis, alteration of gait or coordination.  MUSCULOSKELETAL: See HPI     PE:  APPEARANCE: Well nourished, well developed, in no acute distress.   HEAD: Normocephalic, atraumatic.  EYES: PERRL. EOMI.   EARS: TM's intact. Light reflex normal. No retraction or perforation.   NOSE: Mucosa pink. Airway clear.  MOUTH & THROAT: No tonsillar enlargement. No pharyngeal erythema or exudate. No stridor.  NECK: Supple.   NODES: No cervical, axillary or inguinal lymph node enlargement.  CHEST: Lungs clear to auscultation.  CARDIOVASCULAR: Normal S1, S2. No rubs, murmurs or gallops.  ABDOMEN: Bowel sounds normal. Not distended. Soft. No tenderness or " "masses.  NEUROLOGIC: Cranial Nerves: II-XII grossly intact, also see MUSCULOSKELETAL  MUSCULOSKELETAL:      /82 (BP Location: Right arm, Patient Position: Lying, BP Method: Automatic)   Pulse 76   Temp 98.9 °F (37.2 °C) (Oral)   Resp 14   Ht 5' 6" (1.676 m)   Wt 52.2 kg (115 lb)   LMP  (LMP Unknown)   SpO2 (!) 93%   Breastfeeding? No   BMI 18.56 kg/m²                Left  Wrist   Dressing intact, 2 plus radial  artery and ulnar artery pulses, light touch intact Left upper extremity.  All digits are warm. No erythema, no warmth, no drainage, No swelling, no significant tenderness.    Left Hip- pain on range of motion, in th groin area.          ASSESSMENT:   The patient is stable        PLAN:  CT scan of the left hip  Continue pain medication  Continue occupational therapy    "

## 2017-07-16 NOTE — PLAN OF CARE
Problem: Patient Care Overview  Goal: Plan of Care Review  PT REQUIRES TOTAL A FOR GROSS FUNC MOBILITY    Outcome: Ongoing (interventions implemented as appropriate)  POC reviewed with pt, pt verbalized understanding. Pt oriented to self and place, disoriented to time and situation. Pt less confused than 7/15/17. [employer] monitoring used. Pt denies nausea and pain at this time. Pt turned Q2H, remains free of falls/injuries at this time. L arm dressing CDI with swelling and redness to hand. Pt resting quietly in bed. Pt rounds made for safety, bed in lowest position, call bell remains within reach. WCTM

## 2017-07-16 NOTE — PLAN OF CARE
Problem: Patient Care Overview  Goal: Plan of Care Review  PT REQUIRES TOTAL A FOR GROSS FUNC MOBILITY    Outcome: Ongoing (interventions implemented as appropriate)  Pt was able to sit on the side of the bed unsupported today to eat breakfast and participate with therapy. Attempted to take side steps to the head of the bed however pt had increased complaints of L hip pain and was unable to fully weight bear to move the opposite leg.

## 2017-07-16 NOTE — PT/OT/SLP PROGRESS
"Physical Therapy  Treatment    Ibeth Schroeder   MRN: [id number]   Admitting Diagnosis: Radius/ulna fracture    PT Received On: 07/16/17  PT Start Time: 0930     PT Stop Time: 1000    PT Total Time (min): 30 min       Billable Minutes:  Therapeutic Activity 30    Treatment Type: Treatment  PT/PTA: PTA     PTA Visit Number: 2       General Precautions: Standard, respiratory, fall  Orthopedic Precautions: LUE non weight bearing   Braces:  (shoulder sling)         Subjective:  Communicated with NurseMarsha prior to session. "I feel more like myself today" (per nsg she is less altered today but still has moments of increased confusion)      Pain/Comfort  Pain Rating 1: 0/10  Pain Rating 2: 7/10  Location - Side 2: Left  Location 2: hip (with movement)  Pain Addressed 2: Reposition, Cessation of Activity    Objective:   Patient found with: SCD, telemetry, peripheral IV, bed alarm (video monitoring system )    Functional Mobility:  Bed Mobility:   Scooting/Bridging: Moderate Assistance  Supine to Sit: Moderate Assistance  Sit to Supine: Maximum Assistance    Transfers:  Sit <> Stand Assistance: Moderate Assistance  Sit <> Stand Assistive Device: No Assistive Device (i assisted pt on her L side)    Gait:   Gait Distance: attempted side steps to the HOB. Pt had increased difficulty weight bearing on her L leg to move her R. Easily moved her L leg. 2 steps with each.   Assistance 1: Maximum assistance  Gait Assistive Device: No device    Stairs:      Balance:   Static Sit: Sat eob x 25 min. Pt was able to maintain static balance, unassisted, to eat her breakfast. She was able to feed herself. No posterior leaning like the prior session.     Static Stand: Stood at the eob with R arm holding the bed rail and therapist assisting her on her L side. Required MIN A for static standing x 1 min. Pt was not leaning posteriorly today. Increased wheezing and sob with standing at attempts to take steps. Pt immediately requested her " inhaler but it is not kept in her room. SOB improved with cues for slow, deep breathes.         Therapeutic Activities and Exercises:  Once pt returned to supine with HOB elevated.  L arm positioned on two pillow with hand higher than elbow. Adjusted the ace wrap to include her hand for edema control.     AM-PAC 6 CLICK MOBILITY  How much help from another person does this patient currently need?   1 = Unable, Total/Dependent Assistance  2 = A lot, Maximum/Moderate Assistance  3 = A little, Minimum/Contact Guard/Supervision  4 = None, Modified [place]/Independent    Turning over in bed (including adjusting bedclothes, sheets and blankets)?: 2  Sitting down on and standing up from a chair with arms (e.g., wheelchair, bedside commode, etc.): 2  Moving from lying on back to sitting on the side of the bed?: 2  Moving to and from a bed to a chair (including a wheelchair)?: 2  Need to walk in hospital room?: 2  Climbing 3-5 steps with a railing?: 1  Total Score: 11    AM-PAC Raw Score CMS G-Code Modifier Level of Impairment Assistance   6 % Total / Unable   7 - 9 CM 80 - 100% Maximal Assist   10 - 14 CL 60 - 80% Moderate Assist   15 - 19 CK 40 - 60% Moderate Assist   20 - 22 CJ 20 - 40% Minimal Assist   23 CI 1-20% SBA / CGA   24 CH 0% Independent/ Mod I     Patient left HOB elevated with all lines intact, call button in reach and bed alarm on. Reviewed how to use the call light. Pt  Was able to verbalize back to me.     Assessment:  Pt had increased complaints of L hip pain today with movement. Has a Hx of a hip replacement. Less confused today . Improved mobility and balance but unable to walk.     Rehab identified problem list/impairments: Rehab identified problem list/impairments: weakness, impaired self care skills, impaired balance, decreased coordination, decreased safety awareness, decreased ROM, impaired skin, impaired joint extensibility, impaired muscle length, edema, pain, decreased upper  extremity function, impaired functional mobilty, visual deficits, impaired endurance, gait instability, impaired cognition, decreased lower extremity function, impaired cardiopulmonary response to activity, orthopedic precautions    Rehab potential is good.    Activity tolerance: Fair    Discharge recommendations: Discharge Facility/Level Of Care Needs: nursing facility, skilled     Barriers to discharge: Barriers to Discharge: Decreased caregiver support    Equipment recommendations: Equipment Needed After Discharge: none     GOALS:    Physical Therapy Goals        Problem: Physical Therapy Goal    Goal Priority Disciplines Outcome Goal Variances Interventions   Physical Therapy Goal     PT/OT, PT      Description:  PT WILL BE SEEN FOR P.T. FOR A MIN OF 5 OUT OF 7 DAYS A WEEK  LT17  1. PT WILL COMPLETE BED MOBILITY WITH MOD A.  2. PT WILL STAND MAX A X 1.  3. PT WILL T/F TO CHAIR WITH MAX A X 1  4. PT WILL COMPLETE B LE TE X 20 REPS FOR STRENGTHENING.  5. PT WILL GT TRAIN WITH APPROPRIATE AD X 10' WITH MAX A.                      PLAN:    Patient to be seen  (5-7 times a wk as tolerated. )  to address the above listed problems via gait training, therapeutic activities, therapeutic exercises  Plan of Care expires: 17  Plan of Care reviewed with: patient         Katey Cee, PTA  2017

## 2017-07-17 PROBLEM — E83.51 HYPOCALCEMIA: Status: RESOLVED | Noted: 2017-07-15 | Resolved: 2017-07-17

## 2017-07-17 PROBLEM — D62 ACUTE BLOOD LOSS ANEMIA: Status: ACTIVE | Noted: 2017-07-17

## 2017-07-17 PROBLEM — R41.0 DELIRIUM: Status: ACTIVE | Noted: 2017-07-17

## 2017-07-17 PROBLEM — S32.313A CLOSED AVULSION FRACTURE OF ANTERIOR INFERIOR ILIAC SPINE OF PELVIS: Status: ACTIVE | Noted: 2017-07-17

## 2017-07-17 LAB
BASOPHILS # BLD AUTO: 0.02 K/UL
BASOPHILS NFR BLD: 0.2 %
DIFFERENTIAL METHOD: ABNORMAL
EOSINOPHIL # BLD AUTO: 0.2 K/UL
EOSINOPHIL NFR BLD: 2.3 %
ERYTHROCYTE [DISTWIDTH] IN BLOOD BY AUTOMATED COUNT: 13.8 %
HCT VFR BLD AUTO: 26.1 %
HGB BLD-MCNC: 8.4 G/DL
LYMPHOCYTES # BLD AUTO: 1.2 K/UL
LYMPHOCYTES NFR BLD: 13.4 %
MCH RBC QN AUTO: 31.6 PG
MCHC RBC AUTO-ENTMCNC: 32.2 %
MCV RBC AUTO: 98 FL
MONOCYTES # BLD AUTO: 0.7 K/UL
MONOCYTES NFR BLD: 8.2 %
NEUTROPHILS # BLD AUTO: 6.6 K/UL
NEUTROPHILS NFR BLD: 75.9 %
PLATELET # BLD AUTO: 310 K/UL
PMV BLD AUTO: 10.1 FL
RBC # BLD AUTO: 2.66 M/UL
WBC # BLD AUTO: 8.73 K/UL

## 2017-07-17 PROCEDURE — 96372 THER/PROPH/DIAG INJ SC/IM: CPT

## 2017-07-17 PROCEDURE — 85025 COMPLETE CBC W/AUTO DIFF WBC: CPT

## 2017-07-17 PROCEDURE — 25000003 PHARM REV CODE 250: Performed by: NURSE PRACTITIONER

## 2017-07-17 PROCEDURE — 63600175 PHARM REV CODE 636 W HCPCS: Performed by: ORTHOPAEDIC SURGERY

## 2017-07-17 PROCEDURE — 97530 THERAPEUTIC ACTIVITIES: CPT

## 2017-07-17 PROCEDURE — 25000003 PHARM REV CODE 250: Performed by: ORTHOPAEDIC SURGERY

## 2017-07-17 PROCEDURE — 21400001 HC TELEMETRY ROOM

## 2017-07-17 PROCEDURE — 97802 MEDICAL NUTRITION INDIV IN: CPT

## 2017-07-17 PROCEDURE — 36415 COLL VENOUS BLD VENIPUNCTURE: CPT

## 2017-07-17 PROCEDURE — 25000003 PHARM REV CODE 250: Performed by: INTERNAL MEDICINE

## 2017-07-17 PROCEDURE — 25000242 PHARM REV CODE 250 ALT 637 W/ HCPCS: Performed by: NURSE PRACTITIONER

## 2017-07-17 PROCEDURE — 94640 AIRWAY INHALATION TREATMENT: CPT

## 2017-07-17 RX ORDER — LANOLIN ALCOHOL/MO/W.PET/CERES
400 CREAM (GRAM) TOPICAL 2 TIMES DAILY
Status: DISCONTINUED | OUTPATIENT
Start: 2017-07-17 | End: 2017-07-18 | Stop reason: HOSPADM

## 2017-07-17 RX ADMIN — SODIUM CHLORIDE: 0.9 INJECTION, SOLUTION INTRAVENOUS at 05:07

## 2017-07-17 RX ADMIN — HYDROCODONE BITARTRATE AND ACETAMINOPHEN 2 TABLET: 10; 325 TABLET ORAL at 11:07

## 2017-07-17 RX ADMIN — MAGNESIUM OXIDE TAB 400 MG (241.3 MG ELEMENTAL MG) 400 MG: 400 (241.3 MG) TAB at 08:07

## 2017-07-17 RX ADMIN — PRAVASTATIN SODIUM 10 MG: 10 TABLET ORAL at 08:07

## 2017-07-17 RX ADMIN — METOPROLOL TARTRATE 50 MG: 50 TABLET ORAL at 08:07

## 2017-07-17 RX ADMIN — QUETIAPINE FUMARATE 25 MG: 25 TABLET, FILM COATED ORAL at 08:07

## 2017-07-17 RX ADMIN — LEVOTHYROXINE SODIUM 100 MCG: 25 TABLET ORAL at 05:07

## 2017-07-17 RX ADMIN — ENOXAPARIN SODIUM 50 MG: 100 INJECTION SUBCUTANEOUS at 09:07

## 2017-07-17 RX ADMIN — BUDESONIDE 0.25 MG: 0.25 SUSPENSION RESPIRATORY (INHALATION) at 08:07

## 2017-07-17 RX ADMIN — MAGNESIUM OXIDE TAB 400 MG (241.3 MG ELEMENTAL MG) 400 MG: 400 (241.3 MG) TAB at 09:07

## 2017-07-17 RX ADMIN — Medication 100 MG: at 09:07

## 2017-07-17 RX ADMIN — METOPROLOL TARTRATE 50 MG: 50 TABLET ORAL at 09:07

## 2017-07-17 RX ADMIN — BUSPIRONE HYDROCHLORIDE 15 MG: 5 TABLET ORAL at 09:07

## 2017-07-17 RX ADMIN — BUSPIRONE HYDROCHLORIDE 15 MG: 5 TABLET ORAL at 05:07

## 2017-07-17 RX ADMIN — DULOXETINE 60 MG: 30 CAPSULE, DELAYED RELEASE ORAL at 09:07

## 2017-07-17 RX ADMIN — BUSPIRONE HYDROCHLORIDE 15 MG: 5 TABLET ORAL at 02:07

## 2017-07-17 RX ADMIN — POTASSIUM & SODIUM PHOSPHATES POWDER PACK 280-160-250 MG 1 PACKET: 280-160-250 PACK at 08:07

## 2017-07-17 RX ADMIN — POTASSIUM & SODIUM PHOSPHATES POWDER PACK 280-160-250 MG 1 PACKET: 280-160-250 PACK at 12:07

## 2017-07-17 RX ADMIN — PANTOPRAZOLE SODIUM 40 MG: 40 TABLET, DELAYED RELEASE ORAL at 09:07

## 2017-07-17 RX ADMIN — ENOXAPARIN SODIUM 50 MG: 100 INJECTION SUBCUTANEOUS at 11:07

## 2017-07-17 RX ADMIN — POTASSIUM & SODIUM PHOSPHATES POWDER PACK 280-160-250 MG 1 PACKET: 280-160-250 PACK at 05:07

## 2017-07-17 RX ADMIN — IPRATROPIUM BROMIDE AND ALBUTEROL SULFATE 3 ML: .5; 3 SOLUTION RESPIRATORY (INHALATION) at 08:07

## 2017-07-17 NOTE — PT/OT/SLP PROGRESS
Physical Therapy  Treatment    Ibeth Schroeder   MRN: [id number]   Admitting Diagnosis: Radius/ulna fracture    PT Received On: 07/17/17  PT Start Time: 1100     PT Stop Time: 1123    PT Total Time (min): 23 min       Billable Minutes:  Therapeutic Activity 23    Treatment Type: Treatment  PT/PTA: PT     PTA Visit Number: 2       General Precautions: Standard, respiratory, fall  Orthopedic Precautions: LUE non weight bearing   Braces:           Subjective:  Communicated with NURSE MANAV AND EPIC CHART REVIEW  prior to session.   PT AGREED TO TX     Pain/Comfort  Pain Rating 1: 7/10  Location - Side 1: Left  Location 1: arm  Pain Addressed 1: Reposition  Pain Rating Post-Intervention 1: 7/10    Objective:   Patient found with: SCD, telemetry, peripheral IV, bed alarm    Functional Mobility:  Bed Mobility:   Rolling/Turning to Left: Maximum assistance  Scooting/Bridging: Maximum Assistance  Supine to Sit: Maximum Assistance    Transfers:  Sit <> Stand Assistance: Maximum Assistance (X2)  Sit <> Stand Assistive Device: No Assistive Device  Bed <> Chair Technique: Stand Pivot  Bed <> Chair Assistance: Maximum Assistance (X2)  Bed <> Chair Assistive Device: No Assistive Device    Gait:   Gait Distance: ATTEMPTED TO STEP HOWEVER PT WITH SEVERE POST LEAN AND DEC WB L LE AND SCISSORING GT  Assistance 1: Maximum assistance, With assist of two  Gait Assistive Device: No device  Gait Deviation(s): decreased tanesha, backward lean    Therapeutic Activities and Exercises:  PT SEATED EOB WITH MAX A AND POST LEAN. PT GIVEN MAX CUES FOR ANT WT SHIFT. PT STOOD X 3 TRIALS WITH MAX A X 2 AND POST LEAN. PT TOOK ONE STEP FORWARD WITH MAX A X 2 WITH MAX A AND NARROW ONDINA. PT UNABLE TO AMBULATE AT THIS TIME. PT T.F TO CHAIR WITH MAX A X 2. PT SEATED IN CHAIR AND LEFT WITH ALL NEEDS MET      AM-PAC 6 CLICK MOBILITY  How much help from another person does this patient currently need?   1 = Unable, Total/Dependent Assistance  2 = A lot,  Maximum/Moderate Assistance  3 = A little, Minimum/Contact Guard/Supervision  4 = None, Modified [place]/Independent    Turning over in bed (including adjusting bedclothes, sheets and blankets)?: 2  Sitting down on and standing up from a chair with arms (e.g., wheelchair, bedside commode, etc.): 2  Moving from lying on back to sitting on the side of the bed?: 2  Moving to and from a bed to a chair (including a wheelchair)?: 2  Need to walk in hospital room?: 2  Climbing 3-5 steps with a railing?: 1  Total Score: 11    AM-PAC Raw Score CMS G-Code Modifier Level of Impairment Assistance   6 % Total / Unable   7 - 9 CM 80 - 100% Maximal Assist   10 - 14 CL 60 - 80% Moderate Assist   15 - 19 CK 40 - 60% Moderate Assist   20 - 22 CJ 20 - 40% Minimal Assist   23 CI 1-20% SBA / CGA   24 CH 0% Independent/ Mod I     Patient left up in chair with call button in reach, NURSE MANAV  notified and  present.    Assessment:  PT PROGRESSING WITH MOBILITY HOWEVER CONT TO BE CONFUSED AND REQUIRES MAX A WITH GROSS FUNC MOBILITY.     Rehab identified problem list/impairments: Rehab identified problem list/impairments: weakness, impaired endurance, impaired functional mobilty, gait instability, impaired self care skills, impaired balance, impaired cognition, decreased upper extremity function, decreased lower extremity function, pain, edema, decreased ROM, decreased safety awareness, orthopedic precautions    Rehab potential is good.    Activity tolerance: Fair    Discharge recommendations: Discharge Facility/Level Of Care Needs: nursing facility, skilled     Barriers to discharge: Barriers to Discharge: Decreased caregiver support    Equipment recommendations: Equipment Needed After Discharge: none     GOALS:    Physical Therapy Goals        Problem: Physical Therapy Goal    Goal Priority Disciplines Outcome Goal Variances Interventions   Physical Therapy Goal     PT/OT, PT      Description:  PT WILL BE SEEN FOR  P.T. FOR A MIN OF 5 OUT OF 7 DAYS A WEEK  LT17  1. PT WILL COMPLETE BED MOBILITY WITH MOD A.  2. PT WILL STAND MAX A X 1.  3. PT WILL T/F TO CHAIR WITH MAX A X 1  4. PT WILL COMPLETE B LE TE X 20 REPS FOR STRENGTHENING.  5. PT WILL GT TRAIN WITH APPROPRIATE AD X 10' WITH MAX A.                      PLAN:    Patient to be seen  (5-7 times a wk as tolerated. )  to address the above listed problems via gait training, therapeutic activities, therapeutic exercises  Plan of Care expires: 17  Plan of Care reviewed with: patient, spouse         Piper Reardon, PT  2017

## 2017-07-17 NOTE — CONSULTS
[facility] -   Adult Nutrition  Consult Note    SUMMARY     Recommendations    Recommendation/Intervention: 1. Continue current diet. 2. Will send oral supplements with meals (Boost Plus)  3. Will continue to encourage PO intake  4.Discussed importance of adequate intake and sources of healthy calories to increase overall calorie intake. 5. Patient and Family were educated on LowNa diet. Pt was given a handout from the nutrition care manual on Low-Sodium Nutrition Therapy.  Pt/family were provided contact information for further questions.   Goals: Intake of % of meals and oral supplements   Nutrition Goal Status: new  Communication of RD Recs:  (sticky note and care plan )    Reason for Assessment    Reason for Assessment: nurse/nurse practitioner consult (malnourishment )  Diagnosis:  (Radius/ulna fracture s/p, CKD, Delirium, Dyslipidemia, HTN)  Relevent Medical History: Alcohol Abuse, Anxiety, Arthritis, Asthma, COPD, Colon Polyp, Diverticulosis, Hyperlipidemia, HTN, RLS, CKD stage 3    General Information Comments: Patient tolerating diet (No N/V). Patient reports decreased appetite (PO intake less than 55 %). She reports eating less since the fracture.  at bedside.  He reports patient oral intake at home is poor (PO intake ~50 %). He reports patient weight fluctuations between 120-115 (fluid).  Per Epic Records no significant weight changes (  110 within the last 6 months).  Obtained Food preferences. Patient agreed to try oral supplements.   Nutrition Discharge Planning:  (Home on LowNa diet )    Nutrition Prescription Ordered    Current Diet Order: LowNa diet    Evaluation of Received Nutrients/Fluid Intake      % Intake of Estimated Energy Needs: 25 - 50 %  % Meal Intake: 50%     Nutrition Risk Screen     Nutrition Risk Screen: no indicators present    Nutrition/Diet History    Typical Food/Fluid Intake: poor intake at home   Food Preferences: no milk. Patient likes spaguetti,  "grits, eggs.   Factors Affecting Nutritional Intake: decreased appetite    Labs/Tests/Procedures/Meds    Diagnostic Test/Procedure Review: reviewed, other (see comments) (BP (166/77) )  Pertinent Labs Reviewed: reviewed  Pertinent Labs Comments: Mg 1.3, Alb 2.5, Glu 116, Ca 7.5   Pertinent Medications Reviewed: reviewed     Physical Findings    Oral/Mouth Cavity: tooth/teeth missing  Skin:  (Jaylon Score 17, incision left arm )    Anthropometrics    Height: 5' 5.98" (167.6 cm)  Weight Method: Stated  Weight: 52.2 kg (115 lb 1.3 oz)  Ideal Body Weight (IBW), Female: 129.9 lb  % Ideal Body Weight, Female (lb): 88.59 lb  BMI (Calculated): 18.6  BMI Grade: 18.5-24.9 - normal  Usual Body Weight (UBW), k kg  % Usual Body Weight: 100.59  % Weight Change From Usual Weight: -0.59 %    Estimated/Assessed Needs    Weight Used For Calorie Calculations: 52.2 kg (115 lb 1.3 oz)   Height (cm): 167.6 cm  Energy Calorie Requirements (kcal): 1566  (30cal/kg )  Energy Need Method: Kcal/kg  RMR ([place]-St. Jeor Equation): 1088.5   Weight Used For Protein Calculations: 52.2 kg (115 lb 1.3 oz)  Protein Requirements: 52.31 (1g/kg )  Fluid Requirements (mL): 1ml/domingo or per MD   Fluid Need Method: RDA Method   RDA Method (mL): 1566    Assessment and Plan    Essential hypertension    Nutrition Problem:  Decreased nutrient needs (sodium)     Related to (etiology):   Current medical condition     Signs and Symptoms (as evidenced by):   /77 and hx of hypertension     Interventions/Recommendations (treatment strategy):  1. Continue current diet. 2. Will send oral supplements with meals (Boost Plus)  3. Will continue to encourage PO intake  4.Discussed importance of adequate intake and sources of healthy calories to increase overall calorie intake. 5. Patient and Family were educated on LowNa diet. Pt was given a handout from the nutrition care manual on Low-Sodium Nutrition Therapy.  Pt/family were provided contact information for " further questions.     Nutrition Diagnosis Status:   New            Monitor and Evaluation    Food and Nutrient Intake: energy intake, food and beverage intake  Food and Nutrient Adminstration: diet order  Knowledge/Beliefs/Attitudes: food and nutrition knowledge/skill  Physical Activity and Function: nutrition-related ADLs and IADLs  Anthropometric Measurements: weight  Biochemical Data, Medical Tests and Procedures: glucose/endocrine profile, electrolyte and renal panel  Nutrition-Focused Physical Findings: overall appearance    Nutrition Follow-Up    RD Follow-up?: Yes (1xweekly )

## 2017-07-17 NOTE — PHYSICIAN QUERY
PT Name: Ibeth Schroeder  MR #: [id number]     Physician Query Form - Documentation Clarification      CDS/: FRANCESCO Torres, RN, CCDS               Contact information: [email or web address]    This form is a permanent document in the medical record.     Query Date: July 17, 2017    By submitting this query, we are merely seeking further clarification of documentation. Please utilize your independent clinical judgment when addressing the question(s) below.    The Medical record reflects the following:    Supporting Clinical Findings Location in Medical Record     CKD (chronic kidney disease) stage 3, GFR 30-59 ml/min     - Cr. Stable.  Monitor.     BUN = 38-->16  Cr = 1.7-->0.9  GFR = 31--> greater than 60    0.9% NaCl infusion   7/16 [facility] Progress Note        7/11-7/15 Labs        7/11-7/17 MAR                                                                                      Doctor, Please specify diagnosis or diagnoses associated with above clinical findings.    Please clarify renal diagnosis.    Provider Use Only      (   ) CKD stage 3 without Acute Kidney Injury    (   ) Acute Kidney Injury on CKD (specify stage):_________________________    (xx ) Acute Kidney Injury without CKD    (   ) Other (specify): ______________________________________________                                                                                                                   [  ] Clinically undetermined

## 2017-07-17 NOTE — PLAN OF CARE
Problem: Patient Care Overview  Goal: Plan of Care Review  PT REQUIRES TOTAL A FOR GROSS FUNC MOBILITY    Outcome: Ongoing (interventions implemented as appropriate)  Room air, respirations even and unlabored, no distress noted on assessment, oriented to self, left arm dressing dry and intact, AvaSys monitor in use, bed alarm

## 2017-07-17 NOTE — PROGRESS NOTES
[facility] Hospital Medicine  Progress Note    Patient Name: Ibeth Schroeder  MRN: [id number]  Patient Class: IP- Inpatient   Admission Date: 7/10/2017  Length of Stay: 7 days  Attending Physician: Binu Lizarraga Sr., MD  Primary Care Provider: SAGAR BETTS MD        Subjective:     Principal Problem:Radius/ulna fracture    HPI:  Ibeth Schroeder is 66 year old female with PMHx of HTN, HLD, CKD, depression, hypothyroidism, COPD, and EtOH abuse, who presented to the ED with c/o left wrist swelling and pain due to left radial and ulnar fracture after suffering a fall down a stairway.  Hospital Medicine consulted for medical management.  Patient states she is complaint with prescribed medications.  She reports drinking 1-2 alcoholic drinks per night made with Rum.  She is prescribed home oxygen by Pulmonology.  She denies any chest pain, palpitations, SOB/BRADY, orthopnea, PND, edema, lightheadedness/dizziness, N/V/D, syncope, or fever.    Hospital Course:  Patient was admitted per Ortho Surgery.  2D echo revealed normal LVEF of 55%, CLVH, and PAP 50 mmHg. Patient ok to proceed with surgery at low risk for CV events.  She underwent uneventful ORIF of left wrist.  to assist with SNF placement at [place].  During stay,  patient confusion & delirium with hallucinations.  PRN IV Ativan and Haldol ordered for suspected EtOH withdrawal.  Patient disoriented and hallucinating continued. Magnesium and phosphorous were repleted. Pt had pain and antalgic gait and CT of ABD/Pelvis revealed a left comminuted ilius fracture. Further discussion with  revealed pt has had diminishing mental status and has been evaluated by [facility] with negative findings within the last 2 months. He mentioned they found she was low on thiamine.     Interval History:     Presently, pt sitting in chair at bedside.  present in room. Left arm in splint and large amount of bruising present to left  shoulder. Swelling to left hand. Pt is cooperative and confused at times. Advised  waiting to discuss pelvic fracture with Dr. Lizarraga.  stated patient fell down a flight of stairs at night. He explains she has a shortened leg. Pt denied any spinous process tenderness.     Review of Systems   Constitutional: Positive for activity change. Negative for chills, diaphoresis, fatigue and fever.   HENT: Negative for congestion and sore throat.    Eyes: Negative for visual disturbance.   Respiratory: Negative for apnea, cough, chest tightness, shortness of breath and wheezing.    Cardiovascular: Negative for chest pain, palpitations and leg swelling.   Gastrointestinal: Negative for abdominal distention, abdominal pain, blood in stool, constipation, diarrhea, nausea and vomiting.   Genitourinary: Negative for dysuria and hematuria.   Musculoskeletal: Positive for arthralgias and gait problem. Negative for back pain.   Skin: Negative for pallor, rash and wound.   Neurological: Negative for dizziness, tremors, seizures, syncope, speech difficulty, weakness, light-headedness, numbness and headaches.   Hematological: Negative for adenopathy.   Psychiatric/Behavioral: Positive for decreased concentration. Negative for agitation, confusion and hallucinations.        Memory loss   All other systems reviewed and are negative.    Objective:     Vital Signs (Most Recent):  Temp: 98.7 °F (37.1 °C) (07/17/17 1239)  Pulse: 79 (07/17/17 1239)  Resp: 16 (07/17/17 1239)  BP: (!) 166/77 (07/17/17 1239)  SpO2: 95 % (07/17/17 1239) Vital Signs (24h Range):  Temp:  [98 °F (36.7 °C)-99.6 °F (37.6 °C)] 98.7 °F (37.1 °C)  Pulse:  [] 79  Resp:  [16-20] 16  SpO2:  [87 %-95 %] 95 %  BP: ()/(56-99) 166/77     Weight: 52.2 kg (115 lb 1.3 oz)  Body mass index is 18.58 kg/m².    Intake/Output Summary (Last 24 hours) at 07/17/17 1358  Last data filed at 07/17/17 1013   Gross per 24 hour   Intake             1100 ml   Output                 0 ml   Net             1100 ml      Physical Exam   Constitutional: She appears well-developed and well-nourished. No distress.   HENT:   Head: Normocephalic and atraumatic.   Eyes: Conjunctivae are normal.   Neck: Normal range of motion. Neck supple. No JVD present.   Cardiovascular: Normal rate, regular rhythm, normal heart sounds and intact distal pulses.  Exam reveals no gallop and no friction rub.    No murmur heard.  Pulses:       Dorsalis pedis pulses are 2+ on the right side, and 2+ on the left side.        Posterior tibial pulses are 2+ on the right side, and 2+ on the left side.   Pulmonary/Chest: Effort normal and breath sounds normal. No respiratory distress. She has no wheezes. She has no rales.   Abdominal: Soft. Bowel sounds are normal. She exhibits no distension. There is no tenderness.   Musculoskeletal: She exhibits tenderness. She exhibits no edema or deformity.        Left hip: She exhibits tenderness (Pain with ROM ).        Arms:  Left arm with ace wrap dressing, CDI. + swelling and Limited ROM. 2+ Radial pulse, capillary refill  <2.   Neurological: She is alert. She is not disoriented. She exhibits normal muscle tone.   Skin: Skin is warm and dry. No rash noted.   Psychiatric:   Positive for hallucinations and confusion.   Nursing note and vitals reviewed.      Significant Labs:   CBC:   Recent Labs  Lab 07/16/17  0547 07/17/17  0506   WBC 7.46 8.73   HGB 7.8* 8.4*   HCT 23.8* 26.1*    310     CMP: No results for input(s): NA, K, CL, CO2, GLU, BUN, CREATININE, CALCIUM, PROT, ALBUMIN, BILITOT, ALKPHOS, AST, ALT, ANIONGAP, EGFRNONAA in the last 48 hours.    Invalid input(s): ESTGFAFR[place]  All pertinent labs within the past 24 hours have been reviewed.    Significant Imaging: I have reviewed all pertinent imaging results/findings within the past 24 hours.    Assessment/Plan:      Acute blood loss anemia    Hct 9.0 >> 8.4  Will transfuse if pt becomes symptomatic or Hgb < 7   Will  continue to monitor            Delirium    - Suspect secondary to EtOH withdrawal.  - PRN Ativan and Haldol.  - Monitor closely for DTs  Had Neuromedical evaluation within last 2 months with negative findings except for low thiamine          History of ETOH abuse    - PRN Ativan and Haldol as above.  Monitor for DTs.  - Counseled on cessation.  - Scheduled Seroquel at bedtime.  - Will add home thiamine.        Dyslipidemia    - Statin continued.          Depression    - Resume current medications.         Stage 3 severe COPD by GOLD classification    With chronic respiratory failure  - Budesonide and arformoterol nebulizer treatment.  - Duonebs PRN.  - Continue home dose O2 @ 2L per NC         Anemia of other chronic disease    -H/H stable  -will monitor  -will transfuse if needed   -repeat CBC in am   -will resume iron supplementation post procedure          History of seizure    - Seizure precautions.          Essential hypertension    - BP suboptimally controlled.  - Will switch Toprol to Lopressor 50mg BID.  Monitor BP trends.          Hypothyroidism    - Synthroid continued.          * Radius/ulna fracture    - S/P left radius and ulna ORIF  - Post-op management per Orthopedic Surgery (primary team).  - Analgesia prn.   - Case discussed with social work. SNF placement pending.  -PT/OT eval and treat            VTE Risk Mitigation         Ordered     Medium Risk of VTE  Once      07/12/17 1644     Place MALCIK hose  Until discontinued      07/12/17 1644     Place sequential compression device  Until discontinued      07/12/17 1644          Rohini Sargent NP  Department of Hospital Medicine   [facility]

## 2017-07-17 NOTE — PROGRESS NOTES
Yelling, cursing, slapping, punching, confused to place time situation, Ativan IV administered, AvaSys monitor in use

## 2017-07-17 NOTE — PLAN OF CARE
Problem: Patient Care Overview  Goal: Plan of Care Review  PT REQUIRES TOTAL A FOR GROSS FUNC MOBILITY    Outcome: Ongoing (interventions implemented as appropriate)    Reviewed plan of care, including indications and possible side effects of administered medications. Remains free from injury at this time. Respirations even and unlabored. Bed locked and in lowest position. Call light within reach. Side rails up x2.  Bed alarm on. Avasys in room. Patient sleeping in between care. Oriented to time and situation. Mood is pleasant.     12 hour chart check complete.

## 2017-07-17 NOTE — ASSESSMENT & PLAN NOTE
With chronic respiratory failure  - Budesonide and arformoterol nebulizer treatment.  - Duonebs PRN.  - Continue home dose O2 @ 2L per NC

## 2017-07-17 NOTE — ASSESSMENT & PLAN NOTE
- S/P left radius and ulna ORIF  - Post-op management per Orthopedic Surgery (primary team).  - Analgesia prn.   - Case discussed with social work. SNF placement pending.  -PT/OT eval and treat

## 2017-07-17 NOTE — ASSESSMENT & PLAN NOTE
Nutrition Problem:  Decreased nutrient needs (sodium)     Related to (etiology):   Current medical condition     Signs and Symptoms (as evidenced by):   /77 and hx of hypertension     Interventions/Recommendations (treatment strategy):  1. Continue current diet. 2. Will send oral supplements with meals (Boost Plus)  3. Will continue to encourage PO intake  4.Discussed importance of adequate intake and sources of healthy calories to increase overall calorie intake. 5. Patient and Family were educated on LowNa diet. Pt was given a handout from the nutrition care manual on Low-Sodium Nutrition Therapy.  Pt/family were provided contact information for further questions.     Nutrition Diagnosis Status:   New

## 2017-07-17 NOTE — PT/OT/SLP PROGRESS
Occupational Therapy      Ibeth Schroeder  MRN: [id number]    Patient not seen today secondary to PT GETTING CHANGE. WILL COMPLETE TX SESSION ON AT LATER TIME.    Charito Lindsey, OT   0946  7/17/2017

## 2017-07-17 NOTE — ASSESSMENT & PLAN NOTE
- Suspect secondary to EtOH withdrawal.  - PRN Ativan and Haldol.  - Monitor closely for DTs  Had Neuromedical evaluation within last 2 months with negative findings except for low thiamine

## 2017-07-17 NOTE — SUBJECTIVE & OBJECTIVE
Interval History:     Presently, pt sitting in chair at bedside.  present in room. Left arm in splint and large amount of bruising present to left shoulder. Swelling to left hand. Pt is cooperative and confused at times. Advised  waiting to discuss pelvic fracture with Dr. Lizarraga.  stated patient fell down a flight of stair at night. He explains she has a shortened leg. Pt denied any spinous process tenderness.     Review of Systems   Constitutional: Positive for activity change. Negative for chills, diaphoresis, fatigue and fever.   HENT: Negative for congestion and sore throat.    Eyes: Negative for visual disturbance.   Respiratory: Negative for apnea, cough, chest tightness, shortness of breath and wheezing.    Cardiovascular: Negative for chest pain, palpitations and leg swelling.   Gastrointestinal: Negative for abdominal distention, abdominal pain, blood in stool, constipation, diarrhea, nausea and vomiting.   Genitourinary: Negative for dysuria and hematuria.   Musculoskeletal: Positive for arthralgias and gait problem. Negative for back pain.   Skin: Negative for pallor, rash and wound.   Neurological: Negative for dizziness, tremors, seizures, syncope, speech difficulty, weakness, light-headedness, numbness and headaches.   Hematological: Negative for adenopathy.   Psychiatric/Behavioral: Positive for decreased concentration. Negative for agitation, confusion and hallucinations.        Memory loss   All other systems reviewed and are negative.    Objective:     Vital Signs (Most Recent):  Temp: 98.7 °F (37.1 °C) (07/17/17 1239)  Pulse: 79 (07/17/17 1239)  Resp: 16 (07/17/17 1239)  BP: (!) 166/77 (07/17/17 1239)  SpO2: 95 % (07/17/17 1239) Vital Signs (24h Range):  Temp:  [98 °F (36.7 °C)-99.6 °F (37.6 °C)] 98.7 °F (37.1 °C)  Pulse:  [] 79  Resp:  [16-20] 16  SpO2:  [87 %-95 %] 95 %  BP: ()/(56-99) 166/77     Weight: 52.2 kg (115 lb 1.3 oz)  Body mass index is 18.58  kg/m².    Intake/Output Summary (Last 24 hours) at 07/17/17 1358  Last data filed at 07/17/17 1013   Gross per 24 hour   Intake             1100 ml   Output                0 ml   Net             1100 ml      Physical Exam   Constitutional: She appears well-developed and well-nourished. No distress.   HENT:   Head: Normocephalic and atraumatic.   Eyes: Conjunctivae are normal.   Neck: Normal range of motion. Neck supple. No JVD present.   Cardiovascular: Normal rate, regular rhythm, normal heart sounds and intact distal pulses.  Exam reveals no gallop and no friction rub.    No murmur heard.  Pulses:       Dorsalis pedis pulses are 2+ on the right side, and 2+ on the left side.        Posterior tibial pulses are 2+ on the right side, and 2+ on the left side.   Pulmonary/Chest: Effort normal and breath sounds normal. No respiratory distress. She has no wheezes. She has no rales.   Abdominal: Soft. Bowel sounds are normal. She exhibits no distension. There is no tenderness.   Musculoskeletal: She exhibits tenderness. She exhibits no edema or deformity.        Left hip: She exhibits tenderness (Pain with ROM ).        Arms:  Left arm with ace wrap dressing, CDI. + swelling and Limited ROM. 2+ Radial pulse, capillary refill  <2.   Neurological: She is alert. She is not disoriented. She exhibits normal muscle tone.   Skin: Skin is warm and dry. No rash noted.   Psychiatric:   Positive for hallucinations and confusion.   Nursing note and vitals reviewed.      Significant Labs:   CBC:   Recent Labs  Lab 07/16/17  0547 07/17/17  0506   WBC 7.46 8.73   HGB 7.8* 8.4*   HCT 23.8* 26.1*    310     CMP: No results for input(s): NA, K, CL, CO2, GLU, BUN, CREATININE, CALCIUM, PROT, ALBUMIN, BILITOT, ALKPHOS, AST, ALT, ANIONGAP, EGFRNONAA in the last 48 hours.    Invalid input(s): ESTGFAFR[place]  All pertinent labs within the past 24 hours have been reviewed.    Significant Imaging: I have reviewed all pertinent imaging  results/findings within the past 24 hours.

## 2017-07-17 NOTE — PLAN OF CARE
Problem: Patient Care Overview  Goal: Plan of Care Review  PT REQUIRES TOTAL A FOR GROSS FUNC MOBILITY    Outcome: Ongoing (interventions implemented as appropriate)  PT REQUIRES MAX A X 2 FOR STAND PIVOT T/F TO CHAIR

## 2017-07-17 NOTE — PLAN OF CARE
07/17/17 1014   Final Note   Assessment Type Final Discharge Note   Discharge Disposition SNF   Phoned Rebeca, [phone]. Patient accepted pending Lyle auth at [facility].   1320 Spoke with Lyle Simmons. Clarified that the authorization for [facility] is the first choice. She stated that she will release the auth.   Informed by NP that patient is not ready for discharge.   Phoned Lyle Simmons, and Rad[facility], informed them that patient is not ready for transfer.

## 2017-07-17 NOTE — PLAN OF CARE
Problem: Patient Care Overview  Goal: Plan of Care Review  PT REQUIRES TOTAL A FOR GROSS FUNC MOBILITY    Outcome: Ongoing (interventions implemented as appropriate)  Recommendations     Recommendation/Intervention: 1. Continue current diet. 2. Will send oral supplements with meals (Boost Plus)  3. Will continue to encourage PO intake  4.Discussed importance of adequate intake and sources of healthy calories to increase overall calorie intake. 5. Patient and Family were educated on LowNa diet. Pt was given a handout from the nutrition care manual on Low-Sodium Nutrition Therapy.  Pt/family were provided contact information for further questions.   Goals: Intake of % of meals and oral supplements   Nutrition Goal Status: new  Communication of RD Recs:  (sticky note and care plan )

## 2017-07-17 NOTE — ASSESSMENT & PLAN NOTE
Hct 9.0 >> 8.4  Will transfuse if pt becomes symptomatic or Hgb < 7   Will continue to monitor

## 2017-07-18 VITALS
OXYGEN SATURATION: 93 % | SYSTOLIC BLOOD PRESSURE: 156 MMHG | WEIGHT: 115.06 LBS | TEMPERATURE: 98 F | BODY MASS INDEX: 18.49 KG/M2 | HEART RATE: 85 BPM | RESPIRATION RATE: 18 BRPM | HEIGHT: 66 IN | DIASTOLIC BLOOD PRESSURE: 93 MMHG

## 2017-07-18 PROBLEM — I71.40 ABDOMINAL AORTIC ANEURYSM (AAA) 3.0 CM TO 5.0 CM IN DIAMETER IN FEMALE: Status: ACTIVE | Noted: 2017-07-18

## 2017-07-18 PROBLEM — D62 ACUTE BLOOD LOSS ANEMIA: Status: RESOLVED | Noted: 2017-07-17 | Resolved: 2017-07-18

## 2017-07-18 PROBLEM — S32.302A CLOSED FRACTURE OF LEFT ILIAC WING: Status: ACTIVE | Noted: 2017-07-18

## 2017-07-18 LAB
BASOPHILS # BLD AUTO: 0.02 K/UL
BASOPHILS NFR BLD: 0.3 %
DIFFERENTIAL METHOD: ABNORMAL
EOSINOPHIL # BLD AUTO: 0.3 K/UL
EOSINOPHIL NFR BLD: 4.5 %
ERYTHROCYTE [DISTWIDTH] IN BLOOD BY AUTOMATED COUNT: 14.1 %
HCT VFR BLD AUTO: 25.5 %
HGB BLD-MCNC: 8.2 G/DL
LYMPHOCYTES # BLD AUTO: 1.5 K/UL
LYMPHOCYTES NFR BLD: 21.3 %
MCH RBC QN AUTO: 31.5 PG
MCHC RBC AUTO-ENTMCNC: 32.2 %
MCV RBC AUTO: 98 FL
MONOCYTES # BLD AUTO: 0.7 K/UL
MONOCYTES NFR BLD: 10.2 %
NEUTROPHILS # BLD AUTO: 4.4 K/UL
NEUTROPHILS NFR BLD: 63.7 %
PLATELET # BLD AUTO: 335 K/UL
PMV BLD AUTO: 10 FL
RBC # BLD AUTO: 2.6 M/UL
WBC # BLD AUTO: 6.87 K/UL

## 2017-07-18 PROCEDURE — 36415 COLL VENOUS BLD VENIPUNCTURE: CPT

## 2017-07-18 PROCEDURE — 25000242 PHARM REV CODE 250 ALT 637 W/ HCPCS: Performed by: NURSE PRACTITIONER

## 2017-07-18 PROCEDURE — 63600175 PHARM REV CODE 636 W HCPCS: Performed by: ORTHOPAEDIC SURGERY

## 2017-07-18 PROCEDURE — 25000003 PHARM REV CODE 250: Performed by: NURSE PRACTITIONER

## 2017-07-18 PROCEDURE — 97530 THERAPEUTIC ACTIVITIES: CPT

## 2017-07-18 PROCEDURE — 63600175 PHARM REV CODE 636 W HCPCS: Performed by: INTERNAL MEDICINE

## 2017-07-18 PROCEDURE — 97116 GAIT TRAINING THERAPY: CPT

## 2017-07-18 PROCEDURE — 85025 COMPLETE CBC W/AUTO DIFF WBC: CPT

## 2017-07-18 PROCEDURE — 94640 AIRWAY INHALATION TREATMENT: CPT

## 2017-07-18 PROCEDURE — 25000003 PHARM REV CODE 250: Performed by: INTERNAL MEDICINE

## 2017-07-18 PROCEDURE — 97110 THERAPEUTIC EXERCISES: CPT

## 2017-07-18 RX ORDER — HYDROCODONE BITARTRATE AND ACETAMINOPHEN 7.5; 325 MG/1; MG/1
1 TABLET ORAL EVERY 6 HOURS PRN
Qty: 12 TABLET | Refills: 0 | Status: SHIPPED | OUTPATIENT
Start: 2017-07-18 | End: 2017-07-18

## 2017-07-18 RX ORDER — HYDROCODONE BITARTRATE AND ACETAMINOPHEN 7.5; 325 MG/1; MG/1
1 TABLET ORAL EVERY 6 HOURS PRN
Qty: 12 TABLET | Refills: 0 | Status: SHIPPED | OUTPATIENT
Start: 2017-07-18 | End: 2017-07-27

## 2017-07-18 RX ORDER — LANOLIN ALCOHOL/MO/W.PET/CERES
400 CREAM (GRAM) TOPICAL 2 TIMES DAILY
Qty: 14 TABLET | Refills: 0 | Status: SHIPPED | OUTPATIENT
Start: 2017-07-18 | End: 2017-07-25

## 2017-07-18 RX ORDER — SODIUM,POTASSIUM PHOSPHATES 280-250MG
1 POWDER IN PACKET (EA) ORAL
Qty: 28 PACKET | Refills: 0 | Status: SHIPPED | OUTPATIENT
Start: 2017-07-18 | End: 2017-07-25

## 2017-07-18 RX ORDER — QUETIAPINE FUMARATE 25 MG/1
25 TABLET, FILM COATED ORAL NIGHTLY
Qty: 30 TABLET | Refills: 0 | Status: SHIPPED | OUTPATIENT
Start: 2017-07-18 | End: 2017-08-09 | Stop reason: SDUPTHER

## 2017-07-18 RX ADMIN — IPRATROPIUM BROMIDE AND ALBUTEROL SULFATE 3 ML: .5; 3 SOLUTION RESPIRATORY (INHALATION) at 08:07

## 2017-07-18 RX ADMIN — LEVOTHYROXINE SODIUM 100 MCG: 25 TABLET ORAL at 05:07

## 2017-07-18 RX ADMIN — BUSPIRONE HYDROCHLORIDE 15 MG: 5 TABLET ORAL at 02:07

## 2017-07-18 RX ADMIN — POTASSIUM & SODIUM PHOSPHATES POWDER PACK 280-160-250 MG 1 PACKET: 280-160-250 PACK at 11:07

## 2017-07-18 RX ADMIN — LORAZEPAM 1 MG: 2 INJECTION INTRAMUSCULAR; INTRAVENOUS at 12:07

## 2017-07-18 RX ADMIN — ENOXAPARIN SODIUM 50 MG: 100 INJECTION SUBCUTANEOUS at 10:07

## 2017-07-18 RX ADMIN — MAGNESIUM OXIDE TAB 400 MG (241.3 MG ELEMENTAL MG) 400 MG: 400 (241.3 MG) TAB at 08:07

## 2017-07-18 RX ADMIN — PANTOPRAZOLE SODIUM 40 MG: 40 TABLET, DELAYED RELEASE ORAL at 08:07

## 2017-07-18 RX ADMIN — POTASSIUM & SODIUM PHOSPHATES POWDER PACK 280-160-250 MG 1 PACKET: 280-160-250 PACK at 07:07

## 2017-07-18 RX ADMIN — Medication 100 MG: at 10:07

## 2017-07-18 RX ADMIN — DULOXETINE 60 MG: 30 CAPSULE, DELAYED RELEASE ORAL at 08:07

## 2017-07-18 RX ADMIN — BUDESONIDE 0.25 MG: 0.25 SUSPENSION RESPIRATORY (INHALATION) at 08:07

## 2017-07-18 RX ADMIN — METOPROLOL TARTRATE 50 MG: 50 TABLET ORAL at 08:07

## 2017-07-18 RX ADMIN — BUSPIRONE HYDROCHLORIDE 15 MG: 5 TABLET ORAL at 05:07

## 2017-07-18 NOTE — PROGRESS NOTES
Spoke with Rohini Sargent NP. Reviewed chart, ready for discharge to SNF.   Phoned Lyle Simmons. Left message regarding plan.   Phoned [facility], Requested updated information to resubmit to [employer] for authorization. Information sent via [employer].

## 2017-07-18 NOTE — PROGRESS NOTES
SUBJECTIVE:  Patient is status post Left Radius and ulna ORIF .  She has had left hip pain for one week.    The patient is resting well.She is alert and oriented times three. She is being treated for alcohol withdraw.    Patient complains of  6 /10 pain that is better with the  pain meds and aggravated with movement.              Past Medical History:   Diagnosis Date    Alcohol dependence       per patient in the past    Allergy      Anemia      Anxiety      Arthritis 6/24/2013    Asthma       per patient    Colon polyp      Colon polyp       colonoscopy 8/26/2013    COPD (chronic obstructive pulmonary disease)      Depression      Diverticulosis      Diverticulosis       colonoscopy 8/26/2013    Hiatal hernia       CXR 2/25/2015--Large hiatal hernia.     Hyperlipidemia      Hypertension      Hypocalcemia 7/6/2016    Hypothyroidism 6/24/2013    Osteoporosis      Pulmonary embolism       per patient unsure of date    Restless leg syndrome      RLS (restless legs syndrome)      Seizure 6/24/2013    Tobacco dependence       resolved    Trouble in sleeping                  Past Surgical History:   Procedure Laterality Date    ADENOIDECTOMY        APPENDECTOMY        CHOLECYSTECTOMY        COLONOSCOPY   2013    FINGER SURGERY        HERNIA REPAIR        HYSTERECTOMY        JOINT REPLACEMENT        TONSILLECTOMY        TOTAL HIP ARTHROPLASTY         L x2              Review of patient's allergies indicates:   Allergen Reactions    Nsaids (non-steroidal anti-inflammatory drug) Hives and Shortness Of Breath    Pcn [penicillins] Hives and Shortness Of Breath    Sulfa (sulfonamide antibiotics) Hives and Shortness Of Breath    Aspirin      Macrodantin [nitrofurantoin macrocrystalline] Hives    Wellbutrin [bupropion hcl]        No current facility-administered medications on file prior to encounter.                   Current Outpatient Prescriptions on File Prior to Encounter    Medication Sig Dispense Refill    allopurinol (ZYLOPRIM) 100 MG tablet Take 1 tablet (100 mg total) by mouth once daily. 90 tablet 5    busPIRone (BUSPAR) 15 MG tablet 1 tab po tid 90 tablet 1    clindamycin (CLEOCIN) 150 MG capsule Take 2 capsules (300 mg total) by mouth every 8 (eight) hours. 42 capsule 0    diclofenac sodium (VOLTAREN) 1 % Gel Apply 4 g topically 4 (four) times daily. Pt has shoulder pain and knee pain 200 g 5    diphenoxylate-atropine 2.5-0.025 mg (LOMOTIL) 2.5-0.025 mg per tablet     0    duloxetine (CYMBALTA) 60 MG capsule Take 1 capsule (60 mg total) by mouth once daily. 90 capsule 0    ferrous sulfate 325 (65 FE) MG EC tablet Take 1 tablet (325 mg total) by mouth once a week. 90 tablet 3    fluticasone (FLONASE) 50 mcg/actuation nasal spray instill 2 sprays into each nostril once daily 16 g 11    fluticasone-salmeterol (ADVAIR HFA) 115-21 mcg/actuation HFAA inhale 2 puffs INTO THE LUNGS twice a day 12 g 12    hydrocodone-acetaminophen 7.5-325mg (NORCO) 7.5-325 mg per tablet Take 1 tablet by mouth every 6 (six) hours as needed for Pain. 15 tablet 0    ipratropium-albuterol (COMBIVENT RESPIMAT)  mcg/actuation inhaler inhale 1 puff INTO THE LUNGS four times a day 3 Package 4    levothyroxine (SYNTHROID) 100 MCG tablet TAKE 1 TABLET ONE TIME DAILY 90 tablet 3    methocarbamol (ROBAXIN) 500 MG Tab Take 1 tablet (500 mg total) by mouth 2 (two) times daily as needed (for muscle spasms.). 60 tablet 1    metoprolol succinate (TOPROL-XL) 50 MG 24 hr tablet Take 1 tablet (50 mg total) by mouth once daily. 90 tablet 3    MULTIVITAMIN W-MINERALS/LUTEIN (CENTRUM SILVER ORAL) Take 1 tablet by mouth once daily.        pantoprazole (PROTONIX) 40 MG tablet Take 1 tablet (40 mg total) by mouth once daily. 90 tablet 3    pravastatin (PRAVACHOL) 10 MG tablet TAKE 1 TABLET ONE TIME DAILY 90 tablet 0    ropinirole (REQUIP) 1 MG tablet Take 1 tablet (1 mg total) by mouth nightly. 90 tablet  3    thiamine 250 MG tablet Take 500 mg by mouth once daily.        tramadol (ULTRAM) 50 mg tablet Take 1 tablet (50 mg total) by mouth 2 (two) times daily as needed for Pain. 60 tablet 2          ROS:  GENERAL: No fever, chills, fatigability or weight loss.  SKIN: No rashes, itching or changes in color or texture of skin.  HEAD: No headaches or recent head trauma.  EYES: Visual acuity fine. No photophobia, ocular pain or diplopia.  EARS: Denies ear pain, discharge or vertigo.  NOSE: No loss of smell, no epistaxis or postnasal drip.  MOUTH & THROAT: No hoarseness or change in voice. No excessive gum bleeding.  NODES: Denies swollen glands.  CHEST: Denies BRADY, cyanosis, wheezing, cough and sputum production.  CARDIOVASCULAR: Denies chest pain, PND, orthopnea or reduced exercise tolerance.  ABDOMEN: Appetite fine. No weight loss. Denies diarrhea, abdominal pain, hematemesis or blood in stool.  URINARY: No flank pain, dysuria or hematuria.  PERIPHERAL VASCULAR: No claudication or cyanosis.  NEUROLOGIC: No history of seizures, paralysis, alteration of gait or coordination.  MUSCULOSKELETAL: See HPI     PE:  APPEARANCE: Well nourished, well developed, in no acute distress.   HEAD: Normocephalic, atraumatic.  EYES: PERRL. EOMI.   EARS: TM's intact. Light reflex normal. No retraction or perforation.   NOSE: Mucosa pink. Airway clear.  MOUTH & THROAT: No tonsillar enlargement. No pharyngeal erythema or exudate. No stridor.  NECK: Supple.   NODES: No cervical, axillary or inguinal lymph node enlargement.  CHEST: Lungs clear to auscultation.  CARDIOVASCULAR: Normal S1, S2. No rubs, murmurs or gallops.  ABDOMEN: Bowel sounds normal. Not distended. Soft. No tenderness or masses.  NEUROLOGIC: Cranial Nerves: II-XII grossly intact, also see MUSCULOSKELETAL  MUSCULOSKELETAL:     BP (!) 156/92 (BP Location: Right arm, Patient Position: Lying, BP Method: Automatic)   Pulse 82   Temp 98 °F (36.7 °C) (Oral)   Resp 16   Ht 5'  "5.98" (1.676 m)   Wt 52.2 kg (115 lb 1.3 oz)   LMP  (LMP Unknown)   SpO2 (!) 94%   Breastfeeding? No   BMI 18.58 kg/m²                   Left  Wrist   Dressing intact, 2 plus radial  artery and ulnar artery pulses, light touch intact Left upper extremity.  All digits are warm. No erythema, no warmth, no drainage, No swelling, no significant tenderness.     Left Hip- pain on range of motion, in the groin area.          ASSESSMENT:   The patient is stable.  The CAT scan of the pelvis shows the patient has a iliac wing fracture that does not involve the weightbearing surface of the left hip joint.     Diagnosis:  1.  Left iliac wing fracture     PLAN:  Walk assisted ambulation full weight-bear left lower extremity.  The patient can anticipate in physical therapy with pain being the limiting factor for the left hip range of motion.  Continue pain medication  Continue occupational therapy and physical therapy.  The patient can follow-up 1 week following discharge from the hospital       "

## 2017-07-18 NOTE — PROGRESS NOTES
CM contacted pt's  kenan and updated him on d/c plan.   verbalized understanding of current d/c plan

## 2017-07-18 NOTE — NURSING
First attempt to call report to facility.     Notified [employer] system of patient DC. Spoke with Kemal.

## 2017-07-18 NOTE — PT/OT/SLP PROGRESS
Physical Therapy      Ibeth Schroeder  MRN: [id number]    S: PT MET IN RM SUP IN BED.  O: PT COMPLETED B LE TE X 15 REPS OF AP, HS, HIP ADD/ABD, AND GLUT SETS WITH AAROM ON L LE. PT COMPLETED L UE FINGER FLEX /EXT , AND SHOULDER FLEX /EXT AND ADD/ABD. PT LEFT WITH L UE ELEVATED ALL NEEDS MET . PT EDUCATED TO COMPLETE ROM TE LATER IN THE DAY.   A: PT KENNY TE WELL WITH MIN COMPLAINTS.   P: CONT POC    Piper Reardon, PT

## 2017-07-18 NOTE — PROGRESS NOTES
Spoke with Yen the [employer] for [employer] to inform her that I will be the nurse for the night and gave her my number of [phone]. Will continue to monitor.

## 2017-07-18 NOTE — PLAN OF CARE
Problem: Patient Care Overview  Goal: Plan of Care Review  PT REQUIRES TOTAL A FOR GROSS FUNC MOBILITY    Outcome: Ongoing (interventions implemented as appropriate)  Fall prevention precautions maintained, pt remained free of falls throughout shift, call bell and personal items within reach, no complaints of pain throughout shift, av[employer] system in use in pt room. 24 hour chart check completed. Will continue to monitor

## 2017-07-18 NOTE — PT/OT/SLP PROGRESS
Physical Therapy  Treatment    Ibeth Schroeder   MRN: [id number]   Admitting Diagnosis: Radius/ulna fracture    PT Received On: 07/18/17  PT Start Time: 0851     PT Stop Time: 0915    PT Total Time (min): 24 min       Billable Minutes:  Gait [id number] and Therapeutic Activity 10    Treatment Type: Treatment  PT/PTA: PT     PTA Visit Number: 2       General Precautions: Standard, fall  Orthopedic Precautions: LUE non weight bearing   Braces:           Subjective:  Communicated with NURSE URDY AND EPIC CHART REVIEW  prior to session.   PT AGREED TO TO TX     Pain/Comfort  Pain Rating 1: 5/10  Location - Side 1: Left  Location 1: leg  Pain Rating Post-Intervention 1: 5/10    Objective:   Patient found with: telemetry, peripheral IV, bed alarm    Functional Mobility:  Bed Mobility:   Rolling/Turning to Left: Moderate assistance  Scooting/Bridging: Moderate Assistance  Supine to Sit: Moderate Assistance    Transfers:  Sit <> Stand Assistance: Maximum Assistance (X2)  Sit <> Stand Assistive Device: No Assistive Device  Bed <> Chair Technique: Stand Pivot  Bed <> Chair Assistance: Maximum Assistance (X2)  Bed <> Chair Assistive Device: No Assistive Device    Gait:   Gait Distance: PT GT TRAINED 2X6' WITH HHA X 2 WITH SEVERE POST LEAN AND NARROW ONDINA  Assistance 1: Maximum assistance, With assist of two  Gait Assistive Device: No device  Gait Deviation(s): decreased tanesha, backward lean    Therapeutic Activities and Exercises:  PT SEATED EOB WITH POST LEAN. PT STOOD X 2 TRIALS WITH MAX A X 2 AND GT TRAINED 2X6' WITH MAX A X 2 AND POST LEAN. PT WITH NARROW ONDINA AND GIVEN VERBAL CUES AND DEMONSTRATED MOBILITY AND ANT WT SHIFT. PT RETURNED TO CHAIR SEATED FOR REST BREAK. PT LEFT SEATED WITH ALL NEEDS MET AND NURSE AWARE.      AM-PAC 6 CLICK MOBILITY  How much help from another person does this patient currently need?   1 = Unable, Total/Dependent Assistance  2 = A lot, Maximum/Moderate Assistance  3 = A little, Minimum/Contact  Guard/Supervision  4 = None, Modified [place]/Independent    Turning over in bed (including adjusting bedclothes, sheets and blankets)?: 2  Sitting down on and standing up from a chair with arms (e.g., wheelchair, bedside commode, etc.): 2  Moving from lying on back to sitting on the side of the bed?: 2  Moving to and from a bed to a chair (including a wheelchair)?: 2  Need to walk in hospital room?: 2  Climbing 3-5 steps with a railing?: 1 (UNABLE)  Total Score: 11    AM-PAC Raw Score CMS G-Code Modifier Level of Impairment Assistance   6 % Total / Unable   7 - 9 CM 80 - 100% Maximal Assist   10 - 14 CL 60 - 80% Moderate Assist   15 - 19 CK 40 - 60% Moderate Assist   20 - 22 CJ 20 - 40% Minimal Assist   23 CI 1-20% SBA / CGA   24 CH 0% Independent/ Mod I     Patient left up in chair with call button in reach and NURSE notified.    Assessment:  PT PROGRESSING WITH GT AND [facility] MOBILITY     Rehab identified problem list/impairments: Rehab identified problem list/impairments: weakness, impaired endurance, gait instability, impaired functional mobilty, impaired balance, decreased lower extremity function, decreased upper extremity function, pain    Rehab potential is good.    Activity tolerance: Fair    Discharge recommendations: Discharge Facility/Level Of Care Needs: nursing facility, skilled     Barriers to discharge: Barriers to Discharge: Decreased caregiver support    Equipment recommendations: Equipment Needed After Discharge: none     GOALS:    Physical Therapy Goals        Problem: Physical Therapy Goal    Goal Priority Disciplines Outcome Goal Variances Interventions   Physical Therapy Goal     PT/OT, PT      Description:  PT WILL BE SEEN FOR P.T. FOR A MIN OF 5 OUT OF 7 DAYS A WEEK  LT17  1. PT WILL COMPLETE BED MOBILITY WITH MOD A.  2. PT WILL STAND MAX A X 1.  3. PT WILL T/F TO CHAIR WITH MAX A X 1  4. PT WILL COMPLETE B LE TE X 20 REPS FOR STRENGTHENING.  5. PT WILL GT TRAIN WITH  APPROPRIATE AD X 10' WITH MAX A.                      PLAN:    Patient to be seen  (5-7 times a wk as tolerated. )  to address the above listed problems via gait training, therapeutic activities, therapeutic exercises  Plan of Care expires: 07/20/17  Plan of Care reviewed with: patient         Piper Reardon, PT  07/18/2017

## 2017-07-18 NOTE — PLAN OF CARE
07/18/17 1524   Final Note   Assessment Type Final Discharge Note   Discharge Disposition SNF   Authorization for SNF received from Lyle Simmons. Auth number [id number], effective 7/18/17 - 7/25/17. Provided information through [facility] [employer] to [facility], with AVS and discharge summary. Phoned [facility]. She will arrange transportation. Provided packet to nursing with report number, [phone].   Informed patient and her  of transfer information.

## 2017-07-18 NOTE — PLAN OF CARE
Problem: Patient Care Overview  Goal: Plan of Care Review  PT REQUIRES TOTAL A FOR GROSS FUNC MOBILITY    Outcome: Ongoing (interventions implemented as appropriate)  PT PROGRESSING WITH GT TRAINING TODAY MAX A X2 2X6'

## 2017-07-18 NOTE — NURSING
Verbalized understanding of discharge.   at bedside.  Transport on unit to take patient to facility.  No acute distress or injury noted to patient. Patient to wheelchair to vehicle to home.

## 2017-07-18 NOTE — PT/OT/SLP PROGRESS
Occupational Therapy  Treatment    Ibeth Schroeder   MRN: [id number]   Admitting Diagnosis: Radius/ulna fracture    OT Date of Treatment: 07/18/17   OT Start Time: 0853  OT Stop Time: 0918  OT Total Time (min): 25 min    Billable Minutes:  Therapeutic Activity 25 minutes    General Precautions: Standard, fall  Orthopedic Precautions: LUE non weight bearing  Braces:           Subjective:  Communicated with nurse tejada and epic chart review prior to session.    Pain/Comfort  Pain Rating 1: 0/10    Objective:  Patient found with: telemetry, peripheral IV, bed alarm     Functional Mobility:  Bed Mobility:  Rolling/Turning to Left: Moderate assistance, With assist of 2  Rolling/Turning Right: Moderate assistance, With assist of 2  Scooting/Bridging: Moderate Assistance, With assist of 2  Supine to Sit: Moderate Assistance, With assist of 2    Transfers:   Sit <> Stand Assistance: Maximum Assistance (x2)  Sit <> Stand Assistive Device: No Assistive Device  Bed <> Chair Technique: Stand Pivot  Bed <> Chair Transfer Assistance: Maximum Assistance (x2)  Bed <> Chair Assistive Device: No Assistive Device    Functional Ambulation: pt ambulated 6 feet x 2 with max a     Activities of Daily Living:     Feeding adaptive equipment: na     UE adaptive equipment: na     LE adaptive equipment: na                    Bathing adaptive equipment: na    Balance:   Static Sit: FAIR+: Able to take MINIMAL challenges from all directions  Dynamic Sit: POOR: N/A  Static Stand: 0: Needs MAXIMAL assist to maintain   Dynamic stand: 0: N/A    Therapeutic Activities and Exercises:  Pt req max a with sit<>stand t/f's and req max a x 2 with standing task. Pt with posterior lean and req max verbal and tactile cues to perform task    AM-PAC 6 CLICK ADL   How much help from another person does this patient currently need?   1 = Unable, Total/Dependent Assistance  2 = A lot, Maximum/Moderate Assistance  3 = A little, Minimum/Contact Guard/Supervision  4 =  "None, Modified [place]/Independent    Putting on and taking off regular lower body clothing? : 1  Bathing (including washing, rinsing, drying)?: 1  Toileting, which includes using toilet, bedpan, or urinal? : 2  Putting on and taking off regular upper body clothing?: 2  Taking care of personal grooming such as brushing teeth?: 3  Eating meals?: 3  Total Score: 12     AM-PAC Raw Score CMS "G-Code Modifier Level of Impairment Assistance   6 % Total / Unable   7 - 8 CM 80 - 100% Maximal Assist   9-13 CL 60 - 80% Moderate Assist   14 - 19 CK 40 - 60% Moderate Assist   20 - 22 CJ 20 - 40% Minimal Assist   23 CI 1-20% SBA / CGA   24 CH 0% Independent/ Mod I       Patient left up in chair with all lines intact, call button in reach, nurse terrell notified and Lamont burleson present    ASSESSMENT:  Ibeth Schroeder is a 66 y.o. female with a medical diagnosis of Radius/ulna fracture and presents with Impaired adl's, decrease b ue  Strength/endurance, impaired functional mobility and impaired t/f's. Pt will continue to benefit from skilled o.t.      Rehab identified problem list/impairments: Rehab identified problem list/impairments: weakness, impaired self care skills, impaired balance, decreased coordination, decreased safety awareness, decreased ROM, impaired endurance, impaired functional mobilty, decreased upper extremity function, gait instability, impaired sensation, decreased lower extremity function    Rehab potential is good.    Activity tolerance: Fair    Discharge recommendations: Discharge Facility/Level Of Care Needs: nursing facility, skilled     Barriers to discharge: Barriers to Discharge: Decreased caregiver support    Equipment recommendations: none     GOALS:    Occupational Therapy Goals        Problem: Occupational Therapy Goal    Goal Priority Disciplines Outcome Interventions   Occupational Therapy Goal     OT, PT/OT Ongoing (interventions implemented as appropriate)    Description:  ot goals " to be met by 7-20-17  1. Mod a with ue dressing  2. Mod a with le dressing  3. Mod a with supine<>sit t/f's   4. Mod a with g/h task seated eob with fair sitting balance                    Plan:  Patient to be seen 3 x/week to address the above listed problems via self-care/home management, therapeutic activities, therapeutic exercises  Plan of Care expires: 07/25/17  Plan of Care reviewed with: patient         Charito Lindsey, OT  07/18/2017

## 2017-07-19 ENCOUNTER — PATIENT OUTREACH (OUTPATIENT)
Dept: ADMINISTRATIVE | Facility: CLINIC | Age: 67
End: 2017-07-19

## 2017-07-19 NOTE — PHYSICIAN QUERY
PT Name: Ibeth Schroeder  MR #: [id number]     Physician Query Form - Documentation Clarification      CDS/: FRANCESCO Torres, RN, CCDS               Contact information: [email or web address]    This form is a permanent document in the medical record.     Query Date: July 19, 2017    By submitting this query, we are merely seeking further clarification of documentation. Please utilize your independent clinical judgment when addressing the question(s) below.    The Medical record reflects the following:    Supporting Clinical Findings Location in Medical Record     Magnesium = 1.0-->1.3    Magnesium Sulfate 2 grams IV   7/15-7/16 Labs    7/15, 7/16 MAR                                                                                      Doctor, Please specify diagnosis or diagnoses associated with above clinical findings.    Provider Use Only    (  x ) Hypomagnesemia    (   ) Other (specify): ______________________________________________                                                                                                                     [  ] Clinically undetermined

## 2017-07-19 NOTE — PHYSICIAN QUERY
PT Name: Ibeth Schroeder  MR #: [id number]     Physician Query Form - Documentation Clarification      CDS/: FRANCESCO Torres, RN, CCDS               Contact information: [email or web address]    This form is a permanent document in the medical record.     Query Date: July 19, 2017    By submitting this query, we are merely seeking further clarification of documentation. Please utilize your independent clinical judgment when addressing the question(s) below.    The Medical record reflects the following:    Supporting Clinical Findings Location in Medical Record       Phosphorous = 1.7-->3.3    Sodium Phosphate 20.01 mmol IVPB     7/15-7/16 Labs    7/15 MAR                                                                                      Doctor, Please specify diagnosis or diagnoses associated with above clinical findings.    Provider Use Only      ( x  ) Hypophosphatemia    (   ) Other (specify): ______________________________________                                                                                                                         [  ] Clinically undetermined

## 2017-07-19 NOTE — PHYSICIAN QUERY
PT Name: Ibeth Schroeder  MR #: [id number]    Physician Query Form -Present on Admission (POA) Diagnosis Clarification     CDS/: FRANCESCO Torres, RN, CCDS               Contact information: [email or web address]    This form is a permanent document in the medical record.     Query Date: July 19, 2017    By submitting this query, we are merely seeking further clarification of documentation. Please utilize your independent clinical judgment when addressing the question(s) below.       The Medical record contains the following:    Diagnosis      Supporting Clinical Information   Location in Medical Record   Left iliac wing fracture               History of Present Illness: Ibeth Schroeder is a 66 y.o. female patient who presents to the Emergency Department for left arm pain which onset gradually 2 days ago. Symptoms are constant and moderate in severity. Pt was seen in the ED 2 days ago for similar sx after falling down some stairs.    The CAT scan of the pelvis shows the patient has a iliac wing fracture that does not involve the weightbearing surface of the left hip joint.    Patient is status post Left Radius and ulna ORIF .  She has had left hip pain for one week.  7/18 Orthopedic Surgery Progress Note    7/10 ED Provider Note                  7/18 Orthopedic Surgery Progress Note         Doctor, Please specify Present On Admission (POA) status of left iliac wing fracture.    [ x ] Present on Admission   [  ] Not Present on Admission  [  ] Clinically undetermined

## 2017-07-19 NOTE — PHYSICIAN QUERY
PT Name: Ibeth Schroeder  MR #: [id number]     Physician Query Form - Documentation Clarification      CDS/: FRANCESCO Torres, RN, CCDS               Contact information: [email or web address]    This form is a permanent document in the medical record.     Query Date: July 19, 2017    By submitting this query, we are merely seeking further clarification of documentation. Please utilize your independent clinical judgment when addressing the question(s) below.    The Medical record reflects the following:    Supporting Clinical Findings Location in Medical Record   Ibeth Schroeder is a 66 y.o. female patient who presents to the Emergency Department for left arm pain which onset gradually 2 days ago. Symptoms are constant and moderate in severity. Pt was seen in the ED 2 days ago for similar sx after falling down some stairs.     PMH Osteoporosis    Postoperative Diagnosis:   Left radius and ulna fracture    Patient is status post Left Radius and ulna ORIF .  She has had left hip pain for one week.      The CAT scan of the pelvis shows the patient has a iliac wing fracture that does not involve the weightbearing surface of the left hip joint. 7/10 ED Provider Note              7/11 H&P    Op Note filed 7/15    7/18 Orthopedic Surgery Progress Note                                                                                      Doctor, Please specify diagnosis or diagnoses associated with above clinical findings.    Please further specify the fractures.    Provider Use Only                                                       Traumatic               Osteoporotic                Other(Specify)     Left radius fracture                     (  x )                               (   )                           ________________    Left ulna fracture                         (  x )                               (   )                           ________________    Iliac wing fracture                        ( x  )                                (   )                           ________________                                                                                                                   [  ] Clinically undetermined

## 2017-07-22 NOTE — DISCHARGE SUMMARY
[facility] -   Orthopedics  Discharge Summary      Patient Name: Ibeth Schroeder  MRN: [id number]  Admission Date: 7/10/2017  Hospital Length of Stay: 8 days  Discharge Date and Time:  7/18/17   Attending Physician: No att. providers found   Discharging Provider: Kashmir Méndez Sr, MD  Primary Care Provider: SAGAR BETTS MD    HPI: The patient fell onto an outstretched left hand and sustained a radius and ulna fracture.  The patient admitted to the hospital and underwent an open reduction and internal fixation of the left distal radius and ulna fracture.    Procedure(s) (LRB):  OPEN REDUCTION INTERNAL FIXATION-RADIUS (Left)  OPEN REDUCTION INTERNAL FIXATION-ULNA (Left)      Hospital Course: The patient admitted for a left distal radius and ulna fracture.  Then the patient underwent an open reduction and internal fixation of the distal radius and ulna fracture.  Postoperatively the patient developed delirium tremens secondary to alcohol withdrawal.  On discharge the patient is lucid and oriented ×3. The patient has been discharged to a skilled nursing facility.    Consults         Status Ordering Provider     Inpatient consult to Dietary  Once     Provider:  (Not yet assigned)    Completed NANCY DARLING consult case management/social work  Once     Provider:  (Not yet assigned)    Completed KASHMIR MÉNDEZ SR          Significant Diagnostic Studies:  X-ray of the left wrist    Pending Diagnostic Studies:     None        Final Active Diagnoses:    Diagnosis Date Noted POA    PRINCIPAL PROBLEM:  Radius/ulna fracture [S52.509A, S52.609A] 07/10/2017 Yes    Abdominal aortic aneurysm (AAA) 3.0 cm to 5.0 cm in diameter in female [I71.4] 07/18/2017 Yes    Closed fracture of left iliac wing [S32.302A] 07/18/2017 Yes    Delirium [R41.0] 07/17/2017 Yes    History of ETOH abuse [Z87.898] 07/11/2017 Yes    Dyslipidemia [E78.5] 07/12/2016 Yes     Chronic    Depression [F32.9] 04/20/2016  Yes    Stage 3 severe COPD by GOLD classification [J44.9] 03/16/2016 Yes     Chronic    Hypothyroidism [E03.9] 06/24/2013 Yes     Chronic    Essential hypertension [I10] 06/24/2013 Yes     Chronic    History of seizure [Z87.898] 06/24/2013 Not Applicable     Chronic      Problems Resolved During this Admission:    Diagnosis Date Noted Date Resolved POA    Acute blood loss anemia [D62] 07/17/2017 07/18/2017 Yes    Hypocalcemia [E83.51] 07/15/2017 07/17/2017 No    CKD (chronic kidney disease) stage 3, GFR 30-59 ml/min [N18.3] 08/04/2014 07/17/2017 Yes      Discharged Condition: stable    Disposition: Skilled Nursing Facility    Follow Up: Follow up 1 week following the discharge  Follow-up Information     Binu Lizarraga Sr, MD In 1 week.    Specialty:  Orthopedic Surgery  Why:  Hosp F/U - left iliac wing fracture, left distal radius fracture  Contact information:  [address] [facility] [address] [phone]             6 cm aortic abdominal aneurysm.    Why:  Enlarging since 2013 - needs to see Vascular Doctor for possible intervention           Kosta Liz MD.    Specialty:  Vascular Surgery  Why:  ASAP - Regarding abdominal aneurysm   Contact information:  [address]  3rd Floor  [facility] [id number] [phone]             Brain Perez MD On 8/28/2017.    Specialty:  Psychiatry  Why:  Psychiatry appointment on 8/28 at 3:30 AM.  Please attend.  Contact information:  [address] [facility] [id number] [phone]                 Patient Instructions:     Diet general     Other restrictions (specify):   Order Comments: Murphy assisted ambulation full weight-bearing left lower extremity       Medications:  Reconciled Home Medications:   Discharge Medication List as of 7/18/2017  4:10 PM      START taking these medications    Details   apixaban 2.5 mg Tab Take 1 tablet (2.5 mg total) by mouth 2 (two) times daily., Starting Tue 7/18/2017, Until Sat 8/19/2017, Normal      magnesium  oxide (MAG-OX) 400 mg tablet Take 1 tablet (400 mg total) by mouth 2 (two) times daily., Starting Tue 7/18/2017, Until Tue 7/25/2017, Normal      potassium, sodium phosphates (PHOS-NAK) 280-160-250 mg PwPk Take 1 packet by mouth 4 (four) times daily with meals and nightly., Starting Tue 7/18/2017, Until Tue 7/25/2017, Normal      quetiapine (SEROQUEL) 25 MG Tab Take 1 tablet (25 mg total) by mouth every evening., Starting Tue 7/18/2017, Until Wed 7/18/2018, Normal         CONTINUE these medications which have CHANGED    Details   hydrocodone-acetaminophen 7.5-325mg (NORCO) 7.5-325 mg per tablet Take 1 tablet by mouth every 6 (six) hours as needed for Pain., Starting Tue 7/18/2017, Print         CONTINUE these medications which have NOT CHANGED    Details   allopurinol (ZYLOPRIM) 100 MG tablet Take 1 tablet (100 mg total) by mouth once daily., Starting 4/19/2017, Until Discontinued, Normal      busPIRone (BUSPAR) 15 MG tablet 1 tab po tid, Normal      diclofenac sodium (VOLTAREN) 1 % Gel Apply 4 g topically 4 (four) times daily. Pt has shoulder pain and knee pain, Starting 7/25/2014, Until Tue 5/12/15, Normal      duloxetine (CYMBALTA) 60 MG capsule Take 1 capsule (60 mg total) by mouth once daily., Starting Thu 5/25/2017, Normal      ferrous sulfate 325 (65 FE) MG EC tablet Take 1 tablet (325 mg total) by mouth once a week., Starting 7/22/2015, Until Discontinued, Normal      fluticasone (FLONASE) 50 mcg/actuation nasal spray instill 2 sprays into each nostril once daily, Normal      fluticasone-salmeterol (ADVAIR HFA) 115-21 mcg/actuation HFAA inhale 2 puffs INTO THE LUNGS twice a day, Normal      ipratropium-albuterol (COMBIVENT RESPIMAT)  mcg/actuation inhaler inhale 1 puff INTO THE LUNGS four times a day, Normal      levothyroxine (SYNTHROID) 100 MCG tablet TAKE 1 TABLET ONE TIME DAILY, Normal      metoprolol succinate (TOPROL-XL) 50 MG 24 hr tablet Take 1 tablet (50 mg total) by mouth once daily.,  Starting 1/25/2017, Until Thu 1/25/18, Normal      MULTIVITAMIN W-MINERALS/LUTEIN (CENTRUM SILVER ORAL) Take 1 tablet by mouth once daily., Until Discontinued, Historical Med      pantoprazole (PROTONIX) 40 MG tablet Take 1 tablet (40 mg total) by mouth once daily., Starting 4/28/2016, Until Discontinued, Normal      pravastatin (PRAVACHOL) 10 MG tablet TAKE 1 TABLET ONE TIME DAILY, Normal      thiamine 250 MG tablet Take 500 mg by mouth once daily., Until Discontinued, Historical Med         STOP taking these medications       clindamycin (CLEOCIN) 150 MG capsule Comments:   Reason for Stopping:         diphenoxylate-atropine 2.5-0.025 mg (LOMOTIL) 2.5-0.025 mg per tablet Comments:   Reason for Stopping:         methocarbamol (ROBAXIN) 500 MG Tab Comments:   Reason for Stopping:         oxycodone-acetaminophen (PERCOCET)  mg per tablet Comments:   Reason for Stopping:         ropinirole (REQUIP) 1 MG tablet Comments:   Reason for Stopping:         tramadol (ULTRAM) 50 mg tablet Comments:   Reason for Stopping:               Binu Lizarraga Sr, MD  Orthopedics  [facility]

## 2017-07-25 ENCOUNTER — TELEPHONE (OUTPATIENT)
Dept: ORTHOPEDICS | Facility: CLINIC | Age: 67
End: 2017-07-25

## 2017-07-25 DIAGNOSIS — M25.532 LEFT WRIST PAIN: Primary | ICD-10-CM

## 2017-07-25 NOTE — TELEPHONE ENCOUNTER
Returned Eneida's phone call. Pt was scheduled post op appointment with Dr. Lizarraga on 7/27 for 1:00pm. Eneida verified understanding.

## 2017-07-25 NOTE — TELEPHONE ENCOUNTER
----- Message from Emilie Zaldivar sent at 7/25/2017  9:13 AM CDT -----  Contact: Eneida/[facility]  Please call  to schedule hospital follow up appt for pt to see doctor within 1wk, please call eneida @ [phone].

## 2017-07-27 ENCOUNTER — HOSPITAL ENCOUNTER (OUTPATIENT)
Dept: RADIOLOGY | Facility: HOSPITAL | Age: 67
Discharge: HOME | End: 2017-07-27
Attending: ORTHOPAEDIC SURGERY
Payer: MEDICARE

## 2017-07-27 ENCOUNTER — OFFICE VISIT (OUTPATIENT)
Dept: ORTHOPEDICS | Facility: CLINIC | Age: 67
End: 2017-07-27
Payer: MEDICARE

## 2017-07-27 VITALS
SYSTOLIC BLOOD PRESSURE: 130 MMHG | DIASTOLIC BLOOD PRESSURE: 78 MMHG | RESPIRATION RATE: 16 BRPM | HEART RATE: 91 BPM | WEIGHT: 115 LBS | HEIGHT: 66 IN | BODY MASS INDEX: 18.48 KG/M2

## 2017-07-27 DIAGNOSIS — S32.301S: Primary | ICD-10-CM

## 2017-07-27 DIAGNOSIS — M25.532 LEFT WRIST PAIN: ICD-10-CM

## 2017-07-27 PROBLEM — S32.301A CLOSED FRACTURE OF RIGHT ILIAC WING: Status: ACTIVE | Noted: 2017-07-18

## 2017-07-27 PROCEDURE — 99999 PR PBB SHADOW E&M-EST. PATIENT-LVL III: CPT | Mod: PBBFAC,,, | Performed by: ORTHOPAEDIC SURGERY

## 2017-07-27 PROCEDURE — 99214 OFFICE O/P EST MOD 30 MIN: CPT | Mod: 24,S$GLB,, | Performed by: ORTHOPAEDIC SURGERY

## 2017-07-27 PROCEDURE — 73110 X-RAY EXAM OF WRIST: CPT | Mod: 26,LT,, | Performed by: RADIOLOGY

## 2017-07-27 RX ORDER — HYDROCODONE BITARTRATE AND ACETAMINOPHEN 5; 325 MG/1; MG/1
1 TABLET ORAL EVERY 6 HOURS PRN
Qty: 40 TABLET | Refills: 0 | Status: SHIPPED | OUTPATIENT
Start: 2017-07-27 | End: 2017-08-03 | Stop reason: ALTCHOICE

## 2017-07-27 RX ORDER — MUPIROCIN 20 MG/G
OINTMENT TOPICAL
Refills: 0 | COMMUNITY
Start: 2017-04-22 | End: 2017-08-03

## 2017-07-27 NOTE — PROGRESS NOTES
SUBJECTIVE:  Patient is status post Left Radius and ulna ORIF .  She has had left hip pain for one week.    The patient is resting well.She is alert and oriented times three. She is being treated for alcohol withdraw.  the patient has bouts of disorientation. She is alert and oriented ×3.  Patient complains of  6 /10 pain that is better with the  pain meds and aggravated with movement.              Past Medical History:   Diagnosis Date    Alcohol dependence       per patient in the past    Allergy      Anemia      Anxiety      Arthritis 6/24/2013    Asthma       per patient    Colon polyp      Colon polyp       colonoscopy 8/26/2013    COPD (chronic obstructive pulmonary disease)      Depression      Diverticulosis      Diverticulosis       colonoscopy 8/26/2013    Hiatal hernia       CXR 2/25/2015--Large hiatal hernia.     Hyperlipidemia      Hypertension      Hypocalcemia 7/6/2016    Hypothyroidism 6/24/2013    Osteoporosis      Pulmonary embolism       per patient unsure of date    Restless leg syndrome      RLS (restless legs syndrome)      Seizure 6/24/2013    Tobacco dependence       resolved    Trouble in sleeping                  Past Surgical History:   Procedure Laterality Date    ADENOIDECTOMY        APPENDECTOMY        CHOLECYSTECTOMY        COLONOSCOPY   2013    FINGER SURGERY        HERNIA REPAIR        HYSTERECTOMY        JOINT REPLACEMENT        TONSILLECTOMY        TOTAL HIP ARTHROPLASTY         L x2              Review of patient's allergies indicates:   Allergen Reactions    Nsaids (non-steroidal anti-inflammatory drug) Hives and Shortness Of Breath    Pcn [penicillins] Hives and Shortness Of Breath    Sulfa (sulfonamide antibiotics) Hives and Shortness Of Breath    Aspirin      Macrodantin [nitrofurantoin macrocrystalline] Hives    Wellbutrin [bupropion hcl]        No current facility-administered medications on file prior to encounter.                    Current Outpatient Prescriptions on File Prior to Encounter   Medication Sig Dispense Refill    allopurinol (ZYLOPRIM) 100 MG tablet Take 1 tablet (100 mg total) by mouth once daily. 90 tablet 5    busPIRone (BUSPAR) 15 MG tablet 1 tab po tid 90 tablet 1    clindamycin (CLEOCIN) 150 MG capsule Take 2 capsules (300 mg total) by mouth every 8 (eight) hours. 42 capsule 0    diclofenac sodium (VOLTAREN) 1 % Gel Apply 4 g topically 4 (four) times daily. Pt has shoulder pain and knee pain 200 g 5    diphenoxylate-atropine 2.5-0.025 mg (LOMOTIL) 2.5-0.025 mg per tablet     0    duloxetine (CYMBALTA) 60 MG capsule Take 1 capsule (60 mg total) by mouth once daily. 90 capsule 0    ferrous sulfate 325 (65 FE) MG EC tablet Take 1 tablet (325 mg total) by mouth once a week. 90 tablet 3    fluticasone (FLONASE) 50 mcg/actuation nasal spray instill 2 sprays into each nostril once daily 16 g 11    fluticasone-salmeterol (ADVAIR HFA) 115-21 mcg/actuation HFAA inhale 2 puffs INTO THE LUNGS twice a day 12 g 12    hydrocodone-acetaminophen 7.5-325mg (NORCO) 7.5-325 mg per tablet Take 1 tablet by mouth every 6 (six) hours as needed for Pain. 15 tablet 0    ipratropium-albuterol (COMBIVENT RESPIMAT)  mcg/actuation inhaler inhale 1 puff INTO THE LUNGS four times a day 3 Package 4    levothyroxine (SYNTHROID) 100 MCG tablet TAKE 1 TABLET ONE TIME DAILY 90 tablet 3    methocarbamol (ROBAXIN) 500 MG Tab Take 1 tablet (500 mg total) by mouth 2 (two) times daily as needed (for muscle spasms.). 60 tablet 1    metoprolol succinate (TOPROL-XL) 50 MG 24 hr tablet Take 1 tablet (50 mg total) by mouth once daily. 90 tablet 3    MULTIVITAMIN W-MINERALS/LUTEIN (CENTRUM SILVER ORAL) Take 1 tablet by mouth once daily.        pantoprazole (PROTONIX) 40 MG tablet Take 1 tablet (40 mg total) by mouth once daily. 90 tablet 3    pravastatin (PRAVACHOL) 10 MG tablet TAKE 1 TABLET ONE TIME DAILY 90 tablet 0    ropinirole (REQUIP) 1 MG  "tablet Take 1 tablet (1 mg total) by mouth nightly. 90 tablet 3    thiamine 250 MG tablet Take 500 mg by mouth once daily.        tramadol (ULTRAM) 50 mg tablet Take 1 tablet (50 mg total) by mouth 2 (two) times daily as needed for Pain. 60 tablet 2          ROS:  GENERAL: No fever, chills, fatigability or weight loss.  SKIN: No rashes, itching or changes in color or texture of skin.  HEAD: No headaches or recent head trauma.  EYES: Visual acuity fine. No photophobia, ocular pain or diplopia.  EARS: Denies ear pain, discharge or vertigo.  NOSE: No loss of smell, no epistaxis or postnasal drip.  MOUTH & THROAT: No hoarseness or change in voice. No excessive gum bleeding.  NODES: Denies swollen glands.  CHEST: Denies BRADY, cyanosis, wheezing, cough and sputum production.  CARDIOVASCULAR: Denies chest pain, PND, orthopnea or reduced exercise tolerance.  ABDOMEN: Appetite fine. No weight loss. Denies diarrhea, abdominal pain, hematemesis or blood in stool.  URINARY: No flank pain, dysuria or hematuria.  PERIPHERAL VASCULAR: No claudication or cyanosis.  NEUROLOGIC: No history of seizures, paralysis, alteration of gait or coordination.  MUSCULOSKELETAL: See HPI     PE:  APPEARANCE: Well nourished, well developed, in no acute distress.   HEAD: Normocephalic, atraumatic.  EYES: PERRL. EOMI.   EARS: TM's intact. Light reflex normal. No retraction or perforation.   NOSE: Mucosa pink. Airway clear.  MOUTH & THROAT: No tonsillar enlargement. No pharyngeal erythema or exudate. No stridor.  NECK: Supple.   NODES: No cervical, axillary or inguinal lymph node enlargement.  CHEST: Lungs clear to auscultation.  CARDIOVASCULAR: Normal S1, S2. No rubs, murmurs or gallops.  ABDOMEN: Bowel sounds normal. Not distended. Soft. No tenderness or masses.  NEUROLOGIC: Cranial Nerves: II-XII grossly intact, also see MUSCULOSKELETAL  MUSCULOSKELETAL:      ,/78   Pulse 91   Resp 16   Ht 5' 5.98" (1.676 m)   Wt 52.2 kg (115 lb)   LMP  " (LMP Unknown)   BMI 18.57 kg/m²                   Left  Wrist   Dressing intact, 2 plus radial  artery and ulnar artery pulses, light touch intact Left upper extremity.  All digits are warm. No erythema, no warmth, no drainage, No swelling, no significant tenderness.     Left Hip- pain on range of motion, in the groin area.          ASSESSMENT:   The patient is stable.  The CAT scan of the pelvis shows the patient has a iliac wing fracture that does not involve the weightbearing surface of the left hip joint.   the patient understands that she did have some movement at the fracture site and some displacement of the hardware.  The patient's pin was loose and outside of the skin.  The patient's is soiled and dismantled.  It is obvious that the splint has take been taken off and rewrapped. The patient understands the importance of protecting the left upper extremity.     Diagnosis:  1.  Left iliac wing fracture   2. Left radius ORIF    PLAN:  Walk assisted ambulation full weight-bear left lower extremity.  The patient can anticipate in physical therapy with pain being the limiting factor for the left hip range of motion.  Continue pain medication  Sutures removed  Continue occupational therapy and physical therapy.  The patient can follow-up 3  week

## 2017-07-27 NOTE — PATIENT INSTRUCTIONS
Pelvic Fracture  You have a break or fracture of the pelvic bone. Your fracture is stable because the bones are not out of place and there are no signs of serious internal bleeding. No surgery or other special treatment will be needed. As long as your pain is controlled by oral medicine, you can be treated at home. A broken pelvis will take about 6 to 8 weeks to heal. It can be painful to move for the first 3 to 4 weeks.    Home care  · Bed rest and pain medicine is the only treatment required. Stay in bed for the first 2 to 3 days to reduce pain with movement. During this time, you will need help bathing, using the bathroom, and meals. A bedpan or bedside commode may be easier to use than getting up to use the bathroom. As soon as possible, begin sitting or walking to avoid problems with prolonged bed rest (muscle weakness, worsening back stiffness and pain, blood clots in the legs). A walker, crutches, or cane will make walking easier in the first 3 weeks.  · Home healthcare may be available to provide in-home nursing services. Check with your healthcare provider, the \[facility]\"" social service department or a private nursing agency to see if your insurance will cover this kind of care.  · During the first 2 days after the injury there will probably be localized swelling and bruising on the skin over the pelvis. During this time apply an ice pack to the painful area for no more than 20 minutes every 1 to 2 hours to reduce swelling and pain. Wrap the ice pack in a thin towel. Never put ice or an ice pack directly on your skin.  · You may use over-the-counter pain medicine to control pain, unless another medicine was prescribed. Take pain medicine as directed. Call your healthcare provider if your pain is not well-controlled. A dose change or stronger medicine may be needed. If you have chronic liver or kidney disease, talk with your healthcare provider before using pain medicine.  Follow-up care  Follow up with  your healthcare provider, or as advised. This will help to make sure the bone is healing properly.  If X-rays were taken, you will be told of any new findings that may affect your care.  Call 911  Call 911 if you have:  · Swelling, pain or redness below your knee  · Chest pain or shortness of breath  When to seek medical advice  Call your healthcare provider right away if any of these occur:  · Pain becomes worse or you are unable to walk with help for more than three days  · Blood in your urine or bleeding from the urethra (the opening where urine comes out)  · Difficulty passing urine or unable to pass stool due to pain  · Fever of 100.4°F (38°C) or above lasting for 24 to 48 hours  Date Last Reviewed: 12/3/2015  © 0686-2552 [employer]. [address] [place] [address], [place] [address]. All rights reserved. This information is not intended as a substitute for professional medical care. Always follow your healthcare professional's instructions.

## 2017-07-28 ENCOUNTER — TELEPHONE (OUTPATIENT)
Dept: ORTHOPEDICS | Facility: CLINIC | Age: 67
End: 2017-07-28

## 2017-07-28 NOTE — TELEPHONE ENCOUNTER
Spoke with April in the [facility] center in regards to questions  About the patients most recent visit. April claimed they had questions but received the answers, and requested office notes . Office notes are being faxed. -AS

## 2017-08-03 ENCOUNTER — PATIENT OUTREACH (OUTPATIENT)
Dept: ADMINISTRATIVE | Facility: CLINIC | Age: 67
End: 2017-08-03

## 2017-08-03 RX ORDER — CALCITRIOL 0.25 UG/1
0.25 CAPSULE ORAL DAILY
COMMUNITY
End: 2017-08-28 | Stop reason: SDUPTHER

## 2017-08-03 RX ORDER — TRAMADOL HYDROCHLORIDE 50 MG/1
50 TABLET ORAL EVERY 12 HOURS PRN
COMMUNITY
End: 2017-09-22

## 2017-08-03 RX ORDER — FOLIC ACID 1 MG/1
1 TABLET ORAL DAILY
COMMUNITY
End: 2017-08-03

## 2017-08-03 RX ORDER — HYDROCODONE BITARTRATE AND ACETAMINOPHEN 7.5; 325 MG/1; MG/1
1 TABLET ORAL EVERY 6 HOURS PRN
COMMUNITY
End: 2017-08-03

## 2017-08-03 NOTE — PATIENT INSTRUCTIONS
Mechanical Fall  You have had a fall today. It appears that the cause is what we call mechanical. That means that you slipped, tripped or lost your balance. If your fall had been due to fainting or a seizure, other tests might be required.  It is normal to feel sore and tight in your muscles and back the next day, and not just the muscles you injured at first. Remember, all the parts of your body are connected, so while initially one area hurts, the next day another may hurt. Also, when you injure yourself, it causes inflammation, which then causes the muscles to tighten up and hurt more. After the initial worsening, it should gradually improve over the next few days. However, report more severe pain.  Even without a definite head injury, you can still get a concussion from your head suddenly jerking forward, backward or sideways when falling. Concussions and even bleeding can still occur, especially if you have had a recent injury or take blood thinner medicine. It is not unusual to have a mild headache and feel tired and even nauseous or dizzy.   Home care  1. Rest today and resume your normal activities when you are feeling back to normal.  2. If you were injured during the fall, follow the advice from your doctor regarding care of your injury.  3. At first, do not try to stretch out the sore spots. If there is a strain, stretching may make it worse.  Massage may help relax the muscles without stretching them.  4. You can use an ice pack or cold compress on and off to the sore spots 10 to 20 at a time, as often as you feel comfortable. This may help reduce the inflammation, swelling and pain.  5. If you have any scrapes or abrasions, they usually heal within 10 days. It is important to keep the abrasions clean while they initially start to heal. However, an infection may occur even with proper care, so watch for early signs of infection.  Medications  · Talk to your doctor before taking new medicines,  especially if you have other medical problems or are taking other medicines.  · If you need anything for pain, you can take acetaminophen or ibuprofen, unless you were given a different pain medicine to use. Talk with your doctor before using these medicines if you have chronic liver or kidney disease, or ever had a stomach ulcer or gastrointestinal bleeding, or are taking blood thinner medicines.  · Be careful if you are given prescription pain medicines, narcotics, or medicine for muscle spasm. They can make you sleepy, dizzy and can affect your coordination, reflexes and judgment. Do not drive or do work where you can injure yourself when taking them.  Fall prevention  · Was there anything that caused your fall that can be fixed, removed, or replaced?  · Make your home safe by keeping walkways clear of objects you may trip over.  · Use non-slip pads under rugs. Don't use small area rugs or throw rugs.  · Do not walk in poorly lit areas.  · Do not stand on chairs or wobbly ladders.  · Use caution when reaching overhead or looking upward. This position can cause a loss of balance.  · Be sure your shoes fit properly, have non-slip bottoms and are in good condition.  · Be cautious when going up and down curbs, and walking on uneven sidewalks.  · If your balance is poor, consider using a cane or walker.  · Stay as active as you can. Balance, flexibility, strength, and endurance all come from exercise. They all play a role in preventing falls.  · If you have pets, know where they are before you stand up or walk so you don't trip over them.  · Limit alcohol intake. Alcohol can cause balance problems and increase the risk of falls.  · Use night lights.  Follow-up  Follow up with your doctor or as advised by our staff. If X-rays or CT scans were done, you will be notified if there is a change in the reading, especially if it affects treatment.  Call 911  Call 911 if any of these occur:  · Trouble breathing  · Confused or  difficulty arousing  · Fainting or loss of consciousness  · Rapid or very slow heart rate  · Seizure  · Difficulty with speech or vision, weakness of an arm or leg  · Difficulty walking or talking, loss of balance, numbness or weakness in one side of your body, facial droop  When to seek medical advice  Call your healthcare provider right away if any of these occur:  · Repeated mechanical falls, or unexplained falls  · Dizziness  · Severe headache  · Blood in vomit, stools (black or red color)  Date Last Reviewed: 11/5/2015 © 2000-2016 [employer]. [address] [place] [id number]. All rights reserved. This information is not intended as a substitute for professional medical care. Always follow your healthcare professional's instructions.

## 2017-08-04 ENCOUNTER — TELEPHONE (OUTPATIENT)
Dept: ORTHOPEDICS | Facility: CLINIC | Age: 67
End: 2017-08-04

## 2017-08-04 ENCOUNTER — TELEPHONE (OUTPATIENT)
Dept: FAMILY MEDICINE | Facility: CLINIC | Age: 67
End: 2017-08-04

## 2017-08-04 NOTE — TELEPHONE ENCOUNTER
Returned Leslee's phone call. Advised Leslee I would send a message to provider for ROM allowed in therapy for pt left wrist. Leslee verified understanding.

## 2017-08-04 NOTE — TELEPHONE ENCOUNTER
Returned call and spoke with Mattie patients home health nurse, she stated that she went to patients [place] this week and BP was 122/94 and then later this week PT went to patients [place] and BP was 138/90. Nurse just wanted to let doctor know and see if there is anything she would like to do or if that is an ok range for patient. Informed would forward info to doctor. Mattie verbalized understanding.

## 2017-08-04 NOTE — TELEPHONE ENCOUNTER
Is patient experiencing any chest pain, headache or dizziness? If not continue to monitor bp.  Notify us in 1 week of readings

## 2017-08-04 NOTE — TELEPHONE ENCOUNTER
----- Message from Cheko Perez sent at 8/4/2017  1:04 PM CDT -----  Contact: [facility] (Leslee)  Caller needs specific orders or what needs to be done with the pt. Please give Leslee a call at [phone]

## 2017-08-04 NOTE — TELEPHONE ENCOUNTER
----- Message from Radha Jenkins sent at 8/4/2017  8:32 AM CDT -----  Contact: derian buenrostro [facility]-[phone]  Would like to consult with nurse regarding blood pressure. Please call back at [phone].      Thanks,  Radha Jenkins

## 2017-08-07 RX ORDER — PRAVASTATIN SODIUM 10 MG/1
TABLET ORAL
Qty: 90 TABLET | Refills: 0 | Status: SHIPPED | OUTPATIENT
Start: 2017-08-07 | End: 2017-12-21 | Stop reason: SDUPTHER

## 2017-08-07 RX ORDER — PANTOPRAZOLE SODIUM 40 MG/1
TABLET, DELAYED RELEASE ORAL
Qty: 90 TABLET | Refills: 3 | Status: SHIPPED | OUTPATIENT
Start: 2017-08-07 | End: 2018-08-15 | Stop reason: SDUPTHER

## 2017-08-07 RX ORDER — AMLODIPINE BESYLATE 5 MG/1
TABLET ORAL
Qty: 90 TABLET | Refills: 3 | Status: SHIPPED | OUTPATIENT
Start: 2017-08-07 | End: 2017-08-28

## 2017-08-09 ENCOUNTER — OFFICE VISIT (OUTPATIENT)
Dept: FAMILY MEDICINE | Facility: CLINIC | Age: 67
End: 2017-08-09
Payer: MEDICARE

## 2017-08-09 ENCOUNTER — TELEPHONE (OUTPATIENT)
Dept: FAMILY MEDICINE | Facility: CLINIC | Age: 67
End: 2017-08-09

## 2017-08-09 VITALS
HEIGHT: 65 IN | DIASTOLIC BLOOD PRESSURE: 84 MMHG | WEIGHT: 111.44 LBS | SYSTOLIC BLOOD PRESSURE: 112 MMHG | OXYGEN SATURATION: 98 % | HEART RATE: 102 BPM | BODY MASS INDEX: 18.57 KG/M2 | TEMPERATURE: 96 F

## 2017-08-09 DIAGNOSIS — Z12.39 BREAST CANCER SCREENING: ICD-10-CM

## 2017-08-09 DIAGNOSIS — S32.301A CLOSED FRACTURE OF RIGHT ILIAC WING, INITIAL ENCOUNTER: ICD-10-CM

## 2017-08-09 DIAGNOSIS — E78.5 DYSLIPIDEMIA: ICD-10-CM

## 2017-08-09 DIAGNOSIS — I49.9 IRREGULAR HEART BEAT: ICD-10-CM

## 2017-08-09 DIAGNOSIS — F10.931 DELIRIUM TREMENS: ICD-10-CM

## 2017-08-09 DIAGNOSIS — Z12.31 ENCOUNTER FOR SCREENING MAMMOGRAM FOR MALIGNANT NEOPLASM OF BREAST: ICD-10-CM

## 2017-08-09 DIAGNOSIS — S32.391D OTHER CLOSED FRACTURE OF RIGHT ILIUM WITH ROUTINE HEALING, SUBSEQUENT ENCOUNTER: Primary | ICD-10-CM

## 2017-08-09 PROCEDURE — 93010 ELECTROCARDIOGRAM REPORT: CPT | Mod: S$GLB,,, | Performed by: INTERNAL MEDICINE

## 2017-08-09 PROCEDURE — 99214 OFFICE O/P EST MOD 30 MIN: CPT | Mod: S$GLB,,, | Performed by: FAMILY MEDICINE

## 2017-08-09 PROCEDURE — 99999 PR PBB SHADOW E&M-EST. PATIENT-LVL III: CPT | Mod: PBBFAC,,, | Performed by: FAMILY MEDICINE

## 2017-08-09 PROCEDURE — 3074F SYST BP LT 130 MM HG: CPT | Mod: S$GLB,,, | Performed by: FAMILY MEDICINE

## 2017-08-09 PROCEDURE — 3008F BODY MASS INDEX DOCD: CPT | Mod: S$GLB,,, | Performed by: FAMILY MEDICINE

## 2017-08-09 PROCEDURE — 99499 UNLISTED E&M SERVICE: CPT | Mod: S$GLB,,, | Performed by: FAMILY MEDICINE

## 2017-08-09 PROCEDURE — 3079F DIAST BP 80-89 MM HG: CPT | Mod: S$GLB,,, | Performed by: FAMILY MEDICINE

## 2017-08-09 PROCEDURE — 93005 ELECTROCARDIOGRAM TRACING: CPT | Mod: S$GLB,,, | Performed by: FAMILY MEDICINE

## 2017-08-09 RX ORDER — LEVOTHYROXINE SODIUM 100 UG/1
TABLET ORAL
Qty: 90 TABLET | Refills: 3 | Status: SHIPPED | OUTPATIENT
Start: 2017-08-09 | End: 2018-09-04 | Stop reason: SDUPTHER

## 2017-08-09 RX ORDER — DULOXETIN HYDROCHLORIDE 60 MG/1
60 CAPSULE, DELAYED RELEASE ORAL DAILY
Qty: 90 CAPSULE | Refills: 0 | Status: SHIPPED | OUTPATIENT
Start: 2017-08-09 | End: 2017-08-28 | Stop reason: SDUPTHER

## 2017-08-09 RX ORDER — METOPROLOL SUCCINATE 50 MG/1
50 TABLET, EXTENDED RELEASE ORAL DAILY
Qty: 90 TABLET | Refills: 3 | Status: SHIPPED | OUTPATIENT
Start: 2017-08-09 | End: 2017-09-01 | Stop reason: SDUPTHER

## 2017-08-09 RX ORDER — QUETIAPINE FUMARATE 25 MG/1
25 TABLET, FILM COATED ORAL NIGHTLY
Qty: 30 TABLET | Refills: 0 | Status: SHIPPED | OUTPATIENT
Start: 2017-08-09 | End: 2017-08-28 | Stop reason: SDUPTHER

## 2017-08-09 NOTE — TELEPHONE ENCOUNTER
Pt is due for mammo , PCV13 and zoster . Ruth placed order for Mammo . Zoster can be send  to pharmacy . PCV13 can be administer here when she returns  for labs

## 2017-08-09 NOTE — PROGRESS NOTES
Subjective:       Patient ID: Ibeth Schroeder is a 67 y.o. female.    Chief Complaint: Hospital Follow Up and Fall    67 y old female  Here for f.u on HOsp due to fall which resulted in  an  Iliac wing fracture and L  Radius and ulnar fracture .s/p Left radius and Ulna ORIF .  Under the care of Dr Lizarraga , she is Weight bearing and ambulating with assistance  .  Doing better .  Pain on wrist is the worst . Much better overall : 2/10   [facility] .getting outpatient PT . She has not had ETOH since the accident . She had DT  While hospitalized , started on Seroquel . Will be seeing Dr Perez in the next couple of weeks . NO sundowners since  being at home  . She Has  Not  had  drink since the accident . She is also requesting a rf on pravastatin . Compliant with it , not   On any diet       Fall       Review of Systems   Constitutional: Negative.    HENT: Negative.    Respiratory: Negative.    Cardiovascular: Negative.    Gastrointestinal: Negative.    Musculoskeletal: Positive for arthralgias.   Psychiatric/Behavioral: Negative.        Objective:      Physical Exam   Constitutional: She is oriented to person, place, and time. She appears well-developed and well-nourished. No distress.   HENT:   Head: Normocephalic and atraumatic.   Right Ear: External ear normal.   Left Ear: External ear normal.   Mouth/Throat: No oropharyngeal exudate.   Eyes: Conjunctivae and EOM are normal. Pupils are equal, round, and reactive to light. Right eye exhibits no discharge. Left eye exhibits no discharge. No scleral icterus.   Neck: Normal range of motion. Neck supple. No JVD present. No tracheal deviation present. No thyromegaly present.   Cardiovascular: Normal rate and normal heart sounds.  An irregularly irregular rhythm present. Exam reveals no gallop and no friction rub.    No murmur heard.  Pulmonary/Chest: Effort normal and breath sounds normal. No stridor. No respiratory distress. She has no  wheezes. She has no rales. She exhibits no tenderness.   Abdominal: Soft. Bowel sounds are normal. She exhibits no distension. There is no tenderness. There is no rebound and no guarding.   Musculoskeletal:        Left wrist: She exhibits decreased range of motion and tenderness.        Left hip: She exhibits decreased range of motion and decreased strength.   Lymphadenopathy:     She has no cervical adenopathy.   Neurological: She is alert and oriented to person, place, and time.   Skin: Skin is warm and dry. She is not diaphoretic.   Psychiatric: She has a normal mood and affect. Her behavior is normal. Judgment and thought content normal.       Assessment:       Ibeth was seen today for hospital follow up and fall.    Diagnoses and all orders for this visit:    Other closed fracture of right ilium with routine healing, subsequent encounter    Closed fracture of right iliac wing, initial encounter    Dyslipidemia  -     Lipid panel; Future    Irregular heart beat  -     IN OFFICE EKG 12-LEAD (to Muse)    Delirium tremens    Other orders  -     levothyroxine (SYNTHROID) 100 MCG tablet; TAKE 1 TABLET ONE TIME DAILY  -     metoprolol succinate (TOPROL-XL) 50 MG 24 hr tablet; Take 1 tablet (50 mg total) by mouth once daily.  -     duloxetine (CYMBALTA) 60 MG capsule; Take 1 capsule (60 mg total) by mouth once daily.  -     quetiapine (SEROQUEL) 25 MG Tab; Take 1 tablet (25 mg total) by mouth every evening.      Plan:     Ibeth was seen today for hospital follow up and fall.    Diagnoses and all orders for this visit:    Other closed fracture of right ilium with routine healing, subsequent encounter    Closed fracture of right iliac wing, initial encounter    Other orders  -     levothyroxine (SYNTHROID) 100 MCG tablet; TAKE 1 TABLET ONE TIME DAILY  -     metoprolol succinate (TOPROL-XL) 50 MG 24 hr tablet; Take 1 tablet (50 mg total) by mouth once daily.  -     duloxetine (CYMBALTA) 60 MG capsule; Take 1 capsule (60  mg total) by mouth once daily.     Stable > cont PT and ORTHO f.u izabela   Diet and ex. Get labs   EKG   Ressolved . Keep psych izabela

## 2017-08-10 ENCOUNTER — PATIENT MESSAGE (OUTPATIENT)
Dept: FAMILY MEDICINE | Facility: CLINIC | Age: 67
End: 2017-08-10

## 2017-08-14 ENCOUNTER — TELEPHONE (OUTPATIENT)
Dept: FAMILY MEDICINE | Facility: CLINIC | Age: 67
End: 2017-08-14

## 2017-08-14 DIAGNOSIS — R94.31 ABNORMAL EKG: Primary | ICD-10-CM

## 2017-08-16 ENCOUNTER — OFFICE VISIT (OUTPATIENT)
Dept: CARDIOLOGY | Facility: CLINIC | Age: 67
End: 2017-08-16
Payer: MEDICARE

## 2017-08-16 VITALS
SYSTOLIC BLOOD PRESSURE: 118 MMHG | HEART RATE: 83 BPM | OXYGEN SATURATION: 96 % | DIASTOLIC BLOOD PRESSURE: 64 MMHG | BODY MASS INDEX: 18.4 KG/M2 | HEIGHT: 65 IN | WEIGHT: 110.44 LBS

## 2017-08-16 DIAGNOSIS — Z95.828 S/P IVC FILTER: Chronic | ICD-10-CM

## 2017-08-16 DIAGNOSIS — I26.99 PE (PULMONARY THROMBOEMBOLISM): ICD-10-CM

## 2017-08-16 DIAGNOSIS — M25.532 LEFT WRIST PAIN: Primary | ICD-10-CM

## 2017-08-16 DIAGNOSIS — I49.1 PAC (PREMATURE ATRIAL CONTRACTION): Primary | ICD-10-CM

## 2017-08-16 DIAGNOSIS — F10.11 HISTORY OF ETOH ABUSE: ICD-10-CM

## 2017-08-16 DIAGNOSIS — I71.40 ABDOMINAL AORTIC ANEURYSM (AAA) 3.0 CM TO 5.0 CM IN DIAMETER IN FEMALE: ICD-10-CM

## 2017-08-16 PROCEDURE — 99499 UNLISTED E&M SERVICE: CPT | Mod: S$GLB,,, | Performed by: INTERNAL MEDICINE

## 2017-08-16 PROCEDURE — 99999 PR PBB SHADOW E&M-EST. PATIENT-LVL III: CPT | Mod: PBBFAC,,, | Performed by: INTERNAL MEDICINE

## 2017-08-16 PROCEDURE — 99204 OFFICE O/P NEW MOD 45 MIN: CPT | Mod: S$GLB,,, | Performed by: INTERNAL MEDICINE

## 2017-08-16 PROCEDURE — 3078F DIAST BP <80 MM HG: CPT | Mod: S$GLB,,, | Performed by: INTERNAL MEDICINE

## 2017-08-16 PROCEDURE — 3074F SYST BP LT 130 MM HG: CPT | Mod: S$GLB,,, | Performed by: INTERNAL MEDICINE

## 2017-08-16 PROCEDURE — 3008F BODY MASS INDEX DOCD: CPT | Mod: S$GLB,,, | Performed by: INTERNAL MEDICINE

## 2017-08-16 RX ORDER — DIPHENOXYLATE HCL/ATROPINE 2.5-.025/5
5 LIQUID (ML) ORAL 4 TIMES DAILY PRN
COMMUNITY
End: 2017-08-17

## 2017-08-16 RX ORDER — METHOCARBAMOL 500 MG/1
500 TABLET, FILM COATED ORAL
COMMUNITY
End: 2018-02-21

## 2017-08-16 NOTE — PROGRESS NOTES
Subjective:   Patient ID:  Ibeth Schroeder is a 67 y.o. female who presents for evaluation of Abnormal ECG      68 yo female, came in for care establish.  PMH HTN, HLD, copd, h/o PE s/p IVC filter, former smoker, CKD, did drink 2 shots a day for years.  Fell few weeks with multiple Fx. Stayed in hospital for a week and NH for two weeks. Had life wrist fixed by Dr. Lizarraga.  Feels fatigue, no chest pain, palpitation, dyspnea, dizziness,  ECHO in  normal EF, normal structure  EKG NSR and PACs.  VQ scan negative          Past Medical History:   Diagnosis Date    Alcohol dependence     per patient in the past    Allergy     Anemia     Anxiety     Arthritis 6/24/2013    Asthma     per patient    Colon polyp     Colon polyp     colonoscopy 8/26/2013    COPD (chronic obstructive pulmonary disease)     Depression     Diverticulosis     Diverticulosis     colonoscopy 8/26/2013    Hiatal hernia     CXR 2/25/2015--Large hiatal hernia.     Hyperlipidemia     Hypertension     Hypocalcemia 7/6/2016    Hypothyroidism 6/24/2013    Osteoporosis     Pulmonary embolism     per patient unsure of date    Restless leg syndrome     RLS (restless legs syndrome)     Seizure 6/24/2013    Tobacco dependence     resolved    Trouble in sleeping        Past Surgical History:   Procedure Laterality Date    ADENOIDECTOMY      APPENDECTOMY      CHOLECYSTECTOMY      COLONOSCOPY  2013    FINGER SURGERY      HERNIA REPAIR      HYSTERECTOMY      JOINT REPLACEMENT      TONSILLECTOMY      TOTAL HIP ARTHROPLASTY      L x2       Social History   Substance Use Topics    Smoking status: Former Smoker     Packs/day: 1.00     Years: 30.00     Types: Cigarettes     Quit date: 8/5/2010    Smokeless tobacco: Former User     Quit date: 8/5/2010    Alcohol use 0.6 - 1.2 oz/week     1 - 2 Shots of liquor per week       Family History   Problem Relation Age of Onset    Diabetes Mother     Hypertension Mother     Cancer  Mother     Kidney disease Mother      stones    Parkinsonism Mother     Asthma Father     Diabetes Son     Hypertension Son     Heart disease Maternal Aunt      CAD     Cancer Maternal Aunt      Breast Ca    Colon cancer Maternal Grandmother     Cancer Maternal Grandmother     Cancer Maternal Aunt      breast    Heart disease Maternal Aunt     Stroke Neg Hx        Review of Systems   Constitution: Negative for decreased appetite, diaphoresis, fever, weakness, malaise/fatigue and night sweats.   HENT: Negative for headaches and nosebleeds.    Eyes: Negative for blurred vision and double vision.   Cardiovascular: Negative for chest pain, claudication, dyspnea on exertion, irregular heartbeat, leg swelling, near-syncope, orthopnea, palpitations, paroxysmal nocturnal dyspnea and syncope.   Respiratory: Negative for cough, shortness of breath, sleep disturbances due to breathing, snoring, sputum production and wheezing.    Endocrine: Negative for cold intolerance and polyuria.   Hematologic/Lymphatic: Does not bruise/bleed easily.   Skin: Negative for rash.   Musculoskeletal: Positive for arthritis and joint pain. Negative for back pain, falls, joint swelling and neck pain.   Gastrointestinal: Negative for abdominal pain, heartburn, nausea and vomiting.   Genitourinary: Negative for dysuria, frequency and hematuria.   Neurological: Negative for difficulty with concentration, dizziness, focal weakness, light-headedness, numbness and seizures.   Psychiatric/Behavioral: Negative for depression, memory loss and substance abuse. The patient does not have insomnia.    Allergic/Immunologic: Negative for HIV exposure and hives.       Objective:   Physical Exam   Constitutional: She is oriented to person, place, and time. She appears well-nourished.   HENT:   Head: Normocephalic.   Eyes: Pupils are equal, round, and reactive to light.   Neck: Normal carotid pulses and no JVD present. Carotid bruit is not present. No  thyromegaly present.   Cardiovascular: Normal rate, regular rhythm, normal heart sounds and normal pulses.  Frequent extrasystoles are present. PMI is not displaced.  Exam reveals no gallop and no S3.    No murmur heard.  Pulmonary/Chest: Breath sounds normal. No stridor. No respiratory distress.   Abdominal: Soft. Bowel sounds are normal. There is no tenderness. There is no rebound.   Musculoskeletal: Normal range of motion.   Left wrist brace   Neurological: She is alert and oriented to person, place, and time.   Skin: Skin is intact. No rash noted.   Psychiatric: Her behavior is normal.       Lab Results   Component Value Date    CHOL 210 (H) 04/19/2017    CHOL 203 (H) 04/21/2016    CHOL 220 (H) 06/24/2015     Lab Results   Component Value Date    HDL 66 04/19/2017    HDL 66 04/21/2016    HDL 60 06/24/2015     Lab Results   Component Value Date    LDLCALC 113.4 04/19/2017    LDLCALC 110.6 04/21/2016    LDLCALC 130.2 06/24/2015     Lab Results   Component Value Date    TRIG 153 (H) 04/19/2017    TRIG 132 04/21/2016    TRIG 149 06/24/2015     Lab Results   Component Value Date    CHOLHDL 31.4 04/19/2017    CHOLHDL 32.5 04/21/2016    CHOLHDL 27.3 06/24/2015       Chemistry        Component Value Date/Time     07/15/2017 1031    K 4.4 07/15/2017 1031     (H) 07/15/2017 1031    CO2 18 (L) 07/15/2017 1031    BUN 16 07/15/2017 1031    CREATININE 0.9 07/15/2017 1031     (H) 07/15/2017 1031        Component Value Date/Time    CALCIUM 7.5 (L) 07/15/2017 1031    ALKPHOS 81 07/12/2017 2117    AST 22 07/12/2017 2117    ALT 12 07/12/2017 2117    BILITOT 0.6 07/12/2017 2117    ESTGFRAFRICA >60 07/15/2017 1031    EGFRNONAA >60 07/15/2017 1031          Lab Results   Component Value Date    TSH 1.275 07/11/2017     Lab Results   Component Value Date    INR 1.0 07/12/2017    INR 1.0 07/12/2017    INR 1.0 06/26/2015     Lab Results   Component Value Date    WBC 6.87 07/18/2017    HGB 8.2 (L) 07/18/2017    HCT 25.5  (L) 07/18/2017    MCV 98 07/18/2017     07/18/2017     BNP  @LABRCNTIP(BNP,BNPTRIAGEBLO)@  CrCl cannot be calculated (Patient's most recent sCr result is older than the maximum 7 days allowed.).     Assessment:      1. PAC (premature atrial contraction)    2. History of ETOH abuse    3. Abdominal aortic aneurysm (AAA) 3.0 cm to 5.0 cm in diameter in female    4. S/P IVC filter    5. PE (pulmonary thromboembolism)        Plan:   holter for PAC load  Continue ToprolXL, amlodipine.  Finished Eliquis Rx for prophylasix.  RTC as indicated

## 2017-08-16 NOTE — LETTER
August 16, 2017      Geovanna Botello MD  [address] [facility] [address]           [facility] [place] [facility]  [address] [facility] [address]  Phone: [phone]  Fax: [phone]          Patient: Ibeth Schrodeer   MR Number: [id number]   YOB: 1950   Date of Visit: 8/16/2017       Dear Dr. Geovanna Botello:    Thank you for referring Ibeth Schroeder to me for evaluation. Attached you will find relevant portions of my assessment and plan of care.    If you have questions, please do not hesitate to call me. I look forward to following Ibeth Schroeder along with you.    Sincerely,    Harish Davis MD    Enclosure  CC:  No Recipients    If you would like to receive this communication electronically, please contact [email or web address] or [phone] to request more information on [employer] access.    For providers and/or their staff who would like to refer a patient to [facility], please contact us through our one-stop-shop provider referral line, [facility], at [phone].    If you feel you have received this communication in error or would no longer like to receive these types of communications, please e-mail [email or web address]

## 2017-08-16 NOTE — PROGRESS NOTES
Patient, Ibeth Schroeder (MRN #[id number]), presented with a recent Estimated PA Systolic Pressure greater than 40 mmHG consistent with the definition of pulmonary hypertension (ICD10 - I27.0).    Est. PA Systolic Pressure   Date Value Ref Range Status   07/12/2017 50.25 (A)       The patient's pulmonary hypertension was monitored, evaluated, addressed and/or treated. This addendum to the medical record is made on 08/16/2017.

## 2017-08-17 ENCOUNTER — HOSPITAL ENCOUNTER (OUTPATIENT)
Dept: RADIOLOGY | Facility: HOSPITAL | Age: 67
Discharge: HOME | End: 2017-08-17
Attending: ORTHOPAEDIC SURGERY
Payer: MEDICARE

## 2017-08-17 ENCOUNTER — OFFICE VISIT (OUTPATIENT)
Dept: RHEUMATOLOGY | Facility: CLINIC | Age: 67
End: 2017-08-17
Payer: MEDICARE

## 2017-08-17 ENCOUNTER — OFFICE VISIT (OUTPATIENT)
Dept: ORTHOPEDICS | Facility: CLINIC | Age: 67
End: 2017-08-17
Payer: MEDICARE

## 2017-08-17 ENCOUNTER — TELEPHONE (OUTPATIENT)
Dept: NEPHROLOGY | Facility: CLINIC | Age: 67
End: 2017-08-17

## 2017-08-17 VITALS
DIASTOLIC BLOOD PRESSURE: 80 MMHG | HEART RATE: 63 BPM | BODY MASS INDEX: 18.26 KG/M2 | HEIGHT: 65 IN | RESPIRATION RATE: 14 BRPM | SYSTOLIC BLOOD PRESSURE: 116 MMHG | WEIGHT: 109.56 LBS

## 2017-08-17 VITALS
WEIGHT: 109.56 LBS | HEIGHT: 65 IN | DIASTOLIC BLOOD PRESSURE: 72 MMHG | HEART RATE: 77 BPM | SYSTOLIC BLOOD PRESSURE: 106 MMHG | BODY MASS INDEX: 18.26 KG/M2

## 2017-08-17 DIAGNOSIS — Z51.81 MEDICATION MONITORING ENCOUNTER: ICD-10-CM

## 2017-08-17 DIAGNOSIS — Z98.890 POSTOPERATIVE STATE: Primary | ICD-10-CM

## 2017-08-17 DIAGNOSIS — M25.532 LEFT WRIST PAIN: ICD-10-CM

## 2017-08-17 DIAGNOSIS — T85.848D PAIN FROM IMPLANTED HARDWARE, SUBSEQUENT ENCOUNTER: Primary | ICD-10-CM

## 2017-08-17 DIAGNOSIS — M15.9 PRIMARY OSTEOARTHRITIS INVOLVING MULTIPLE JOINTS: ICD-10-CM

## 2017-08-17 DIAGNOSIS — M81.8 OSTEOPOROSIS, IDIOPATHIC: Primary | ICD-10-CM

## 2017-08-17 DIAGNOSIS — M25.332 SCAPHOLUNATE DISSOCIATION, LEFT: ICD-10-CM

## 2017-08-17 PROCEDURE — 3008F BODY MASS INDEX DOCD: CPT | Mod: S$GLB,,, | Performed by: ORTHOPAEDIC SURGERY

## 2017-08-17 PROCEDURE — 96372 THER/PROPH/DIAG INJ SC/IM: CPT | Mod: S$GLB,,, | Performed by: PHYSICIAN ASSISTANT

## 2017-08-17 PROCEDURE — 99499 UNLISTED E&M SERVICE: CPT | Mod: S$GLB,,, | Performed by: PHYSICIAN ASSISTANT

## 2017-08-17 PROCEDURE — 3078F DIAST BP <80 MM HG: CPT | Mod: S$GLB,,, | Performed by: PHYSICIAN ASSISTANT

## 2017-08-17 PROCEDURE — 3074F SYST BP LT 130 MM HG: CPT | Mod: S$GLB,,, | Performed by: ORTHOPAEDIC SURGERY

## 2017-08-17 PROCEDURE — 99213 OFFICE O/P EST LOW 20 MIN: CPT | Mod: 24,S$GLB,, | Performed by: ORTHOPAEDIC SURGERY

## 2017-08-17 PROCEDURE — 3074F SYST BP LT 130 MM HG: CPT | Mod: S$GLB,,, | Performed by: PHYSICIAN ASSISTANT

## 2017-08-17 PROCEDURE — 3008F BODY MASS INDEX DOCD: CPT | Mod: S$GLB,,, | Performed by: PHYSICIAN ASSISTANT

## 2017-08-17 PROCEDURE — 99214 OFFICE O/P EST MOD 30 MIN: CPT | Mod: S$GLB,,, | Performed by: PHYSICIAN ASSISTANT

## 2017-08-17 PROCEDURE — 73110 X-RAY EXAM OF WRIST: CPT | Mod: 26,LT,, | Performed by: RADIOLOGY

## 2017-08-17 PROCEDURE — 99999 PR PBB SHADOW E&M-EST. PATIENT-LVL IV: CPT | Mod: PBBFAC,,, | Performed by: PHYSICIAN ASSISTANT

## 2017-08-17 PROCEDURE — 3079F DIAST BP 80-89 MM HG: CPT | Mod: S$GLB,,, | Performed by: ORTHOPAEDIC SURGERY

## 2017-08-17 PROCEDURE — 99999 PR PBB SHADOW E&M-EST. PATIENT-LVL IV: CPT | Mod: PBBFAC,,, | Performed by: ORTHOPAEDIC SURGERY

## 2017-08-17 RX ORDER — DIPHENOXYLATE HYDROCHLORIDE AND ATROPINE SULFATE 2.5; .025 MG/1; MG/1
1 TABLET ORAL DAILY PRN
COMMUNITY
End: 2017-11-01

## 2017-08-17 RX ORDER — ROPINIROLE 1 MG/1
1 TABLET, FILM COATED ORAL NIGHTLY PRN
COMMUNITY

## 2017-08-17 NOTE — PROGRESS NOTES
SUBJECTIVE:  Patient is status post Left Radius and ulna ORIF .  She has had left hip pain.    The patient is resting well.She is alert and oriented times three. She is being treated for alcohol withdraw.  the patient has bouts of disorientation. She is alert and oriented ×3.  Patient complains of  3/10 pain that is better with the  pain meds and aggravated with movement.              Past Medical History:   Diagnosis Date    Alcohol dependence       per patient in the past    Allergy      Anemia      Anxiety      Arthritis 6/24/2013    Asthma       per patient    Colon polyp      Colon polyp       colonoscopy 8/26/2013    COPD (chronic obstructive pulmonary disease)      Depression      Diverticulosis      Diverticulosis       colonoscopy 8/26/2013    Hiatal hernia       CXR 2/25/2015--Large hiatal hernia.     Hyperlipidemia      Hypertension      Hypocalcemia 7/6/2016    Hypothyroidism 6/24/2013    Osteoporosis      Pulmonary embolism       per patient unsure of date    Restless leg syndrome      RLS (restless legs syndrome)      Seizure 6/24/2013    Tobacco dependence       resolved    Trouble in sleeping                  Past Surgical History:   Procedure Laterality Date    ADENOIDECTOMY        APPENDECTOMY        CHOLECYSTECTOMY        COLONOSCOPY   2013    FINGER SURGERY        HERNIA REPAIR        HYSTERECTOMY        JOINT REPLACEMENT        TONSILLECTOMY        TOTAL HIP ARTHROPLASTY         L x2              Review of patient's allergies indicates:   Allergen Reactions    Nsaids (non-steroidal anti-inflammatory drug) Hives and Shortness Of Breath    Pcn [penicillins] Hives and Shortness Of Breath    Sulfa (sulfonamide antibiotics) Hives and Shortness Of Breath    Aspirin      Macrodantin [nitrofurantoin macrocrystalline] Hives    Wellbutrin [bupropion hcl]        No current facility-administered medications on file prior to encounter.                   Current  Outpatient Prescriptions on File Prior to Encounter   Medication Sig Dispense Refill    allopurinol (ZYLOPRIM) 100 MG tablet Take 1 tablet (100 mg total) by mouth once daily. 90 tablet 5    busPIRone (BUSPAR) 15 MG tablet 1 tab po tid 90 tablet 1    clindamycin (CLEOCIN) 150 MG capsule Take 2 capsules (300 mg total) by mouth every 8 (eight) hours. 42 capsule 0    diclofenac sodium (VOLTAREN) 1 % Gel Apply 4 g topically 4 (four) times daily. Pt has shoulder pain and knee pain 200 g 5    diphenoxylate-atropine 2.5-0.025 mg (LOMOTIL) 2.5-0.025 mg per tablet     0    duloxetine (CYMBALTA) 60 MG capsule Take 1 capsule (60 mg total) by mouth once daily. 90 capsule 0    ferrous sulfate 325 (65 FE) MG EC tablet Take 1 tablet (325 mg total) by mouth once a week. 90 tablet 3    fluticasone (FLONASE) 50 mcg/actuation nasal spray instill 2 sprays into each nostril once daily 16 g 11    fluticasone-salmeterol (ADVAIR HFA) 115-21 mcg/actuation HFAA inhale 2 puffs INTO THE LUNGS twice a day 12 g 12    hydrocodone-acetaminophen 7.5-325mg (NORCO) 7.5-325 mg per tablet Take 1 tablet by mouth every 6 (six) hours as needed for Pain. 15 tablet 0    ipratropium-albuterol (COMBIVENT RESPIMAT)  mcg/actuation inhaler inhale 1 puff INTO THE LUNGS four times a day 3 Package 4    levothyroxine (SYNTHROID) 100 MCG tablet TAKE 1 TABLET ONE TIME DAILY 90 tablet 3    methocarbamol (ROBAXIN) 500 MG Tab Take 1 tablet (500 mg total) by mouth 2 (two) times daily as needed (for muscle spasms.). 60 tablet 1    metoprolol succinate (TOPROL-XL) 50 MG 24 hr tablet Take 1 tablet (50 mg total) by mouth once daily. 90 tablet 3    MULTIVITAMIN W-MINERALS/LUTEIN (CENTRUM SILVER ORAL) Take 1 tablet by mouth once daily.        pantoprazole (PROTONIX) 40 MG tablet Take 1 tablet (40 mg total) by mouth once daily. 90 tablet 3    pravastatin (PRAVACHOL) 10 MG tablet TAKE 1 TABLET ONE TIME DAILY 90 tablet 0    ropinirole (REQUIP) 1 MG tablet  "Take 1 tablet (1 mg total) by mouth nightly. 90 tablet 3    thiamine 250 MG tablet Take 500 mg by mouth once daily.        tramadol (ULTRAM) 50 mg tablet Take 1 tablet (50 mg total) by mouth 2 (two) times daily as needed for Pain. 60 tablet 2          ROS:  GENERAL: No fever, chills, fatigability or weight loss.  SKIN: No rashes, itching or changes in color or texture of skin.  HEAD: No headaches or recent head trauma.  EYES: Visual acuity fine. No photophobia, ocular pain or diplopia.  EARS: Denies ear pain, discharge or vertigo.  NOSE: No loss of smell, no epistaxis or postnasal drip.  MOUTH & THROAT: No hoarseness or change in voice. No excessive gum bleeding.  NODES: Denies swollen glands.  CHEST: Denies BRADY, cyanosis, wheezing, cough and sputum production.  CARDIOVASCULAR: Denies chest pain, PND, orthopnea or reduced exercise tolerance.  ABDOMEN: Appetite fine. No weight loss. Denies diarrhea, abdominal pain, hematemesis or blood in stool.  URINARY: No flank pain, dysuria or hematuria.  PERIPHERAL VASCULAR: No claudication or cyanosis.  NEUROLOGIC: No history of seizures, paralysis, alteration of gait or coordination.  MUSCULOSKELETAL: See HPI     PE:  APPEARANCE: Well nourished, well developed, in no acute distress.   HEAD: Normocephalic, atraumatic.  EYES: PERRL. EOMI.   EARS: TM's intact. Light reflex normal. No retraction or perforation.   NOSE: Mucosa pink. Airway clear.  MOUTH & THROAT: No tonsillar enlargement. No pharyngeal erythema or exudate. No stridor.  NECK: Supple.   NODES: No cervical, axillary or inguinal lymph node enlargement.  CHEST: Lungs clear to auscultation.  CARDIOVASCULAR: Normal S1, S2. No rubs, murmurs or gallops.  ABDOMEN: Bowel sounds normal. Not distended. Soft. No tenderness or masses.  NEUROLOGIC: Cranial Nerves: II-XII grossly intact, also see MUSCULOSKELETAL  MUSCULOSKELETAL:      ,/80   Pulse 63   Resp 14   Ht 5' 5" (1.651 m)   Wt 49.7 kg (109 lb 9.1 oz)   LMP  " (LMP Unknown)   BMI 18.23 kg/m²                   Left  Wrist -2 plus radial  artery and ulnar artery pulses, light touch intact Left upper extremity.  All digits are warm. No erythema, no warmth, no drainage, No swelling, no significant tenderness.     Left Hip- pain on range of motion, in the groin area.          ASSESSMENT:   The patient is stable.  The CAT scan of the pelvis shows the patient has a iliac wing fracture that does not involve the weightbearing surface of the left hip joint.  The patient does have a scapholunate dissociation.  She does have some intercarpal wrist arthritis.  The patient states that she does have a history of wrist arthritis.  She understands that she may benefit from a closed reduction and percutaneous.  Fixation of the scapholunate joint as well as ligament reconstruction or possible delayed treatment and a wrist fusion.       Diagnosis:  1.  Left iliac wing fracture   2. Left radius ORIF  3. Eft wrist scapholunate dissociation    PLAN:  Walk assisted ambulation full weight-bear left lower extremity.  The patient can anticipate in physical therapy with pain being the limiting factor for the left hip range of motion.  Continue pain medication  Continue occupational therapy and physical therapy.  The patient can follow-up 2  week   Hardware removal of the left wrist  Repair of scapholunate dissociation

## 2017-08-17 NOTE — PROGRESS NOTES
Administered 1cc Prolia 60mg/cc to LLQ of abdomen. Pt. Tolerated well. No acute reaction noted to site. Pt. Instructed on S/S of reaction to report. Pt. Verbalized understanding.    Lot:[id number]  Exp:10/19  Manu:j carlos

## 2017-08-17 NOTE — LETTER
August 17, 2017      Shyam Turner MD  [address] [place] [address]           [facility] - Rheumatology  [address] [place] [address]  Phone: [phone]  Fax: [phone]          Patient: Ibeth Schroeder   MR Number: [id number]   YOB: 1950   Date of Visit: 8/17/2017       Dear Dr. Shyam Turner:    Thank you for referring Ibeth Schroeder to me for evaluation. Attached you will find relevant portions of my assessment and plan of care.    If you have questions, please do not hesitate to call me. I look forward to following Ibeth Schroeder along with you.    Sincerely,    Patria Melara PA-C    Enclosure  CC:  No Recipients    If you would like to receive this communication electronically, please contact [email or web address] or [phone] to request more information on [employer] access.    For providers and/or their staff who would like to refer a patient to [facility], please contact us through our one-stop-shop provider referral line, [facility], at [phone].    If you feel you have received this communication in error or would no longer like to receive these types of communications, please e-mail [email or web address]

## 2017-08-17 NOTE — PROGRESS NOTES
Subjective:       Patient ID: Ibeth Schroeder is a 67 y.o. female.    Chief Complaint: Osteoporosis; Chronic Kidney Disease; and Osteoarthritis    Ibeth is back for rheumatology follow-up she was last seen in the clinic 2013 she's been lost to follow-up.  At that time was supposed to start Prolia for osteoporosis.  She does have a history of hyperuricemia with gout attacks but none recently it's been almost 7 years since her last attack.  She is not on daily maintenance Treatment.  He pain level today 0/10.  She does have some chronic kidney disease so the bisphosphonates thought not to be the best option.  She did have some low calcium is now on over-the-counter supplement of calcium twice daily which includes vitamin D.  Calcium level is back within normal limits.  She did fall sustaining a pelvic fracture in the last year.  This has healed.  Had a repeat bone density done today and here to review results.  She does have osteoarthritis affecting multiple joints as well as her spine has had epidural injections in the past with some help.      Osteoarthritis   Pertinent negatives include no abdominal pain, arthralgias, chest pain, chills, fatigue, fever, joint swelling, myalgias, nausea, neck pain, rash, vomiting or weakness.     Review of Systems   Constitutional: Negative.  Negative for activity change, appetite change, chills, fatigue and fever.   HENT: Negative.  Negative for mouth sores and trouble swallowing.         No dry mouth   Eyes: Negative.  Negative for photophobia, pain and redness.        No swollen or red eyes, no dry eye     Respiratory: Negative.  Negative for chest tightness, shortness of breath, wheezing and stridor.    Cardiovascular: Negative.  Negative for chest pain.   Gastrointestinal: Negative.  Negative for abdominal pain, blood in stool, diarrhea, nausea and vomiting.   Genitourinary: Negative.  Negative for dysuria, frequency, hematuria and urgency.   Musculoskeletal: Negative.   "Negative for arthralgias, back pain, gait problem, joint swelling, myalgias, neck pain and neck stiffness.   Skin: Negative.  Negative for color change, pallor and rash.   Neurological: Negative.  Negative for weakness.   Hematological: Negative for adenopathy.   Psychiatric/Behavioral: Negative for suicidal ideas.         Objective:     /72   Pulse 77   Ht 5' 5" (1.651 m)   Wt 49.7 kg (109 lb 9.1 oz)   LMP  (LMP Unknown)   BMI 18.23 kg/m²      Physical Exam   Constitutional: She is oriented to person, place, and time and well-developed, well-nourished, and in no distress. No distress.   HENT:   Head: Normocephalic and atraumatic.   Right Ear: External ear normal.   Left Ear: External ear normal.   Mouth/Throat: No oropharyngeal exudate.   Eyes: Conjunctivae and EOM are normal. Pupils are equal, round, and reactive to light. No scleral icterus.   Neck: Normal range of motion. Neck supple. No thyromegaly present.   Cardiovascular: Normal rate, regular rhythm and normal heart sounds.    No murmur heard.  Pulmonary/Chest: Effort normal and breath sounds normal. She exhibits no tenderness.   Abdominal: Soft. Bowel sounds are normal.   Lymphadenopathy:     She has no cervical adenopathy.   Neurological: She is alert and oriented to person, place, and time. She displays normal reflexes. No cranial nerve deficit. She exhibits normal muscle tone. Gait normal.   Skin: Skin is warm and dry. No rash noted.     Musculoskeletal: Normal range of motion. She exhibits no edema or tenderness.   She does have some mild kyphosis in the thoracic spine but no tenderness to palpation  Some mild osteoarthritic changes in her hands                 Recent Results (from the past 168 hour(s))   Basic metabolic panel    Collection Time: 08/17/17  1:05 PM   Result Value Ref Range    Sodium 141 136 - 145 mmol/L    Potassium 4.2 3.5 - 5.1 mmol/L    Chloride 110 95 - 110 mmol/L    CO2 22 (L) 23 - 29 mmol/L    Glucose 120 (H) 70 - 110 " mg/dL    BUN, Bld 44 (H) 8 - 23 mg/dL    Creatinine 1.4 0.5 - 1.4 mg/dL    Calcium 9.7 8.7 - 10.5 mg/dL    Anion Gap 9 8 - 16 mmol/L    eGFR if African American 45 (A) >60 mL/min/1.73 m^2    eGFR if non African American 39 (A) >60 mL/min/1.73 m^2   Lipid panel    Collection Time: 08/17/17  1:05 PM   Result Value Ref Range    Cholesterol 170 120 - 199 mg/dL    Triglycerides 145 30 - 150 mg/dL    HDL 41 40 - 75 mg/dL    LDL Cholesterol 100.0 63.0 - 159.0 mg/dL    HDL/Chol Ratio 24.1 20.0 - 50.0 %    Total Cholesterol/HDL Ratio 4.1 2.0 - 5.0    Non-HDL Cholesterol 129 mg/dL       DEXA 7/6/16 total femur T score -2.7, femur neck -2.7, spine +1.9 impression osteoporosis  DEXA 6/5/13 total femur T score -3.1, spine +1.7 impression osteoporosis    Assessment:       1. Osteoporosis, idiopathic    2. Primary osteoarthritis involving multiple joints    3. Medication monitoring encounter          1.  Severe osteoporosis at the hip with recent pelvic fracture not on treatment for the last 3 years because she has been lost to follow-up -- CKD so now on Prolia 1st injection done 7/6/16  2.  Hyperuricemia with no recent gout attacks currently not on long-term treatment last gout attack 7 years ago currently no indication to add treatment  3.  Chronic kidney disease  4.  Generalized osteoarthritis  5.  Medication Monitoring- no current issues, no evidence of toxicity      Plan:         Ok for Prolia 60 mg subcutaneous injection today for osteoporosis   Continue Prolia Q 6 months  increase dietary intake of calcium by 400-600 mg daily for next 14 days and recheck bmp in 10 days     No need to treat hyperuricemia currently if she develops gout attacks then consider allopurinol    Avoid non-steroidal's, okay for Tylenol for pain    Return to clinic in 6 months with labs and Prolia    Call with any questions, changes, or concerns.

## 2017-08-17 NOTE — PATIENT INSTRUCTIONS
First Aid: Sprains and Fractures  A sprain happens when the ligaments, or fibrous tissue, connecting bones at a joint pulls or tears. Most sprains hurt, and some take even longer than a broken bone to heal. A fracture may happen when a bone is hit with more force than it can bear.  If a bad sprain or a fracture occurs and you cant get first-aid supplies, make do with whats on hand and immediately call for help:  · A broken leg can be splinted to the good leg. You can also use a rolled-up towel as a splint.  · Immobilize an injured arm by cradling it in a sling. Fold a bandana or scarf into a triangle and tie it behind the neck. Or, the bottom of a shirt can be safely pinned up to the top of a shirt, creating a makeshift sling.   1. Control any bleeding  Here are two ways to control bleeding:  · Apply direct pressure to the source of bleeding with gauze or a cloth to stop bleeding.  · If a bone has broken through the skin, cover the wound with loosely wrapped gauze or cloth. DONT increase damage by pressing directly on the bone or pushing it back into place.  2. Immobilize the injury  Do's and don'ts for not moving the injury:  · Place a rigid material (splint) next to the injury, and tie or tape it in place. Secure the splint above and below the injury.  · DONT increase damage by straightening an injury.  · DONT secure the splint too tight. If toes or fingers become pale, cold, or numb, loosen the splint immediately.  3. Ice and elevate  Tips to ice and raise the injury:   · Place ice or a cold pack on the injury for 20 minutes every 3 to 4 hours to limit swelling and pain. Use a barrier, like a thin towel, between the ice or cold pack and the skin to prevent cold injury to the skin.  · Raise the injury above the heart, if possible, to reduce swelling.  Seek medical help if any of the following is true:  · The injury has forced a joint beyond its normal range of motion and now the joint wont work.  · A  strong force, such as a fall, placed great stress on a bone or joint, especially if a snap was heard.  · The joint or limb looks crooked or bowed.  · You have reason to believe a bone is broken.  · You have numbness, tingling or a pale, cool limb.    Call 911 right away if the victim has decreased feeling or movement in the arms or legs, or if the victim has pain in the neck or back (possible a spinal cord injury).  1. Keep the victims head, neck, and back exactly as you found them. Place heavy objects around the body or hold the head still with your hands.  2. Move the victim only to save his or her life. Lift the body as one unit, supporting head, midsection, and legs. DONT straighten the victim, elevate the legs, or tip the head in any direction.   Date Last Reviewed: 9/29/2015  © 3425-5994 [employer]. [address] [place] [address], [place] [address]. All rights reserved. This information is not intended as a substitute for professional medical care. Always follow your healthcare professional's instructions.

## 2017-08-17 NOTE — TELEPHONE ENCOUNTER
CALLED PT. REGARDING TEST RESULTS, NO ANSWER, LEFT VOICE MESSAGE ASKING PT. TO RETURN CALL TO OFFICE.

## 2017-08-18 ENCOUNTER — TELEPHONE (OUTPATIENT)
Dept: NEPHROLOGY | Facility: CLINIC | Age: 67
End: 2017-08-18

## 2017-08-18 RX ORDER — CIPROFLOXACIN 500 MG/1
500 TABLET ORAL 2 TIMES DAILY
Qty: 20 TABLET | Refills: 0 | Status: SHIPPED | OUTPATIENT
Start: 2017-08-18 | End: 2017-08-28

## 2017-08-18 NOTE — TELEPHONE ENCOUNTER
Returned call to patient, Marino states that patient is sleeping. He states that patient has been complaining of symptoms of a UTI, burning when she urinates. Please send something over to [facility] in [place]. Please advise

## 2017-08-18 NOTE — TELEPHONE ENCOUNTER
----- Message from Sandrine Barbour sent at 8/17/2017  4:23 PM CDT -----  Returning nurse call. Please call back at [phone]. thanks

## 2017-08-21 ENCOUNTER — LAB VISIT (OUTPATIENT)
Dept: LAB | Facility: HOSPITAL | Age: 67
End: 2017-08-21
Attending: INTERNAL MEDICINE
Payer: MEDICARE

## 2017-08-21 ENCOUNTER — TELEPHONE (OUTPATIENT)
Dept: ORTHOPEDICS | Facility: CLINIC | Age: 67
End: 2017-08-21

## 2017-08-21 DIAGNOSIS — R80.1 PERSISTENT PROTEINURIA: ICD-10-CM

## 2017-08-21 DIAGNOSIS — N18.30 CKD (CHRONIC KIDNEY DISEASE) STAGE 3, GFR 30-59 ML/MIN: ICD-10-CM

## 2017-08-21 DIAGNOSIS — E87.5 HYPERKALEMIA: ICD-10-CM

## 2017-08-21 DIAGNOSIS — N25.81 HYPERPARATHYROIDISM, SECONDARY RENAL: ICD-10-CM

## 2017-08-21 DIAGNOSIS — M1A.3710 CHRONIC GOUT DUE TO RENAL IMPAIRMENT INVOLVING TOE OF RIGHT FOOT WITHOUT TOPHUS: ICD-10-CM

## 2017-08-21 DIAGNOSIS — E87.20 ACIDOSIS: ICD-10-CM

## 2017-08-21 DIAGNOSIS — Z01.818 PRE-OP TESTING: Primary | ICD-10-CM

## 2017-08-21 LAB
ALBUMIN SERPL BCP-MCNC: 3.1 G/DL
ANION GAP SERPL CALC-SCNC: 6 MMOL/L
BASOPHILS # BLD AUTO: 0.01 K/UL
BASOPHILS NFR BLD: 0.1 %
BUN SERPL-MCNC: 33 MG/DL
CALCIUM SERPL-MCNC: 8.6 MG/DL
CHLORIDE SERPL-SCNC: 111 MMOL/L
CO2 SERPL-SCNC: 25 MMOL/L
CREAT SERPL-MCNC: 1.6 MG/DL
DIFFERENTIAL METHOD: ABNORMAL
EOSINOPHIL # BLD AUTO: 0.3 K/UL
EOSINOPHIL NFR BLD: 3.6 %
ERYTHROCYTE [DISTWIDTH] IN BLOOD BY AUTOMATED COUNT: 15.5 %
EST. GFR  (AFRICAN AMERICAN): 38.7 ML/MIN/1.73 M^2
EST. GFR  (NON AFRICAN AMERICAN): 33.6 ML/MIN/1.73 M^2
GLUCOSE SERPL-MCNC: 110 MG/DL
HCT VFR BLD AUTO: 30.2 %
HGB BLD-MCNC: 9.5 G/DL
LYMPHOCYTES # BLD AUTO: 1.9 K/UL
LYMPHOCYTES NFR BLD: 25.2 %
MCH RBC QN AUTO: 30.7 PG
MCHC RBC AUTO-ENTMCNC: 31.5 G/DL
MCV RBC AUTO: 98 FL
MONOCYTES # BLD AUTO: 0.6 K/UL
MONOCYTES NFR BLD: 7.4 %
NEUTROPHILS # BLD AUTO: 4.8 K/UL
NEUTROPHILS NFR BLD: 63.3 %
PHOSPHATE SERPL-MCNC: 3.5 MG/DL
PLATELET # BLD AUTO: 271 K/UL
PMV BLD AUTO: 11.1 FL
POTASSIUM SERPL-SCNC: 4.6 MMOL/L
PTH-INTACT SERPL-MCNC: 118 PG/ML
RBC # BLD AUTO: 3.09 M/UL
SODIUM SERPL-SCNC: 142 MMOL/L
URATE SERPL-MCNC: 8.6 MG/DL
WBC # BLD AUTO: 7.55 K/UL

## 2017-08-21 PROCEDURE — 84550 ASSAY OF BLOOD/URIC ACID: CPT

## 2017-08-21 PROCEDURE — 80069 RENAL FUNCTION PANEL: CPT

## 2017-08-21 PROCEDURE — 83970 ASSAY OF PARATHORMONE: CPT

## 2017-08-21 PROCEDURE — 36415 COLL VENOUS BLD VENIPUNCTURE: CPT | Mod: PO

## 2017-08-21 PROCEDURE — 85025 COMPLETE CBC W/AUTO DIFF WBC: CPT

## 2017-08-21 NOTE — TELEPHONE ENCOUNTER
Spoke with pt's  in regards to coordinating surgery. Contacted cardiology to move up holter monitor placement.  Pt's  stated there are appts more important than the surgery that they won't miss.  Pt's  refused cardiology appt and requested I leave everything how it was in the system and to delay surgery by 1 week.  Pt's new surgery date is 9/6/2017.

## 2017-08-26 ENCOUNTER — HOSPITAL ENCOUNTER (OUTPATIENT)
Dept: RADIOLOGY | Facility: HOSPITAL | Age: 67
Discharge: HOME | End: 2017-08-26
Attending: FAMILY MEDICINE
Payer: MEDICARE

## 2017-08-26 DIAGNOSIS — Z12.31 ENCOUNTER FOR SCREENING MAMMOGRAM FOR MALIGNANT NEOPLASM OF BREAST: ICD-10-CM

## 2017-08-26 DIAGNOSIS — Z12.39 BREAST CANCER SCREENING: ICD-10-CM

## 2017-08-26 PROCEDURE — 77067 SCR MAMMO BI INCL CAD: CPT | Mod: TC

## 2017-08-26 PROCEDURE — 77067 SCR MAMMO BI INCL CAD: CPT | Mod: 26,,, | Performed by: RADIOLOGY

## 2017-08-28 ENCOUNTER — LAB VISIT (OUTPATIENT)
Dept: LAB | Facility: HOSPITAL | Age: 67
End: 2017-08-28
Attending: INTERNAL MEDICINE
Payer: MEDICARE

## 2017-08-28 ENCOUNTER — OFFICE VISIT (OUTPATIENT)
Dept: PSYCHIATRY | Facility: CLINIC | Age: 67
End: 2017-08-28
Payer: MEDICARE

## 2017-08-28 ENCOUNTER — OFFICE VISIT (OUTPATIENT)
Dept: NEPHROLOGY | Facility: CLINIC | Age: 67
End: 2017-08-28
Payer: MEDICARE

## 2017-08-28 VITALS — SYSTOLIC BLOOD PRESSURE: 120 MMHG | BODY MASS INDEX: 18.6 KG/M2 | DIASTOLIC BLOOD PRESSURE: 80 MMHG | WEIGHT: 111.75 LBS

## 2017-08-28 DIAGNOSIS — N25.81 HYPERPARATHYROIDISM, SECONDARY RENAL: ICD-10-CM

## 2017-08-28 DIAGNOSIS — M1A.3710 CHRONIC GOUT DUE TO RENAL IMPAIRMENT INVOLVING TOE OF RIGHT FOOT WITHOUT TOPHUS: ICD-10-CM

## 2017-08-28 DIAGNOSIS — F03.90 MAJOR NEUROCOGNITIVE DISORDER: Primary | ICD-10-CM

## 2017-08-28 DIAGNOSIS — R80.1 PERSISTENT PROTEINURIA: ICD-10-CM

## 2017-08-28 DIAGNOSIS — N18.4 STAGE 4 CHRONIC KIDNEY DISEASE: Primary | ICD-10-CM

## 2017-08-28 DIAGNOSIS — D63.8 ANEMIA OF OTHER CHRONIC DISEASE: ICD-10-CM

## 2017-08-28 DIAGNOSIS — E87.20 ACIDOSIS: ICD-10-CM

## 2017-08-28 DIAGNOSIS — F32.89 OTHER DEPRESSION: ICD-10-CM

## 2017-08-28 DIAGNOSIS — E87.5 HYPERKALEMIA: ICD-10-CM

## 2017-08-28 DIAGNOSIS — N18.4 STAGE 4 CHRONIC KIDNEY DISEASE: ICD-10-CM

## 2017-08-28 LAB
ALBUMIN SERPL BCP-MCNC: 3.2 G/DL
ANION GAP SERPL CALC-SCNC: 10 MMOL/L
BACTERIA #/AREA URNS AUTO: NORMAL /HPF
BASOPHILS # BLD AUTO: 0.01 K/UL
BASOPHILS NFR BLD: 0.2 %
BILIRUB UR QL STRIP: NEGATIVE
BUN SERPL-MCNC: 21 MG/DL
CALCIUM SERPL-MCNC: 8.8 MG/DL
CHLORIDE SERPL-SCNC: 110 MMOL/L
CLARITY UR REFRACT.AUTO: ABNORMAL
CO2 SERPL-SCNC: 23 MMOL/L
COLOR UR AUTO: YELLOW
CREAT SERPL-MCNC: 1.2 MG/DL
CREAT UR-MCNC: 71 MG/DL
DIFFERENTIAL METHOD: ABNORMAL
EOSINOPHIL # BLD AUTO: 0.1 K/UL
EOSINOPHIL NFR BLD: 2.2 %
ERYTHROCYTE [DISTWIDTH] IN BLOOD BY AUTOMATED COUNT: 15 %
EST. GFR  (AFRICAN AMERICAN): 54.8 ML/MIN/1.73 M^2
EST. GFR  (NON AFRICAN AMERICAN): 47.5 ML/MIN/1.73 M^2
GLUCOSE SERPL-MCNC: 95 MG/DL
GLUCOSE UR QL STRIP: NEGATIVE
HCT VFR BLD AUTO: 33.1 %
HGB BLD-MCNC: 10.2 G/DL
HGB UR QL STRIP: NEGATIVE
HYALINE CASTS UR QL AUTO: 1 /LPF
KETONES UR QL STRIP: NEGATIVE
LEUKOCYTE ESTERASE UR QL STRIP: ABNORMAL
LYMPHOCYTES # BLD AUTO: 1.6 K/UL
LYMPHOCYTES NFR BLD: 24.7 %
MCH RBC QN AUTO: 30.1 PG
MCHC RBC AUTO-ENTMCNC: 30.8 G/DL
MCV RBC AUTO: 98 FL
MICROSCOPIC COMMENT: NORMAL
MONOCYTES # BLD AUTO: 0.3 K/UL
MONOCYTES NFR BLD: 5.2 %
NEUTROPHILS # BLD AUTO: 4.2 K/UL
NEUTROPHILS NFR BLD: 67.2 %
NITRITE UR QL STRIP: NEGATIVE
PH UR STRIP: 6 [PH] (ref 5–8)
PHOSPHATE SERPL-MCNC: 2.6 MG/DL
PLATELET # BLD AUTO: 285 K/UL
PMV BLD AUTO: 9.8 FL
POTASSIUM SERPL-SCNC: 4.7 MMOL/L
PROT UR QL STRIP: NEGATIVE
PROT UR-MCNC: 26 MG/DL
PROT/CREAT RATIO, UR: 0.37
RBC # BLD AUTO: 3.39 M/UL
RBC #/AREA URNS AUTO: 0 /HPF (ref 0–4)
SODIUM SERPL-SCNC: 143 MMOL/L
SP GR UR STRIP: 1.01 (ref 1–1.03)
SQUAMOUS #/AREA URNS AUTO: 1 /HPF
URN SPEC COLLECT METH UR: ABNORMAL
UROBILINOGEN UR STRIP-ACNC: NEGATIVE EU/DL
WBC # BLD AUTO: 6.31 K/UL
WBC #/AREA URNS AUTO: 4 /HPF (ref 0–5)

## 2017-08-28 PROCEDURE — 3078F DIAST BP <80 MM HG: CPT | Mod: S$GLB,,, | Performed by: PSYCHIATRY & NEUROLOGY

## 2017-08-28 PROCEDURE — 99499 UNLISTED E&M SERVICE: CPT | Mod: S$GLB,,, | Performed by: INTERNAL MEDICINE

## 2017-08-28 PROCEDURE — 3079F DIAST BP 80-89 MM HG: CPT | Mod: S$GLB,,, | Performed by: INTERNAL MEDICINE

## 2017-08-28 PROCEDURE — 3008F BODY MASS INDEX DOCD: CPT | Mod: S$GLB,,, | Performed by: PSYCHIATRY & NEUROLOGY

## 2017-08-28 PROCEDURE — 99499 UNLISTED E&M SERVICE: CPT | Mod: S$GLB,,, | Performed by: PSYCHIATRY & NEUROLOGY

## 2017-08-28 PROCEDURE — 99214 OFFICE O/P EST MOD 30 MIN: CPT | Mod: S$GLB,,, | Performed by: INTERNAL MEDICINE

## 2017-08-28 PROCEDURE — 3074F SYST BP LT 130 MM HG: CPT | Mod: S$GLB,,, | Performed by: INTERNAL MEDICINE

## 2017-08-28 PROCEDURE — 99213 OFFICE O/P EST LOW 20 MIN: CPT | Mod: S$GLB,,, | Performed by: PSYCHIATRY & NEUROLOGY

## 2017-08-28 PROCEDURE — 99999 PR PBB SHADOW E&M-EST. PATIENT-LVL IV: CPT | Mod: PBBFAC,,, | Performed by: INTERNAL MEDICINE

## 2017-08-28 PROCEDURE — 3008F BODY MASS INDEX DOCD: CPT | Mod: S$GLB,,, | Performed by: INTERNAL MEDICINE

## 2017-08-28 PROCEDURE — 3074F SYST BP LT 130 MM HG: CPT | Mod: S$GLB,,, | Performed by: PSYCHIATRY & NEUROLOGY

## 2017-08-28 RX ORDER — ALLOPURINOL 100 MG/1
100 TABLET ORAL DAILY
Qty: 90 TABLET | Refills: 5 | Status: SHIPPED | OUTPATIENT
Start: 2017-08-28 | End: 2018-04-23 | Stop reason: SDUPTHER

## 2017-08-28 RX ORDER — CALCITRIOL 0.25 UG/1
0.25 CAPSULE ORAL DAILY
Qty: 90 CAPSULE | Refills: 3 | Status: SHIPPED | OUTPATIENT
Start: 2017-08-28 | End: 2018-04-23

## 2017-08-28 NOTE — PATIENT INSTRUCTIONS
1. Chronic kidney disease stage III/4: No heavy proteinuria.  Continue current conservative management.  No intervention for renal artery stenosis at this time.  Avoid ACE inhibitor and ARB at this time.  Cystatin C  GFR 28 % stable in last 6 months       2.  Hypertension: doing well off Norvasc and off ACEI  Has recently restarted on norvasc but BP is low not high; hold norvasc for now pending holter     3.  Anemia:on PO iron and IV iron per hematology --arrange follow up with dr. Mar     4.  AAA last ultrasound show size was 4 cm.  We will repeat the ultrasound in 6 months --dr. Liz is is following her       5.  Proteinuria: Stable without  ARB or ACE inhibitor    6.  Hyperparathyroidism: Continue calcitriol 0.25 mg ; patient's vitamin D levels are normal ( 37) , hypercalcemia is better    7. Gout: restart allopurinol 100 mg ---life long with uric acid target < 6 mg/ dl and it protects kidney and prevents gout             Follow up 6 months

## 2017-08-28 NOTE — PROGRESS NOTES
Subjective:       Patient ID: Ibeth Schroeder is a 67 y.o. White female who presents for follow up of Chronic Kidney Disease    Hypertension   Pertinent negatives include no chest pain, headaches, palpitations or shortness of breath.        Patient is a white female with severe COPD and history of acute kidney injury status post hemodialysis in the year 2012.  She has been followed in the nephrology division.  She was last seen by Dr. Jara in August 2016 when her creatinine was higher.  Today she comes back for follow-up as a new patient to me.  Patient has been having some wheezing.  Patient has lost 5 pounds in last 6 months unintentionally.  Patient has not been drinking very well.  Her creatinine is higher at 1.9.  Patient overall is a very poor historian.  Denies any fevers and chills.  Denied any urinary symptoms.  Denies any cough or sputum.    September 2016 acute kidney injury--- ACE inhibitor was stopped    October 2016 creatinine came down to 1.6; renal ultrasound showed echogenic kidneys with possible renal artery stenosis; AAA of size of 4.0 cm    January 2017: Recurrent UTI instructions given, mild hypercalcemia over-the-counter vitamin D stopped, continued with calcitriol for hyperparathyroidism    April 2017 Norvasc was stopped due to relative hypotension    August 2017 GFR is 28% based on Cystatin C with a corresponding creatinine of 1.6; creatinine in the last 6 months have been fluctuating between 1.6 and 2.2.  There was one creatinine of 0.9 which could have been a lab error. S/p fall with pelvic # and left wrist # and s/p plate ( dr. Lizarraga) pending holter today       Review of Systems   Constitutional: Negative for activity change, appetite change, chills, diaphoresis, fatigue, fever and unexpected weight change.   HENT: Negative for congestion, dental problem, drooling, postnasal drip, rhinorrhea and voice change.    Eyes: Negative for discharge.   Respiratory: Negative for apnea, cough,  choking, chest tightness, shortness of breath, wheezing and stridor.    Cardiovascular: Negative for chest pain, palpitations and leg swelling.   Gastrointestinal: Negative for abdominal distention, blood in stool, constipation, diarrhea, nausea, rectal pain and vomiting.   Endocrine: Negative for cold intolerance, heat intolerance, polydipsia and polyuria.   Genitourinary: Negative for decreased urine volume, difficulty urinating, dysuria, enuresis, flank pain, frequency, hematuria and urgency.   Musculoskeletal: Negative for arthralgias, back pain, gait problem and joint swelling.   Skin: Negative for rash.   Allergic/Immunologic: Negative for food allergies and immunocompromised state.   Neurological: Negative for dizziness, tremors, syncope, numbness and headaches.   Hematological: Does not bruise/bleed easily.   Psychiatric/Behavioral: Negative for agitation, behavioral problems and self-injury. The patient is not nervous/anxious and is not hyperactive.    All other systems reviewed and are negative.      Objective:     /80   Wt 50.7 kg (111 lb 12.4 oz)   LMP  (LMP Unknown)   BMI 18.60 kg/m²      Physical Exam   Constitutional: She is oriented to person, place, and time. She appears well-developed and well-nourished.   HENT:   Head: Normocephalic and atraumatic.   Eyes: Conjunctivae and EOM are normal. Pupils are equal, round, and reactive to light.   Neck: Normal range of motion and full passive range of motion without pain. Neck supple. Carotid bruit is not present. No edema present. No thyroid mass and no thyromegaly present.   Cardiovascular: Normal rate, regular rhythm, S1 normal, S2 normal, normal heart sounds, intact distal pulses and normal pulses.  PMI is not displaced.  Exam reveals no friction rub.    No murmur heard.  Pulmonary/Chest: Effort normal and breath sounds normal. No accessory muscle usage. No respiratory distress. She has no wheezes. She has no rales. She exhibits no tenderness.    Abdominal: Soft. Bowel sounds are normal. She exhibits no distension and no mass. There is no hepatosplenomegaly. There is no tenderness. There is no rebound and no CVA tenderness. No hernia.   Musculoskeletal: Normal range of motion. She exhibits no edema or tenderness.   In WC left forearm in sling and brace    Neurological: She is alert and oriented to person, place, and time. She has normal reflexes. She displays normal reflexes. No cranial nerve deficit or sensory deficit. She exhibits normal muscle tone. Coordination normal.   Skin: Skin is warm and dry. No bruising, no ecchymosis and no rash noted. No cyanosis or erythema. No pallor. Nails show no clubbing.   Psychiatric: She has a normal mood and affect. Her speech is normal and behavior is normal. Judgment and thought content normal.         Lab Results   Component Value Date    CREATININE 1.6 (H) 08/21/2017    BUN 33 (H) 08/21/2017     08/21/2017    K 4.6 08/21/2017     (H) 08/21/2017    CO2 25 08/21/2017     Lab Results   Component Value Date    WBC 7.55 08/21/2017    HGB 9.5 (L) 08/21/2017    HCT 30.2 (L) 08/21/2017    MCV 98 08/21/2017     08/21/2017     Lab Results   Component Value Date    .0 (H) 08/21/2017    CALCIUM 8.6 (L) 08/21/2017    CAION 0.90 (L) 09/18/2012    PHOS 3.5 08/21/2017     Lab Results   Component Value Date    URICACID 8.6 (H) 08/21/2017       Assessment:    )    1. Stage 4 chronic kidney disease    2. Hyperparathyroidism, secondary renal    3. Hyperkalemia    4. Acidosis    5. Persistent proteinuria    6. 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 Anemia of other chronic disease        Plan:         1. Chronic kidney disease stage III/4: No heavy proteinuria.  Continue current conservative management.  No intervention for renal artery stenosis at this time.  Avoid ACE inhibitor and ARB at this time.  Cystatin C  GFR 28 % stable in last 6 months       2.   Hypertension: doing well off Norvasc and off ACEI  Has recently restarted on norvasc but BP is low not high; hold norvasc for now pending holter     3.  Anemia:on PO iron and IV iron per hematology --arrange follow up with dr. aMr     4.  AAA last ultrasound show size was 4 cm.  We will repeat the ultrasound in 6 months --dr. Liz is is following her       5.  Proteinuria: Stable without  ARB or ACE inhibitor    6.  Hyperparathyroidism: Continue calcitriol 0.25 mg ; patient's vitamin D levels are normal ( 37) , hypercalcemia is better    7. Gout: restart allopurinol 100 mg ---life long with uric acid target < 6 mg/ dl and it protects kidney and prevents gout             Follow up 6 months     Cristina Crowder MD
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
## 2017-08-29 RX ORDER — QUETIAPINE FUMARATE 25 MG/1
25 TABLET, FILM COATED ORAL NIGHTLY
Qty: 90 TABLET | Refills: 0 | Status: SHIPPED | OUTPATIENT
Start: 2017-08-29 | End: 2017-11-28 | Stop reason: SDUPTHER

## 2017-08-29 RX ORDER — DULOXETIN HYDROCHLORIDE 60 MG/1
60 CAPSULE, DELAYED RELEASE ORAL DAILY
Qty: 90 CAPSULE | Refills: 2 | Status: SHIPPED | OUTPATIENT
Start: 2017-08-29 | End: 2017-11-28 | Stop reason: SDUPTHER

## 2017-08-29 RX ORDER — BUSPIRONE HYDROCHLORIDE 15 MG/1
TABLET ORAL
Qty: 90 TABLET | Refills: 2 | Status: SHIPPED | OUTPATIENT
Start: 2017-08-29 | End: 2017-11-28 | Stop reason: SDUPTHER

## 2017-08-30 ENCOUNTER — TELEPHONE (OUTPATIENT)
Dept: ORTHOPEDICS | Facility: CLINIC | Age: 67
End: 2017-08-30

## 2017-09-01 ENCOUNTER — LAB VISIT (OUTPATIENT)
Dept: LAB | Facility: HOSPITAL | Age: 67
End: 2017-09-01
Attending: ORTHOPAEDIC SURGERY
Payer: MEDICARE

## 2017-09-01 ENCOUNTER — TELEPHONE (OUTPATIENT)
Dept: CARDIOLOGY | Facility: CLINIC | Age: 67
End: 2017-09-01

## 2017-09-01 DIAGNOSIS — I49.1 PAC (PREMATURE ATRIAL CONTRACTION): Primary | ICD-10-CM

## 2017-09-01 DIAGNOSIS — Z01.818 PRE-OP TESTING: ICD-10-CM

## 2017-09-01 LAB
BASOPHILS # BLD AUTO: 0.02 K/UL
BASOPHILS NFR BLD: 0.3 %
DIFFERENTIAL METHOD: ABNORMAL
EOSINOPHIL # BLD AUTO: 0.2 K/UL
EOSINOPHIL NFR BLD: 3.1 %
ERYTHROCYTE [DISTWIDTH] IN BLOOD BY AUTOMATED COUNT: 14.7 %
HCT VFR BLD AUTO: 31.3 %
HGB BLD-MCNC: 9.8 G/DL
LYMPHOCYTES # BLD AUTO: 1.9 K/UL
LYMPHOCYTES NFR BLD: 29.6 %
MCH RBC QN AUTO: 30.6 PG
MCHC RBC AUTO-ENTMCNC: 31.3 G/DL
MCV RBC AUTO: 98 FL
MONOCYTES # BLD AUTO: 0.4 K/UL
MONOCYTES NFR BLD: 5.7 %
NEUTROPHILS # BLD AUTO: 4 K/UL
NEUTROPHILS NFR BLD: 61.3 %
PLATELET # BLD AUTO: 214 K/UL
PMV BLD AUTO: 9.6 FL
RBC # BLD AUTO: 3.2 M/UL
WBC # BLD AUTO: 6.53 K/UL

## 2017-09-01 PROCEDURE — 36415 COLL VENOUS BLD VENIPUNCTURE: CPT | Mod: PO

## 2017-09-01 PROCEDURE — 85025 COMPLETE CBC W/AUTO DIFF WBC: CPT | Mod: PO

## 2017-09-01 RX ORDER — METOPROLOL SUCCINATE 50 MG/1
50 TABLET, EXTENDED RELEASE ORAL 2 TIMES DAILY
Qty: 60 TABLET | Refills: 11 | Status: SHIPPED | OUTPATIENT
Start: 2017-09-01 | End: 2018-09-04 | Stop reason: SDUPTHER

## 2017-09-01 NOTE — TELEPHONE ENCOUNTER
The patient has been notified of these results and all questions have been answered. I also verified follow-up visit with patient.The patient verbalized understanding will call the office with any further questions.

## 2017-09-01 NOTE — PRE-PROCEDURE INSTRUCTIONS
Pre op instructions reviewed with patient per phone:    To confirm, Your surgeon has instructed you:  Surgery is scheduled 9/6/17 at 1215.      Please report to [facility] 1st floor main lobby by 1045 Pre admit office will call afternoon prior to surgery for final arrival time.      INSTRUCTIONS IMPORTANT!!!  ¨ Do not eat, drink, or smoke after 12 midnight-including water. OK to brush teeth, no gum, candy or mints!    ¨ Take only these medicines with a small swallow of water-morning of surgery.  Buspar, Metoprolol, Pravastatin, Synthroid, Cymbalta, Advair Diskus    ____  Do not wear makeup, including mascara.  ____  No powder, lotions or creams to surgical area.  ____  Please remove all jewelry, including piercings and leave at home.  ____  No money or valuables needed. Please leave at home.  ____  Please bring identification and insurance information to hospital.  ____  If going home the same day, arrange for a ride home. You will not be able to   drive if Anesthesia was used.  ____  Children, under 12 years old, must remain in the waiting room with an adult.  They are not allowed in patient areas.  ____  Wear loose fitting clothing. Allow for dressings, bandages.  ____  Stop Aspirin, Ibuprofen, Motrin and Aleve at least 5-7 days before surgery, unless otherwise instructed by your doctor, or the nurse.   You MAY use Tylenol/acetaminophen until day of surgery.  ____  If you take diabetic medication, do not take am of surgery unless instructed by   Doctor.  ____ Stop taking any Fish Oil supplement or any Vitamins that contain Vitamin E at least 5 days prior to surgery.          Bathing Instructions-- The night before surgery and the morning prior to coming to the hospital:   -Do not shave the surgical area.   -Shower and wash your hair and body as usual with anti-bacterial  soap and shampoo.   -Rinse your hair and body completely.   -Use one packet of hibiclens to wash the surgical site  (using your hand) gently for 5 minutes.  Do not scrub you skin too hard.   -Do not use hibiclens on your head, face, or genitals.   -Do not wash with anti-bacterial soap after you use the hibiclens.   -Rinse your body thoroughly.   -Dry with clean, soft towel.  Do not use lotion, cream, deodorant, or powders on   the surgical site.    Use antibacterial soap in place of hibiclens if your surgery is on the head, face or genitals.         Surgical Site Infection    Prevention of surgical site infections:     -Keep incisions clean and dry.   -Do not soak/submerge incisions in water until completely healed.   -Do not apply lotions, powders, creams, or deodorants to site.   -Always make sure hands are cleaned with antibacterial soap/ alcohol-based   prior to touching the surgical site.  (This includes doctors, nurses, staff, and yourself.)    Signs and symptoms:   -Redness and pain around the area where you had surgery   -Drainage of cloudy fluid from your surgical wound   -Fever over 100.4  I have read or had read and explained to me, and understand the above information.

## 2017-09-05 ENCOUNTER — ANESTHESIA EVENT (OUTPATIENT)
Dept: SURGERY | Facility: HOSPITAL | Age: 67
End: 2017-09-05
Payer: MEDICARE

## 2017-09-06 ENCOUNTER — SURGERY (OUTPATIENT)
Age: 67
End: 2017-09-06

## 2017-09-06 ENCOUNTER — ANESTHESIA (OUTPATIENT)
Dept: SURGERY | Facility: HOSPITAL | Age: 67
End: 2017-09-06
Payer: MEDICARE

## 2017-09-06 ENCOUNTER — HOSPITAL ENCOUNTER (OUTPATIENT)
Facility: HOSPITAL | Age: 67
Discharge: HOME | End: 2017-09-06
Attending: ORTHOPAEDIC SURGERY | Admitting: ORTHOPAEDIC SURGERY
Payer: MEDICARE

## 2017-09-06 DIAGNOSIS — M25.539 WRIST PAIN: ICD-10-CM

## 2017-09-06 DIAGNOSIS — M25.332 SCAPHOLUNATE DISSOCIATION OF LEFT WRIST: Primary | ICD-10-CM

## 2017-09-06 PROCEDURE — 88300 SURGICAL PATH GROSS: CPT | Mod: 26,,, | Performed by: PATHOLOGY

## 2017-09-06 PROCEDURE — 25320 REPAIR/REVISE WRIST JOINT: CPT | Mod: 79,AS,LT, | Performed by: PHYSICIAN ASSISTANT

## 2017-09-06 PROCEDURE — 25000003 PHARM REV CODE 250: Performed by: ORTHOPAEDIC SURGERY

## 2017-09-06 PROCEDURE — 71000015 HC POSTOP RECOV 1ST HR: Performed by: ORTHOPAEDIC SURGERY

## 2017-09-06 PROCEDURE — 20680 REMOVAL OF IMPLANT DEEP: CPT | Mod: 78,51,, | Performed by: ORTHOPAEDIC SURGERY

## 2017-09-06 PROCEDURE — 37000009 HC ANESTHESIA EA ADD 15 MINS: Performed by: ORTHOPAEDIC SURGERY

## 2017-09-06 PROCEDURE — 25320 REPAIR/REVISE WRIST JOINT: CPT | Mod: 79,LT,, | Performed by: ORTHOPAEDIC SURGERY

## 2017-09-06 PROCEDURE — 63600175 PHARM REV CODE 636 W HCPCS: Performed by: NURSE ANESTHETIST, CERTIFIED REGISTERED

## 2017-09-06 PROCEDURE — 37000008 HC ANESTHESIA 1ST 15 MINUTES: Performed by: ORTHOPAEDIC SURGERY

## 2017-09-06 PROCEDURE — 25000003 PHARM REV CODE 250: Performed by: ANESTHESIOLOGY

## 2017-09-06 PROCEDURE — C1769 GUIDE WIRE: HCPCS | Performed by: ORTHOPAEDIC SURGERY

## 2017-09-06 PROCEDURE — 71000033 HC RECOVERY, INTIAL HOUR: Performed by: ORTHOPAEDIC SURGERY

## 2017-09-06 PROCEDURE — 36000706: Performed by: ORTHOPAEDIC SURGERY

## 2017-09-06 PROCEDURE — 36000707: Performed by: ORTHOPAEDIC SURGERY

## 2017-09-06 PROCEDURE — 88305 TISSUE EXAM BY PATHOLOGIST: CPT | Mod: 59 | Performed by: PATHOLOGY

## 2017-09-06 PROCEDURE — S0077 INJECTION, CLINDAMYCIN PHOSP: HCPCS | Performed by: ORTHOPAEDIC SURGERY

## 2017-09-06 PROCEDURE — 88305 TISSUE EXAM BY PATHOLOGIST: CPT | Mod: 26,,, | Performed by: PATHOLOGY

## 2017-09-06 PROCEDURE — C1713 ANCHOR/SCREW BN/BN,TIS/BN: HCPCS | Performed by: ORTHOPAEDIC SURGERY

## 2017-09-06 PROCEDURE — 20680 REMOVAL OF IMPLANT DEEP: CPT | Mod: 78,51,AS, | Performed by: PHYSICIAN ASSISTANT

## 2017-09-06 DEVICE — IMPLANTABLE DEVICE: Type: IMPLANTABLE DEVICE | Site: WRIST | Status: FUNCTIONAL

## 2017-09-06 RX ORDER — METOCLOPRAMIDE HYDROCHLORIDE 5 MG/ML
10 INJECTION INTRAMUSCULAR; INTRAVENOUS EVERY 10 MIN PRN
Status: DISCONTINUED | OUTPATIENT
Start: 2017-09-06 | End: 2017-09-06 | Stop reason: HOSPADM

## 2017-09-06 RX ORDER — PROPOFOL 10 MG/ML
VIAL (ML) INTRAVENOUS CONTINUOUS PRN
Status: DISCONTINUED | OUTPATIENT
Start: 2017-09-06 | End: 2017-09-06

## 2017-09-06 RX ORDER — MEPERIDINE HYDROCHLORIDE 50 MG/ML
12.5 INJECTION INTRAMUSCULAR; INTRAVENOUS; SUBCUTANEOUS ONCE AS NEEDED
Status: DISCONTINUED | OUTPATIENT
Start: 2017-09-06 | End: 2017-09-06 | Stop reason: HOSPADM

## 2017-09-06 RX ORDER — MORPHINE SULFATE 10 MG/ML
2 INJECTION INTRAMUSCULAR; INTRAVENOUS; SUBCUTANEOUS EVERY 5 MIN PRN
Status: DISCONTINUED | OUTPATIENT
Start: 2017-09-06 | End: 2017-09-06 | Stop reason: HOSPADM

## 2017-09-06 RX ORDER — SODIUM CHLORIDE 0.9 % (FLUSH) 0.9 %
3 SYRINGE (ML) INJECTION EVERY 8 HOURS
Status: DISCONTINUED | OUTPATIENT
Start: 2017-09-06 | End: 2017-09-06 | Stop reason: HOSPADM

## 2017-09-06 RX ORDER — OXYCODONE HYDROCHLORIDE 5 MG/1
5 TABLET ORAL
Status: DISCONTINUED | OUTPATIENT
Start: 2017-09-06 | End: 2017-09-06 | Stop reason: HOSPADM

## 2017-09-06 RX ORDER — SODIUM CHLORIDE, SODIUM LACTATE, POTASSIUM CHLORIDE, CALCIUM CHLORIDE 600; 310; 30; 20 MG/100ML; MG/100ML; MG/100ML; MG/100ML
INJECTION, SOLUTION INTRAVENOUS CONTINUOUS
Status: DISCONTINUED | OUTPATIENT
Start: 2017-09-06 | End: 2017-09-06 | Stop reason: HOSPADM

## 2017-09-06 RX ORDER — LIDOCAINE HYDROCHLORIDE 10 MG/ML
1 INJECTION, SOLUTION EPIDURAL; INFILTRATION; INTRACAUDAL; PERINEURAL ONCE
Status: DISCONTINUED | OUTPATIENT
Start: 2017-09-06 | End: 2017-09-06 | Stop reason: HOSPADM

## 2017-09-06 RX ORDER — HYDROCODONE BITARTRATE AND ACETAMINOPHEN 10; 325 MG/1; MG/1
1 TABLET ORAL EVERY 4 HOURS PRN
Qty: 42 TABLET | Refills: 0 | Status: SHIPPED | OUTPATIENT
Start: 2017-09-06 | End: 2017-09-16

## 2017-09-06 RX ORDER — LIDOCAINE HYDROCHLORIDE 20 MG/ML
INJECTION, SOLUTION INFILTRATION; PERINEURAL
Status: DISCONTINUED
Start: 2017-09-06 | End: 2017-09-06 | Stop reason: HOSPADM

## 2017-09-06 RX ORDER — LIDOCAINE HCL/PF 100 MG/5ML
SYRINGE (ML) INTRAVENOUS
Status: DISCONTINUED | OUTPATIENT
Start: 2017-09-06 | End: 2017-09-06

## 2017-09-06 RX ORDER — SODIUM CHLORIDE 0.9 % (FLUSH) 0.9 %
3 SYRINGE (ML) INJECTION
Status: DISCONTINUED | OUTPATIENT
Start: 2017-09-06 | End: 2017-09-06 | Stop reason: HOSPADM

## 2017-09-06 RX ORDER — PROPOFOL 10 MG/ML
VIAL (ML) INTRAVENOUS
Status: DISCONTINUED | OUTPATIENT
Start: 2017-09-06 | End: 2017-09-06

## 2017-09-06 RX ORDER — MIDAZOLAM HYDROCHLORIDE 1 MG/ML
INJECTION, SOLUTION INTRAMUSCULAR; INTRAVENOUS
Status: DISCONTINUED | OUTPATIENT
Start: 2017-09-06 | End: 2017-09-06

## 2017-09-06 RX ORDER — HYDROCODONE BITARTRATE AND ACETAMINOPHEN 5; 325 MG/1; MG/1
1 TABLET ORAL EVERY 4 HOURS PRN
Status: DISCONTINUED | OUTPATIENT
Start: 2017-09-06 | End: 2017-09-06 | Stop reason: HOSPADM

## 2017-09-06 RX ORDER — MUPIROCIN 20 MG/G
1 OINTMENT TOPICAL
Status: DISCONTINUED | OUTPATIENT
Start: 2017-09-06 | End: 2017-09-06 | Stop reason: HOSPADM

## 2017-09-06 RX ORDER — ROPIVACAINE HYDROCHLORIDE 5 MG/ML
INJECTION, SOLUTION EPIDURAL; INFILTRATION; PERINEURAL
Status: DISCONTINUED
Start: 2017-09-06 | End: 2017-09-06 | Stop reason: HOSPADM

## 2017-09-06 RX ORDER — CLINDAMYCIN PHOSPHATE 900 MG/50ML
900 INJECTION, SOLUTION INTRAVENOUS
Status: COMPLETED | OUTPATIENT
Start: 2017-09-06 | End: 2017-09-06

## 2017-09-06 RX ORDER — LIDOCAINE HYDROCHLORIDE 10 MG/ML
INJECTION, SOLUTION EPIDURAL; INFILTRATION; INTRACAUDAL; PERINEURAL
Status: DISCONTINUED | OUTPATIENT
Start: 2017-09-06 | End: 2017-09-06 | Stop reason: HOSPADM

## 2017-09-06 RX ADMIN — MIDAZOLAM HYDROCHLORIDE 1 MG: 1 INJECTION, SOLUTION INTRAMUSCULAR; INTRAVENOUS at 12:09

## 2017-09-06 RX ADMIN — CLINDAMYCIN IN 5 PERCENT DEXTROSE 900 MG: 18 INJECTION, SOLUTION INTRAVENOUS at 12:09

## 2017-09-06 RX ADMIN — PROPOFOL 40 MG: 10 INJECTION, EMULSION INTRAVENOUS at 12:09

## 2017-09-06 RX ADMIN — SODIUM CHLORIDE, SODIUM LACTATE, POTASSIUM CHLORIDE, AND CALCIUM CHLORIDE: 600; 310; 30; 20 INJECTION, SOLUTION INTRAVENOUS at 12:09

## 2017-09-06 RX ADMIN — LIDOCAINE HYDROCHLORIDE 60 MG: 20 INJECTION, SOLUTION INTRAVENOUS at 12:09

## 2017-09-06 RX ADMIN — PROPOFOL 50 MCG/KG/MIN: 10 INJECTION, EMULSION INTRAVENOUS at 12:09

## 2017-09-06 RX ADMIN — LIDOCAINE HYDROCHLORIDE 5 ML: 10 INJECTION, SOLUTION EPIDURAL; INFILTRATION; INTRACAUDAL; PERINEURAL at 01:09

## 2017-09-06 RX ADMIN — LIDOCAINE HYDROCHLORIDE 5 ML: 10 INJECTION, SOLUTION EPIDURAL; INFILTRATION; INTRACAUDAL; PERINEURAL at 12:09

## 2017-09-06 NOTE — TRANSFER OF CARE
"Anesthesia Transfer of Care Note    Patient: Ibeth Schroeder    Procedure(s) Performed: Procedure(s) (LRB):  REMOVAL-HARDWARE- LEFT WRIST- PIN REMOVAL (Left)  EXCISION-CYST (Left)  SYNOVECTOMY-WRIST (Left)    Patient location: PACU    Anesthesia Type: regional    Transport from OR: Transported from OR on room air with adequate spontaneous ventilation    Post pain: adequate analgesia    Post assessment: no apparent anesthetic complications    Post vital signs: stable    Level of consciousness: responds to stimulation    Nausea/Vomiting: no nausea/vomiting    Complications: none    Transfer of care protocol was followed      Last vitals:   Visit Vitals  BP (!) 151/92 (BP Location: Right arm, Patient Position: Sitting)   Pulse 66   Temp 36.2 °C (97.2 °F) (Tympanic)   Resp 18   Ht 5' 5" (1.651 m)   Wt 52.2 kg (115 lb)   LMP  (LMP Unknown)   SpO2 100%   Breastfeeding? No   BMI 19.14 kg/m²     "

## 2017-09-06 NOTE — ANESTHESIA RELEASE NOTE
"Anesthesia Release from PACU Note    Patient: Ibeth Schroeder    Procedure(s) Performed: Procedure(s) (LRB):  REMOVAL-HARDWARE- LEFT WRIST- PIN REMOVAL (Left)  EXCISION-CYST (Left)  SYNOVECTOMY-WRIST (Left)    Anesthesia type: regional    Post pain: Adequate analgesia    Post assessment: no apparent anesthetic complications, tolerated procedure well and no evidence of recall    Last Vitals:   Visit Vitals  BP (!) 151/92 (BP Location: Right arm, Patient Position: Sitting)   Pulse 66   Temp 36.2 °C (97.2 °F) (Tympanic)   Resp 18   Ht 5' 5" (1.651 m)   Wt 52.2 kg (115 lb)   LMP  (LMP Unknown)   SpO2 100%   Breastfeeding? No   BMI 19.14 kg/m²       Post vital signs: stable    Level of consciousness: responds to stimulation    Nausea/Vomiting: no nausea/no vomiting    Complications: none    Airway Patency: patent    Respiratory: unassisted    Cardiovascular: stable and blood pressure at baseline    Hydration: euvolemic     "

## 2017-09-06 NOTE — ANESTHESIA PROCEDURE NOTES
Peripheral    Patient location during procedure: OR    Reason for block: primary anesthetic    End time: 9/6/2017 12:42 PM  Staffing  Anesthesiologist: HOUSTON HUDSON  Performed: anesthesiologist   Preanesthetic Checklist  Completed: patient identified, surgical consent, pre-op evaluation, timeout performed, IV checked, risks and benefits discussed and monitors and equipment checked  Peripheral Block  Patient position: supine  Prep: ChloraPrep  Patient monitoring: heart rate, continuous pulse ox, continuous capnometry and frequent blood pressure checks  Block type: axillary  Laterality: left  Injection technique: single shot  Needle  Needle type: Short-bevel   Needle gauge: 22 G  Needle length: 2 in  Needle localization: anatomical landmarks     Assessment  Injection assessment: negative aspiration  Medications:  Bolus administered: 25 mL of 0.5 ropivacaine  Additional Notes  Lido 2% 15cc also used

## 2017-09-06 NOTE — DISCHARGE SUMMARY
[facility]  Brief Operative Note     SUMMARY     Surgery Date: 9/6/2017     Surgeon(s) and Role:     * Binu Lizarraga Sr., MD - Primary    Assisting Surgeon: None    Pre-op Diagnosis:  Pain from implanted hardware, subsequent encounter [T85.848D]    Post-op Diagnosis:  Post-Op Diagnosis Codes:     * Pain from implanted hardware, subsequent encounter [T85.848D]  Left ulnar hardware, scapholunate dissociation of the left wrist, synovitis of the left wrist, capsular tear of the left wrist.  Hemorrhagic cyst of the left wrist.      Procedure(s) (LRB):  REMOVAL-HARDWARE- LEFT WRIST- PIN REMOVAL (Left)  EXCISION-CYST (Left)  SYNOVECTOMY-WRIST (Left)    Anesthesia: Monitor Anesthesia Care    Description of the findings of the procedure:  Left ulnar hardware, scapholunate dissociation of the left wrist, synovitis of the left wrist, capsular tear of the left wrist.  Hemorrhagic cyst of the left wrist.      Findings/Key Components: Left ulnar hardware, scapholunate dissociation of the left wrist, synovitis of the left wrist, capsular tear of the left wrist.  Hemorrhagic cyst of the left wrist.      Estimated Blood Loss: 2 mL         Specimens:   Specimen (12h ago through future)    Start     Ordered    09/06/17 1304  Specimen to Pathology - Surgery  Once     Comments:  1). Left wrist hardware (GROSS)2). Left wrist cyst3). Left wrist synoviumDX: Painful hardware, cyst, Scapholunate dissociation-- LEFT WRIST      09/06/17 1325          Discharge Note    SUMMARY     Admit Date: 9/6/2017    Discharge Date and Time:  09/06/2017 2:42 PM    Hospital Course (synopsis of major diagnoses, care, treatment, and services provided during the course of the hospital stay): The patient underwent hardware removal and wrist reconstruction without difficulty.     Final Diagnosis: Post-Op Diagnosis Codes:     * Pain from implanted hardware, subsequent encounter [T85.848D]  Left ulnar hardware, scapholunate dissociation of the  left wrist, synovitis of the left wrist, capsular tear of the left wrist.  Hemorrhagic cyst of the left wrist.        Disposition: Home or Self Care    Follow Up/Patient Instructions: Follow-up in excised, related the left wrist to 2 cm above the level of the heart    Medications: Norco when necessary for pain  Reconciled Home Medications:   Current Discharge Medication List      START taking these medications    Details   hydrocodone-acetaminophen 10-325mg (NORCO)  mg Tab Take 1 tablet by mouth every 4 (four) hours as needed.  Qty: 42 tablet, Refills: 0         CONTINUE these medications which have NOT CHANGED    Details   busPIRone (BUSPAR) 15 MG tablet 1 tab po tid  Qty: 90 tablet, Refills: 2      calcitRIOL (ROCALTROL) 0.25 MCG Cap Take 1 capsule (0.25 mcg total) by mouth once daily.  Qty: 90 capsule, Refills: 3      diphenoxylate-atropine 2.5-0.025 mg (LOMOTIL) 2.5-0.025 mg per tablet Take 1 tablet by mouth daily as needed for Diarrhea.      duloxetine (CYMBALTA) 60 MG capsule Take 1 capsule (60 mg total) by mouth once daily.  Qty: 90 capsule, Refills: 2      ferrous sulfate 325 (65 FE) MG EC tablet Take 1 tablet (325 mg total) by mouth once a week.  Qty: 90 tablet, Refills: 3    Associated Diagnoses: Iron deficiency anemia      fluticasone-salmeterol (ADVAIR HFA) 115-21 mcg/actuation HFAA inhale 2 puffs INTO THE LUNGS twice a day  Qty: 12 g, Refills: 12    Comments: Please call patient to pick prescription once it is ready.  Associated Diagnoses: Moderate COPD (chronic obstructive pulmonary disease)      ipratropium-albuterol (COMBIVENT RESPIMAT)  mcg/actuation inhaler inhale 1 puff INTO THE LUNGS four times a day  Qty: 3 Package, Refills: 4    Associated Diagnoses: Moderate COPD (chronic obstructive pulmonary disease)      levothyroxine (SYNTHROID) 100 MCG tablet TAKE 1 TABLET ONE TIME DAILY  Qty: 90 tablet, Refills: 3      methocarbamol (ROBAXIN) 500 MG Tab Take 500 mg by mouth 4 (four) times  daily.      metoprolol succinate (TOPROL-XL) 50 MG 24 hr tablet Take 1 tablet (50 mg total) by mouth 2 (two) times daily.  Qty: 60 tablet, Refills: 11    Associated Diagnoses: PAC (premature atrial contraction)      pantoprazole (PROTONIX) 40 MG tablet TAKE 1 TABLET ONE TIME DAILY  Qty: 90 tablet, Refills: 3      pravastatin (PRAVACHOL) 10 MG tablet TAKE 1 TABLET ONE TIME DAILY  Qty: 90 tablet, Refills: 0      quetiapine (SEROQUEL) 25 MG Tab Take 1 tablet (25 mg total) by mouth every evening.  Qty: 90 tablet, Refills: 0      ropinirole (REQUIP) 1 MG tablet Take 1 mg by mouth nightly as needed.      thiamine 250 MG tablet Take 100 mg by mouth once daily. PER [facility]      tramadol (ULTRAM) 50 mg tablet Take 50 mg by mouth every 12 (twelve) hours as needed for Pain.      allopurinol (ZYLOPRIM) 100 MG tablet Take 1 tablet (100 mg total) by mouth once daily.  Qty: 90 tablet, Refills: 5      MULTIVITAMIN W-MINERALS/LUTEIN (CENTRUM SILVER ORAL) Take 1 tablet by mouth once daily.             Discharge Procedure Orders  Diet general     Call MD for:  temperature >100.4     Call MD for:  persistent nausea and vomiting     Call MD for:  severe uncontrolled pain     Call MD for:  difficulty breathing, headache or visual disturbances     Call MD for:  redness, tenderness, or signs of infection (pain, swelling, redness, odor or green/yellow discharge around incision site)     Call MD for:  hives     Call MD for:  persistent dizziness or light-headedness     Call MD for:  extreme fatigue     Other restrictions (specify):   Order Comments: Elevate the left wrist to 5 cm above the level of the heart. Keep the dressing and splint in place.     Remove dressing in 48 hours       Follow-up Information     Call Binu Lizarraga Sr, MD.    Specialty:  Orthopedic Surgery  Why:  As needed, If symptoms worsen, For suture removal, For wound re-check  Contact information:  [address] [address] [phone]

## 2017-09-06 NOTE — ANESTHESIA POSTPROCEDURE EVALUATION
"Anesthesia Post Evaluation    Patient: Ibeth Schroeder    Procedure(s) Performed: Procedure(s) (LRB):  REMOVAL-HARDWARE- LEFT WRIST- PIN REMOVAL (Left)  EXCISION-CYST (Left)  SYNOVECTOMY-WRIST (Left)    Final Anesthesia Type: regional  Patient location during evaluation: PACU  Patient participation: Yes- Able to Participate  Level of consciousness: awake and alert  Post-procedure vital signs: reviewed and stable  Pain management: adequate  Airway patency: patent  PONV status at discharge: No PONV  Anesthetic complications: no      Cardiovascular status: blood pressure returned to baseline  Respiratory status: unassisted  Hydration status: euvolemic  Follow-up not needed.        Visit Vitals  /89   Pulse 60   Temp 36.2 °C (97.2 °F) (Temporal)   Resp 20   Ht 5' 5" (1.651 m)   Wt 52.2 kg (115 lb)   LMP  (LMP Unknown)   SpO2 96%   Breastfeeding? No   BMI 19.14 kg/m²       Pain/Kylah Score: Pain Assessment Performed: Yes (9/6/2017  2:30 PM)  Presence of Pain: denies (9/6/2017  2:30 PM)  Kylah Score: 10 (9/6/2017  2:15 PM)      "

## 2017-09-06 NOTE — OP NOTE
OPERATIVE DICTATION:    DATE OF SERVICE:09/06/2017      Preoperative Diagnosis:  Left ulna hardware, scapholunate dissociation of the left wrist    Postoperative Diagnosis: Left ulnar hardware, scapholunate dissociation of the left wrist, synovitis of the left wrist, capsular tear of the left wrist.  Hemorrhagic cyst of the left wrist.      Procedure: Left wrist hardware removal, cyst excision, repair of scapholunate dissociation, capsulorrhaphy of the left wrist, synovectomy of the left wrist.    Indication for surgery: Left wrist pain and left wrist hardware    Anesthesia: Mac anesthesia with IV sedation     Complications:  None    Surgeon: Binu Lizarraga M.D.     Specimen: Left wrist hemorrhagic cyst    Assistant:  MARITA Murray.  His assistance was critical and necessary with positioning of the extremity throughout the surgical procedure.The physician assistant allowed me to access areas of the left wrist that could not be possible without the assistance of a trained orthopedic physician assistant.  His assistance was critical to allowing me to provide the highest level of care to the patient.      Operative procedure:  The patient was brought to operating room after appropriate consent and placed under  a left upper extremity nerve block.  The patient was given IV sedation as well.  The patient left upper extremity was prepped with alcohol chlorhexidine and sterilely draped.  The timeout was performed and the correct extremity identified.  The Esmarch used for exsanguination and tourniquet inflated to 250 mmHg pressure.  The dorsal incision was made over the scapholunate joint extending 3 cm.  Dissection carried through subcutaneous tissue and the K wire within the ulna was excised.  The patient noted to have a 2 x 2 centimeter hemorrhagic cyst that was excised en bloc.  The extensor retinaculum was incised over the fourth extensor compartment.  The patient's capsule noted to be loose over the dorsal  wrist.  The capsule was reflected radially and ulnarly.  The scapholunate joint was identified.  The soft tissue over the scaphoid and lunate noted to be avulsed.  The lunate was volarly displaced and rotated.  The A4 to articulation with the radius and capitate was directly visualized.  The scapholunate joint was reduced.  A 45 guidewire was placed through the capitate into the scaphoid.  A second pin was placed through the lunate into the scaphoid as well.  Intraoperative x-ray showed the correction of the scapholunate dissociation.  The neutral rotation of the lunate was restored.  The drill hole made in the dorsal aspect of the waist of the scaphoid and the dorsal central portion of the lunate.  The dorsal wrist capsule was then sutured down to both the scaphoid and the lunate and the sutures were advanced through the capsule and used to tighten and further reinforce the scapholunate joint.  The wrist capsule was repaired using the FiberWire 2 sutures.  The repair noted to be quite satisfactory.  The intraoperative x-ray showed good alignment of the carpal bones.  The [place]'s incision was repaired using running a running 3-0 nylon suture.  Betadine applied to the incision site.  The pins were cut and bent outside the skin.  A long-arm posterior splint was placed.  The patient and her  understand that she is not to use the left upper extremity.  She is to continue the immobilization for a total of 8 weeks postoperatively.                  Binu Lizarraga M.D.

## 2017-09-06 NOTE — PLAN OF CARE
Pt resting on stretcher, denies pain at present. Respirations even and unlabored on room air. Neurovascular checks remain intact. VSS. Awaiting pharmacy to deliver meds. Will cont to monitor. See flow sheet for detailed assessment.

## 2017-09-06 NOTE — DISCHARGE INSTRUCTIONS
Incision Care  Remember: Follow-up visits allow your doctor to make sure your incision is healing well. Be sure to keep your appointments.   Stitches (sutures), surgical staples, special strips of surgical tape called Steri-Strips, or surgical skin glue may be used to close incisions. They also help stop bleeding and speed healing. To help your incision heal, follow the tips on this handout.  Home care     Steri-Strips   · Always wash your hands before touching your incision.  · Keep your incision clean and dry.  · Avoid doing things that could cause dirt or sweat to get on your incision.  · Dont pick at scabs. They help protect the wound.  · Keep your incision out of water.  · Take a sponge bath to avoid getting your incision wet, unless your healthcare provider tells you otherwise.  · Ask your provider when can you take a shower or bathe.  · Ask your provider about the best way to keep your incision dry when bathing or showering.  · Pat sutures dry if they get wet. Dont rub.  · Leave the bandage (dressing) in place until you are told to remove it or change it. Change it only as directed, using clean hands.  · After the first 12 hours, change your dressing every 24 hours, or as directed by your healthcare provider.  · Change your dressing if it gets wet or soiled.  Care for specific closures  Follow these guidelines unless your healthcare provider tells you otherwise:  · Sutures or staples. Once you no longer need to keep these dry, clean the wound daily. First remove the bandage using clean hands. Then wash the area gently with soap and warm water. Use a wet cotton swab to loosen and remove any blood or crust that forms. After cleaning, put a thin layer of antibiotic ointment on. Then put on a new bandage.  · Skin glue. Dont put liquid, ointment, or cream on your wound while the glue is in place. Avoid activities that cause heavy sweating. Protect the wound from sunlight. Do not scratch, rub, or pick at the  glue. Do not put tape directly over the glue. The glue should peel off within 5 to 10 days.  · Surgical tape. Keep the area dry. If it gets wet, blot the area dry with a clean towel. Surgical tape usually falls off within 7 to 10 days. If it has not fallen off after 10 days, contact your healthcare provider before taking it off yourself. If you are told to remove the tape, put mineral oil or petroleum jelly on a cotton ball. Gently rub the tape until it is removed.  Changing your dressing     Wash your hands before changing a dressing.    Leave the dressing (bandage) in place until you are told to remove it or change it. Follow the instructions below unless told otherwise by your healthcare provider.  · Always wash your hands before changing your dressing.  · After the first 48 hours the incision wound usually will have closed. At this point, leave the incision uncovered and open to the air. If the incision has not closed keep it covered.  · Cover your incision only if your clothing is rubbing it or causing irritation.  · Change your dressing if it gets wet or soiled.  Follow-up care  Follow up with your healthcare provider to ask how long sutures or staples should be left in place. Be sure to return for suture or staple removal as directed. If dissolving stitches were used in your mouth, these will not need to be removed. They should fall out or dissolve on their own.  If tape closures were used, remove them yourself when your provider recommends if they have not fallen off on their own. If skin glue was used, the glue will wear off by itself.  When to seek medical care  Call your healthcare provider if you have any of the following:  · More pain, redness, swelling, bleeding, or foul-smelling discharge around the incision area  · Fever of 100.4°F (38ºC) or higher or as directed by your healthcare provider  · Shaking chills  · Vomiting or nausea that doesn't go away  · Numbness, coldness, or tingling around the  incision area, or changes in skin color  · Opening of the sutures or wound  · Stitches or staples come apart or fall out or surgical tape falls off before 7 days or as directed by your healthcare provider   Date Last Reviewed: 10/16/2014  © 2879-0065 [employer]. [address] [place] [id number]. All rights reserved. This information is not intended as a substitute for professional medical care. Always follow your healthcare professional's instructions.        Acetaminophen; Hydrocodone tablets or capsules  What is this medicine?  ACETAMINOPHEN; HYDROCODONE (a set a VINH radha fen; domi droe KOE done) is a pain reliever. It is used to treat moderate to severe pain.  How should I use this medicine?  Take this medicine by mouth with a glass of water. Follow the directions on the prescription label. You can take it with or without food. If it upsets your stomach, take it with food. Do not take your medicine more often than directed.  A special MedGuide will be given to you by the pharmacist with each prescription and refill. Be sure to read this information carefully each time.  Talk to your pediatrician regarding the use of this medicine in children. Special care may be needed.  What side effects may I notice from receiving this medicine?  Side effects that you should report to your doctor or health care professional as soon as possible:  · allergic reactions like skin rash, itching or hives, swelling of the face, lips, or tongue  · breathing problems  · confusion  · redness, blistering, peeling or loosening of the skin, including inside the mouth  · signs and symptoms of low blood pressure like dizziness; feeling faint or lightheaded, falls; unusually weak or tired  · trouble passing urine or change in the amount of urine  · yellowing of the eyes or skin  Side effects that usually do not require medical attention (report to your doctor or health care professional if they continue or are  bothersome):  · constipation  · dry mouth  · nausea, vomiting  · tiredness  What may interact with this medicine?  This medicine may interact with the following medications:  · alcohol  · antiviral medicines for HIV or AIDS  · atropine  · antihistamines for allergy, cough and cold  · certain antibiotics like erythromycin, clarithromycin  · certain medicines for anxiety or sleep  · certain medicines for bladder problems like oxybutynin, tolterodine  · certain medicines for depression like amitriptyline, fluoxetine, sertraline  · certain medicines for fungal infections like ketoconazole and itraconazole  · certain medicines for Parkinson's disease like benztropine, trihexyphenidyl  · certain medicines for seizures like carbamazepine, phenobarbital, phenytoin, primidone  · certain medicines for stomach problems like dicyclomine, hyoscyamine  · certain medicines for travel sickness like scopolamine  · general anesthetics like halothane, isoflurane, methoxyflurane, propofol  · ipratropium  · local anesthetics like lidocaine, pramoxine, tetracaine  · MAOIs like Carbex, Eldepryl, Marplan, Nardil, and Parnate  · medicines that relax muscles for surgery  · other medicines with acetaminophen  · other narcotic medicines for pain or cough  · phenothiazines like chlorpromazine, mesoridazine, prochlorperazine, thioridazine  · rifampin  What if I miss a dose?  If you miss a dose, take it as soon as you can. If it is almost time for your next dose, take only that dose. Do not take double or extra doses.  Where should I keep my medicine?  Keep out of the reach of children. This medicine can be abused. Keep your medicine in a safe place to protect it from theft. Do not share this medicine with anyone. Selling or giving away this medicine is dangerous and against the law.  This medicine may cause accidental overdose and death if it taken by other adults, children, or pets. Mix any unused medicine with a substance like cat litter or  coffee grounds. Then throw the medicine away in a sealed container like a sealed bag or a coffee can with a lid. Do not use the medicine after the expiration date.  Store at room temperature between 15 and 30 degrees C (59 and 86 degrees F).  What should I tell my health care provider before I take this medicine?  They need to know if you have any of these conditions:  · brain tumor  · Crohn's disease, inflammatory bowel disease, or ulcerative colitis  · drug abuse or addiction  · head injury  · heart or circulation problems  · if you often drink alcohol  · kidney disease or problems going to the bathroom  · liver disease  · lung disease, asthma, or breathing problems  · an unusual or allergic reaction to acetaminophen, hydrocodone, other opioid analgesics, other medicines, foods, dyes, or preservatives  · pregnant or trying to get pregnant  · breast-feeding  What should I watch for while using this medicine?  Tell your doctor or health care professional if your pain does not go away, if it gets worse, or if you have new or a different type of pain. You may develop tolerance to the medicine. Tolerance means that you will need a higher dose of the medicine for pain relief. Tolerance is normal and is expected if you take the medicine for a long time.  Do not suddenly stop taking your medicine because you may develop a severe reaction. Your body becomes used to the medicine. This does NOT mean you are addicted. Addiction is a behavior related to getting and using a drug for a non-medical reason. If you have pain, you have a medical reason to take pain medicine. Your doctor will tell you how much medicine to take. If your doctor wants you to stop the medicine, the dose will be slowly lowered over time to avoid any side effects.  There are different types of narcotic medicines (opiates). If you take more than one type at the same time or if you are taking another medicine that also causes drowsiness, you may have more  side effects. Give your health care provider a list of all medicines you use. Your doctor will tell you how much medicine to take. Do not take more medicine than directed. Call emergency for help if you have problems breathing or unusual sleepiness.  Do not take other medicines that contain acetaminophen with this medicine. Always read labels carefully. If you have questions, ask your doctor or pharmacist.  If you take too much acetaminophen get medical help right away. Too much acetaminophen can be very dangerous and cause liver damage. Even if you do not have symptoms, it is important to get help right away.  You may get drowsy or dizzy. Do not drive, use machinery, or do anything that needs mental alertness until you know how this medicine affects you. Do not stand or sit up quickly, especially if you are an older patient. This reduces the risk of dizzy or fainting spells. Alcohol may interfere with the effect of this medicine. Avoid alcoholic drinks.  The medicine will cause constipation. Try to have a bowel movement at least every 2 to 3 days. If you do not have a bowel movement for 3 days, call your doctor or health care professional.  Your mouth may get dry. Chewing sugarless gum or sucking hard candy, and drinking plenty of water may help. Contact your doctor if the problem does not go away or is severe.  Date Last Reviewed:   NOTE:This sheet is a summary. It may not cover all possible information. If you have questions about this medicine, talk to your doctor, pharmacist, or health care provider. Copyright© 2016 [employer]        General Information:    1.  Do not drink alcoholic beverages including beer for 24 hours or as long as you are on pain medication..  2.  Do not drive a motor vehicle, operate machinery or power tools, or signs legal papers for 24 hours or as long as you are on pain medication.   3.  You may experience light-headedness, dizziness, and sleepiness following surgery. Please do not  stay alone. A responsible adult should be with you for this 24 hour period.  4.  Go home and rest.    5. Progress slowly to a normal diet unless instructed.  Otherwise, begin with liquids such as soft drinks, then soup and crackers working up to solid foods. Drink plenty of nonalcoholic fluids.  6.  Certain anesthetics and pain medications produce nausea and vomiting in certain       individuals. If nausea becomes a problem at home, call you doctor.    7. Several times every hour while you are awake, take 2-3 deep breaths and cough. If you had stomach surgery hold a pillow or rolled towel firmly against your stomach before you cough. This will help with any pain the cough might cause.    8. Several times every hour while you are awake, pump and flex your feet 5-6 times and do foot circles. This will help prevent blood clots.    9.Call your doctor for severe pain, bleeding, fever, or signs or symptoms of infection (pain, swelling, redness, foul odor, drainage).    10.You can contact your doctor anytime by [phone] for the [facility] (at [facility]) or [phone] for the [facility] on [facility].   [email or web address] is another way to contact your doctor if you are an active participant online with [facility].

## 2017-09-06 NOTE — ANESTHESIA PREPROCEDURE EVALUATION
09/06/2017  Ibeth Schroeder is a 67 y.o., female.    Pre-op Assessment    I have reviewed the Patient Summary Reports.     I have reviewed the Nursing Notes.   I have reviewed the Medications.     Review of Systems  Cardiovascular:   Hypertension CONCLUSIONS     1 - Normal left ventricular systolic function (EF 55-60%).     2 - Indeterminate LV diastolic function.     3 - Normal right ventricular systolic function .    Pulmonary:   COPD Asthma    Renal/:   Chronic Renal Disease, CRI Stage 4   Hepatic/GI:   Hiatal Hernia,    Musculoskeletal:   Arthritis     Neurological:   Neuromuscular Disease, Seizures    Endocrine:   Hypothyroidism    Psych:   Psychiatric History             Anesthesia Plan  Type of Anesthesia, risks & benefits discussed:  Anesthesia Type:  general  Patient's Preference:   Intra-op Monitoring Plan:   Intra-op Monitoring Plan Comments:   Post Op Pain Control Plan:   Post Op Pain Control Plan Comments:   Induction:   IV  Beta Blocker:         Informed Consent: Patient understands risks and agrees with Anesthesia plan.  Questions answered.   ASA Score: 3     Day of Surgery Review of History & Physical:

## 2017-09-18 DIAGNOSIS — M25.532 LEFT WRIST PAIN: Primary | ICD-10-CM

## 2017-09-21 ENCOUNTER — HOSPITAL ENCOUNTER (OUTPATIENT)
Dept: RADIOLOGY | Facility: HOSPITAL | Age: 67
Discharge: HOME | End: 2017-09-21
Attending: ORTHOPAEDIC SURGERY
Payer: MEDICARE

## 2017-09-21 ENCOUNTER — OFFICE VISIT (OUTPATIENT)
Dept: ORTHOPEDICS | Facility: CLINIC | Age: 67
End: 2017-09-21
Payer: MEDICARE

## 2017-09-21 VITALS
SYSTOLIC BLOOD PRESSURE: 137 MMHG | RESPIRATION RATE: 14 BRPM | BODY MASS INDEX: 19.17 KG/M2 | DIASTOLIC BLOOD PRESSURE: 86 MMHG | HEIGHT: 65 IN | HEART RATE: 65 BPM | WEIGHT: 115.06 LBS

## 2017-09-21 VITALS
TEMPERATURE: 97 F | WEIGHT: 115 LBS | RESPIRATION RATE: 20 BRPM | HEIGHT: 65 IN | SYSTOLIC BLOOD PRESSURE: 144 MMHG | OXYGEN SATURATION: 94 % | BODY MASS INDEX: 19.16 KG/M2 | HEART RATE: 61 BPM | DIASTOLIC BLOOD PRESSURE: 86 MMHG

## 2017-09-21 DIAGNOSIS — Z98.890 POSTOPERATIVE STATE: Primary | ICD-10-CM

## 2017-09-21 DIAGNOSIS — M25.532 LEFT WRIST PAIN: ICD-10-CM

## 2017-09-21 PROCEDURE — 73110 X-RAY EXAM OF WRIST: CPT | Mod: TC,PO,LT

## 2017-09-21 PROCEDURE — 99999 PR PBB SHADOW E&M-EST. PATIENT-LVL III: CPT | Mod: PBBFAC,,, | Performed by: ORTHOPAEDIC SURGERY

## 2017-09-21 PROCEDURE — 99024 POSTOP FOLLOW-UP VISIT: CPT | Mod: S$GLB,,, | Performed by: ORTHOPAEDIC SURGERY

## 2017-09-21 PROCEDURE — 73110 X-RAY EXAM OF WRIST: CPT | Mod: 26,LT,, | Performed by: RADIOLOGY

## 2017-09-21 NOTE — PROGRESS NOTES
SUBJECTIVE:  Patient is status post Left  Wrist open treatment for a scapholunate dissociation .  She has had left hip pain.  The patient is resting well.She is alert and oriented times three. She is being treated for alcohol withdraw.  the patient has bouts of disorientation. She is alert and oriented ×3.  Patient complains of  3/10 pain that is better with the  pain meds and aggravated with movement.              Past Medical History:   Diagnosis Date    Alcohol dependence       per patient in the past    Allergy      Anemia      Anxiety      Arthritis 6/24/2013    Asthma       per patient    Colon polyp      Colon polyp       colonoscopy 8/26/2013    COPD (chronic obstructive pulmonary disease)      Depression      Diverticulosis      Diverticulosis       colonoscopy 8/26/2013    Hiatal hernia       CXR 2/25/2015--Large hiatal hernia.     Hyperlipidemia      Hypertension      Hypocalcemia 7/6/2016    Hypothyroidism 6/24/2013    Osteoporosis      Pulmonary embolism       per patient unsure of date    Restless leg syndrome      RLS (restless legs syndrome)      Seizure 6/24/2013    Tobacco dependence       resolved    Trouble in sleeping                  Past Surgical History:   Procedure Laterality Date    ADENOIDECTOMY        APPENDECTOMY        CHOLECYSTECTOMY        COLONOSCOPY   2013    FINGER SURGERY        HERNIA REPAIR        HYSTERECTOMY        JOINT REPLACEMENT        TONSILLECTOMY        TOTAL HIP ARTHROPLASTY         L x2              Review of patient's allergies indicates:   Allergen Reactions    Nsaids (non-steroidal anti-inflammatory drug) Hives and Shortness Of Breath    Pcn [penicillins] Hives and Shortness Of Breath    Sulfa (sulfonamide antibiotics) Hives and Shortness Of Breath    Aspirin      Macrodantin [nitrofurantoin macrocrystalline] Hives    Wellbutrin [bupropion hcl]        No current facility-administered medications on file prior to encounter.                    Current Outpatient Prescriptions on File Prior to Encounter   Medication Sig Dispense Refill    allopurinol (ZYLOPRIM) 100 MG tablet Take 1 tablet (100 mg total) by mouth once daily. 90 tablet 5    busPIRone (BUSPAR) 15 MG tablet 1 tab po tid 90 tablet 1    clindamycin (CLEOCIN) 150 MG capsule Take 2 capsules (300 mg total) by mouth every 8 (eight) hours. 42 capsule 0    diclofenac sodium (VOLTAREN) 1 % Gel Apply 4 g topically 4 (four) times daily. Pt has shoulder pain and knee pain 200 g 5    diphenoxylate-atropine 2.5-0.025 mg (LOMOTIL) 2.5-0.025 mg per tablet     0    duloxetine (CYMBALTA) 60 MG capsule Take 1 capsule (60 mg total) by mouth once daily. 90 capsule 0    ferrous sulfate 325 (65 FE) MG EC tablet Take 1 tablet (325 mg total) by mouth once a week. 90 tablet 3    fluticasone (FLONASE) 50 mcg/actuation nasal spray instill 2 sprays into each nostril once daily 16 g 11    fluticasone-salmeterol (ADVAIR HFA) 115-21 mcg/actuation HFAA inhale 2 puffs INTO THE LUNGS twice a day 12 g 12    hydrocodone-acetaminophen 7.5-325mg (NORCO) 7.5-325 mg per tablet Take 1 tablet by mouth every 6 (six) hours as needed for Pain. 15 tablet 0    ipratropium-albuterol (COMBIVENT RESPIMAT)  mcg/actuation inhaler inhale 1 puff INTO THE LUNGS four times a day 3 Package 4    levothyroxine (SYNTHROID) 100 MCG tablet TAKE 1 TABLET ONE TIME DAILY 90 tablet 3    methocarbamol (ROBAXIN) 500 MG Tab Take 1 tablet (500 mg total) by mouth 2 (two) times daily as needed (for muscle spasms.). 60 tablet 1    metoprolol succinate (TOPROL-XL) 50 MG 24 hr tablet Take 1 tablet (50 mg total) by mouth once daily. 90 tablet 3    MULTIVITAMIN W-MINERALS/LUTEIN (CENTRUM SILVER ORAL) Take 1 tablet by mouth once daily.        pantoprazole (PROTONIX) 40 MG tablet Take 1 tablet (40 mg total) by mouth once daily. 90 tablet 3    pravastatin (PRAVACHOL) 10 MG tablet TAKE 1 TABLET ONE TIME DAILY 90 tablet 0     "ropinirole (REQUIP) 1 MG tablet Take 1 tablet (1 mg total) by mouth nightly. 90 tablet 3    thiamine 250 MG tablet Take 500 mg by mouth once daily.        tramadol (ULTRAM) 50 mg tablet Take 1 tablet (50 mg total) by mouth 2 (two) times daily as needed for Pain. 60 tablet 2          ROS:  GENERAL: No fever, chills, fatigability or weight loss.  SKIN: No rashes, itching or changes in color or texture of skin.  HEAD: No headaches or recent head trauma.  EYES: Visual acuity fine. No photophobia, ocular pain or diplopia.  EARS: Denies ear pain, discharge or vertigo.  NOSE: No loss of smell, no epistaxis or postnasal drip.  MOUTH & THROAT: No hoarseness or change in voice. No excessive gum bleeding.  NODES: Denies swollen glands.  CHEST: Denies BRADY, cyanosis, wheezing, cough and sputum production.  CARDIOVASCULAR: Denies chest pain, PND, orthopnea or reduced exercise tolerance.  ABDOMEN: Appetite fine. No weight loss. Denies diarrhea, abdominal pain, hematemesis or blood in stool.  URINARY: No flank pain, dysuria or hematuria.  PERIPHERAL VASCULAR: No claudication or cyanosis.  NEUROLOGIC: No history of seizures, paralysis, alteration of gait or coordination.  MUSCULOSKELETAL: See HPI     PE:  APPEARANCE: Well nourished, well developed, in no acute distress.   HEAD: Normocephalic, atraumatic.  EYES: PERRL. EOMI.   EARS: TM's intact. Light reflex normal. No retraction or perforation.   NOSE: Mucosa pink. Airway clear.  MOUTH & THROAT: No tonsillar enlargement. No pharyngeal erythema or exudate. No stridor.  NECK: Supple.   NODES: No cervical, axillary or inguinal lymph node enlargement.  CHEST: Lungs clear to auscultation.  CARDIOVASCULAR: Normal S1, S2. No rubs, murmurs or gallops.  ABDOMEN: Bowel sounds normal. Not distended. Soft. No tenderness or masses.  NEUROLOGIC: Cranial Nerves: II-XII grossly intact, also see MUSCULOSKELETAL  MUSCULOSKELETAL:      ,/86   Pulse 65   Resp 14   Ht 5' 5" (1.651 m)   Wt " 52.2 kg (115 lb 1.3 oz)   LMP  (LMP Unknown)   BMI 19.15 kg/m²                   Left  Wrist -2 plus radial  artery and ulnar artery pulses, light touch intact Left upper extremity.  All digits are warm. No erythema, no warmth, no drainage, No swelling, no significant tenderness.     Left Hip- pain on range of motion, in the groin area.          ASSESSMENT:          Diagnosis:  1.  Left iliac wing fracture   2. Left radius ORIF  3.  left wrist scapholunate dissociation    PLAN:  Walk assisted ambulation full weight-bear left lower extremity.  The patient can anticipate in physical therapy with pain being the limiting factor for the left hip range of motion.  Continue pain medication  Continue occupational therapy and physical therapy.  The patient can follow-up 2  week   Hardware removal of the left wrist  Repair of scapholunate dissociation

## 2017-09-21 NOTE — PATIENT INSTRUCTIONS
Colles Wrist Fracture, Reduction Needed  You have a break (fracture) of the forearm bone (radius) where it attaches to your wrist. This type of fracture may be called a Colles fracture. The bone is out of place. It must be set (reduced) to make it straight again. Once the bone is straightened, a splint or cast will be put on. The splint or cast must stay in place until the bone heals. This is usually 4 to 6 weeks. But the splint or cast may need to be replaced in 2 to 3 weeks if it loosens as swelling goes down.   Home care  Follow these guidelines when caring for yourself at home:  · Keep your arm elevated to ease pain and swelling. When sitting or lying down keep your arm above the level of your heart. You can do this by putting your arm on a pillow that rests on your chest or on a pillow at your side. This is most important during the first 2 days (48 hours) after the injury.  · Put an ice pack on the injured area. Do this for 20 minutes every 1 to 2 hours the first day. You can make an ice pack by wrapping a plastic bag of ice cubes in a thin towel. As the ice melts, be careful that the cast or splint doesnt get wet. You can put the ice pack inside the sling and directly over the splint or cast. Continue with ice pack 3 to 4 times a day for the next 2 days. Then use the pack as needed to ease pain and swelling.  · Keep the cast or splint completely dry at all times. Bathe with your cast or splint out of the water. Protect it with a large plastic bag, rubber-banded at the top end. If a fiberglass cast or splint gets wet, you can dry it with a hair dryer.  · You may use acetaminophen or ibuprofen to control pain unless another pain medicine was prescribed. If you have chronic liver or kidney disease, talk with your healthcare provider before using these medicines. Also talk with your provider if youve had a stomach ulcer or gastrointestinal bleeding.  · Dont put creams or objects under the cast if you have  itching.  Follow-up care  Follow up with your healthcare provider in 1 week, or as advised. This is to make sure the bone is healing as it should. If a splint was put on, it will likely be changed to a cast during your follow-up visit.  There is a chance that the fracture will move out of place after it is set but before the ends start to seal together. Thats why its important that you follow up as directed for another X-ray within the next 7 days.  You will be told of any new findings that may affect your care.  When to seek medical advice  Call your healthcare provider right away if any of these occur:  · The cast cracks  · The plaster cast or splint becomes wet or soft  · The fiberglass cast or splint stays wet for more than 24 hours  · Tightness or pain under the cast or splint gets worse  · Bad odor from the cast or wound-fluid stains the cast  · Fingers become swollen, cold, blue, numb, or tingly  · You cant move your fingers  · Skin around cast becomes red  · Fever of 100.4ºF (38ºC) or higher, or as directed by your healthcare provider  Date Last Reviewed: 2/1/2017  © 6402-9054 [employer]. [address] [place] [address], [place] [address]. All rights reserved. This information is not intended as a substitute for professional medical care. Always follow your healthcare professional's instructions.

## 2017-09-22 ENCOUNTER — TELEPHONE (OUTPATIENT)
Dept: ORTHOPEDICS | Facility: CLINIC | Age: 67
End: 2017-09-22

## 2017-09-22 RX ORDER — HYDROCODONE BITARTRATE AND ACETAMINOPHEN 10; 325 MG/1; MG/1
1 TABLET ORAL EVERY 4 HOURS PRN
Qty: 42 TABLET | Refills: 0 | Status: SHIPPED | OUTPATIENT
Start: 2017-09-22 | End: 2017-10-02

## 2017-09-22 NOTE — TELEPHONE ENCOUNTER
Pt requesting a refill on Norco 10      Sx date:9/6/17 hardware removal  LOV: 9/21/17  NOV:10/5/17  Pt pharmacy:   [id number]  [place] - [address] [phone] (Phone)  [phone] (Fax)

## 2017-09-22 NOTE — TELEPHONE ENCOUNTER
----- Message from Radha Jenkins sent at 9/22/2017  9:15 AM CDT -----  Contact: -spouse  Would like to consult with nurse regarding RX medication patient did not receive. Please call back at [phone].        Thanks,  Radha Jenkins

## 2017-09-25 ENCOUNTER — PATIENT MESSAGE (OUTPATIENT)
Dept: ORTHOPEDICS | Facility: CLINIC | Age: 67
End: 2017-09-25

## 2017-09-28 RX ORDER — TRAMADOL HYDROCHLORIDE 50 MG/1
50 TABLET ORAL 2 TIMES DAILY PRN
Qty: 60 TABLET | Refills: 2 | Status: SHIPPED | OUTPATIENT
Start: 2017-09-29 | End: 2017-10-29

## 2017-09-29 ENCOUNTER — OFFICE VISIT (OUTPATIENT)
Dept: PAIN MEDICINE | Facility: CLINIC | Age: 67
End: 2017-09-29
Payer: MEDICARE

## 2017-09-29 VITALS
SYSTOLIC BLOOD PRESSURE: 106 MMHG | RESPIRATION RATE: 16 BRPM | BODY MASS INDEX: 19.16 KG/M2 | WEIGHT: 115 LBS | HEART RATE: 87 BPM | HEIGHT: 65 IN | DIASTOLIC BLOOD PRESSURE: 63 MMHG

## 2017-09-29 DIAGNOSIS — G89.4 CHRONIC PAIN DISORDER: ICD-10-CM

## 2017-09-29 DIAGNOSIS — M47.817 LUMBOSACRAL SPONDYLOSIS WITHOUT MYELOPATHY: Primary | ICD-10-CM

## 2017-09-29 DIAGNOSIS — M51.36 DDD (DEGENERATIVE DISC DISEASE), LUMBAR: ICD-10-CM

## 2017-09-29 DIAGNOSIS — M47.817 SPONDYLOSIS OF LUMBOSACRAL REGION WITHOUT MYELOPATHY OR RADICULOPATHY: ICD-10-CM

## 2017-09-29 DIAGNOSIS — M47.816 LUMBAR FACET ARTHROPATHY: ICD-10-CM

## 2017-09-29 PROCEDURE — 99214 OFFICE O/P EST MOD 30 MIN: CPT | Mod: S$GLB,,, | Performed by: PHYSICIAN ASSISTANT

## 2017-09-29 PROCEDURE — 3078F DIAST BP <80 MM HG: CPT | Mod: S$GLB,,, | Performed by: PHYSICIAN ASSISTANT

## 2017-09-29 PROCEDURE — 99499 UNLISTED E&M SERVICE: CPT | Mod: S$GLB,,, | Performed by: PHYSICIAN ASSISTANT

## 2017-09-29 PROCEDURE — 3008F BODY MASS INDEX DOCD: CPT | Mod: S$GLB,,, | Performed by: PHYSICIAN ASSISTANT

## 2017-09-29 PROCEDURE — 99999 PR PBB SHADOW E&M-EST. PATIENT-LVL IV: CPT | Mod: PBBFAC,,, | Performed by: PHYSICIAN ASSISTANT

## 2017-09-29 PROCEDURE — 3074F SYST BP LT 130 MM HG: CPT | Mod: S$GLB,,, | Performed by: PHYSICIAN ASSISTANT

## 2017-09-29 NOTE — PROGRESS NOTES
Chief Pain Complaint:  Low Back Pain    History of Present Illness:  This patient is a 67 y.o. female who presents today complaining of the above noted pain/s. The patient describes this pain as follows.    - duration of pain: pain for years   - timing: intermittent   - character: sharp, aching  - radiating, dermatomal: pain is nonradiating  - antecedent trauma, prior spinal surgery: no prior trauma, no prior spinal surgery, left hip replacement & revision, left radial ORIF on 7-12-17, left hardware revision on 9-6-17  - pertinent negatives: No fever, No chills, No weight loss, No bladder dysfunction, No bowel dysfunction, No extremity weakness, No saddle anesthesia  - pertinent positives: none    - medications, other therapies tried (physical therapy, injections):     >> Tylenol, Ultracet    >> Has previously undergone Physical Therapy    >> Has previously undergone spinal injection/s:    - what sounds like 2 lumbar MBBs at [facility] with great relief   - right lumbar MBB on 1-18-17 & left lumbar MBB on 2-8-17 with excellent relief        IMAGING / Labs / Studies (reviewed on 9/29/2017):    8/11/16 X-Ray Lumbar Spine AP And Lateral  Comparison: 09/24/2013  Technique: AP, lateral, lateral flexion, lateral extension, bilateral oblique, and lumbosacral coned down views were obtained of the lumbar spine  Findings:   Vertebral body heights and alignment are within normal limits.  No change in spinal alignment with flexion or extension to suggest instability. Multilevel degenerative disc height loss again noted throughout the lumbar spine, most severe at the levels of T12-L1, L1.  Moderate disc loss present at L4-L5 and L5-S1.  There multilevel degenerative endplate changes and osteophyte production.  Findings appear essentially unchanged from prior.  Bilateral facet arthropathy present at L4-L5 and L5-S1.  No pars defects visualized.  Posterior elements appear grossly intact. No acute fractures or subluxations are  demonstrated.  IVC filter again noted.  Aortic vascular calcifications present.  Previously described aneurysmal dilatation of the distal abdominal aorta appear similar to prior measuring up to approximately 4.2 cm in diameter.       9/24/13 X-Ray Lumbar Spine AP And Lateral    Narrative Findings:  Comparison is with 6/16/09.  Mild dextroscoliosis and diffuse changes of degenerative disk disease as well as diffuse facet joint hypertrophy are again demonstrated.  These changes of degenerative disk disease are particularly pronounced at the T12-L1, L1-2, L4-5 and L5-S1 levels.  AP alignment appears maintained with no acute compression fractures identified.  Again demonstrated are surgical clips consistent with prior cholecystectomy, IVC filter and left hip prosthesis.  Also again demonstrated is diffuse calcification of the abdominal aorta including distal aneurysmal dilatation measured at 4.4 cm in the AP axis at the level of the superior endplate of L4. This dilatation appears to have slightly increased in the interim previously measured at this level at approximately 3.3 cm.         Review of Systems:  CONSTITUTIONAL: patient denies any fever, chills, or weight loss  SKIN: patient denies any rash or itching  RESPIRATORY: patient denies having any shortness of breath  GASTROINTESTINAL: patient denies having any diarrhea, constipation, or bowel incontinence  GENITOURINARY: patient denies having any abnormal bladder function    MUSCULOSKELETAL:  - patient complains of the above noted pain/s (see chief pain complaint)    NEUROLOGICAL:   - pain as above  - strength in Lower extremities is intact, BILATERALLY  - sensation in Lower extremities is intact, BILATERALLY  - patient denies any loss of bowel or bladder control      PSYCHIATRIC: patient reports a history of anxiety and depression        Physical Exam:  Vitals:  /63 (BP Location: Right arm, Patient Position: Sitting, BP Method: Large (Automatic))   Pulse 87  "  Resp 16   Ht 5' 5" (1.651 m)   Wt 52.2 kg (115 lb)   LMP  (LMP Unknown)   BMI 19.14 kg/m²    (reviewed on 9/29/2017)    General: alert and oriented, in no apparent distress, poorly nourished  Gait: normal gait  Skin: No rashes, No discoloration, No obvious lesions  HEENT: EOMI  Respiratory: respirations nonlabored    Musculoskeletal:  - Any pain on flexion, extension, rotation:    >> pain on extension and rotation, improved since injection  - Straight Leg Raise:     >> LEFT :: negative    >> RIGHT :: negative  - Any tenderness to palpation across paraspinal muscles, joints, bursae:     >> across lumbar paraspinals, mild    Neuro:  - Extremity Strength:     >> LEFT :: 5/5    >> RIGHT :: 5/5  - Extremity Reflexes:    >> LEFT  :: 2+    >> RIGHT :: 2+  - Sensory (sensation to light touch):    >> LEFT :: intact    >> RIGHT :: intact     Psych:  Mood and affect is appropriate          Assessment:  Lumbar Spondylosis   Lumbar Facet Joint Syndrome  Lumbar DDD  Chronic Pain Syndrome        Plan:  Patient returns for follow-up. She complains of low back pain, lumbar facet pain primarily.   - S/p left radial ORIF on 7-12-17 then left hardware revision on 9-6-17 with Dr. Lizarraga. She continues to follow-up with him.   - LA  reviewed. She has several Rx of Norco listed including, Norco 7.5mg #15, #12, then #90 on 7-8-17, 7-18-17, & 7-27-17. She also filled 2 more Rx from Dr. Lizarraga of [facility] 10mg #42 on 9-6-17 & 9-26-17. She reports she is no longer taking this, now that the pain has subsided. I inform her that we she should not fill any more Rx. She will call us in the future if a similar situation arises. Last filled tramadol from Dr. Rivera on 8-21-17.  - We can consider repeating MBB vs. RFA in the future for returning pain.   - Refill tramadol 50mg BID PRN pain x 3 months. Also takes Cymbalta 60mg QD.  RTC in 3 months for med refill. I discussed the risks, benefits, and alternatives to potential treatment options. " All questions and concerns were fully addressed today in clinic. Dr. Rivera was consulted regarding the patient plan and agrees.          >> Pain Disability Index:  4/12/2017 :: 33  7/7/2017 :: 56    >>Opioid Risk Tool:  1/12/2017 :: 1

## 2017-10-05 ENCOUNTER — OFFICE VISIT (OUTPATIENT)
Dept: ORTHOPEDICS | Facility: CLINIC | Age: 67
End: 2017-10-05
Payer: MEDICARE

## 2017-10-05 ENCOUNTER — HOSPITAL ENCOUNTER (OUTPATIENT)
Dept: RADIOLOGY | Facility: HOSPITAL | Age: 67
Discharge: HOME | End: 2017-10-05
Attending: ORTHOPAEDIC SURGERY
Payer: MEDICARE

## 2017-10-05 VITALS
HEIGHT: 65 IN | SYSTOLIC BLOOD PRESSURE: 150 MMHG | HEART RATE: 64 BPM | BODY MASS INDEX: 19.17 KG/M2 | RESPIRATION RATE: 16 BRPM | DIASTOLIC BLOOD PRESSURE: 89 MMHG | WEIGHT: 115.06 LBS

## 2017-10-05 DIAGNOSIS — Z98.890 POST-OPERATIVE STATE: Primary | ICD-10-CM

## 2017-10-05 DIAGNOSIS — S62.102D CLOSED FRACTURE OF LEFT WRIST WITH ROUTINE HEALING, SUBSEQUENT ENCOUNTER: Primary | ICD-10-CM

## 2017-10-05 DIAGNOSIS — S62.102D CLOSED FRACTURE OF LEFT WRIST WITH ROUTINE HEALING, SUBSEQUENT ENCOUNTER: ICD-10-CM

## 2017-10-05 PROCEDURE — 73110 X-RAY EXAM OF WRIST: CPT | Mod: TC,PO,LT

## 2017-10-05 PROCEDURE — 99024 POSTOP FOLLOW-UP VISIT: CPT | Mod: S$GLB,,, | Performed by: ORTHOPAEDIC SURGERY

## 2017-10-05 PROCEDURE — 73110 X-RAY EXAM OF WRIST: CPT | Mod: 26,LT,, | Performed by: RADIOLOGY

## 2017-10-05 PROCEDURE — 99999 PR PBB SHADOW E&M-EST. PATIENT-LVL III: CPT | Mod: PBBFAC,,, | Performed by: ORTHOPAEDIC SURGERY

## 2017-10-06 NOTE — PROGRESS NOTES
SUBJECTIVE:  Patient is status post Left  Wrist open treatment for a scapholunate dissociation .  She has had left hip pain.  The patient is resting well.She is alert and oriented times three. She is being treated for alcohol withdraw.  the patient has bouts of disorientation. She is alert and oriented ×3.  Patient complains of  3/10 pain that is better with the  pain meds and aggravated with movement.              Past Medical History:   Diagnosis Date    Alcohol dependence       per patient in the past    Allergy      Anemia      Anxiety      Arthritis 6/24/2013    Asthma       per patient    Colon polyp      Colon polyp       colonoscopy 8/26/2013    COPD (chronic obstructive pulmonary disease)      Depression      Diverticulosis      Diverticulosis       colonoscopy 8/26/2013    Hiatal hernia       CXR 2/25/2015--Large hiatal hernia.     Hyperlipidemia      Hypertension      Hypocalcemia 7/6/2016    Hypothyroidism 6/24/2013    Osteoporosis      Pulmonary embolism       per patient unsure of date    Restless leg syndrome      RLS (restless legs syndrome)      Seizure 6/24/2013    Tobacco dependence       resolved    Trouble in sleeping                  Past Surgical History:   Procedure Laterality Date    ADENOIDECTOMY        APPENDECTOMY        CHOLECYSTECTOMY        COLONOSCOPY   2013    FINGER SURGERY        HERNIA REPAIR        HYSTERECTOMY        JOINT REPLACEMENT        TONSILLECTOMY        TOTAL HIP ARTHROPLASTY         L x2              Review of patient's allergies indicates:   Allergen Reactions    Nsaids (non-steroidal anti-inflammatory drug) Hives and Shortness Of Breath    Pcn [penicillins] Hives and Shortness Of Breath    Sulfa (sulfonamide antibiotics) Hives and Shortness Of Breath    Aspirin      Macrodantin [nitrofurantoin macrocrystalline] Hives    Wellbutrin [bupropion hcl]        No current facility-administered medications on file prior to encounter.                    Current Outpatient Prescriptions on File Prior to Encounter   Medication Sig Dispense Refill    allopurinol (ZYLOPRIM) 100 MG tablet Take 1 tablet (100 mg total) by mouth once daily. 90 tablet 5    busPIRone (BUSPAR) 15 MG tablet 1 tab po tid 90 tablet 1    clindamycin (CLEOCIN) 150 MG capsule Take 2 capsules (300 mg total) by mouth every 8 (eight) hours. 42 capsule 0    diclofenac sodium (VOLTAREN) 1 % Gel Apply 4 g topically 4 (four) times daily. Pt has shoulder pain and knee pain 200 g 5    diphenoxylate-atropine 2.5-0.025 mg (LOMOTIL) 2.5-0.025 mg per tablet     0    duloxetine (CYMBALTA) 60 MG capsule Take 1 capsule (60 mg total) by mouth once daily. 90 capsule 0    ferrous sulfate 325 (65 FE) MG EC tablet Take 1 tablet (325 mg total) by mouth once a week. 90 tablet 3    fluticasone (FLONASE) 50 mcg/actuation nasal spray instill 2 sprays into each nostril once daily 16 g 11    fluticasone-salmeterol (ADVAIR HFA) 115-21 mcg/actuation HFAA inhale 2 puffs INTO THE LUNGS twice a day 12 g 12    hydrocodone-acetaminophen 7.5-325mg (NORCO) 7.5-325 mg per tablet Take 1 tablet by mouth every 6 (six) hours as needed for Pain. 15 tablet 0    ipratropium-albuterol (COMBIVENT RESPIMAT)  mcg/actuation inhaler inhale 1 puff INTO THE LUNGS four times a day 3 Package 4    levothyroxine (SYNTHROID) 100 MCG tablet TAKE 1 TABLET ONE TIME DAILY 90 tablet 3    methocarbamol (ROBAXIN) 500 MG Tab Take 1 tablet (500 mg total) by mouth 2 (two) times daily as needed (for muscle spasms.). 60 tablet 1    metoprolol succinate (TOPROL-XL) 50 MG 24 hr tablet Take 1 tablet (50 mg total) by mouth once daily. 90 tablet 3    MULTIVITAMIN W-MINERALS/LUTEIN (CENTRUM SILVER ORAL) Take 1 tablet by mouth once daily.        pantoprazole (PROTONIX) 40 MG tablet Take 1 tablet (40 mg total) by mouth once daily. 90 tablet 3    pravastatin (PRAVACHOL) 10 MG tablet TAKE 1 TABLET ONE TIME DAILY 90 tablet 0     "ropinirole (REQUIP) 1 MG tablet Take 1 tablet (1 mg total) by mouth nightly. 90 tablet 3    thiamine 250 MG tablet Take 500 mg by mouth once daily.        tramadol (ULTRAM) 50 mg tablet Take 1 tablet (50 mg total) by mouth 2 (two) times daily as needed for Pain. 60 tablet 2          ROS:  GENERAL: No fever, chills, fatigability or weight loss.  SKIN: No rashes, itching or changes in color or texture of skin.  HEAD: No headaches or recent head trauma.  EYES: Visual acuity fine. No photophobia, ocular pain or diplopia.  EARS: Denies ear pain, discharge or vertigo.  NOSE: No loss of smell, no epistaxis or postnasal drip.  MOUTH & THROAT: No hoarseness or change in voice. No excessive gum bleeding.  NODES: Denies swollen glands.  CHEST: Denies BRADY, cyanosis, wheezing, cough and sputum production.  CARDIOVASCULAR: Denies chest pain, PND, orthopnea or reduced exercise tolerance.  ABDOMEN: Appetite fine. No weight loss. Denies diarrhea, abdominal pain, hematemesis or blood in stool.  URINARY: No flank pain, dysuria or hematuria.  PERIPHERAL VASCULAR: No claudication or cyanosis.  NEUROLOGIC: No history of seizures, paralysis, alteration of gait or coordination.  MUSCULOSKELETAL: See HPI     PE:  APPEARANCE: Well nourished, well developed, in no acute distress.   HEAD: Normocephalic, atraumatic.  EYES: PERRL. EOMI.   EARS: TM's intact. Light reflex normal. No retraction or perforation.   NOSE: Mucosa pink. Airway clear.  MOUTH & THROAT: No tonsillar enlargement. No pharyngeal erythema or exudate. No stridor.  NECK: Supple.   NODES: No cervical, axillary or inguinal lymph node enlargement.  CHEST: Lungs clear to auscultation.  CARDIOVASCULAR: Normal S1, S2. No rubs, murmurs or gallops.  ABDOMEN: Bowel sounds normal. Not distended. Soft. No tenderness or masses.  NEUROLOGIC: Cranial Nerves: II-XII grossly intact, also see MUSCULOSKELETAL  MUSCULOSKELETAL:      ,BP (!) 150/89   Pulse 64   Resp 16   Ht 5' 5" (1.651 m)   " Wt 52.2 kg (115 lb 1.3 oz)   LMP  (LMP Unknown)   BMI 19.15 kg/m²                   Left  Wrist -2 plus radial  artery and ulnar artery pulses, light touch intact Left upper extremity.  All digits are warm. No erythema, no warmth, no drainage, No swelling, no significant tenderness.     Left Hip- pain on range of motion, in the groin area.          ASSESSMENT:  The patient's  states that he pulled one   Of her pins out. They understand the pins where to be removed at 12 weeks postop. They understand the importance of compliance.  The xray shows widening of the scapholunate joint with only one pin remaining.             Diagnosis:  1.  Left iliac wing fracture   2. Left radius ORIF  3.  left wrist scapholunate dissociation    PLAN:  Walk assisted ambulation full weight-bear left lower extremity.  The patient can anticipate in physical therapy with pain being the limiting factor for the left hip range of motion.  Continue pain medication  The patient can follow-up 1  week   Hardware removal of the left wrist  Repair of scapholunate dissociation  A referral to Dr. Gan may be beneficial.

## 2017-10-06 NOTE — PATIENT INSTRUCTIONS
Upper Extremity Fracture    You have a break (fracture) of the arm, wrist, or hand. This may be a small crack in the bone. Or it may be a major break, with the broken parts pushed out of position. Most fractures will heal without surgery. But you may need surgery if the bones are far out of place or if the break is near the elbow. Treatment is with a special sling called a shoulder immobilizer, or a splint or cast, depending on the type of fracture and where the fracture is. This fracture takes 4 to 6 weeks or longer to heal. The cast may need to be changed in 2 to 3 weeks as swelling goes down.  Home care  Follow these guidelines when caring for yourself:  · If you were given a shoulder immobilizer, leave it in place. This will support the injured arm at your side. This is the best position for bone healing. The shoulder immobilizer can be adjusted. If it becomes loose, adjust it so that your forearm is level with the ground (horizontal). Your hand should be level with your elbow.  · Put an ice pack on the injured area. Do this for 20 minutes every 1 to 2 hours the first day for pain relief. You can make an ice pack by wrapping a plastic bag of ice cubes in a thin towel. As the ice melts, be careful that the cast/splint/sling doesnt get wet. You can put the ice pack inside the sling and directly over the splint or cast. Continue using the ice pack 3 to 4 times a day for the next 2 days. Then use the ice pack as needed to ease pain and swelling.  · Keep the cast, splint, or sling completely dry at all times. Bathe with your cast, splint, or sling out of the water. Protect it with a large plastic bag, rubber-banded at the top end. If a fiberglass cast, splint, or sling gets wet, you can dry it with a hair dryer.  · You may use acetaminophen or ibuprofen to control pain, unless another pain medicine was prescribed. If you have chronic liver or kidney disease, talk with your healthcare provider before using these  medicines. Also talk with your provider if youve had a stomach ulcer or gastrointestinal bleeding.  · Dont put creams or objects under the cast if you have itching.  Follow-up care  Follow up with your healthcare provider, or as advised. This is to make sure the bone is healing the way it should.  X-rays may be taken. You will be told of any new findings that may affect your care.  When to seek medical advice  Call your health care provider right away if any of these occur:  · The cast or splint cracks  · The plaster cast or splint becomes wet or soft  · The fiberglass cast or splint stays wet for more than 24 hours  · Bad odor from the cast or wound fluid stains the cast  · Tightness or pain under the cast or splint gets worse  · Fingers become swollen, cold, blue, numb, or tingly  · You cant move your fingers  · Skin around cast or splint becomes red  · Fever of 100.4ºF (38ºC) or higher, or as directed by your healthcare provider  Date Last Reviewed: 2/1/2017 © 2000-2017 [employer]. [address] [place] [address], [place] [address]. All rights reserved. This information is not intended as a substitute for professional medical care. Always follow your healthcare professional's instructions.

## 2017-10-10 ENCOUNTER — OFFICE VISIT (OUTPATIENT)
Dept: ORTHOPEDICS | Facility: CLINIC | Age: 67
End: 2017-10-10
Payer: MEDICARE

## 2017-10-10 VITALS — SYSTOLIC BLOOD PRESSURE: 176 MMHG | HEART RATE: 71 BPM | DIASTOLIC BLOOD PRESSURE: 113 MMHG | RESPIRATION RATE: 12 BRPM

## 2017-10-10 DIAGNOSIS — S63.502D SPRAIN OF LEFT WRIST, SUBSEQUENT ENCOUNTER: Primary | ICD-10-CM

## 2017-10-10 PROCEDURE — 99024 POSTOP FOLLOW-UP VISIT: CPT | Mod: S$GLB,,, | Performed by: ORTHOPAEDIC SURGERY

## 2017-10-10 PROCEDURE — 99999 PR PBB SHADOW E&M-EST. PATIENT-LVL III: CPT | Mod: PBBFAC,,, | Performed by: ORTHOPAEDIC SURGERY

## 2017-10-10 NOTE — PROGRESS NOTES
SUBJECTIVE:  Patient is status post Left  Wrist open treatment for a scapholunate dissociation .  She has had left hip pain related to her iliac fracture. Patient complains of  0/10 pain.   The patient states that she has been fishing.             Past Medical History:   Diagnosis Date    Alcohol dependence       per patient in the past    Allergy      Anemia      Anxiety      Arthritis 6/24/2013    Asthma       per patient    Colon polyp      Colon polyp       colonoscopy 8/26/2013    COPD (chronic obstructive pulmonary disease)      Depression      Diverticulosis      Diverticulosis       colonoscopy 8/26/2013    Hiatal hernia       CXR 2/25/2015--Large hiatal hernia.     Hyperlipidemia      Hypertension      Hypocalcemia 7/6/2016    Hypothyroidism 6/24/2013    Osteoporosis      Pulmonary embolism       per patient unsure of date    Restless leg syndrome      RLS (restless legs syndrome)      Seizure 6/24/2013    Tobacco dependence       resolved    Trouble in sleeping                  Past Surgical History:   Procedure Laterality Date    ADENOIDECTOMY        APPENDECTOMY        CHOLECYSTECTOMY        COLONOSCOPY   2013    FINGER SURGERY        HERNIA REPAIR        HYSTERECTOMY        JOINT REPLACEMENT        TONSILLECTOMY        TOTAL HIP ARTHROPLASTY         L x2              Review of patient's allergies indicates:   Allergen Reactions    Nsaids (non-steroidal anti-inflammatory drug) Hives and Shortness Of Breath    Pcn [penicillins] Hives and Shortness Of Breath    Sulfa (sulfonamide antibiotics) Hives and Shortness Of Breath    Aspirin      Macrodantin [nitrofurantoin macrocrystalline] Hives    Wellbutrin [bupropion hcl]        No current facility-administered medications on file prior to encounter.                   Current Outpatient Prescriptions on File Prior to Encounter   Medication Sig Dispense Refill    allopurinol (ZYLOPRIM) 100 MG tablet Take 1 tablet (100 mg  total) by mouth once daily. 90 tablet 5    busPIRone (BUSPAR) 15 MG tablet 1 tab po tid 90 tablet 1    clindamycin (CLEOCIN) 150 MG capsule Take 2 capsules (300 mg total) by mouth every 8 (eight) hours. 42 capsule 0    diclofenac sodium (VOLTAREN) 1 % Gel Apply 4 g topically 4 (four) times daily. Pt has shoulder pain and knee pain 200 g 5    diphenoxylate-atropine 2.5-0.025 mg (LOMOTIL) 2.5-0.025 mg per tablet     0    duloxetine (CYMBALTA) 60 MG capsule Take 1 capsule (60 mg total) by mouth once daily. 90 capsule 0    ferrous sulfate 325 (65 FE) MG EC tablet Take 1 tablet (325 mg total) by mouth once a week. 90 tablet 3    fluticasone (FLONASE) 50 mcg/actuation nasal spray instill 2 sprays into each nostril once daily 16 g 11    fluticasone-salmeterol (ADVAIR HFA) 115-21 mcg/actuation HFAA inhale 2 puffs INTO THE LUNGS twice a day 12 g 12    hydrocodone-acetaminophen 7.5-325mg (NORCO) 7.5-325 mg per tablet Take 1 tablet by mouth every 6 (six) hours as needed for Pain. 15 tablet 0    ipratropium-albuterol (COMBIVENT RESPIMAT)  mcg/actuation inhaler inhale 1 puff INTO THE LUNGS four times a day 3 Package 4    levothyroxine (SYNTHROID) 100 MCG tablet TAKE 1 TABLET ONE TIME DAILY 90 tablet 3    methocarbamol (ROBAXIN) 500 MG Tab Take 1 tablet (500 mg total) by mouth 2 (two) times daily as needed (for muscle spasms.). 60 tablet 1    metoprolol succinate (TOPROL-XL) 50 MG 24 hr tablet Take 1 tablet (50 mg total) by mouth once daily. 90 tablet 3    MULTIVITAMIN W-MINERALS/LUTEIN (CENTRUM SILVER ORAL) Take 1 tablet by mouth once daily.        pantoprazole (PROTONIX) 40 MG tablet Take 1 tablet (40 mg total) by mouth once daily. 90 tablet 3    pravastatin (PRAVACHOL) 10 MG tablet TAKE 1 TABLET ONE TIME DAILY 90 tablet 0    ropinirole (REQUIP) 1 MG tablet Take 1 tablet (1 mg total) by mouth nightly. 90 tablet 3    thiamine 250 MG tablet Take 500 mg by mouth once daily.        tramadol (ULTRAM) 50 mg  tablet Take 1 tablet (50 mg total) by mouth 2 (two) times daily as needed for Pain. 60 tablet 2          ROS:  GENERAL: No fever, chills, fatigability or weight loss.  SKIN: No rashes, itching or changes in color or texture of skin.  HEAD: No headaches or recent head trauma.  EYES: Visual acuity fine. No photophobia, ocular pain or diplopia.  EARS: Denies ear pain, discharge or vertigo.  NOSE: No loss of smell, no epistaxis or postnasal drip.  MOUTH & THROAT: No hoarseness or change in voice. No excessive gum bleeding.  NODES: Denies swollen glands.  CHEST: Denies BRADY, cyanosis, wheezing, cough and sputum production.  CARDIOVASCULAR: Denies chest pain, PND, orthopnea or reduced exercise tolerance.  ABDOMEN: Appetite fine. No weight loss. Denies diarrhea, abdominal pain, hematemesis or blood in stool.  URINARY: No flank pain, dysuria or hematuria.  PERIPHERAL VASCULAR: No claudication or cyanosis.  NEUROLOGIC: No history of seizures, paralysis, alteration of gait or coordination.  MUSCULOSKELETAL: See HPI     PE:  APPEARANCE: Well nourished, well developed, in no acute distress.   HEAD: Normocephalic, atraumatic.  EYES: PERRL. EOMI.   EARS: TM's intact. Light reflex normal. No retraction or perforation.   NOSE: Mucosa pink. Airway clear.  MOUTH & THROAT: No tonsillar enlargement. No pharyngeal erythema or exudate. No stridor.  NECK: Supple.   NODES: No cervical, axillary or inguinal lymph node enlargement.  CHEST: Lungs clear to auscultation.  CARDIOVASCULAR: Normal S1, S2. No rubs, murmurs or gallops.  ABDOMEN: Bowel sounds normal. Not distended. Soft. No tenderness or masses.  NEUROLOGIC: Cranial Nerves: II-XII grossly intact, also see MUSCULOSKELETAL  MUSCULOSKELETAL:      ,BP (!) 176/113   Pulse 71   Resp 12   LMP  (LMP Unknown)                   Left  Wrist -2 plus radial  artery and ulnar artery pulses, light touch intact Left upper extremity.  All digits are warm. No erythema, no warmth, no drainage, No  swelling, no significant tenderness.     Left Hip- pain on range of motion, in the groin area.          ASSESSMENT:  The patient's  states that he pulled one   Of her pins out. They understand the pins where to be removed at 12 weeks postop. They understand the importance of compliance.  The xray shows widening of the scapholunate joint with only one pin remaining.   The patient's splint was heavily soiled.  The patient's  question why the patient was not placed in a closed cast following her surgery with her pins in place.  I explained to him that a closed cast overlying the pins could increase the risk of infection and hardware displacement.            Diagnosis:  1.  Left iliac wing fracture   2. Left radius ORIF  3.  left wrist scapholunate dissociation    PLAN:  Walk assisted ambulation full weight-bear left lower extremity.  Nonweightbearing on the left upper extremity  The patient can anticipate in physical therapy with pain being the limiting factor for the left hip range of motion.  Continue pain medication       Remaining Hardware removal of the left wrist       Repair of scapholunate dissociation       A referral to Dr. Gan may be beneficial.       The patient and her  states that they will see Dr. Bustillo.

## 2017-10-11 ENCOUNTER — TELEPHONE (OUTPATIENT)
Dept: ORTHOPEDICS | Facility: CLINIC | Age: 67
End: 2017-10-11

## 2017-10-11 NOTE — TELEPHONE ENCOUNTER
Called 's office and scheduled appointment for pt. Pt is scheduled November 1, 2017 at 2:00pm. Pt records will be faxed to  [phone]. Called pt  and advised of appointment date and time. Marino vocalized understanding.

## 2017-10-12 PROBLEM — S63.502A SPRAIN OF LEFT WRIST: Status: ACTIVE | Noted: 2017-10-12

## 2017-10-13 NOTE — PATIENT INSTRUCTIONS
ACE Wrap  Minor muscle or joint injuries are often treated with an elastic bandage. The bandage provides support and compression to the injured area. An elastic bandage is a stretchy, rolled bandage. Elastic bandages range in width from 2 to 6 inches. They can be used for a variety of injuries. The bandages are often called ACE bandages, after the most common brand name.  If used correctly, elastic bandages help control swelling and ease pain. An elastic bandage is also a good reminder not to overuse the injured area. However, elastic bandages do not provide a lot of support and will not prevent reinjury.  Home care    To apply an elastic bandage:  · Check the skin before wrapping the injury. It should be clean, dry, and free of drainage.  · Start wrapping below the injury and work your way toward the body. For an ankle sprain, start wrapping around the foot and work up toward the calf. This will help control swelling.  · Overlap the edges of the bandage so it stays snuggly in place.  · Wrap the bandage firmly, but not too tightly. A tight bandage can increase swelling on either end of the bandage. Make sure the bandage is wrinkle free.  · Leave fingers and toes exposed.  · Secure ends of the bandage (even self-sticking ones) with clips or tape.  · Check frequently to ensure adequate circulation, especially in the fingers and toes. Loosen the bandage if there is local swelling, numbness, tingling, discomfort, coldness, or discoloration (skin pale or bluish in color).  · Rewrap the bandage as needed during the day. Reroll the bandage as you unwind it.  Continue using the elastic bandage until the pain and swelling are gone or as your healthcare provider advises.  If you have been told to ice the area, the ice can be secured in place with the elastic bandage. Wrap the ice pack with a thin towel to protect the skin. Do not put ice or an ice pack directly on the skin.  Ice the area for no more than 20 minutes at a  time.    Follow-up care  Follow up with your healthcare provider, as advised.  When to seek medical advice  Call your healthcare provider for any of the following:  · Pain and swelling that doesn't get better or gets worse  · Trouble moving injured area  · Skin discoloration, numbness, or tingling that doesnt go away after bandage is removed  Date Last Reviewed: 9/13/2015  © 7073-2634 [employer]. [address] [place] [address], [place] [address]. All rights reserved. This information is not intended as a substitute for professional medical care. Always follow your healthcare professional's instructions.

## 2017-10-25 RX ORDER — DIPHENOXYLATE HYDROCHLORIDE AND ATROPINE SULFATE 2.5; .025 MG/1; MG/1
TABLET ORAL
Qty: 60 TABLET | Refills: 2 | Status: SHIPPED | OUTPATIENT
Start: 2017-10-25 | End: 2018-01-16 | Stop reason: SDUPTHER

## 2017-11-01 ENCOUNTER — OFFICE VISIT (OUTPATIENT)
Dept: CARDIOLOGY | Facility: CLINIC | Age: 67
End: 2017-11-01
Payer: MEDICARE

## 2017-11-01 VITALS
DIASTOLIC BLOOD PRESSURE: 80 MMHG | WEIGHT: 112.44 LBS | BODY MASS INDEX: 18.73 KG/M2 | HEART RATE: 60 BPM | SYSTOLIC BLOOD PRESSURE: 142 MMHG | HEIGHT: 65 IN

## 2017-11-01 DIAGNOSIS — Z95.828 S/P IVC FILTER: Chronic | ICD-10-CM

## 2017-11-01 DIAGNOSIS — F10.11 HISTORY OF ETOH ABUSE: ICD-10-CM

## 2017-11-01 DIAGNOSIS — I10 ESSENTIAL HYPERTENSION: Chronic | ICD-10-CM

## 2017-11-01 DIAGNOSIS — J44.9 STAGE 3 SEVERE COPD BY GOLD CLASSIFICATION: Chronic | ICD-10-CM

## 2017-11-01 DIAGNOSIS — I71.40 ABDOMINAL AORTIC ANEURYSM (AAA) 3.0 CM TO 5.0 CM IN DIAMETER IN FEMALE: ICD-10-CM

## 2017-11-01 DIAGNOSIS — I49.1 PAC (PREMATURE ATRIAL CONTRACTION): Primary | ICD-10-CM

## 2017-11-01 DIAGNOSIS — I26.99 PE (PULMONARY THROMBOEMBOLISM): ICD-10-CM

## 2017-11-01 PROCEDURE — 99214 OFFICE O/P EST MOD 30 MIN: CPT | Mod: S$GLB,,, | Performed by: INTERNAL MEDICINE

## 2017-11-01 PROCEDURE — 99999 PR PBB SHADOW E&M-EST. PATIENT-LVL III: CPT | Mod: PBBFAC,,, | Performed by: INTERNAL MEDICINE

## 2017-11-01 PROCEDURE — 99499 UNLISTED E&M SERVICE: CPT | Mod: S$GLB,,, | Performed by: INTERNAL MEDICINE

## 2017-11-01 NOTE — PROGRESS NOTES
Subjective:   Patient ID:  Ibeth Schroeder is a 67 y.o. female who presents for follow up of Peripheral Arterial Disease      66 yo female, came in for 2-month f/u  PMH HTN, HLD, copd, h/o PE s/p IVC filter, former smoker, CKD, did drink 2 shots a day for years.  Fell few months ago with multiple Fx. Stayed in hospital for a week and NH for two weeks. Had left wrist fixed by Dr. Lizarraga.  Still has multiple falls weekly. No black out. Just collapsed and got up quickly.   No chest pain, palpitation and dizziness  ECHO in  normal EF, normal structure  EKG NSR and PACs.  VQ scan negative          Past Medical History:   Diagnosis Date    Alcohol dependence     per patient in the past    Allergy     Anemia     Anxiety     Arthritis 6/24/2013    Asthma     per patient    Colon polyp     Colon polyp     colonoscopy 8/26/2013    COPD (chronic obstructive pulmonary disease)     Depression     Diverticulosis     Diverticulosis     colonoscopy 8/26/2013    Hiatal hernia     CXR 2/25/2015--Large hiatal hernia.     Hyperlipidemia     Hypertension     Hypocalcemia 7/6/2016    Hypothyroidism 6/24/2013    Osteoporosis     Pulmonary embolism     per patient unsure of date    Restless leg syndrome     RLS (restless legs syndrome)     Seizure 6/24/2013    Tobacco dependence     resolved    Trouble in sleeping        Past Surgical History:   Procedure Laterality Date    ADENOIDECTOMY      APPENDECTOMY      CHOLECYSTECTOMY      COLONOSCOPY  2013    FINGER SURGERY      HERNIA REPAIR      HYSTERECTOMY      JOINT REPLACEMENT      OOPHORECTOMY      TONSILLECTOMY      TOTAL HIP ARTHROPLASTY      L x2       Social History   Substance Use Topics    Smoking status: Former Smoker     Packs/day: 1.00     Years: 30.00     Types: Cigarettes     Quit date: 8/5/2010    Smokeless tobacco: Former User     Quit date: 8/5/2010    Alcohol use 0.6 - 1.2 oz/week     1 - 2 Shots of liquor per week       Family  History   Problem Relation Age of Onset    Diabetes Mother     Hypertension Mother     Cancer Mother     Kidney disease Mother      stones    Parkinsonism Mother     Asthma Father     Diabetes Son     Hypertension Son     Heart disease Maternal Aunt      CAD     Cancer Maternal Aunt      Breast Ca    Breast cancer Maternal Aunt     Colon cancer Maternal Grandmother     Cancer Maternal Grandmother     Cancer Maternal Aunt      breast    Heart disease Maternal Aunt     Stroke Neg Hx          Review of Systems   Constitution: Negative for decreased appetite, diaphoresis, fever, weakness, malaise/fatigue and night sweats.   HENT: Negative for nosebleeds.    Eyes: Negative for blurred vision and double vision.   Cardiovascular: Negative for chest pain, claudication, dyspnea on exertion, irregular heartbeat, leg swelling, near-syncope, orthopnea, palpitations, paroxysmal nocturnal dyspnea and syncope.   Respiratory: Negative for cough, shortness of breath, sleep disturbances due to breathing, snoring, sputum production and wheezing.    Endocrine: Negative for cold intolerance and polyuria.   Hematologic/Lymphatic: Does not bruise/bleed easily.   Skin: Negative for rash.   Musculoskeletal: Positive for arthritis and joint pain. Negative for back pain, falls, joint swelling and neck pain.   Gastrointestinal: Negative for abdominal pain, heartburn, nausea and vomiting.   Genitourinary: Negative for dysuria, frequency and hematuria.   Neurological: Positive for loss of balance. Negative for difficulty with concentration, dizziness, focal weakness, headaches, light-headedness, numbness and seizures.   Psychiatric/Behavioral: Negative for depression, memory loss and substance abuse. The patient does not have insomnia.    Allergic/Immunologic: Negative for HIV exposure and hives.       Objective:   Physical Exam   Constitutional: She is oriented to person, place, and time. She appears well-nourished.   HENT:    Head: Normocephalic.   Eyes: Pupils are equal, round, and reactive to light.   Neck: Normal carotid pulses and no JVD present. Carotid bruit is not present. No thyromegaly present.   Cardiovascular: Normal rate, regular rhythm, normal heart sounds and normal pulses.  Frequent extrasystoles are present. PMI is not displaced.  Exam reveals no gallop and no S3.    No murmur heard.  Pulmonary/Chest: Breath sounds normal. No stridor. No respiratory distress.   Abdominal: Soft. Bowel sounds are normal. There is no tenderness. There is no rebound.   Musculoskeletal: Normal range of motion.   Left wrist brace   Neurological: She is alert and oriented to person, place, and time.   Skin: Skin is intact. No rash noted.   Psychiatric: Her behavior is normal.       Lab Results   Component Value Date    CHOL 170 08/17/2017    CHOL 210 (H) 04/19/2017    CHOL 203 (H) 04/21/2016     Lab Results   Component Value Date    HDL 41 08/17/2017    HDL 66 04/19/2017    HDL 66 04/21/2016     Lab Results   Component Value Date    LDLCALC 100.0 08/17/2017    LDLCALC 113.4 04/19/2017    LDLCALC 110.6 04/21/2016     Lab Results   Component Value Date    TRIG 145 08/17/2017    TRIG 153 (H) 04/19/2017    TRIG 132 04/21/2016     Lab Results   Component Value Date    CHOLHDL 24.1 08/17/2017    CHOLHDL 31.4 04/19/2017    CHOLHDL 32.5 04/21/2016       Chemistry        Component Value Date/Time     08/28/2017 1300    K 4.7 08/28/2017 1300     08/28/2017 1300    CO2 23 08/28/2017 1300    BUN 21 08/28/2017 1300    CREATININE 1.2 08/28/2017 1300    GLU 95 08/28/2017 1300        Component Value Date/Time    CALCIUM 8.8 08/28/2017 1300    ALKPHOS 81 07/12/2017 2117    AST 22 07/12/2017 2117    ALT 12 07/12/2017 2117    BILITOT 0.6 07/12/2017 2117    ESTGFRAFRICA 54.8 (A) 08/28/2017 1300    EGFRNONAA 47.5 (A) 08/28/2017 1300          Lab Results   Component Value Date    TSH 1.275 07/11/2017     Lab Results   Component Value Date    INR 1.0  07/12/2017    INR 1.0 07/12/2017    INR 1.0 06/26/2015     Lab Results   Component Value Date    WBC 6.53 09/01/2017    HGB 9.8 (L) 09/01/2017    HCT 31.3 (L) 09/01/2017    MCV 98 09/01/2017     09/01/2017     BMP  Sodium   Date Value Ref Range Status   08/28/2017 143 136 - 145 mmol/L Final     Potassium   Date Value Ref Range Status   08/28/2017 4.7 3.5 - 5.1 mmol/L Final     Chloride   Date Value Ref Range Status   08/28/2017 110 95 - 110 mmol/L Final     CO2   Date Value Ref Range Status   08/28/2017 23 23 - 29 mmol/L Final     BUN, Bld   Date Value Ref Range Status   08/28/2017 21 8 - 23 mg/dL Final     Creatinine   Date Value Ref Range Status   08/28/2017 1.2 0.5 - 1.4 mg/dL Final     Calcium   Date Value Ref Range Status   08/28/2017 8.8 8.7 - 10.5 mg/dL Final     Anion Gap   Date Value Ref Range Status   08/28/2017 10 8 - 16 mmol/L Final     eGFR if    Date Value Ref Range Status   08/28/2017 54.8 (A) >60 mL/min/1.73 m^2 Final     eGFR if non    Date Value Ref Range Status   08/28/2017 47.5 (A) >60 mL/min/1.73 m^2 Final     Comment:     Calculation used to obtain the estimated glomerular filtration  rate (eGFR) is the CKD-EPI equation. Since race is unknown   in our information system, the eGFR values for   -American and Non--American patients are given   for each creatinine result.       CrCl cannot be calculated (Patient's most recent lab result is older than the maximum 7 days allowed.).     Assessment:      1. PAC (premature atrial contraction)    2. Essential hypertension    3. Abdominal aortic aneurysm (AAA) 3.0 cm to 5.0 cm in diameter in female    4. History of ETOH abuse    5. Stage 3 severe COPD by GOLD classification    6. PE (pulmonary thromboembolism)    7. S/P IVC filter      PAC and PVC, asymptomatic  Frequent fall due to imbalance\  On PT  Plan:   Continue Metoprol 50 mgbid  Fall precaution  RTC in 6 months

## 2017-11-03 ENCOUNTER — OFFICE VISIT (OUTPATIENT)
Dept: FAMILY MEDICINE | Facility: CLINIC | Age: 67
End: 2017-11-03
Payer: MEDICARE

## 2017-11-03 VITALS
HEIGHT: 65 IN | DIASTOLIC BLOOD PRESSURE: 86 MMHG | WEIGHT: 111.13 LBS | OXYGEN SATURATION: 94 % | SYSTOLIC BLOOD PRESSURE: 138 MMHG | BODY MASS INDEX: 18.52 KG/M2 | TEMPERATURE: 96 F | HEART RATE: 91 BPM

## 2017-11-03 DIAGNOSIS — R31.9 HEMATURIA, UNSPECIFIED TYPE: Primary | ICD-10-CM

## 2017-11-03 LAB
BILIRUB SERPL-MCNC: ABNORMAL MG/DL
BLOOD URINE, POC: 50
COLOR, POC UA: YELLOW
GLUCOSE UR QL STRIP: NORMAL
KETONES UR QL STRIP: ABNORMAL
LEUKOCYTE ESTERASE URINE, POC: ABNORMAL
NITRITE, POC UA: ABNORMAL
PH, POC UA: 6
PROTEIN, POC: ABNORMAL
SPECIFIC GRAVITY, POC UA: 1
UROBILINOGEN, POC UA: NORMAL

## 2017-11-03 PROCEDURE — 81002 URINALYSIS NONAUTO W/O SCOPE: CPT | Mod: S$GLB,,, | Performed by: FAMILY MEDICINE

## 2017-11-03 PROCEDURE — 87086 URINE CULTURE/COLONY COUNT: CPT

## 2017-11-03 PROCEDURE — 81001 URINALYSIS AUTO W/SCOPE: CPT

## 2017-11-03 PROCEDURE — 99999 PR PBB SHADOW E&M-EST. PATIENT-LVL IV: CPT | Mod: PBBFAC,,, | Performed by: FAMILY MEDICINE

## 2017-11-03 PROCEDURE — 99213 OFFICE O/P EST LOW 20 MIN: CPT | Mod: 25,S$GLB,, | Performed by: FAMILY MEDICINE

## 2017-11-03 RX ORDER — CIPROFLOXACIN 250 MG/1
250 TABLET, FILM COATED ORAL 2 TIMES DAILY
Qty: 6 TABLET | Refills: 0 | Status: SHIPPED | OUTPATIENT
Start: 2017-11-03 | End: 2017-11-20

## 2017-11-03 NOTE — PROGRESS NOTES
Subjective:       Patient ID: Ibeth Schroeder is a 67 y.o. female.    Chief Complaint: bleed for a month    67 y old female  S/p hysterectomy   With gross hematuria for 1 m , she  Sees Blood  every time she wipes . No dysuria , urinary frequebcy  etc       Review of Systems   Constitutional: Positive for activity change and unexpected weight change.   HENT: Negative.  Negative for hearing loss, rhinorrhea and trouble swallowing.    Eyes: Negative for discharge and visual disturbance.   Respiratory: Negative.  Negative for chest tightness and wheezing.    Cardiovascular: Negative.  Negative for chest pain and palpitations.   Gastrointestinal: Positive for diarrhea. Negative for blood in stool, constipation and vomiting.   Endocrine: Negative for polydipsia and polyuria.   Genitourinary: Negative for difficulty urinating, dysuria, hematuria and menstrual problem.   Musculoskeletal: Positive for arthralgias. Negative for joint swelling and neck pain.   Neurological: Positive for weakness. Negative for headaches.   Psychiatric/Behavioral: Positive for confusion and dysphoric mood.       Objective:      Physical Exam   Constitutional: She is oriented to person, place, and time. She appears well-developed and well-nourished. No distress.   HENT:   Head: Normocephalic and atraumatic.   Right Ear: External ear normal.   Left Ear: External ear normal.   Mouth/Throat: No oropharyngeal exudate.   Eyes: Conjunctivae and EOM are normal. Pupils are equal, round, and reactive to light. Right eye exhibits no discharge. Left eye exhibits no discharge. No scleral icterus.   Neck: Normal range of motion. Neck supple. No JVD present. No tracheal deviation present. No thyromegaly present.   Cardiovascular: Normal rate, regular rhythm and normal heart sounds.  Exam reveals no gallop and no friction rub.    No murmur heard.  Pulmonary/Chest: Effort normal and breath sounds normal. No stridor. No respiratory distress. She has no wheezes.  She has no rales. She exhibits no tenderness.   Abdominal: Soft. Bowel sounds are normal. She exhibits no distension. There is no tenderness. There is no rebound and no guarding.   Musculoskeletal: Normal range of motion.   Lymphadenopathy:     She has no cervical adenopathy.   Neurological: She is alert and oriented to person, place, and time.   Skin: Skin is warm and dry. She is not diaphoretic.   Psychiatric: She has a normal mood and affect. Her behavior is normal. Judgment and thought content normal.       Assessment:      Ibeth was seen today for bleed for a month.    Diagnoses and all orders for this visit:    Hematuria, unspecified type  -     POCT URINE DIPSTICK WITHOUT MICROSCOPE  -     Urine culture  -     Urinalysis Microscopic    Other orders  -     ciprofloxacin HCl (CIPRO) 250 MG tablet; Take 1 tablet (250 mg total) by mouth 2 (two) times daily.      Plan:   F.u in 2 w to ensure hematuria has resolved

## 2017-11-04 LAB
BACTERIA #/AREA URNS AUTO: NORMAL /HPF
MICROSCOPIC COMMENT: NORMAL
RBC #/AREA URNS AUTO: 1 /HPF (ref 0–4)
WBC #/AREA URNS AUTO: 5 /HPF (ref 0–5)

## 2017-11-05 LAB
BACTERIA UR CULT: NORMAL
BACTERIA UR CULT: NORMAL

## 2017-11-17 ENCOUNTER — HOSPITAL ENCOUNTER (OUTPATIENT)
Dept: RADIOLOGY | Facility: HOSPITAL | Age: 67
Discharge: HOME | End: 2017-11-17
Attending: INTERNAL MEDICINE
Payer: MEDICARE

## 2017-11-17 ENCOUNTER — OFFICE VISIT (OUTPATIENT)
Dept: HEMATOLOGY/ONCOLOGY | Facility: CLINIC | Age: 67
End: 2017-11-17
Payer: MEDICARE

## 2017-11-17 VITALS
SYSTOLIC BLOOD PRESSURE: 135 MMHG | BODY MASS INDEX: 17.99 KG/M2 | WEIGHT: 108 LBS | HEIGHT: 65 IN | HEART RATE: 66 BPM | TEMPERATURE: 98 F | DIASTOLIC BLOOD PRESSURE: 87 MMHG | OXYGEN SATURATION: 99 %

## 2017-11-17 DIAGNOSIS — E53.8 B12 DEFICIENCY: ICD-10-CM

## 2017-11-17 DIAGNOSIS — D50.0 IRON DEFICIENCY ANEMIA DUE TO CHRONIC BLOOD LOSS: Primary | ICD-10-CM

## 2017-11-17 DIAGNOSIS — S09.90XA TRAUMATIC INJURY OF HEAD, INITIAL ENCOUNTER: ICD-10-CM

## 2017-11-17 PROCEDURE — 70450 CT HEAD/BRAIN W/O DYE: CPT | Mod: TC

## 2017-11-17 PROCEDURE — 99999 PR PBB SHADOW E&M-EST. PATIENT-LVL III: CPT | Mod: PBBFAC,,, | Performed by: INTERNAL MEDICINE

## 2017-11-17 PROCEDURE — 99214 OFFICE O/P EST MOD 30 MIN: CPT | Mod: S$GLB,,, | Performed by: INTERNAL MEDICINE

## 2017-11-17 NOTE — PROGRESS NOTES
Subjective:       Patient ID: Ibeth Schroeder is a 67 y.o. female.    Chief Complaint: Results and Anemia    HPI 67-year-old female history of recurrent iron deficiency anemia patient returns previously seen by Dr. Alvarado prior to departure patient states that last night she fell hit her head she states that she did not lose consciousness but has large ecchymoses on the right posterior occipital region    Past Medical History:   Diagnosis Date    Alcohol dependence     per patient in the past    Allergy     Anemia     Anxiety     Arthritis 6/24/2013    Asthma     per patient    Colon polyp     Colon polyp     colonoscopy 8/26/2013    COPD (chronic obstructive pulmonary disease)     Depression     Diverticulosis     Diverticulosis     colonoscopy 8/26/2013    Hiatal hernia     CXR 2/25/2015--Large hiatal hernia.     Hyperlipidemia     Hypertension     Hypocalcemia 7/6/2016    Hypothyroidism 6/24/2013    Osteoporosis     Pulmonary embolism     per patient unsure of date    Restless leg syndrome     RLS (restless legs syndrome)     Seizure 6/24/2013    Tobacco dependence     resolved    Trouble in sleeping      Family History   Problem Relation Age of Onset    Diabetes Mother     Hypertension Mother     Cancer Mother     Kidney disease Mother      stones    Parkinsonism Mother     Asthma Father     Diabetes Son     Hypertension Son     Heart disease Maternal Aunt      CAD     Cancer Maternal Aunt      Breast Ca    Breast cancer Maternal Aunt     Colon cancer Maternal Grandmother     Cancer Maternal Grandmother     Cancer Maternal Aunt      breast    Heart disease Maternal Aunt     Stroke Neg Hx      Social History     Social History    Marital status:      Spouse name: N/A    Number of children: N/A    Years of education: N/A     Occupational History    Not on file.     Social History Main Topics    Smoking status: Former Smoker     Packs/day: 1.00     Years: 30.00      Types: Cigarettes     Quit date: 8/5/2010    Smokeless tobacco: Former User     Quit date: 8/5/2010    Alcohol use 0.6 - 1.2 oz/week     1 - 2 Shots of liquor per week    Drug use: No    Sexual activity: Not Currently     Partners: Male     Other Topics Concern    Not on file     Social History Narrative    No narrative on file     Past Surgical History:   Procedure Laterality Date    ADENOIDECTOMY      APPENDECTOMY      CHOLECYSTECTOMY      COLONOSCOPY  2013    FINGER SURGERY      HERNIA REPAIR      HYSTERECTOMY      JOINT REPLACEMENT      OOPHORECTOMY      TONSILLECTOMY      TOTAL HIP ARTHROPLASTY      L x2       Labs:  Lab Results   Component Value Date    WBC 6.53 09/01/2017    HGB 9.8 (L) 09/01/2017    HCT 31.3 (L) 09/01/2017    MCV 98 09/01/2017     09/01/2017     BMP  Lab Results   Component Value Date     08/28/2017    K 4.7 08/28/2017     08/28/2017    CO2 23 08/28/2017    BUN 21 08/28/2017    CREATININE 1.2 08/28/2017    CALCIUM 8.8 08/28/2017    ANIONGAP 10 08/28/2017    ESTGFRAFRICA 54.8 (A) 08/28/2017    EGFRNONAA 47.5 (A) 08/28/2017     Lab Results   Component Value Date    ALT 12 07/12/2017    AST 22 07/12/2017    ALKPHOS 81 07/12/2017    BILITOT 0.6 07/12/2017       Lab Results   Component Value Date    IRON 67 12/14/2016    TIBC 400 12/14/2016    FERRITIN 98 12/14/2016     Lab Results   Component Value Date    QHSBBOLC81 733 05/18/2016     Lab Results   Component Value Date    FOLATE 19.7 05/18/2016     Lab Results   Component Value Date    TSH 1.275 07/11/2017         Review of Systems   Constitutional: Positive for activity change and fatigue. Negative for appetite change, chills, diaphoresis, fever and unexpected weight change.   HENT: Negative for congestion, dental problem, drooling, ear discharge, ear pain, facial swelling, hearing loss, mouth sores, nosebleeds, postnasal drip, rhinorrhea, sinus pressure, sneezing, sore throat, tinnitus, trouble swallowing  and voice change.    Eyes: Negative for photophobia, pain, discharge, redness, itching and visual disturbance.   Respiratory: Negative for cough, choking, chest tightness, shortness of breath, wheezing and stridor.    Cardiovascular: Negative for chest pain, palpitations and leg swelling.   Gastrointestinal: Negative for abdominal distention, abdominal pain, anal bleeding, blood in stool, constipation, diarrhea, nausea, rectal pain and vomiting.   Endocrine: Negative for cold intolerance, heat intolerance, polydipsia, polyphagia and polyuria.   Genitourinary: Negative for decreased urine volume, difficulty urinating, dyspareunia, dysuria, enuresis, flank pain, frequency, genital sores, hematuria, menstrual problem, pelvic pain, urgency, vaginal bleeding, vaginal discharge and vaginal pain.   Musculoskeletal: Positive for gait problem. Negative for arthralgias, back pain, joint swelling, myalgias, neck pain and neck stiffness.   Skin: Negative for color change, pallor and rash.   Allergic/Immunologic: Negative for environmental allergies, food allergies and immunocompromised state.   Neurological: Positive for weakness. Negative for dizziness, tremors, seizures, syncope, facial asymmetry, speech difficulty, light-headedness, numbness and headaches.   Hematological: Negative for adenopathy. Does not bruise/bleed easily.   Psychiatric/Behavioral: Positive for dysphoric mood. Negative for agitation, behavioral problems, confusion, decreased concentration, hallucinations, self-injury, sleep disturbance and suicidal ideas. The patient is nervous/anxious. The patient is not hyperactive.        Objective:      Physical Exam   Constitutional: She is oriented to person, place, and time. She has a sickly appearance. She appears ill. She appears distressed.   HENT:   Head: Normocephalic and atraumatic.       Right Ear: External ear normal.   Left Ear: External ear normal.   Nose: Nose normal. Right sinus exhibits no maxillary  sinus tenderness and no frontal sinus tenderness. Left sinus exhibits no maxillary sinus tenderness and no frontal sinus tenderness.   Mouth/Throat: Oropharynx is clear and moist. No oropharyngeal exudate.   Eyes: Conjunctivae, EOM and lids are normal. Pupils are equal, round, and reactive to light. Right eye exhibits no discharge. Left eye exhibits no discharge. Right conjunctiva is not injected. Right conjunctiva has no hemorrhage. Left conjunctiva is not injected. Left conjunctiva has no hemorrhage. No scleral icterus.   Neck: Normal range of motion. Neck supple. No JVD present. No tracheal deviation present. No thyromegaly present.   Cardiovascular: Normal rate, regular rhythm, normal heart sounds and intact distal pulses.    Pulmonary/Chest: Effort normal and breath sounds normal. No stridor. No respiratory distress. She exhibits no tenderness.   Abdominal: Soft. Bowel sounds are normal. She exhibits no distension and no mass. There is no splenomegaly or hepatomegaly. There is no tenderness. There is no rebound.   Musculoskeletal: Normal range of motion. She exhibits no edema or tenderness.   Lymphadenopathy:     She has no cervical adenopathy.     She has no axillary adenopathy.        Right: No supraclavicular adenopathy present.        Left: No supraclavicular adenopathy present.   Neurological: She is alert and oriented to person, place, and time. No cranial nerve deficit. Coordination normal.   Skin: Skin is dry. No rash noted. She is not diaphoretic. No erythema.   Psychiatric: Her behavior is normal. Judgment and thought content normal. Her mood appears anxious. Her affect is labile. She exhibits a depressed mood.   Vitals reviewed.          Assessment:      1. Iron deficiency anemia due to chronic blood loss    2. B12 deficiency    3. Traumatic injury of head, initial encounter           Plan:   We'll check laboratory studies today communicate results to her patient will return in 4-6 months to based on  laboratory results I've recommended that she was CT of her head this afternoon without IV contrast make sure any evidence of intracranial bleed because of the large area involving the occipital region she is on no anticoagulation at this point but I believe this is the safest course of action

## 2017-11-20 ENCOUNTER — OFFICE VISIT (OUTPATIENT)
Dept: FAMILY MEDICINE | Facility: CLINIC | Age: 67
End: 2017-11-20
Payer: MEDICARE

## 2017-11-20 ENCOUNTER — TELEPHONE (OUTPATIENT)
Dept: FAMILY MEDICINE | Facility: CLINIC | Age: 67
End: 2017-11-20

## 2017-11-20 VITALS
HEIGHT: 65 IN | WEIGHT: 107.69 LBS | DIASTOLIC BLOOD PRESSURE: 100 MMHG | BODY MASS INDEX: 17.94 KG/M2 | TEMPERATURE: 96 F | SYSTOLIC BLOOD PRESSURE: 160 MMHG | HEART RATE: 83 BPM | OXYGEN SATURATION: 98 %

## 2017-11-20 DIAGNOSIS — Z87.891 FORMER SMOKER: ICD-10-CM

## 2017-11-20 DIAGNOSIS — I71.40 ABDOMINAL AORTIC ANEURYSM (AAA) WITHOUT RUPTURE: ICD-10-CM

## 2017-11-20 DIAGNOSIS — I10 HYPERTENSION, UNSPECIFIED TYPE: ICD-10-CM

## 2017-11-20 DIAGNOSIS — R31.9 HEMATURIA, UNSPECIFIED TYPE: Primary | ICD-10-CM

## 2017-11-20 PROCEDURE — G0008 ADMIN INFLUENZA VIRUS VAC: HCPCS | Mod: S$GLB,,, | Performed by: FAMILY MEDICINE

## 2017-11-20 PROCEDURE — 99999 PR PBB SHADOW E&M-EST. PATIENT-LVL III: CPT | Mod: PBBFAC,,, | Performed by: FAMILY MEDICINE

## 2017-11-20 PROCEDURE — 99214 OFFICE O/P EST MOD 30 MIN: CPT | Mod: S$GLB,,, | Performed by: FAMILY MEDICINE

## 2017-11-20 PROCEDURE — 81001 URINALYSIS AUTO W/SCOPE: CPT

## 2017-11-20 PROCEDURE — 90670 PCV13 VACCINE IM: CPT | Mod: S$GLB,,, | Performed by: FAMILY MEDICINE

## 2017-11-20 PROCEDURE — 90662 IIV NO PRSV INCREASED AG IM: CPT | Mod: S$GLB,,, | Performed by: FAMILY MEDICINE

## 2017-11-20 PROCEDURE — 99499 UNLISTED E&M SERVICE: CPT | Mod: S$GLB,,, | Performed by: FAMILY MEDICINE

## 2017-11-20 PROCEDURE — G0009 ADMIN PNEUMOCOCCAL VACCINE: HCPCS | Mod: S$GLB,,, | Performed by: FAMILY MEDICINE

## 2017-11-20 RX ORDER — AMLODIPINE BESYLATE 5 MG/1
5 TABLET ORAL DAILY
Qty: 30 TABLET | Refills: 0 | Status: SHIPPED | OUTPATIENT
Start: 2017-11-20 | End: 2018-01-16 | Stop reason: SDUPTHER

## 2017-11-20 RX ORDER — TRAMADOL HYDROCHLORIDE 50 MG/1
1 TABLET ORAL DAILY
Refills: 0 | COMMUNITY
Start: 2017-11-16 | End: 2017-12-15 | Stop reason: SDUPTHER

## 2017-11-20 NOTE — PROGRESS NOTES
Subjective:       Patient ID: Ibeth Schroeder is a 67 y.o. female.    Chief Complaint: No chief complaint on file.    67 y old female with hematuria here for f.u . Still sees Brown discharge when she wipes. No dysuria , no frequency . No longer smoking . Unsure why BP is so high .       Review of Systems   Constitutional: Negative.    HENT: Negative.    Respiratory: Negative.    Cardiovascular: Negative.    Gastrointestinal: Negative.    Genitourinary: Negative.    Musculoskeletal: Negative.    Neurological: Negative.    Hematological: Negative.    Psychiatric/Behavioral: Negative.        Objective:      Physical Exam   Constitutional: She is oriented to person, place, and time. She appears well-developed and well-nourished. No distress.   HENT:   Head: Normocephalic and atraumatic.   Right Ear: External ear normal.   Left Ear: External ear normal.   Mouth/Throat: No oropharyngeal exudate.   Eyes: Conjunctivae and EOM are normal. Pupils are equal, round, and reactive to light. Right eye exhibits no discharge. Left eye exhibits no discharge. No scleral icterus.   Neck: Normal range of motion. Neck supple. No JVD present. No tracheal deviation present. No thyromegaly present.   Cardiovascular: Normal rate, regular rhythm and normal heart sounds.  Exam reveals no gallop and no friction rub.    No murmur heard.  Pulmonary/Chest: Effort normal and breath sounds normal. No stridor. No respiratory distress. She has no wheezes. She has no rales. She exhibits no tenderness.   Abdominal: Soft. Bowel sounds are normal. She exhibits no distension. There is no tenderness. There is no rebound and no guarding.   Musculoskeletal: Normal range of motion.   Lymphadenopathy:     She has no cervical adenopathy.   Neurological: She is alert and oriented to person, place, and time.   Skin: Skin is warm and dry. She is not diaphoretic.   Psychiatric: She has a normal mood and affect. Her behavior is normal. Judgment and thought content  normal.       Assessment:     Ibeth was seen today for follow-up.    Diagnoses and all orders for this visit:    Hematuria, unspecified type  -     Urinalysis Microscopic    Hypertension, unspecified type    Former smoker    Abdominal aortic aneurysm (AAA) without rupture    Other orders  -     (In Office Administered) Pneumococcal Conjugate Vaccine (13 Valent) (IM)  -     Influenza - High Dose (65+) (PF) (IM)  -     amLODIPine (NORVASC) 5 MG tablet; Take 1 tablet (5 mg total) by mouth once daily.      Plan:   Rep labs   Resume Amlodipine 5 mg . BP n/v in 1 w   Congratulated. Will see Urology to rule out malignancy   See No 2

## 2017-11-21 LAB
BACTERIA #/AREA URNS AUTO: ABNORMAL /HPF
MICROSCOPIC COMMENT: ABNORMAL
NON-SQ EPI CELLS #/AREA URNS AUTO: 2 /HPF
RBC #/AREA URNS AUTO: 23 /HPF (ref 0–4)
SQUAMOUS #/AREA URNS AUTO: 1 /HPF
WBC #/AREA URNS AUTO: 20 /HPF (ref 0–5)

## 2017-11-22 ENCOUNTER — LAB VISIT (OUTPATIENT)
Dept: LAB | Facility: HOSPITAL | Age: 67
End: 2017-11-22
Attending: FAMILY MEDICINE
Payer: MEDICARE

## 2017-11-22 ENCOUNTER — TELEPHONE (OUTPATIENT)
Dept: FAMILY MEDICINE | Facility: CLINIC | Age: 67
End: 2017-11-22

## 2017-11-22 DIAGNOSIS — R31.9 HEMATURIA, UNSPECIFIED TYPE: ICD-10-CM

## 2017-11-22 DIAGNOSIS — R31.9 HEMATURIA, UNSPECIFIED TYPE: Primary | ICD-10-CM

## 2017-11-22 PROCEDURE — 87086 URINE CULTURE/COLONY COUNT: CPT

## 2017-11-24 LAB — BACTERIA UR CULT: NORMAL

## 2017-11-28 ENCOUNTER — OFFICE VISIT (OUTPATIENT)
Dept: PSYCHIATRY | Facility: CLINIC | Age: 67
End: 2017-11-28
Payer: MEDICARE

## 2017-11-28 DIAGNOSIS — F03.90 MAJOR NEUROCOGNITIVE DISORDER: Primary | ICD-10-CM

## 2017-11-28 DIAGNOSIS — F32.A DEPRESSION, UNSPECIFIED DEPRESSION TYPE: ICD-10-CM

## 2017-11-28 PROCEDURE — 99499 UNLISTED E&M SERVICE: CPT | Mod: S$GLB,,, | Performed by: PSYCHIATRY & NEUROLOGY

## 2017-11-28 PROCEDURE — 99213 OFFICE O/P EST LOW 20 MIN: CPT | Mod: S$GLB,,, | Performed by: PSYCHIATRY & NEUROLOGY

## 2017-11-28 RX ORDER — DULOXETIN HYDROCHLORIDE 60 MG/1
60 CAPSULE, DELAYED RELEASE ORAL DAILY
Qty: 90 CAPSULE | Refills: 0 | Status: SHIPPED | OUTPATIENT
Start: 2017-11-28 | End: 2018-02-27 | Stop reason: SINTOL

## 2017-11-28 RX ORDER — BUSPIRONE HYDROCHLORIDE 15 MG/1
TABLET ORAL
Qty: 90 TABLET | Refills: 2 | Status: SHIPPED | OUTPATIENT
Start: 2017-11-28 | End: 2018-02-27 | Stop reason: SDUPTHER

## 2017-11-28 RX ORDER — QUETIAPINE FUMARATE 25 MG/1
TABLET, FILM COATED ORAL
Qty: 90 TABLET | Refills: 0 | Status: SHIPPED | OUTPATIENT
Start: 2017-11-28 | End: 2018-02-27 | Stop reason: SDUPTHER
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
## 2017-12-01 ENCOUNTER — PATIENT OUTREACH (OUTPATIENT)
Dept: ADMINISTRATIVE | Facility: HOSPITAL | Age: 67
End: 2017-12-01

## 2017-12-01 ENCOUNTER — CLINICAL SUPPORT (OUTPATIENT)
Dept: FAMILY MEDICINE | Facility: CLINIC | Age: 67
End: 2017-12-01
Payer: MEDICARE

## 2017-12-01 ENCOUNTER — TELEPHONE (OUTPATIENT)
Dept: FAMILY MEDICINE | Facility: CLINIC | Age: 67
End: 2017-12-01

## 2017-12-01 VITALS — DIASTOLIC BLOOD PRESSURE: 88 MMHG | SYSTOLIC BLOOD PRESSURE: 136 MMHG

## 2017-12-01 NOTE — TELEPHONE ENCOUNTER
Patient here for nurse visit blood pressure check. Patients  states that she was already taking the amlodipine 5 mg before they got the message that the med was sent to the pharm on 11/2017.  states that Dr Perez change patients med at last appt. Today at appointment patients BP was 136/88 taken in right arm. Patient is taking amlodipine 5 mg, nad toprol xl 50 mg.  states patient taking meds as prescribed. Message forwarded to provider for review.

## 2017-12-14 RX ORDER — BUSPIRONE HYDROCHLORIDE 15 MG/1
TABLET ORAL
Qty: 270 TABLET | Refills: 2 | OUTPATIENT
Start: 2017-12-14

## 2017-12-15 ENCOUNTER — OFFICE VISIT (OUTPATIENT)
Dept: INTERNAL MEDICINE | Facility: CLINIC | Age: 67
End: 2017-12-15
Payer: MEDICARE

## 2017-12-15 ENCOUNTER — OFFICE VISIT (OUTPATIENT)
Dept: PAIN MEDICINE | Facility: CLINIC | Age: 67
End: 2017-12-15
Payer: MEDICARE

## 2017-12-15 VITALS
SYSTOLIC BLOOD PRESSURE: 128 MMHG | HEIGHT: 63 IN | HEART RATE: 66 BPM | WEIGHT: 110.25 LBS | DIASTOLIC BLOOD PRESSURE: 70 MMHG | OXYGEN SATURATION: 94 % | BODY MASS INDEX: 19.54 KG/M2

## 2017-12-15 VITALS
RESPIRATION RATE: 16 BRPM | BODY MASS INDEX: 19.49 KG/M2 | HEIGHT: 63 IN | SYSTOLIC BLOOD PRESSURE: 128 MMHG | HEART RATE: 66 BPM | WEIGHT: 110 LBS | DIASTOLIC BLOOD PRESSURE: 70 MMHG

## 2017-12-15 DIAGNOSIS — E79.0 HYPERURICEMIA: Chronic | ICD-10-CM

## 2017-12-15 DIAGNOSIS — Z96.642 STATUS POST LEFT HIP REPLACEMENT: ICD-10-CM

## 2017-12-15 DIAGNOSIS — Z96.651 S/P TOTAL KNEE REPLACEMENT, RIGHT: ICD-10-CM

## 2017-12-15 DIAGNOSIS — M47.817 SPONDYLOSIS OF LUMBOSACRAL REGION WITHOUT MYELOPATHY OR RADICULOPATHY: Primary | ICD-10-CM

## 2017-12-15 DIAGNOSIS — K43.2 INCISIONAL HERNIA, WITHOUT OBSTRUCTION OR GANGRENE: ICD-10-CM

## 2017-12-15 DIAGNOSIS — F33.41 RECURRENT MAJOR DEPRESSIVE DISORDER, IN PARTIAL REMISSION: ICD-10-CM

## 2017-12-15 DIAGNOSIS — F10.11 HISTORY OF ETOH ABUSE: ICD-10-CM

## 2017-12-15 DIAGNOSIS — Z86.711 HISTORY OF PULMONARY EMBOLUS (PE): Chronic | ICD-10-CM

## 2017-12-15 DIAGNOSIS — N18.30 CHRONIC KIDNEY DISEASE, STAGE 3: Chronic | ICD-10-CM

## 2017-12-15 DIAGNOSIS — I10 ESSENTIAL HYPERTENSION: Chronic | ICD-10-CM

## 2017-12-15 DIAGNOSIS — M40.204 KYPHOSIS OF THORACIC REGION, UNSPECIFIED KYPHOSIS TYPE: ICD-10-CM

## 2017-12-15 DIAGNOSIS — D50.0 IRON DEFICIENCY ANEMIA DUE TO CHRONIC BLOOD LOSS: ICD-10-CM

## 2017-12-15 DIAGNOSIS — S68.119D TRAUMATIC AMPUTATION OF FINGERTIP, SUBSEQUENT ENCOUNTER: Chronic | ICD-10-CM

## 2017-12-15 DIAGNOSIS — N25.81 HYPERPARATHYROIDISM, SECONDARY RENAL: ICD-10-CM

## 2017-12-15 DIAGNOSIS — I71.40 ABDOMINAL AORTIC ANEURYSM (AAA) WITHOUT RUPTURE: Chronic | ICD-10-CM

## 2017-12-15 DIAGNOSIS — G47.36 NOCTURNAL HYPOXEMIA DUE TO EMPHYSEMA: Chronic | ICD-10-CM

## 2017-12-15 DIAGNOSIS — I27.9 PULMONARY HEART DISEASE: ICD-10-CM

## 2017-12-15 DIAGNOSIS — J43.9 NOCTURNAL HYPOXEMIA DUE TO EMPHYSEMA: Chronic | ICD-10-CM

## 2017-12-15 DIAGNOSIS — M81.8 OSTEOPOROSIS, IDIOPATHIC: Chronic | ICD-10-CM

## 2017-12-15 DIAGNOSIS — Z91.81 AT HIGH RISK FOR FALLS: ICD-10-CM

## 2017-12-15 DIAGNOSIS — E78.5 DYSLIPIDEMIA: Chronic | ICD-10-CM

## 2017-12-15 DIAGNOSIS — E03.4 HYPOTHYROIDISM DUE TO ACQUIRED ATROPHY OF THYROID: Chronic | ICD-10-CM

## 2017-12-15 DIAGNOSIS — K58.0 IRRITABLE BOWEL SYNDROME WITH DIARRHEA: ICD-10-CM

## 2017-12-15 DIAGNOSIS — Z87.19 HISTORY OF LIVER FAILURE: ICD-10-CM

## 2017-12-15 DIAGNOSIS — J44.9 STAGE 3 SEVERE COPD BY GOLD CLASSIFICATION: Chronic | ICD-10-CM

## 2017-12-15 DIAGNOSIS — Z00.00 ENCOUNTER FOR PREVENTIVE HEALTH EXAMINATION: Primary | ICD-10-CM

## 2017-12-15 PROBLEM — S52.209A RADIUS/ULNA FRACTURE: Status: RESOLVED | Noted: 2017-07-10 | Resolved: 2017-12-15

## 2017-12-15 PROBLEM — Z98.890 POST-OPERATIVE STATE: Status: RESOLVED | Noted: 2017-08-17 | Resolved: 2017-12-15

## 2017-12-15 PROBLEM — S63.502A SPRAIN OF LEFT WRIST: Status: RESOLVED | Noted: 2017-10-12 | Resolved: 2017-12-15

## 2017-12-15 PROBLEM — S52.90XA RADIUS/ULNA FRACTURE: Status: RESOLVED | Noted: 2017-07-10 | Resolved: 2017-12-15

## 2017-12-15 PROBLEM — E21.3 HYPERPARATHYROIDISM: Status: ACTIVE | Noted: 2017-12-15

## 2017-12-15 PROCEDURE — 99999 PR PBB SHADOW E&M-EST. PATIENT-LVL V: CPT | Mod: PBBFAC,,, | Performed by: ANESTHESIOLOGY

## 2017-12-15 PROCEDURE — 99999 PR PBB SHADOW E&M-EST. PATIENT-LVL III: CPT | Mod: PBBFAC,,, | Performed by: INTERNAL MEDICINE

## 2017-12-15 PROCEDURE — 99499 UNLISTED E&M SERVICE: CPT | Mod: S$GLB,,, | Performed by: INTERNAL MEDICINE

## 2017-12-15 PROCEDURE — 99499 UNLISTED E&M SERVICE: CPT | Mod: S$GLB,,, | Performed by: ANESTHESIOLOGY

## 2017-12-15 PROCEDURE — 99214 OFFICE O/P EST MOD 30 MIN: CPT | Mod: S$GLB,,, | Performed by: ANESTHESIOLOGY

## 2017-12-15 PROCEDURE — G0439 PPPS, SUBSEQ VISIT: HCPCS | Mod: S$GLB,,, | Performed by: INTERNAL MEDICINE

## 2017-12-15 RX ORDER — TRAMADOL HYDROCHLORIDE 50 MG/1
50 TABLET ORAL 3 TIMES DAILY PRN
Qty: 90 TABLET | Refills: 2 | Status: SHIPPED | OUTPATIENT
Start: 2017-12-15 | End: 2018-01-14

## 2017-12-15 RX ORDER — THIAMINE HCL 500 MG
500 TABLET ORAL DAILY
COMMUNITY
End: 2018-02-21

## 2017-12-15 NOTE — PROGRESS NOTES
Chief Pain Complaint:  Low Back Pain    History of Present Illness:  This patient is a 67 y.o. female who presents today complaining of the above noted pain/s. The patient describes this pain as follows.    - duration of pain: pain for years   - timing: intermittent   - character: sharp, aching  - radiating, dermatomal: pain is nonradiating  - antecedent trauma, prior spinal surgery: no prior trauma, no prior spinal surgery, left hip replacement & revision, left radial ORIF on 7-12-17, left hardware revision on 9-6-17  - pertinent negatives: No fever, No chills, No weight loss, No bladder dysfunction, No bowel dysfunction, No extremity weakness, No saddle anesthesia  - pertinent positives: none    - medications, other therapies tried (physical therapy, injections):     >> Tylenol, Ultracet    >> Has previously undergone Physical Therapy    >> Has previously undergone spinal injection/s:    - what sounds like 2 lumbar MBBs at [facility] with great relief   - right lumbar MBB on 1-18-17 & left lumbar MBB on 2-8-17 with excellent relief        IMAGING / Labs / Studies (reviewed on 12/15/2017):      8/11/16 X-Ray Lumbar Spine AP And Lateral  Comparison: 09/24/2013  Technique: AP, lateral, lateral flexion, lateral extension, bilateral oblique, and lumbosacral coned down views were obtained of the lumbar spine  Findings:   Vertebral body heights and alignment are within normal limits.  No change in spinal alignment with flexion or extension to suggest instability. Multilevel degenerative disc height loss again noted throughout the lumbar spine, most severe at the levels of T12-L1, L1.  Moderate disc loss present at L4-L5 and L5-S1.  There multilevel degenerative endplate changes and osteophyte production.  Findings appear essentially unchanged from prior.  Bilateral facet arthropathy present at L4-L5 and L5-S1.  No pars defects visualized.  Posterior elements appear grossly intact. No acute fractures or subluxations  are demonstrated.  IVC filter again noted.  Aortic vascular calcifications present.  Previously described aneurysmal dilatation of the distal abdominal aorta appear similar to prior measuring up to approximately 4.2 cm in diameter.         9/24/13 X-Ray Lumbar Spine AP And Lateral    Narrative Findings:  Comparison is with 6/16/09.  Mild dextroscoliosis and diffuse changes of degenerative disk disease as well as diffuse facet joint hypertrophy are again demonstrated.  These changes of degenerative disk disease are particularly pronounced at the T12-L1, L1-2, L4-5 and L5-S1 levels.  AP alignment appears maintained with no acute compression fractures identified.  Again demonstrated are surgical clips consistent with prior cholecystectomy, IVC filter and left hip prosthesis.  Also again demonstrated is diffuse calcification of the abdominal aorta including distal aneurysmal dilatation measured at 4.4 cm in the AP axis at the level of the superior endplate of L4. This dilatation appears to have slightly increased in the interim previously measured at this level at approximately 3.3 cm.         Review of Systems:  CONSTITUTIONAL: patient denies any fever, chills, or weight loss  SKIN: patient denies any rash or itching  RESPIRATORY: patient denies having any shortness of breath  GASTROINTESTINAL: patient denies having any diarrhea, constipation, or bowel incontinence  GENITOURINARY: patient denies having any abnormal bladder function    MUSCULOSKELETAL:  - patient complains of the above noted pain/s (see chief pain complaint)    NEUROLOGICAL:   - pain as above  - strength in Lower extremities is intact, BILATERALLY  - sensation in Lower extremities is intact, BILATERALLY  - patient denies any loss of bowel or bladder control      PSYCHIATRIC: patient reports a history of anxiety and depression        Physical Exam:  Vitals:  /70 (BP Location: Right arm, Patient Position: Sitting, BP Method: Medium (Automatic))    "Pulse 66   Resp 16   Ht 5' 3" (1.6 m)   Wt 49.9 kg (110 lb)   LMP  (LMP Unknown)   BMI 19.49 kg/m²    (reviewed on 12/15/2017)    General: alert and oriented, in no apparent distress, poorly nourished  Gait: normal gait  Skin: No rashes, No discoloration, No obvious lesions  HEENT: EOMI  Respiratory: respirations nonlabored    Musculoskeletal:  - Any pain on flexion, extension, rotation:    >> pain on extension and rotation, improved since injection  - Straight Leg Raise:     >> LEFT :: negative    >> RIGHT :: negative  - Any tenderness to palpation across paraspinal muscles, joints, bursae:     >> across lumbar paraspinals, mild    Neuro:  - Extremity Strength:     >> LEFT :: 5/5    >> RIGHT :: 5/5     Psych:  Mood and affect is appropriate          Assessment:  Lumbar Spondylosis   Lumbar Facet Joint Syndrome  Lumbar DDD  Chronic Pain Syndrome        Plan:  - Patient returns for follow-up. She complains of low back pain, lumbar facet pain primarily.   - S/p left radial ORIF on 7-12-17 then left hardware revision on 9-6-17 with Dr. Lizarraga. She continues to follow-up with him.   - Refill tramadol x 3 months.  - Will also repeat lumbar MBB.  - RTC in 3 months for med refill.   - I discussed the risks, benefits, and alternatives to potential treatment options. All questions and concerns were fully addressed today in clinic.      >> Pain Disability Index:  4/12/2017 :: 33  7/7/2017 :: 56    >>Opioid Risk Tool:  1/12/2017 :: 1  "

## 2017-12-15 NOTE — PATIENT INSTRUCTIONS
Counseling and Referral of Other Preventative  (Italic type indicates deductible and co-insurance are waived)    Patient Name: Ibeth Schroeder  Today's Date: 12/15/2017      SERVICE LIMITATIONS RECOMMENDATION    Vaccines    · Pneumococcal (once after 65)    · Influenza (annually)    · Hepatitis B (if medium/high risk)    · Prevnar 13      Hepatitis B medium/high risk factors:       - End-stage renal disease       - Hemophiliacs who received Factor VII or         IX concentrates       - Clients of institutions for the mentally             retarded       - Persons who live in the same house as          a HepB carrier       - Homosexual men       - Illicit injectable drug abusers     Pneumococcal: Done, no repeat necessary     Influenza: Done, repeat in one year     Hepatitis B: N/A     Prevnar 13: Done, no repeat necessary    Mammogram (biennial age 50-74)  Annually (age 40 or over)  Done this year, repeat every year    Pap (up to age 70 and after 70 if unknown history or abnormal study last 10 years)    N/A     The USPSTF recommends against screening for cervical cancer in women who have had a hysterectomy with removal of the cervix and who do not have a history of a high-grade precancerous lesion (cervical intraepithelial neoplasia [AMINA] grade 2 or 3) or cervical cancer.     Colorectal cancer screening (to age 75)    · Fecal occult blood test (annual)  · Flexible sigmoidoscopy (5y)  · Screening colonoscopy (10y)  · Barium enema   Last done 8/26/13, recommend to repeat every 7  years    Diabetes self-management training (no USPSTF recommendations)  Requires referral by treating physician for patient with diabetes or renal disease. 10 hours of initial DSMT sessions of no less than 30 minutes each in a continuous 12-month period. 2 hours of follow-up DSMT in subsequent years.  N/A    Bone mass measurements (age 65 & older, biennial)  Requires diagnosis related to osteoporosis or estrogen deficiency. Biennial benefit  unless patient has history of long-term glucocorticoid  Last done 7/6/17, recommend to repeat every 2  years On prolia for Osteoporosis    Glaucoma screening (no USPSTF recommendation)  Diabetes mellitus, family history   , age 50 or over    American, age 65 or over  Done this year, repeat every yearSees Dr Gene Caro, elsewhere    Medical nutrition therapy for diabetes or renal disease (no recommended schedule)  Requires referral by treating physician for patient with diabetes or renal disease or kidney transplant within the past 3 years.  Can be provided in same year as diabetes self-management training (DSMT), and CMS recommends medical nutrition therapy take place after DSMT. Up to 3 hours for initial year and 2 hours in subsequent years.  N/A    Cardiovascular screening blood tests (every 5 years)  · Fasting lipid panel  Order as a panel if possible  Done this year, repeat every year    Diabetes screening tests (at least every 3 years, Medicare covers annually or at 6-month intervals for prediabetic patients)  · Fasting blood sugar (FBS) or glucose tolerance test (GTT)  Patient must be diagnosed with one of the following:       - Hypertension       - Dyslipidemia       - Obesity (BMI 30kg/m2)       - Previous elevated impaired FBS or GTT       ... or any two of the following:       - Overweight (BMI 25 but <30)       - Family history of diabetes       - Age 65 or older       - History of gestational diabetes or birth of baby weighing more than 9 pounds  Done this year, repeat every year    HIV screening (annually for increased risk patients)  · HIV-1 and HIV-2 by EIA, or DENISE, rapid antibody test or oral mucosa transudate  Patients must be at increased risk for HIV infection per USPSTF guidelines or pregnant. Tests covered annually for patient at increased risk or as requested by the patient. Pregnant patients may receive up to 3 tests during pregnancy.  Risks discussed, screening  is not recommended    Smoking cessation counseling (up to 8 sessions per year)  Patients must be asymptomatic of tobacco-related conditions to receive as a preventative service.  Non-smoker    Subsequent annual wellness visit  At least 12 months since last AWV  Return in one year     The following information is provided to all patients.  This information is to help you find resources for any of the problems found today that may be affecting your health:                Living healthy guide: www.[facility].[place].[facility]      Understanding Diabetes: www.diabetes.org      Eating healthy: www.cdc.gov/healthyweight      [facility] home safety checklist: www.cdc.gov/steadi/patient.html      Agency on Aging: www.goea.[place].[facility]      Alcoholics anonymous (AA): www.aa.org      Physical Activity: www.kiersten.nih.gov/vf3vngo      Tobacco use: www.quitwithusla.org

## 2017-12-18 PROBLEM — Z87.19 HISTORY OF LIVER FAILURE: Status: ACTIVE | Noted: 2017-12-18

## 2017-12-18 NOTE — PROGRESS NOTES
"Ibeth Schroeder presented for a  Medicare AWV and comprehensive Health Risk Assessment today. Here with spouse who has HRA as well.He works fulltime but is also her caregiver. The following components were reviewed and updated:    · Medical history  · Family History  · Social history  · Allergies and Current Medications  · Health Risk Assessment  · Health Maintenance  · Care Team     ** See Completed Assessments for Annual Wellness Visit within the encounter summary.**       The following assessments were completed:  · Living Situation  · CAGE  · Depression Screening  · Timed Get Up and Go  · Whisper Test  · Cognitive Function Screening  · Nutrition Screening  · ADL Screening  · PAQ Screening    Physical Exam    PE:   /70 (BP Location: Left arm, Patient Position: Sitting, BP Method: Medium (Manual))   Pulse 66   Ht 5' 3" (1.6 m)   Wt 50 kg (110 lb 3.7 oz)   LMP  (LMP Unknown)   SpO2 (!) 94%   BMI 19.53 kg/m²     APPEARANCE: Patient is a 67 y.o.female /White . Awake, alert, in no distress, fair-poor  historian. (Somewhat argumentative versus sarcastic with her spouse. )  HEENT: PERRLA, EOMI. No facial asymmetry.Tongue midline.   NECK: Supple. Carotids: 2+, no bruits. No thyromegaly. No cervical adenopathy.   HEART: Regular rate and rhythm with  No murmur , rubs or gallops.   CHEST: Lungs clear to auscultation.   BACK:  + kyphosis lower thoracic spine. No spinous tenderness. No flank tenderness.   ABDOMEN: Bowel sounds normal. Not distended. Soft. Palpable large incisional hernia lower abdomen, reducible, nontender.  EXTREMITIES: No clubbing, cyanosis or edema. Peripheral pulses intact.Mild varicosities.Rt TKR scar. s/p Lt THR also. Absnt left index finger from DIP distally .  NEURO:  Hearing intact. Motor: Symmetric  grasp, flexion and extension of feet. Toes downgoing. Sensory intact to monofilament testing, symmetric. Finger to nose is intact with no tremor. No spasticity or muscle " fasciculations. Gait: No ataxia.   SKIN:  No suspicious lesions or ulcerations.         Diagnoses and health risks identified today and associated recommendations/orders:    1. Encounter for preventive health examination  Completed.    2. Stage 3 severe COPD by GOLD classification  Stable and controlled. On Advair, CombiventPFTs from 3/16/2016---Moderately severe obstruction (FEV1>50 and <59% of predicted).compared to previous study  performed 2/25/2015 FEV1 improved by 24%, FVC improved by 15%.  Continue current treatment plan as previously prescribed with your pulmonoloist, Dr Barrett.     3. Nocturnal hypoxemia due to emphysema  Unknown status. She has seen Pulmonary Rehab. She is not using nighttime oxygen regularly. Saturation studies documented desaturations below 88% at night. Prior documentation: 9/24/14  Dr Barrett note:COPD test score is 10. FEV1 is 1.08 (44% predicted) FVC is 2.25 (71% pred FEV1/FVC is 48. She uses 2 pillows at night. Patient currently is on oxygen at 2 L/min per nasal cannula at night. Last visit overnight sat result:Overnight sat: 9 hours of sampling w/room air . Her saturation was below 88% for 30 minutes. Recheck from 2/25/2015: overnight sat indicated supplementary O2 required, time below 88% was 3 hrs. CPD test score 20.recommend follow up with pulmonary.     4. Essential hypertension  Stable and controlled. Continue current treatment plan as previously prescribed with your PCP.     5. Hypothyroidism due to acquired atrophy of thyroid  Stable and controlled. Last TSH 7/11/17 in range at 1.27.  Continue levothyroxine as prescribed. With your PCP.    6. Abdominal aortic aneurysm (AAA) without rupture  Increasing in size but not enough to merit surgical intervention. Followed regularly by Dr Kosta Liz, Vascular Surgeon. Prior studies: US Abdominal Aorta 12/12/2013---Calcified fusiform distal abdominal aortic aneurysm extending over an approximately 6 cm segment measured at 3.5 cm  in cross axis.7/16/17 CT abd/pellvis- Infrarenal abdominal aortic aneurysm.  The aneurysm has increased in size since the prior study.  Today, the aneurysm measures 4.1 cm AP by 4.4 cm transverse by 6 cm CC.  (As compared to 3.4 x 3.1 x 5 cm).  Aortic and iliac artery calcifications are present.  IVC filter is present. Continue to monitor by Dr HERBERT Liz    7. Traumatic amputation of fingertip, subsequent encounter  Stable and controlled. In 2012; after lengthy ICU course for liver failure( including arrest ) she developed necrosis of distal Lt fingertip. ( Embolism?) . Stable since a 2nd surgery at around the same time. Hosp progress notes NA. Stable post op finger tip since then. Continue current treatment plan as previously prescribed with your physicians.    8. Chronic kidney disease, stage 3  Controlled within CKD 3 range  though variable- see table below. . Continue current treatment plan as previously prescribed with your nephrologist, Dr Crowder.    Ref Range 8/28/17 8/21/17 8/17/17 7/16/17 7/15/17 7/13/17   BU 8 - 23 mg/dL 21  33   44    16  25     Ca 8.7 - 10.5 mg/dL 8.8  8.6   9.7   7.5   8.2     Cr 0.5 - 1.4 mg/dL 1.2  1.6   1.4   0.9  1.3    Phos 2.7 - 4.5 mg/dL 2.6   3.5   3.3   2.0     eGFR non Afr Am >60 mL/min/1.73 m^2 47.5   33.6   39    >60C 43       9. Pulmonary heart disease  Stable and controlled. Estimated PAP from 7/12/17 2 Decho waqs 50 mmHgContinue current treatment plan as previously prescribed with your PCP.     10. Recurrent major depressive disorder, in partial remission  Stable and followed regularly by psychiatry. She scored 6 on pPHQ-9 scale.She takes Bupropion, Seroquel and Duloxetine. Continue current treatment plan as previously prescribed with Dr Coppola.     11. Hyperparathyroidism, secondary renal  Stable and controlled. Continue current treatment plan as previously prescribed with your nephrologist, Dr Crowder.    Ref Range  8/21/17 9/9/16 2/7/13 2/11/12 9/27/6   PTH, I 9.0 - 77.0  pg/mL 118.0   294.0   51  599   71.6             12. History of ETOH abuse  Stable and controlled per patient and spouse. She still drinks but no longer to excess. . Continue current treatment plan as previously prescribed with your PCP.     13. Osteoporosis, idiopathic  Stable and controlled. She takes Rocaltrol due to renal function.  Has had one Prolia infusion; followup due January 2018. Continue current treatment plan as previously prescribed with your physicians.     14. Dyslipidemia  Stable and controlled on pravastatin. Continue current treatment plan as previously prescribed with your PCP.   Component      Latest Ref Rng & Units 8/17/2017   Cholesterol      120 - 199 mg/dL 170   Triglycerides      30 - 150 mg/dL 145   HDL      40 - 75 mg/dL 41   LDL Cholesterol      63.0 - 159.0 mg/dL 100.0     15. Hyperuricemia  Stable and controlled on allopurinol. Continue current treatment plan as previously prescribed with your physicians.     16. History of pulmonary embolus (PE); s/p IVC filter  Stable and controlled. She is unable to tell me when the PE occurred. IVC filter placement date not known either but mentioned in a discharge note [facility] 1/4/13 as PMH then. At that time he had had intracranial bleed recently. Continue current treatment plan as previously prescribed with your physicians.     17. Irritable bowel syndrome with diarrhea  Stable and controlled with over the counter medications. Continue current treatment plan as previously prescribed with your PCP.     18. Iron deficiency anemia due to chronic blood loss  Stable and controlled. Continue current treatment plan as previously prescribed with your physicians.   Ref Range  8/28/17 8/21/17 7/18/17 7/17/17 7/16/17 7/15/17 7/14/17   Hgb 12.0 - 16.0 g/dL 10.2   9.5   8.2   8.4   7.8   10.5   9.0     Hct 37.0 - 48.5 % 33.1   30.2   25.5   26.1   23.8   33.8   27.8         19. At high risk for falls  Always falling per patient and spouse. Using wheeled walker  and cane at present. Goes to [facility] in [place] region at lest twice a week. ( Does have a wheelchair used after joint replacements in past.)   Time to get up and go 14 seconds. History of falls at least weekly. Follow up with Physical Therapy elsewhere.     20. Status post left hip replacement x 2.   Stable and controlled. Continue current treatment plan as previously prescribed with Dr Bustillo , your orthopedist.your PCP.     21. S/P total knee replacement, right  Stable and controlled. Continue current treatment plan as previously prescribed with your orthopedist, Dr Bustillo.     22. Incisional hernia, without obstruction or gangrene  Stable and controlled. Prior surgical repait then recurrence. Continue current treatment plan as previously prescribed with your Pphysicians.     23. Kyphosis of thoracic region, unspecified kyphosis type  Stable and controlled. Continue current treatment plan as previously prescribed with your PCP.           Provided Ibeth Schroederwith a 5-10 year written screening schedule and personal prevention plan. Recommendations were developed using the USPSTF age appropriate recommendations. Education, counseling, and referrals were provided as needed. After Visit Summary printed and given to patient which includes a list of additional screenings\tests needed.    Return in about 1 year (around 12/15/2018) for annual HRA.    Taylor Espinoza MD

## 2017-12-21 RX ORDER — PRAVASTATIN SODIUM 10 MG/1
TABLET ORAL
Qty: 90 TABLET | Refills: 0 | Status: SHIPPED | OUTPATIENT
Start: 2017-12-21 | End: 2018-03-05 | Stop reason: SDUPTHER

## 2018-01-02 ENCOUNTER — OFFICE VISIT (OUTPATIENT)
Dept: UROLOGY | Facility: CLINIC | Age: 68
End: 2018-01-02
Payer: MEDICARE

## 2018-01-02 VITALS
WEIGHT: 118 LBS | HEART RATE: 84 BPM | BODY MASS INDEX: 20.9 KG/M2 | DIASTOLIC BLOOD PRESSURE: 69 MMHG | SYSTOLIC BLOOD PRESSURE: 101 MMHG

## 2018-01-02 DIAGNOSIS — R31.9 HEMATURIA, UNSPECIFIED TYPE: Primary | ICD-10-CM

## 2018-01-02 LAB
BACTERIA #/AREA URNS AUTO: ABNORMAL /HPF
HYALINE CASTS UR QL AUTO: 3 /LPF
MICROSCOPIC COMMENT: ABNORMAL
POC RESIDUAL URINE VOLUME: 12 ML (ref 0–100)
RBC #/AREA URNS AUTO: 2 /HPF (ref 0–4)
SQUAMOUS #/AREA URNS AUTO: 0 /HPF
WBC #/AREA URNS AUTO: 1 /HPF (ref 0–5)
WBC CLUMPS UR QL AUTO: ABNORMAL
YEAST UR QL AUTO: ABNORMAL

## 2018-01-02 PROCEDURE — 87086 URINE CULTURE/COLONY COUNT: CPT

## 2018-01-02 PROCEDURE — 99204 OFFICE O/P NEW MOD 45 MIN: CPT | Mod: S$GLB,,, | Performed by: UROLOGY

## 2018-01-02 PROCEDURE — 99999 PR PBB SHADOW E&M-EST. PATIENT-LVL II: CPT | Mod: PBBFAC,,, | Performed by: UROLOGY

## 2018-01-02 PROCEDURE — 81001 URINALYSIS AUTO W/SCOPE: CPT

## 2018-01-02 NOTE — LETTER
January 2, 2018      Geovanna Botello MD  [address] [facility] [address]           [facility]  [address] [place] [address]  Phone: [phone]  Fax: [phone]          Patient: Ibeth Schroeder   MR Number: [id number]   YOB: 1950   Date of Visit: 1/2/2018       Dear Dr. Geovanna Botello:    Thank you for referring Ibeth Schroeder to me for evaluation. Attached you will find relevant portions of my assessment and plan of care.    If you have questions, please do not hesitate to call me. I look forward to following Ibeth Schroeder along with you.    Sincerely,    Isaias Garcia IV, MD    Enclosure  CC:  No Recipients    If you would like to receive this communication electronically, please contact [email or web address] or [phone] to request more information on [employer] access.    For providers and/or their staff who would like to refer a patient to [facility], please contact us through our one-stop-shop provider referral line, [facility] [place], at [phone].    If you feel you have received this communication in error or would no longer like to receive these types of communications, please e-mail [email or web address]

## 2018-01-02 NOTE — PROGRESS NOTES
Chief Complaint: Hematuria    HPI:   1/2/18: 66 yo woman referred by PCP for hematuria without UTI.  Also has had gross hematuria a few months ago, looked like old brown blood.  No anticoagulation.  No abd/pelvic pain and no exac/rel factors.  No urolithiasis.  No urinary bother.  No  history.  Normal sexual function.  Fell and fractured her pelvis a few months ago but not at the time of the hematuria.    Allergies:  Nsaids (non-steroidal anti-inflammatory drug); Pcn [penicillins]; Sulfa (sulfonamide antibiotics); Aspirin; Macrodantin [nitrofurantoin macrocrystalline]; and Wellbutrin [bupropion hcl]    Medications: has a current medication list which includes the following prescription(s): allopurinol, amlodipine, buspirone, calcitriol, diphenoxylate-atropine 2.5-0.025 mg, duloxetine, ferrous sulfate, fluticasone-salmeterol, ipratropium-albuterol, levothyroxine, methocarbamol, metoprolol succinate, folic acid/multivit-min/lutein, pantoprazole, pravastatin, quetiapine, ropinirole, vitamin b-1, and tramadol, and the following Facility-Administered Medications: denosumab.    Review of Systems:  General: No fever, chills, fatigability, or weight loss.  Skin: No rashes, itching, or changes in color or texture of skin.  Chest: Denies BRADY, cyanosis, wheezing, cough, and sputum production.  Abdomen: Appetite fine. No weight loss. Denies diarrhea, abdominal pain, hematemesis, or blood in stool.  Musculoskeletal: Chronic joint stiffness or swelling. Chronic back pain.  : As above.  All other review of systems negative.    PMH:   has a past medical history of Acute pancreatitis; Alcohol dependence; Allergy; Anemia; Anxiety; Arthritis (6/24/2013); Asthma; Back pain; Colon polyp; Colon polyp; COPD (chronic obstructive pulmonary disease); Dependence on renal dialysis (2012); Depression; Disorder of kidney and ureter; Diverticulosis; Diverticulosis; Hiatal hernia; Hyperlipidemia; Hypertension; Hypocalcemia (7/6/2016);  Hypothyroidism (6/24/2013); Osteoporosis; Pulmonary embolism; Restless leg syndrome; RLS (restless legs syndrome); Seizure (6/24/2013); Stroke; Tobacco dependence; and Trouble in sleeping.    PSH:   has a past surgical history that includes Cholecystectomy; Colonoscopy (2013); Oophorectomy (1977 approx); Fracture surgery (Left, 07/12/2017); Fracture surgery (Left, 09/12/2017); Tonsillectomy (1960 approx); Adenoidectomy (1960 approx); Joint replacement (Left, 2000 approx); Joint replacement (Left, 2004 approx); Joint replacement (Right, 2002 approx); Hysterectomy (1977 approx); Hernia repair (1999 approx); Appendectomy; amputation left 2nd finger tip (Left, 2012); and na hole/ ventriculostomy (Right, 08/24/2012).    FamHx: family history includes Asthma in her father; Breast cancer in her maternal aunt; Cancer in her maternal aunt, maternal aunt, and maternal grandmother; Colon cancer in her maternal grandmother; Diabetes in her mother and son; Heart disease in her father, maternal aunt, and maternal aunt; Hypertension in her mother and son; Kidney disease in her mother; Parkinsonism in her mother.    SocHx:  reports that she quit smoking about 7 years ago. Her smoking use included Cigarettes. She has a 30.00 pack-year smoking history. She has never used smokeless tobacco. She reports that she drinks about 1.2 - 1.8 oz of alcohol per week . She reports that she does not use drugs.     Physical Exam:  Vitals:   Vitals:    01/02/18 1552   BP: 101/69   Pulse: 84     General: A&Ox3. No apparent distress. No deformities.  Neck: No masses. Normal thyroid.  Lungs: normal inspiration. No use of accessory muscles.  Heart: normal pulse. No arrhythmias.  Abdomen: Soft. NT. ND. No masses. No hernias. No hepatosplenomegaly.  Lymphatic: Neck and groin nodes negative.  Skin: The skin is warm and dry. No jaundice.  Ext: No c/c/e.  : External genitalia normal.     Labs/Studies:     Impression/Plan:   1. Gross hematuria workup.   CT Urogram and RTC cysto.

## 2018-01-03 LAB — BACTERIA UR CULT: NO GROWTH

## 2018-01-05 DIAGNOSIS — E78.5 DYSLIPIDEMIA: ICD-10-CM

## 2018-01-05 RX ORDER — ROPINIROLE 1 MG/1
TABLET, FILM COATED ORAL
Qty: 90 TABLET | Refills: 3 | Status: SHIPPED | OUTPATIENT
Start: 2018-01-05 | End: 2018-02-21 | Stop reason: SDUPTHER

## 2018-01-08 ENCOUNTER — LAB VISIT (OUTPATIENT)
Dept: LAB | Facility: HOSPITAL | Age: 68
End: 2018-01-08
Attending: UROLOGY
Payer: MEDICARE

## 2018-01-08 DIAGNOSIS — R31.9 HEMATURIA, UNSPECIFIED TYPE: ICD-10-CM

## 2018-01-08 LAB
CREAT SERPL-MCNC: 1.6 MG/DL
EST. GFR  (AFRICAN AMERICAN): 38.7 ML/MIN/1.73 M^2
EST. GFR  (NON AFRICAN AMERICAN): 33.6 ML/MIN/1.73 M^2

## 2018-01-08 PROCEDURE — 36415 COLL VENOUS BLD VENIPUNCTURE: CPT | Mod: PO

## 2018-01-08 PROCEDURE — 82565 ASSAY OF CREATININE: CPT

## 2018-01-09 ENCOUNTER — TELEPHONE (OUTPATIENT)
Dept: UROLOGY | Facility: CLINIC | Age: 68
End: 2018-01-09

## 2018-01-09 DIAGNOSIS — R31.9 HEMATURIA, UNSPECIFIED TYPE: Primary | ICD-10-CM

## 2018-01-09 NOTE — TELEPHONE ENCOUNTER
----- Message from Isaias Garcia IV, MD sent at 1/9/2018  8:40 AM CST -----  Regarding: RE: CT Urogram  Contact: [phone]  Lets reschedule;   Team --> please call pt and ask her to drink a lot more water between now and Monday and reschedule a creatinine/CT then.    ----- Message -----  From: Sandrine Eaton, RT  Sent: 1/9/2018   8:28 AM  To: Isaias Garcia IV, MD  Subject: CT Urogram                                       Patient's labs are too elevated for contrast admnistration.  Is this patient on dialysis or do we need to change the study?

## 2018-01-09 NOTE — TELEPHONE ENCOUNTER
Spoke with patient's  and rescheduled creatinine and CT scan. Informed  patient is to increase her fluid intake beginning today until after CT scan.  states understanding.

## 2018-01-15 ENCOUNTER — LAB VISIT (OUTPATIENT)
Dept: LAB | Facility: HOSPITAL | Age: 68
End: 2018-01-15
Attending: UROLOGY
Payer: MEDICARE

## 2018-01-15 DIAGNOSIS — R31.9 HEMATURIA, UNSPECIFIED TYPE: ICD-10-CM

## 2018-01-15 LAB
CREAT SERPL-MCNC: 1.8 MG/DL
EST. GFR  (AFRICAN AMERICAN): 33.6 ML/MIN/1.73 M^2
EST. GFR  (NON AFRICAN AMERICAN): 29.1 ML/MIN/1.73 M^2

## 2018-01-15 PROCEDURE — 82565 ASSAY OF CREATININE: CPT

## 2018-01-15 PROCEDURE — 36415 COLL VENOUS BLD VENIPUNCTURE: CPT | Mod: PO

## 2018-01-16 ENCOUNTER — HOSPITAL ENCOUNTER (OUTPATIENT)
Dept: RADIOLOGY | Facility: HOSPITAL | Age: 68
Discharge: HOME | End: 2018-01-16
Attending: UROLOGY
Payer: MEDICARE

## 2018-01-16 DIAGNOSIS — R31.9 HEMATURIA, UNSPECIFIED TYPE: ICD-10-CM

## 2018-01-16 PROCEDURE — 74176 CT ABD & PELVIS W/O CONTRAST: CPT | Mod: TC

## 2018-01-16 RX ORDER — DIPHENOXYLATE HYDROCHLORIDE AND ATROPINE SULFATE 2.5; .025 MG/1; MG/1
TABLET ORAL
Qty: 60 TABLET | Refills: 2 | Status: SHIPPED | OUTPATIENT
Start: 2018-01-16 | End: 2018-11-06 | Stop reason: SDUPTHER

## 2018-01-16 RX ORDER — AMLODIPINE BESYLATE 5 MG/1
TABLET ORAL
Qty: 30 TABLET | Refills: 0 | Status: SHIPPED | OUTPATIENT
Start: 2018-01-16 | End: 2018-02-18 | Stop reason: SDUPTHER

## 2018-01-18 ENCOUNTER — OFFICE VISIT (OUTPATIENT)
Dept: UROLOGY | Facility: CLINIC | Age: 68
End: 2018-01-18
Payer: MEDICARE

## 2018-01-18 VITALS
WEIGHT: 96.13 LBS | DIASTOLIC BLOOD PRESSURE: 84 MMHG | SYSTOLIC BLOOD PRESSURE: 108 MMHG | BODY MASS INDEX: 16.01 KG/M2 | HEART RATE: 98 BPM | HEIGHT: 65 IN

## 2018-01-18 DIAGNOSIS — R31.9 HEMATURIA, UNSPECIFIED TYPE: Primary | ICD-10-CM

## 2018-01-18 LAB
BILIRUB SERPL-MCNC: NORMAL MG/DL
BLOOD URINE, POC: NORMAL
COLOR, POC UA: YELLOW
GLUCOSE UR QL STRIP: NORMAL
KETONES UR QL STRIP: NORMAL
LEUKOCYTE ESTERASE URINE, POC: NORMAL
NITRITE, POC UA: NORMAL
PH, POC UA: 7
PROTEIN, POC: NORMAL
SPECIFIC GRAVITY, POC UA: 1.01
UROBILINOGEN, POC UA: NORMAL

## 2018-01-18 PROCEDURE — 99499 UNLISTED E&M SERVICE: CPT | Mod: S$GLB,,, | Performed by: UROLOGY

## 2018-01-18 PROCEDURE — 52000 CYSTOURETHROSCOPY: CPT | Mod: S$GLB,,, | Performed by: UROLOGY

## 2018-01-18 PROCEDURE — 81002 URINALYSIS NONAUTO W/O SCOPE: CPT | Mod: S$GLB,,, | Performed by: UROLOGY

## 2018-01-18 PROCEDURE — 99999 PR PBB SHADOW E&M-EST. PATIENT-LVL III: CPT | Mod: PBBFAC,,, | Performed by: UROLOGY

## 2018-01-18 NOTE — PROGRESS NOTES
Chief Complaint: Hematuria    HPI:   1/18/18: Cr elevated so could not get a urogram, CT RSS reassuring no obvious abnormalities, no stones.  1/2/18: 66 yo woman referred by PCP for hematuria without UTI.  Also has had gross hematuria a few months ago, looked like old brown blood.  No anticoagulation.  No abd/pelvic pain and no exac/rel factors.  No urolithiasis.  No urinary bother.  No  history.  Normal sexual function.  Fell and fractured her pelvis a few months ago but not at the time of the hematuria.    Allergies:  Nsaids (non-steroidal anti-inflammatory drug); Pcn [penicillins]; Sulfa (sulfonamide antibiotics); Aspirin; Macrodantin [nitrofurantoin macrocrystalline]; and Wellbutrin [bupropion hcl]    Medications: has a current medication list which includes the following prescription(s): allopurinol, amlodipine, buspirone, calcitriol, diphenoxylate-atropine 2.5-0.025 mg, duloxetine, ferrous sulfate, fluticasone-salmeterol, ipratropium-albuterol, levothyroxine, methocarbamol, metoprolol succinate, folic acid/multivit-min/lutein, pantoprazole, pravastatin, quetiapine, ropinirole, ropinirole, and vitamin b-1, and the following Facility-Administered Medications: denosumab.    Review of Systems:  General: No fever, chills, fatigability, or weight loss.  Skin: No rashes, itching, or changes in color or texture of skin.  Chest: Denies BRADY, cyanosis, wheezing, cough, and sputum production.  Abdomen: Appetite fine. No weight loss. Denies diarrhea, abdominal pain, hematemesis, or blood in stool.  Musculoskeletal: Chronic joint stiffness or swelling. Chronic back pain.  : As above.  All other review of systems negative.    PMH:   has a past medical history of Acute pancreatitis; Alcohol dependence; Allergy; Anemia; Anxiety; Arthritis (6/24/2013); Asthma; Back pain; Colon polyp; Colon polyp; COPD (chronic obstructive pulmonary disease); Dependence on renal dialysis (2012); Depression; Disorder of kidney and ureter;  Diverticulosis; Diverticulosis; Hiatal hernia; Hyperlipidemia; Hypertension; Hypocalcemia (7/6/2016); Hypothyroidism (6/24/2013); Osteoporosis; Pulmonary embolism; Restless leg syndrome; RLS (restless legs syndrome); Seizure (6/24/2013); Stroke; Tobacco dependence; and Trouble in sleeping.    PSH:   has a past surgical history that includes Cholecystectomy; Colonoscopy (2013); Oophorectomy (1977 approx); Fracture surgery (Left, 07/12/2017); Fracture surgery (Left, 09/12/2017); Tonsillectomy (1960 approx); Adenoidectomy (1960 approx); Joint replacement (Left, 2000 approx); Joint replacement (Left, 2004 approx); Joint replacement (Right, 2002 approx); Hysterectomy (1977 approx); Hernia repair (1999 approx); Appendectomy; amputation left 2nd finger tip (Left, 2012); and na hole/ ventriculostomy (Right, 08/24/2012).    FamHx: family history includes Asthma in her father; Breast cancer in her maternal aunt; Cancer in her maternal aunt, maternal aunt, and maternal grandmother; Colon cancer in her maternal grandmother; Diabetes in her mother and son; Heart disease in her father, maternal aunt, and maternal aunt; Hypertension in her mother and son; Kidney disease in her mother; Parkinsonism in her mother.    SocHx:  reports that she quit smoking about 7 years ago. Her smoking use included Cigarettes. She has a 30.00 pack-year smoking history. She has never used smokeless tobacco. She reports that she drinks about 1.2 - 1.8 oz of alcohol per week . She reports that she does not use drugs.     Physical Exam:  Vitals:   Vitals:    01/18/18 1533   BP: 108/84   Pulse: 98     General: A&Ox3. No apparent distress. No deformities.  Neck: No masses. Normal thyroid.  Lungs: normal inspiration. No use of accessory muscles.  Heart: normal pulse. No arrhythmias.  Abdomen: Soft. NT. ND.  Skin: The skin is warm and dry. No jaundice.  Ext: No c/c/e.  : External genitalia normal.     Labs/Studies:     Procedure: Diagnostic  Cystoscopy    Procedure in Detail: After proper consents were obtained, the patient was prepped and draped in normal sterile fashion for diagnostic cystoscopy. 5 ml of lidocaine jelly was instilled in the urethra. The flexible cystoscope was then introduced into the urethra, and advanced into the bladder under direct vision. The urethral mucosa appeared normal, and no strictures were noted. The sphincter appeared to be normal. The bladder neck was normal. Inspection of the interior of the bladder was then carried out. The trigone was unremarkable, with no mucosal lesions. The ureteral orifices were normal in position and configuration. Systematic inspection of the mucosa of the bladder it was then carried out, rotating the cystoscope so that all areas of the left and right lateral walls, the dome of the bladder, and the posterior wall were all visualized. The cystoscope was then advanced further into the bladder, and maximum deflection of the scope was performed so that the bladder neck could be inspected. No mucosal lesions were noted there. The cystoscope was then removed, and the procedure terminated.     Findings: normal cysto    Impression/Plan:   1. Gross hematuria workup complete, except for urogram, with no adverse findings.  US 6/12 with RTC 12 mo.

## 2018-02-18 ENCOUNTER — OFFICE VISIT (OUTPATIENT)
Dept: URGENT CARE | Facility: CLINIC | Age: 68
End: 2018-02-18
Payer: MEDICARE

## 2018-02-18 VITALS
DIASTOLIC BLOOD PRESSURE: 85 MMHG | RESPIRATION RATE: 14 BRPM | WEIGHT: 102.31 LBS | SYSTOLIC BLOOD PRESSURE: 156 MMHG | HEART RATE: 67 BPM | OXYGEN SATURATION: 99 % | TEMPERATURE: 96 F | HEIGHT: 65 IN | BODY MASS INDEX: 17.05 KG/M2

## 2018-02-18 DIAGNOSIS — N39.0 ACUTE UTI: Primary | ICD-10-CM

## 2018-02-18 LAB
BILIRUB SERPL-MCNC: NEGATIVE MG/DL
BLOOD URINE, POC: ABNORMAL
COLOR, POC UA: ABNORMAL
GLUCOSE UR QL STRIP: NORMAL
KETONES UR QL STRIP: NEGATIVE
LEUKOCYTE ESTERASE URINE, POC: 2
NITRITE, POC UA: POSITIVE
PH, POC UA: 7
PROTEIN, POC: 100
SPECIFIC GRAVITY, POC UA: 1
UROBILINOGEN, POC UA: NORMAL

## 2018-02-18 PROCEDURE — 87086 URINE CULTURE/COLONY COUNT: CPT

## 2018-02-18 PROCEDURE — 99999 PR PBB SHADOW E&M-EST. PATIENT-LVL III: CPT | Mod: PBBFAC,,, | Performed by: NURSE PRACTITIONER

## 2018-02-18 PROCEDURE — 81002 URINALYSIS NONAUTO W/O SCOPE: CPT | Mod: S$GLB,,, | Performed by: NURSE PRACTITIONER

## 2018-02-18 PROCEDURE — 99214 OFFICE O/P EST MOD 30 MIN: CPT | Mod: 25,S$GLB,, | Performed by: NURSE PRACTITIONER

## 2018-02-18 PROCEDURE — 3008F BODY MASS INDEX DOCD: CPT | Mod: S$GLB,,, | Performed by: NURSE PRACTITIONER

## 2018-02-18 RX ORDER — DOXYCYCLINE 100 MG/1
100 CAPSULE ORAL EVERY 12 HOURS
Qty: 14 CAPSULE | Refills: 0 | Status: SHIPPED | OUTPATIENT
Start: 2018-02-18 | End: 2018-02-25

## 2018-02-18 NOTE — PROGRESS NOTES
Subjective:       Patient ID: Ibeth Schroeder is a 67 y.o. female.    Chief Complaint: Urinary Tract Infection    Urinary Tract Infection    This is a new problem. The current episode started in the past 7 days. There has been no fever. Associated symptoms include flank pain, frequency and urgency. Pertinent negatives include no chills, hematuria, nausea or vomiting.     Review of Systems   Constitutional: Negative for chills, diaphoresis, fatigue and fever.   Gastrointestinal: Negative for nausea and vomiting.   Genitourinary: Positive for dysuria, flank pain, frequency and urgency. Negative for difficulty urinating and hematuria.   Musculoskeletal: Positive for back pain (chronic). Negative for myalgias.   Neurological: Negative for dizziness and weakness.       Objective:      Physical Exam   Constitutional: She is oriented to person, place, and time. She appears well-developed and well-nourished. No distress.   Abdominal: Soft. Normal appearance. There is no tenderness. There is no CVA tenderness.   Neurological: She is alert and oriented to person, place, and time.   Skin: Skin is warm and dry. She is not diaphoretic.       Assessment:       1. Acute UTI        Plan:   Ibeth was seen today for urinary tract infection.    Diagnoses and all orders for this visit:    Acute UTI  -     POCT urine dipstick without microscope  -     doxycycline (VIBRAMYCIN) 100 MG Cap; Take 1 capsule (100 mg total) by mouth every 12 (twelve) hours. Note to Pharmacy: Can substitute for Monodox if needed  -     Urine culture          -   Diagnosis, treatment, AVS reviewed with patient .   -   Advised to increase fluid intake and empty bladder frequently.   -   Follow up with urology or ER immediately for no improvement in 3 days, worsening, new symptoms.   -   Patient understands and agrees with plan

## 2018-02-18 NOTE — PATIENT INSTRUCTIONS

## 2018-02-19 RX ORDER — AMLODIPINE BESYLATE 5 MG/1
TABLET ORAL
Qty: 90 TABLET | Refills: 0 | Status: SHIPPED | OUTPATIENT
Start: 2018-02-19 | End: 2018-04-23

## 2018-02-20 LAB — BACTERIA UR CULT: NO GROWTH

## 2018-02-21 ENCOUNTER — LAB VISIT (OUTPATIENT)
Dept: LAB | Facility: HOSPITAL | Age: 68
End: 2018-02-21
Attending: PHYSICIAN ASSISTANT
Payer: MEDICARE

## 2018-02-21 ENCOUNTER — OFFICE VISIT (OUTPATIENT)
Dept: RHEUMATOLOGY | Facility: CLINIC | Age: 68
End: 2018-02-21
Payer: MEDICARE

## 2018-02-21 VITALS
HEIGHT: 65 IN | DIASTOLIC BLOOD PRESSURE: 85 MMHG | BODY MASS INDEX: 17.15 KG/M2 | WEIGHT: 102.94 LBS | SYSTOLIC BLOOD PRESSURE: 128 MMHG | HEART RATE: 75 BPM

## 2018-02-21 DIAGNOSIS — N18.30 CKD (CHRONIC KIDNEY DISEASE) STAGE 3, GFR 30-59 ML/MIN: ICD-10-CM

## 2018-02-21 DIAGNOSIS — Z51.81 MEDICATION MONITORING ENCOUNTER: ICD-10-CM

## 2018-02-21 DIAGNOSIS — E83.52 HYPERCALCEMIA: ICD-10-CM

## 2018-02-21 DIAGNOSIS — M81.8 OSTEOPOROSIS, IDIOPATHIC: ICD-10-CM

## 2018-02-21 DIAGNOSIS — M25.50 MULTIPLE JOINT PAIN: ICD-10-CM

## 2018-02-21 DIAGNOSIS — M81.8 OSTEOPOROSIS, IDIOPATHIC: Primary | Chronic | ICD-10-CM

## 2018-02-21 DIAGNOSIS — R29.898 WEAKNESS OF BOTH HIPS: ICD-10-CM

## 2018-02-21 DIAGNOSIS — R26.9 IMPAIRED GAIT: ICD-10-CM

## 2018-02-21 DIAGNOSIS — N18.30 CHRONIC KIDNEY DISEASE, STAGE 3: Chronic | ICD-10-CM

## 2018-02-21 LAB
ANION GAP SERPL CALC-SCNC: 12 MMOL/L
BUN SERPL-MCNC: 36 MG/DL
CALCIUM SERPL-MCNC: 11 MG/DL
CHLORIDE SERPL-SCNC: 100 MMOL/L
CO2 SERPL-SCNC: 29 MMOL/L
CREAT SERPL-MCNC: 1.7 MG/DL
EST. GFR  (AFRICAN AMERICAN): 36 ML/MIN/1.73 M^2
EST. GFR  (NON AFRICAN AMERICAN): 31 ML/MIN/1.73 M^2
GLUCOSE SERPL-MCNC: 102 MG/DL
POTASSIUM SERPL-SCNC: 4 MMOL/L
SODIUM SERPL-SCNC: 141 MMOL/L

## 2018-02-21 PROCEDURE — 3008F BODY MASS INDEX DOCD: CPT | Mod: S$GLB,,, | Performed by: PHYSICIAN ASSISTANT

## 2018-02-21 PROCEDURE — 99999 PR PBB SHADOW E&M-EST. PATIENT-LVL V: CPT | Mod: PBBFAC,,, | Performed by: PHYSICIAN ASSISTANT

## 2018-02-21 PROCEDURE — 36415 COLL VENOUS BLD VENIPUNCTURE: CPT | Mod: PO

## 2018-02-21 PROCEDURE — 80048 BASIC METABOLIC PNL TOTAL CA: CPT | Mod: PO

## 2018-02-21 PROCEDURE — 99214 OFFICE O/P EST MOD 30 MIN: CPT | Mod: 25,S$GLB,, | Performed by: PHYSICIAN ASSISTANT

## 2018-02-21 PROCEDURE — 99499 UNLISTED E&M SERVICE: CPT | Mod: S$GLB,,, | Performed by: PHYSICIAN ASSISTANT

## 2018-02-21 PROCEDURE — 96372 THER/PROPH/DIAG INJ SC/IM: CPT | Mod: JG,S$GLB,, | Performed by: INTERNAL MEDICINE

## 2018-02-21 NOTE — PROGRESS NOTES
Prolia 60mg/cc to left lower abdomen. Labs checked: Calcium 11.0 Creatinine 1.7 . Pt tolerated well. No acute reaction noted to site. Pt instructed on signs and symptoms of reaction to report. Pt verbalized understanding.     Lot:  [id number]  Exp:  6/20

## 2018-02-21 NOTE — PROGRESS NOTES
Subjective:       Patient ID: Ibeth Schroeder is a 67 y.o. female.    Chief Complaint: Osteoporosis and Osteoarthritis    Ibeth is back for rheumatology follow-up     Osteoporosis now on  Prolia for osteoporosis.  She does have some chronic kidney disease Stage III-IV, bisphosphonates contraindicated. initially she did have some low calcium  Now on Calcitrol and daily mv. Last visit her Calcium level is back within normal limits. no issues with Ca drops post prolia. In the last 6 months she has fallen a few time but no fx.  2016 sustained a pelvic fracture and left distal radial fx. This is when prolia was started. 6/2016. Now in PT for balance and strengthening. She does feel stronger and more secure on her feet.     She does have a history of hyperuricemia with gout attacks but none recently it's been almost 8 years since her last attack.  She is on allopurinol 100 mg/d per renal.  Her pain level today 2/10. .  She does have osteoarthritis affecting multiple joints as well as her spine. Has gotten epidural injections in the past with some help.      Osteoarthritis   Associated symptoms include arthralgias. Pertinent negatives include no abdominal pain, chest pain, chills, fatigue, fever, joint swelling, myalgias, nausea, neck pain, rash, vomiting or weakness.     Review of Systems   Constitutional: Negative.  Negative for activity change, appetite change, chills, fatigue and fever.   HENT: Negative.  Negative for mouth sores and trouble swallowing.         No dry mouth   Eyes: Negative.  Negative for photophobia, pain and redness.        No swollen or red eyes, no dry eye     Respiratory: Negative.  Negative for chest tightness, shortness of breath, wheezing and stridor.    Cardiovascular: Negative.  Negative for chest pain.   Gastrointestinal: Negative.  Negative for abdominal pain, blood in stool, diarrhea, nausea and vomiting.   Genitourinary: Negative.  Negative for dysuria, frequency, hematuria and urgency.  "  Musculoskeletal: Positive for arthralgias. Negative for back pain, gait problem, joint swelling, myalgias, neck pain and neck stiffness.   Skin: Negative.  Negative for color change, pallor and rash.   Neurological: Negative.  Negative for weakness.   Hematological: Negative for adenopathy.   Psychiatric/Behavioral: Negative for suicidal ideas.         Objective:     /85   Pulse 75   Ht 5' 5" (1.651 m)   Wt 46.7 kg (102 lb 15.3 oz)   LMP  (LMP Unknown)   BMI 17.13 kg/m²      Physical Exam   Constitutional: She is oriented to person, place, and time and well-developed, well-nourished, and in no distress. No distress.   HENT:   Head: Normocephalic and atraumatic.   Right Ear: External ear normal.   Left Ear: External ear normal.   Mouth/Throat: No oropharyngeal exudate.   Eyes: Conjunctivae and EOM are normal. Pupils are equal, round, and reactive to light. No scleral icterus.   Neck: Normal range of motion. Neck supple. No thyromegaly present.   Cardiovascular: Normal rate, regular rhythm and normal heart sounds.    No murmur heard.  Pulmonary/Chest: Effort normal and breath sounds normal. She exhibits no tenderness.   Abdominal: Soft. Bowel sounds are normal.   Lymphadenopathy:     She has no cervical adenopathy.   Neurological: She is alert and oriented to person, place, and time. She displays normal reflexes. No cranial nerve deficit. She exhibits normal muscle tone. Gait normal.   Skin: Skin is warm and dry. No rash noted.     Musculoskeletal: Normal range of motion. She exhibits deformity. She exhibits no edema or tenderness.   She does have some kyphosis and scoliosis in the thoracic spine but no tenderness to palpation  Some mild osteoarthritic changes in her hands                 Recent Results (from the past 168 hour(s))   POCT urine dipstick without microscope    Collection Time: 02/18/18  9:27 AM   Result Value Ref Range    Color, UA yellow, hazy     Spec Grav UA 1.005     pH, UA 7     WBC, UA " 2     Nitrite, UA positive     Protein 100     Glucose, UA normal     Ketones, UA negative     Urobilinogen, UA normal     Bilirubin negative     Blood, UA 5-10    Urine culture    Collection Time: 02/18/18 10:13 AM   Result Value Ref Range    Urine Culture, Routine No growth    Basic metabolic panel    Collection Time: 02/21/18  1:29 PM   Result Value Ref Range    Sodium 141 136 - 145 mmol/L    Potassium 4.0 3.5 - 5.1 mmol/L    Chloride 100 95 - 110 mmol/L    CO2 29 23 - 29 mmol/L    Glucose 102 70 - 110 mg/dL    BUN, Bld 36 (H) 8 - 23 mg/dL    Creatinine 1.7 (H) 0.5 - 1.4 mg/dL    Calcium 11.0 (H) 8.7 - 10.5 mg/dL    Anion Gap 12 8 - 16 mmol/L    eGFR if African American 36 (A) >60 mL/min/1.73 m^2    eGFR if non African American 31 (A) >60 mL/min/1.73 m^2       DEXA 7/6/16 total femur T score -2.7, femur neck -2.7, spine +1.9 impression osteoporosis  DEXA 6/5/13 total femur T score -3.1, spine +1.7 impression osteoporosis    Assessment:       1. Osteoporosis, idiopathic    2. Medication monitoring encounter    3. Multiple joint pain    4. Impaired gait    5. Chronic kidney disease, stage 3    6. Weakness of both hips    7. Hypercalcemia          1.  Severe osteoporosis at the hip--Prolia X 1.5 years after 3 years no treatment, pt lost to follow up  2016 pelvic fracture and left distal radial fx- prolia therapy started 7/2016    2.  Hyperuricemia with no recent gout attacks- on l allopurinol 100 mg daily     3.  Chronic kidney disease monitored by nephrology     4.  Generalized osteoarthritis    5.  Medication Monitoring- no current issues, no evidence of toxicity    6. Hypercalcemia- ckd on Calcitrol and daily mv       Plan:         Ok for Prolia 60 mg subcutaneous injection today for osteoporosis   Calcium level is high so will not add supplemental calcium  In 2 weeks have her hold her daily MV and watch dietary intake, get back with renal for recheck     Will Continue Prolia Q 6 months    dexa next year  2019    C/w PT for strength and balance    Allopurinol 100 mg/d per renal, no gout attacks    Avoid non-steroidal's, okay for Tylenol for pain    Return to clinic in 6 months with labs- cmp and Prolia    Call with any questions, changes, or concerns.

## 2018-02-27 ENCOUNTER — OFFICE VISIT (OUTPATIENT)
Dept: PSYCHIATRY | Facility: CLINIC | Age: 68
End: 2018-02-27
Payer: MEDICARE

## 2018-02-27 DIAGNOSIS — F41.9 ANXIETY: ICD-10-CM

## 2018-02-27 DIAGNOSIS — F03.90 MAJOR NEUROCOGNITIVE DISORDER: Primary | ICD-10-CM

## 2018-02-27 DIAGNOSIS — R44.3 HALLUCINATIONS: ICD-10-CM

## 2018-02-27 PROCEDURE — 99499 UNLISTED E&M SERVICE: CPT | Mod: S$GLB,,, | Performed by: PSYCHIATRY & NEUROLOGY

## 2018-02-27 PROCEDURE — 99213 OFFICE O/P EST LOW 20 MIN: CPT | Mod: S$GLB,,, | Performed by: PSYCHIATRY & NEUROLOGY

## 2018-02-27 RX ORDER — BUSPIRONE HYDROCHLORIDE 15 MG/1
15 TABLET ORAL 2 TIMES DAILY
Qty: 90 TABLET | Refills: 2 | Status: SHIPPED | OUTPATIENT
Start: 2018-02-27 | End: 2018-05-29 | Stop reason: SDUPTHER

## 2018-02-27 RX ORDER — QUETIAPINE FUMARATE 25 MG/1
TABLET, FILM COATED ORAL
Qty: 90 TABLET | Refills: 0 | Status: SHIPPED | OUTPATIENT
Start: 2018-02-27 | End: 2018-05-29 | Stop reason: SDUPTHER
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
## 2018-03-05 RX ORDER — PRAVASTATIN SODIUM 10 MG/1
TABLET ORAL
Qty: 90 TABLET | Refills: 0 | Status: SHIPPED | OUTPATIENT
Start: 2018-03-05 | End: 2018-06-11 | Stop reason: SDUPTHER

## 2018-03-07 ENCOUNTER — PROCEDURE VISIT (OUTPATIENT)
Dept: PULMONOLOGY | Facility: CLINIC | Age: 68
End: 2018-03-07
Payer: MEDICARE

## 2018-03-07 ENCOUNTER — OFFICE VISIT (OUTPATIENT)
Dept: PULMONOLOGY | Facility: CLINIC | Age: 68
End: 2018-03-07
Payer: MEDICARE

## 2018-03-07 ENCOUNTER — HOSPITAL ENCOUNTER (OUTPATIENT)
Dept: RADIOLOGY | Facility: HOSPITAL | Age: 68
Discharge: HOME | End: 2018-03-07
Attending: INTERNAL MEDICINE
Payer: MEDICARE

## 2018-03-07 VITALS
SYSTOLIC BLOOD PRESSURE: 110 MMHG | BODY MASS INDEX: 18.77 KG/M2 | BODY MASS INDEX: 18.77 KG/M2 | HEART RATE: 63 BPM | WEIGHT: 102 LBS | WEIGHT: 102 LBS | HEIGHT: 62 IN | HEIGHT: 62 IN | OXYGEN SATURATION: 96 % | RESPIRATION RATE: 18 BRPM | DIASTOLIC BLOOD PRESSURE: 63 MMHG

## 2018-03-07 DIAGNOSIS — G47.36 NOCTURNAL HYPOXEMIA DUE TO EMPHYSEMA: Chronic | ICD-10-CM

## 2018-03-07 DIAGNOSIS — J44.9 STAGE 3 SEVERE COPD BY GOLD CLASSIFICATION: Chronic | ICD-10-CM

## 2018-03-07 DIAGNOSIS — J44.9 STAGE 3 SEVERE COPD BY GOLD CLASSIFICATION: Primary | Chronic | ICD-10-CM

## 2018-03-07 DIAGNOSIS — R09.02 EXERCISE HYPOXEMIA: ICD-10-CM

## 2018-03-07 DIAGNOSIS — J43.9 NOCTURNAL HYPOXEMIA DUE TO EMPHYSEMA: Chronic | ICD-10-CM

## 2018-03-07 PROCEDURE — 99214 OFFICE O/P EST MOD 30 MIN: CPT | Mod: 25,S$GLB,, | Performed by: INTERNAL MEDICINE

## 2018-03-07 PROCEDURE — 71046 X-RAY EXAM CHEST 2 VIEWS: CPT | Mod: TC

## 2018-03-07 PROCEDURE — 99499 UNLISTED E&M SERVICE: CPT | Mod: S$GLB,,, | Performed by: INTERNAL MEDICINE

## 2018-03-07 PROCEDURE — 94618 PULMONARY STRESS TESTING: CPT | Mod: S$GLB,,, | Performed by: INTERNAL MEDICINE

## 2018-03-07 PROCEDURE — 3074F SYST BP LT 130 MM HG: CPT | Mod: S$GLB,,, | Performed by: INTERNAL MEDICINE

## 2018-03-07 PROCEDURE — 3078F DIAST BP <80 MM HG: CPT | Mod: S$GLB,,, | Performed by: INTERNAL MEDICINE

## 2018-03-07 PROCEDURE — 94060 EVALUATION OF WHEEZING: CPT | Mod: 59,S$GLB,, | Performed by: INTERNAL MEDICINE

## 2018-03-07 PROCEDURE — 99999 PR PBB SHADOW E&M-EST. PATIENT-LVL III: CPT | Mod: PBBFAC,,, | Performed by: INTERNAL MEDICINE

## 2018-03-07 PROCEDURE — 71046 X-RAY EXAM CHEST 2 VIEWS: CPT | Mod: 26,,, | Performed by: RADIOLOGY

## 2018-03-07 RX ORDER — TRAMADOL HYDROCHLORIDE 50 MG/1
TABLET ORAL
Refills: 0 | COMMUNITY
Start: 2018-01-19 | End: 2018-03-08

## 2018-03-07 RX ORDER — QUETIAPINE FUMARATE 25 MG/1
25 TABLET, FILM COATED ORAL
COMMUNITY
End: 2018-04-23

## 2018-03-07 NOTE — PROCEDURES
"[facility]  Six Minute Walk     SUMMARY     Ordering Provider: Dr Barrett   Interpreting Provider: Dr Barrett  Performing nurse/tech/RT: GISELLE Richter RRT  Diagnosis: COPD  Height: 5' 2" (157.5 cm)  Weight: 46.3 kg (102 lb)  BMI (Calculated): 18.7   Patient Race:             Phase Oxygen Assessment Supplemental O2 Heart   Rate Blood Pressure Fe Dyspnea Scale Rating   Resting 98 % Room Air 85 bpm 125/70 3   Exercise        Minute        1 94 % Room Air 102 bpm     2 82 % Room Air 111 bpm     3 90 % 2 L/M 102 bpm     4 84 % 2 L/M 101 bpm     5 91 % 3 L/M 111 bpm     6  95 % 3 L/M 109 bpm 132/76 4   Recovery        Minute        1 98 % 3 L/M 83 bpm     2 99 % 3 L/M 83 bpm     3 100 % 3 L/M 78 bpm     4 100 % 3 L/M 76 bpm 127/89 2     Six Minute Walk Summary  6MWT Status: completed with stops  Patient Reported:  (back pain and walks with limp)     Interpretation:  Did the patient stop or pause?: Yes  How many times did the patient stop or pause?: 2  Stop Time 1: 120.6  Restart Time 1: 180  Pause Time 1: 59.4 seconds  Stop Time 2: 264  Restart Time 2: 300  Pause Time 2: 36 seconds                    Total Time Walked (Calculated): 264.6 seconds  Final Partial Lap Distance (feet): 0 feet  Total Distance Meters (Calculated): 121.92 meters  Predicted Distance Meters (Calculated): 517.67 meters  Percentage of Predicted (Calculated): 23.55  Peak VO2 (Calculated): 7.64  Mets: 2.18  Has The Patient Had a Previous Six Minute Walk Test?: Yes       Previous 6MWT Results  Has The Patient Had a Previous Six Minute Walk Test?: Yes  Date of Previous Test: 03/16/17  Total Time Walked: 288.6 seconds  Total Distance (meters): 289.56  Predicted Distance (meters): 328.88 meters  Percentage of Predicted: 54.75  Percent Change (Calculated): 0.58    REPORT  Distance completed declined compared to prior study performed 3/16/2017:  Distance completed was 121.92 m (23.55% predicted)  Supplementary oxygen wasn't required " during protocol.  Oxygen saturation dropped to 82% at the second minute.  Patient was placed on 2-3 L of oxygen.  Tachycardia was present during the protocol  Dyspnea was mild to moderate  Blood pressure remained stable  Multiple stops.  Complained of back pain  Exercise capacity severely reduced    Andrea Barrett MD

## 2018-03-07 NOTE — ASSESSMENT & PLAN NOTE
SpO2 krishan was 82%  Placed on oxygen 2.0 to 3.0 LPM  Fe score 4  Distance was 121.92 m( 23.55%)  Predicted was 517.67 m  Back pain

## 2018-03-07 NOTE — PROGRESS NOTES
Subjective:      Ibeth Schroeder is a 67 y.o. female with known moderate to severe COPD  Follow-up appointment. Last visit 04/05/2017, spouse with her  COPD test score is 17  No respiratory symptoms no cough no wheezing no shortness of breath  Main concern: poor memory  FEV1 declined   She is on Advair and Combivent Respimat  She has nocturnal oxygen 3.0 LPM  She has oxygen  mRC 2-3  X-ray was reviewed stable.  Spirometry did show some decline in FEV1  mRC score 2-3  I have reviewed the patient's medical history in detail and updated the computerized patient record.          The following portions of the patient's history were reviewed and updated as appropriate:   She  has a past medical history of Acute pancreatitis; Alcohol dependence; Allergy; Anemia; Anxiety; Arthritis (6/24/2013); Asthma; Back pain; Colon polyp; Colon polyp; COPD (chronic obstructive pulmonary disease); Dependence on renal dialysis (2012); Depression; Disorder of kidney and ureter; Diverticulosis; Diverticulosis; Hiatal hernia; Hyperlipidemia; Hypertension; Hypocalcemia (7/6/2016); Hypothyroidism (6/24/2013); Osteoporosis; Pulmonary embolism; Restless leg syndrome; RLS (restless legs syndrome); Seizure (6/24/2013); Stroke; Tobacco dependence; and Trouble in sleeping.  She  does not have any pertinent problems on file.  She  has a past surgical history that includes Cholecystectomy; Colonoscopy (2013); Oophorectomy (1977 approx); Fracture surgery (Left, 07/12/2017); Fracture surgery (Left, 09/12/2017); Tonsillectomy (1960 approx); Adenoidectomy (1960 approx); Joint replacement (Left, 2000 approx); Joint replacement (Left, 2004 approx); Joint replacement (Right, 2002 approx); Hysterectomy (1977 approx); Hernia repair (1999 approx); Appendectomy; amputation left 2nd finger tip (Left, 2012); and na hole/ ventriculostomy (Right, 08/24/2012).  Her family history includes Asthma in her father; Breast cancer in her maternal aunt; Cancer in her  maternal aunt, maternal aunt, and maternal grandmother; Colon cancer in her maternal grandmother; Diabetes in her mother and son; Heart disease in her father, maternal aunt, and maternal aunt; Hypertension in her mother and son; Kidney disease in her mother; Parkinsonism in her mother.  She  reports that she quit smoking about 7 years ago. Her smoking use included Cigarettes. She has a 30.00 pack-year smoking history. She has never used smokeless tobacco. She reports that she drinks about 1.2 - 1.8 oz of alcohol per week . She reports that she does not use drugs.  She has a current medication list which includes the following prescription(s): allopurinol, amlodipine, buspirone, calcitriol, diphenoxylate-atropine 2.5-0.025 mg, ferrous sulfate, ipratropium-albuterol, levothyroxine, metoprolol succinate, folic acid/multivit-min/lutein, pantoprazole, pravastatin, quetiapine, quetiapine, ropinirole, tramadol, and fluticasone-umeclidin-vilanter, and the following Facility-Administered Medications: denosumab.  Current Outpatient Prescriptions on File Prior to Visit   Medication Sig Dispense Refill    allopurinol (ZYLOPRIM) 100 MG tablet Take 1 tablet (100 mg total) by mouth once daily. 90 tablet 5    amLODIPine (NORVASC) 5 MG tablet take 1 tablet by mouth once daily 90 tablet 0    busPIRone (BUSPAR) 15 MG tablet Take 1 tablet (15 mg total) by mouth 2 (two) times daily. 90 tablet 2    calcitRIOL (ROCALTROL) 0.25 MCG Cap Take 1 capsule (0.25 mcg total) by mouth once daily. 90 capsule 3    diphenoxylate-atropine 2.5-0.025 mg (LOMOTIL) 2.5-0.025 mg per tablet take 2 tablets by mouth four times a day if needed for diarrhea 60 tablet 2    ferrous sulfate 325 (65 FE) MG EC tablet Take 1 tablet (325 mg total) by mouth once a week. (Patient taking differently: Take 325 mg by mouth 2 (two) times daily. ) 90 tablet 3    ipratropium-albuterol (COMBIVENT RESPIMAT)  mcg/actuation inhaler inhale 1 puff INTO THE LUNGS four  "times a day (Patient taking differently: Inhale 1 puff into the lungs every 6 (six) hours as needed. inhale 1 puff INTO THE LUNGS four times a day) 3 Package 4    levothyroxine (SYNTHROID) 100 MCG tablet TAKE 1 TABLET ONE TIME DAILY 90 tablet 3    metoprolol succinate (TOPROL-XL) 50 MG 24 hr tablet Take 1 tablet (50 mg total) by mouth 2 (two) times daily. 60 tablet 11    MULTIVITAMIN W-MINERALS/LUTEIN (CENTRUM SILVER ORAL) Take 1 tablet by mouth once daily.      pantoprazole (PROTONIX) 40 MG tablet TAKE 1 TABLET ONE TIME DAILY 90 tablet 3    pravastatin (PRAVACHOL) 10 MG tablet TAKE 1 TABLET EVERY DAY 90 tablet 0    QUEtiapine (SEROQUEL) 25 MG Tab Take 1/2 to 1 tablet at bedtime 90 tablet 0    ropinirole (REQUIP) 1 MG tablet Take 1 mg by mouth nightly as needed.       [DISCONTINUED] fluticasone-salmeterol (ADVAIR HFA) 115-21 mcg/actuation HFAA inhale 2 puffs INTO THE LUNGS twice a day (Patient taking differently: 2 puffs once daily. PER [facility]) 12 g 12     Current Facility-Administered Medications on File Prior to Visit   Medication Dose Route Frequency Provider Last Rate Last Dose    denosumab (PROLIA) injection 60 mg  60 mg Subcutaneous Q6 Months Patria Melara PA-C   60 mg at 02/21/18 3653     She is allergic to nsaids (non-steroidal anti-inflammatory drug); pcn [penicillins]; sulfa (sulfonamide antibiotics); aspirin; macrodantin [nitrofurantoin macrocrystalline]; and wellbutrin [bupropion hcl]..    Review of Systems  A comprehensive review of systems was negative.       Objective:      /63   Pulse 63   Resp 18   Ht 5' 2" (1.575 m)   Wt 46.3 kg (102 lb)   LMP  (LMP Unknown)   SpO2 96% Comment: 3liters  BMI 18.66 kg/m²      General appearance: alert, appears stated age and cooperative  Head: Normocephalic, without obvious abnormality  Eyes: negative findings: conjunctivae and sclerae normal  Nose: no discharge  Throat: lips, mucosa, and tongue normal; teeth and gums normal  Neck: " "no adenopathy, no carotid bruit, no JVD, supple, symmetrical, trachea midline and thyroid not enlarged, symmetric, no tenderness/mass/nodules  Lungs: clear to auscultation bilaterally and normal percussion bilaterally  Heart: regular rate and rhythm, S1, S2 normal, no murmur, click, rub or gallop  Abdomen: soft, non-tender; bowel sounds normal; no masses,  no organomegaly  Extremities: extremities normal, atraumatic, no cyanosis or edema  Pulses: 2+ and symmetric  Skin: Skin color, texture, turgor normal. No rashes or lesions  Lymph nodes: Cervical, supraclavicular, and axillary nodes normal.  Neurologic: Grossly normal       Spirometry  FEV1 0.77 L (35% predicted) FVC is 2.34 L (82% predicted) FEV1/FVC was 33.    CXR   Heart size is within normal limits.  Large hiatal hernia noted.  The lungs are hyperexpanded.  Linear scarring or atelectasis present in the mid right lung and left lung base.  No confluent infiltrate or effusion multiple healed or healing left-sided rib fractures.  Degenerative change in the spine and mild chronic compression deformity of 1 lower thoracic vertebra.    6MWD  6MW Test  Height: 5' 2" (157.5 cm)  Weight: 46.3 kg (102 lb)  BMI (Calculated): 18.7  Predicted Distance: 382.36  Interpretation  Predicted Distance Meters (Calculated): 517.67 meters    Distance was 121.93m ( 23.55%)       Assessment:      Problem List Items Addressed This Visit     Nocturnal hypoxemia due to emphysema (Chronic)     Continue Nocturnal Oxygen 2.0 LPM         Stage 3 severe COPD by GOLD classification - Primary (Chronic)     CAT score 12  FEV 1 : 0.77 L  35% ( improved by 19%)  Immunizations current  mRC score 2           Relevant Medications    fluticasone-umeclidin-vilanter (TRELEGY ELLIPTA) 100-62.5-25 mcg DsDv    Other Relevant Orders    X-Ray Chest PA And Lateral    Spirometry with/without bronchodilator    Exercise hypoxemia     SpO2 krishan was 82%  Placed on oxygen 2.0 to 3.0 LPM  Fe score 4  Distance was " 121.92 m( 23.55%)  Predicted was 517.67 m  Back pain              STEPH Index Score = 4    Interpretation: This result is indicative of a 52 month mortality of approximately 60%.    Results:  Total Criteria Point Count:    STEPH INDEX: 7  Approximate 4 Year Survival Interpretation    7-10 Points: 18%    Plan:          Follow-up in about 3 months (around 6/7/2018) for Spirometry and CXR next visit, Incruse/breo/anoro / Trelegy coupon.  Changeed to triple therapy   This note was prepared using voice recognition system and is likely to have sound alike errors that may have been overlooked even after proof reading.  Please call me with any questions    Discussed diagnosis, its evaluation, treatment and usual course. All questions answered.  Thank you for the courtesy of participating in the care of this patient    Andrea Barrett MD

## 2018-03-08 LAB
POST FEF 25 75: 0.25 L/S (ref 1.43–2.58)
POST FET 100: 18.13 S
POST FEV1 FVC: 38 %
POST FEV1: 0.91 L (ref 1.94–2.49)
POST FIF 50: 3.07 L/S
POST FVC: 2.4 L (ref 2.57–3.22)
POST PEF: 3.15 L/S (ref 4.87–6.46)
PRE FEF 25 75: 0.2 L/S (ref 1.43–2.58)
PRE FET 100: 19.23 S
PRE FEV1 FVC: 33 %
PRE FEV1: 0.77 L (ref 1.94–2.49)
PRE FIF 50: 3.01 L/S
PRE FVC: 2.34 L (ref 2.57–3.22)
PRE PEF: 3.31 L/S (ref 4.87–6.46)
PREDICTED FEV1 FVC: 77 % (ref 72.1–81.89)
PREDICTED FEV1: 2.21 L (ref 1.94–2.49)
PREDICTED FVC: 2.9 L (ref 2.57–3.22)
PROVOCATION PROTOCOL: ABNORMAL

## 2018-03-08 RX ORDER — TRAMADOL HYDROCHLORIDE 50 MG/1
50 TABLET ORAL 3 TIMES DAILY PRN
Qty: 90 TABLET | Refills: 1 | Status: SHIPPED | OUTPATIENT
Start: 2018-03-08 | End: 2018-04-07

## 2018-03-09 ENCOUNTER — OFFICE VISIT (OUTPATIENT)
Dept: PAIN MEDICINE | Facility: CLINIC | Age: 68
End: 2018-03-09
Payer: MEDICARE

## 2018-03-09 VITALS
HEART RATE: 80 BPM | HEIGHT: 62 IN | DIASTOLIC BLOOD PRESSURE: 98 MMHG | WEIGHT: 102 LBS | RESPIRATION RATE: 16 BRPM | SYSTOLIC BLOOD PRESSURE: 146 MMHG | BODY MASS INDEX: 18.77 KG/M2

## 2018-03-09 DIAGNOSIS — M47.817 LUMBOSACRAL SPONDYLOSIS WITHOUT MYELOPATHY: ICD-10-CM

## 2018-03-09 DIAGNOSIS — M51.36 DDD (DEGENERATIVE DISC DISEASE), LUMBAR: ICD-10-CM

## 2018-03-09 DIAGNOSIS — M25.559 CHRONIC HIP PAIN, UNSPECIFIED LATERALITY: ICD-10-CM

## 2018-03-09 DIAGNOSIS — G89.29 CHRONIC HIP PAIN, UNSPECIFIED LATERALITY: ICD-10-CM

## 2018-03-09 DIAGNOSIS — R29.898 WEAKNESS OF BOTH HIPS: ICD-10-CM

## 2018-03-09 DIAGNOSIS — M47.816 LUMBAR FACET ARTHROPATHY: ICD-10-CM

## 2018-03-09 DIAGNOSIS — M47.817 SPONDYLOSIS OF LUMBOSACRAL REGION WITHOUT MYELOPATHY OR RADICULOPATHY: Primary | ICD-10-CM

## 2018-03-09 PROCEDURE — 3080F DIAST BP >= 90 MM HG: CPT | Mod: S$GLB,,, | Performed by: PHYSICIAN ASSISTANT

## 2018-03-09 PROCEDURE — 99214 OFFICE O/P EST MOD 30 MIN: CPT | Mod: S$GLB,,, | Performed by: PHYSICIAN ASSISTANT

## 2018-03-09 PROCEDURE — 3077F SYST BP >= 140 MM HG: CPT | Mod: S$GLB,,, | Performed by: PHYSICIAN ASSISTANT

## 2018-03-09 PROCEDURE — 99999 PR PBB SHADOW E&M-EST. PATIENT-LVL IV: CPT | Mod: PBBFAC,,, | Performed by: PHYSICIAN ASSISTANT

## 2018-03-09 PROCEDURE — 99499 UNLISTED E&M SERVICE: CPT | Mod: S$GLB,,, | Performed by: PHYSICIAN ASSISTANT

## 2018-03-09 NOTE — PROGRESS NOTES
Chief Pain Complaint:  Low Back Pain    History of Present Illness:  This patient is a 67 y.o. female who presents today complaining of the above noted pain/s. The patient describes this pain as follows.    - duration of pain: pain for years   - timing: intermittent   - character: sharp, aching  - radiating, dermatomal: pain is nonradiating  - antecedent trauma, prior spinal surgery: no prior trauma, no prior spinal surgery, left hip replacement & revision, left radial ORIF on 7-12-17, left hardware revision on 9-6-17  - pertinent negatives: No fever, No chills, No weight loss, No bladder dysfunction, No bowel dysfunction, No extremity weakness, No saddle anesthesia  - pertinent positives: none    - medications, other therapies tried (physical therapy, injections):     >> Tylenol, Ultracet    >> Has previously undergone Physical Therapy    >> Has previously undergone spinal injection/s:    - what sounds like 2 lumbar MBBs at [facility] with great relief   - right lumbar MBB on 1-18-17 & left lumbar MBB on 2-8-17 with excellent relief        IMAGING / Labs / Studies (reviewed on 3/9/2018):    8/11/16 X-Ray Lumbar Spine AP And Lateral  Comparison: 09/24/2013  Technique: AP, lateral, lateral flexion, lateral extension, bilateral oblique, and lumbosacral coned down views were obtained of the lumbar spine  Findings:   Vertebral body heights and alignment are within normal limits.  No change in spinal alignment with flexion or extension to suggest instability. Multilevel degenerative disc height loss again noted throughout the lumbar spine, most severe at the levels of T12-L1, L1.  Moderate disc loss present at L4-L5 and L5-S1.  There multilevel degenerative endplate changes and osteophyte production.  Findings appear essentially unchanged from prior.  Bilateral facet arthropathy present at L4-L5 and L5-S1.  No pars defects visualized.  Posterior elements appear grossly intact. No acute fractures or subluxations are  demonstrated.  IVC filter again noted.  Aortic vascular calcifications present.  Previously described aneurysmal dilatation of the distal abdominal aorta appear similar to prior measuring up to approximately 4.2 cm in diameter.         9/24/13 X-Ray Lumbar Spine AP And Lateral    Narrative Findings:  Comparison is with 6/16/09.  Mild dextroscoliosis and diffuse changes of degenerative disk disease as well as diffuse facet joint hypertrophy are again demonstrated.  These changes of degenerative disk disease are particularly pronounced at the T12-L1, L1-2, L4-5 and L5-S1 levels.  AP alignment appears maintained with no acute compression fractures identified.  Again demonstrated are surgical clips consistent with prior cholecystectomy, IVC filter and left hip prosthesis.  Also again demonstrated is diffuse calcification of the abdominal aorta including distal aneurysmal dilatation measured at 4.4 cm in the AP axis at the level of the superior endplate of L4. This dilatation appears to have slightly increased in the interim previously measured at this level at approximately 3.3 cm.         Review of Systems:  CONSTITUTIONAL: patient denies any fever, chills, or weight loss  SKIN: patient denies any rash or itching  RESPIRATORY: patient denies having any shortness of breath  GASTROINTESTINAL: patient denies having any diarrhea, constipation, or bowel incontinence  GENITOURINARY: patient denies having any abnormal bladder function    MUSCULOSKELETAL:  - patient complains of the above noted pain/s (see chief pain complaint)    NEUROLOGICAL:   - pain as above  - strength in Lower extremities is intact, BILATERALLY  - sensation in Lower extremities is intact, BILATERALLY  - patient denies any loss of bowel or bladder control      PSYCHIATRIC: patient reports a history of anxiety and depression        Physical Exam:  Vitals:  LMP  (LMP Unknown)    (reviewed on 3/9/2018)    General: alert and oriented, in no apparent distress,  poorly nourished  Gait: normal gait  Skin: No rashes, No discoloration, No obvious lesions  HEENT: EOMI  Respiratory: respirations nonlabored    Musculoskeletal:  - Any pain on flexion, extension, rotation:    >> pain on extension and rotation, improved since injection  - Straight Leg Raise:     >> LEFT :: negative    >> RIGHT :: negative  - Any tenderness to palpation across paraspinal muscles, joints, bursae:     >> across lumbar paraspinals, mild    Neuro:  - Extremity Strength:     >> LEFT :: 5/5    >> RIGHT :: 5/5     Psych:  Mood and affect is appropriate          Assessment:    ICD-10-CM ICD-9-CM   1. Spondylosis of lumbosacral region without myelopathy or radiculopathy M47.817 721.3   2. Lumbosacral spondylosis without myelopathy M46.94 721.3   3. DDD (degenerative disc disease), lumbar M51.36 722.52   4. Lumbar facet arthropathy M46.96 721.3   5. Weakness of both hips R29.898 729.89   6. Chronic hip pain, unspecified laterality M25.559 719.45    G89.29 338.29         Plan:  1. Interventional: Schedule bilateral lumbar MBB with local. Patient is not prescription taking blood thinners or ASA.     2. Pharmacologic:   - Refill tramadol 50mg TID PRN pain x 2 months. Paper rx given. Patient tolerating opioids with no side effects, obtaining good pain control with functional improvement.   - LA  reviewed and appropriate. Last filled 1-19-18.      3. Rehabilitative: Encouraged regular exercise.    4. Diagnostic: None for now.    5. Follow up: med refill with Dr. Javed.    - I discussed the risks, benefits, and alternatives to potential treatment options. All questions and concerns were fully addressed today in clinic. Dr. Javed was consulted regarding the patient plan and agrees.             >> Pain Disability Index:  4/12/2017 :: 33  7/7/2017 :: 56    >>Opioid Risk Tool:  1/12/2017 :: 1

## 2018-04-06 ENCOUNTER — HOSPITAL ENCOUNTER (OUTPATIENT)
Dept: RADIOLOGY | Facility: HOSPITAL | Age: 68
Discharge: HOME | End: 2018-04-06
Attending: PHYSICIAN ASSISTANT
Payer: MEDICARE

## 2018-04-06 ENCOUNTER — SURGERY (OUTPATIENT)
Age: 68
End: 2018-04-06

## 2018-04-06 ENCOUNTER — HOSPITAL ENCOUNTER (OUTPATIENT)
Facility: HOSPITAL | Age: 68
Discharge: HOME | End: 2018-04-06
Attending: ANESTHESIOLOGY | Admitting: ANESTHESIOLOGY
Payer: MEDICARE

## 2018-04-06 VITALS — DIASTOLIC BLOOD PRESSURE: 78 MMHG | OXYGEN SATURATION: 100 % | SYSTOLIC BLOOD PRESSURE: 154 MMHG | HEART RATE: 57 BPM

## 2018-04-06 DIAGNOSIS — M47.817 SPONDYLOSIS OF LUMBOSACRAL REGION WITHOUT MYELOPATHY OR RADICULOPATHY: ICD-10-CM

## 2018-04-06 PROCEDURE — 64495 INJ PARAVERT F JNT L/S 3 LEV: CPT | Mod: 50,,, | Performed by: ANESTHESIOLOGY

## 2018-04-06 PROCEDURE — 25000003 PHARM REV CODE 250

## 2018-04-06 PROCEDURE — 64494 INJ PARAVERT F JNT L/S 2 LEV: CPT | Mod: 50,,, | Performed by: ANESTHESIOLOGY

## 2018-04-06 PROCEDURE — 63600175 PHARM REV CODE 636 W HCPCS

## 2018-04-06 PROCEDURE — 64493 INJ PARAVERT F JNT L/S 1 LEV: CPT | Mod: 50,,, | Performed by: ANESTHESIOLOGY

## 2018-04-06 PROCEDURE — 25000003 PHARM REV CODE 250: Performed by: ANESTHESIOLOGY

## 2018-04-06 PROCEDURE — 64493 INJ PARAVERT F JNT L/S 1 LEV: CPT | Mod: 50

## 2018-04-06 PROCEDURE — 64494 INJ PARAVERT F JNT L/S 2 LEV: CPT | Mod: 50

## 2018-04-06 PROCEDURE — 64495 INJ PARAVERT F JNT L/S 3 LEV: CPT | Mod: 50

## 2018-04-06 RX ORDER — LIDOCAINE HYDROCHLORIDE 10 MG/ML
INJECTION, SOLUTION EPIDURAL; INFILTRATION; INTRACAUDAL; PERINEURAL
Status: DISCONTINUED | OUTPATIENT
Start: 2018-04-06 | End: 2018-04-06 | Stop reason: HOSPADM

## 2018-04-06 RX ADMIN — LIDOCAINE HYDROCHLORIDE 5 ML: 10 INJECTION, SOLUTION EPIDURAL; INFILTRATION; INTRACAUDAL; PERINEURAL at 02:04

## 2018-04-06 NOTE — PLAN OF CARE
Problem: Patient Care Overview  Goal: Plan of Care Review  Outcome: Outcome(s) achieved Date Met: 04/06/18  Patient d/c home in stable condition via wheelchair with ride. Verbalized understanding of d/c instructions. Patient voiced no complaints at this time. Patient stood at side of bed, walked steps with no new motor deficits. Neurologically intact.

## 2018-04-06 NOTE — OP NOTE
Procedure: Lumbar Medial Branch Block under Fluoroscopic Guidance    Side: bilateral     Level:  Sacral ala (Corresponding to the L5 dorsal ramus), L5 transverse process (Corresponding to the L4 medial branch) and L4 transverse process (Corresponding to the L3 medial branch)     PROCEDURE DATE: 4/6/2018    Pre-operative Diagnosis: Lumbar Spondylosis  Post-operative Diagnosis: Lumbar Spondylosis    Provider: Anjum Javed MD  Assistant(s): none    Anesthesia: Local, No Sedation    >> 0 mg of VERSED    >> 0 mcg of FENTANYL     Indication: Low back pain without radiculopathy. Symptoms unresponsive to conservative treatments. Fluoroscopy was used to optimize visualization of needle placement and to maximize safety.     Procedure Description / Technique:  The patient was seen and identified in the preoperative area. Risks, benefits, complications, and alternatives were discussed with the patient. The patient agreed to proceed with the procedure and signed the consent. The site and side of the procedure was identified and marked. No iv was started.     The patient was taken to the procedural suite and positioned in prone orientation on the procedure table. A pillow was placed under the abdomen to reduce lumbar lordosis. A time out was performed. The procedure, site, side, and allergies were stated and agreed to by all present. The lumbosacral area was widely prepped with ChloraPrep. The procedural site was draped in usual sterile fashion. Vital signs were closely monitored throughout this procedure. Conscious sedation was NOT used for this procedure.    The Right sacral ala and superior articular process was identified and marked on AP fluoroscopic imaging. Subcutaneous tissues were localized using 1% PF Lidocaine to improve patient comfort. A 25 gauge 3.5 inch spinal needle was advance until the needle rested on OS at the interface of the sacral ala and the base of the sacral superior articular process. After negative  "aspiration, 1 mL of a solution containing 4 mL of 1% PF Lidocaine and 2 mL of Methylprednisolone (40 mg/mL) was injected. No pain or paresthesia was noted on injection. After right side injection, the fluoroscope was obliqued to the Right until the devin dog outline of the L5 vertebrae came into view. The spinal needle was advanced to the "eye of the devin dog" and after negative aspiration a 1 mL of the above noted solution was injected. No pain or paresthesia was noted. This technique was repeated at each of the above noted levels. The spinal needle was removed intact following injection at each targeted site. The stylet was replaced prior to needle removal at each site.    The Left sacral ala and superior articular process was identified and marked on AP fluoroscopic imaging. Subcutaneous tissues were localized using 1% PF Lidocaine to improve patient comfort. A 25 gauge 3.5 inch spinal needle was advance until the needle rested on OS at the interface of the sacral ala and the base of the sacral superior articular process. After negative aspiration, 1 mL of a solution containing 4 mL of 1% PF Lidocaine and 2 mL of Methylprednisolone (40 mg/mL) was injected. No pain or paresthesia was noted on injection. After left side injection, the fluoroscope was obliqued to the Left until the devin dog outline of the L5 vertebrae came into view. The spinal needle was advanced to the "eye of the devin dog" and after negative aspiration a 1 mL of the above noted solution was injected. No pain or paresthesia was noted. This technique was repeated at each of the above noted levels. The spinal needle was removed intact following injection at each targeted site. The stylet was replaced prior to needle removal at each site.    Description of Findings: Not applicable    Prosthetic devices, grafts, tissues, or devices implanted: None    Specimen Removed: No    ESTIMATED BLOOD LOSS: minimal    COMPLICATIONS: None    DISPOSITION " / PLANS: The patient was transferred to the recovery area in a stable condition for observation. The patient was reexamined prior to discharge. There was no evidence of acute neurologic injury following the procedure.  Patient was discharged from the recovery room after meeting discharge criteria. Home discharge instructions were given to the patient by the staff.

## 2018-04-23 ENCOUNTER — LAB VISIT (OUTPATIENT)
Dept: LAB | Facility: HOSPITAL | Age: 68
End: 2018-04-23
Attending: INTERNAL MEDICINE
Payer: MEDICARE

## 2018-04-23 ENCOUNTER — OFFICE VISIT (OUTPATIENT)
Dept: NEPHROLOGY | Facility: CLINIC | Age: 68
End: 2018-04-23
Payer: MEDICARE

## 2018-04-23 VITALS
BODY MASS INDEX: 17.67 KG/M2 | DIASTOLIC BLOOD PRESSURE: 90 MMHG | WEIGHT: 106.06 LBS | SYSTOLIC BLOOD PRESSURE: 158 MMHG | HEIGHT: 65 IN | HEART RATE: 80 BPM

## 2018-04-23 DIAGNOSIS — E87.20 ACIDOSIS: ICD-10-CM

## 2018-04-23 DIAGNOSIS — E87.5 HYPERKALEMIA: ICD-10-CM

## 2018-04-23 DIAGNOSIS — M1A.3710 CHRONIC GOUT DUE TO RENAL IMPAIRMENT INVOLVING TOE OF RIGHT FOOT WITHOUT TOPHUS: ICD-10-CM

## 2018-04-23 DIAGNOSIS — N25.81 HYPERPARATHYROIDISM, SECONDARY RENAL: ICD-10-CM

## 2018-04-23 DIAGNOSIS — E83.52 HYPERCALCEMIA: ICD-10-CM

## 2018-04-23 DIAGNOSIS — N18.4 STAGE 4 CHRONIC KIDNEY DISEASE: ICD-10-CM

## 2018-04-23 DIAGNOSIS — R80.1 PERSISTENT PROTEINURIA: ICD-10-CM

## 2018-04-23 DIAGNOSIS — D63.8 ANEMIA OF CHRONIC DISEASE: ICD-10-CM

## 2018-04-23 DIAGNOSIS — N18.4 STAGE 4 CHRONIC KIDNEY DISEASE: Primary | ICD-10-CM

## 2018-04-23 LAB
ALBUMIN SERPL BCP-MCNC: 3.1 G/DL
ANION GAP SERPL CALC-SCNC: 11 MMOL/L
BUN SERPL-MCNC: 31 MG/DL
CALCIUM SERPL-MCNC: 8.3 MG/DL
CHLORIDE SERPL-SCNC: 106 MMOL/L
CO2 SERPL-SCNC: 27 MMOL/L
CREAT SERPL-MCNC: 1.2 MG/DL
EST. GFR  (AFRICAN AMERICAN): 54.8 ML/MIN/1.73 M^2
EST. GFR  (NON AFRICAN AMERICAN): 47.5 ML/MIN/1.73 M^2
GLUCOSE SERPL-MCNC: 87 MG/DL
PHOSPHATE SERPL-MCNC: 3.3 MG/DL
POTASSIUM SERPL-SCNC: 4.2 MMOL/L
SODIUM SERPL-SCNC: 144 MMOL/L

## 2018-04-23 PROCEDURE — 99499 UNLISTED E&M SERVICE: CPT | Mod: S$GLB,,, | Performed by: INTERNAL MEDICINE

## 2018-04-23 PROCEDURE — 80069 RENAL FUNCTION PANEL: CPT

## 2018-04-23 PROCEDURE — 99999 PR PBB SHADOW E&M-EST. PATIENT-LVL III: CPT | Mod: PBBFAC,,, | Performed by: INTERNAL MEDICINE

## 2018-04-23 PROCEDURE — 99214 OFFICE O/P EST MOD 30 MIN: CPT | Mod: S$GLB,,, | Performed by: INTERNAL MEDICINE

## 2018-04-23 PROCEDURE — 3077F SYST BP >= 140 MM HG: CPT | Mod: CPTII,S$GLB,, | Performed by: INTERNAL MEDICINE

## 2018-04-23 PROCEDURE — 36415 COLL VENOUS BLD VENIPUNCTURE: CPT

## 2018-04-23 PROCEDURE — 3080F DIAST BP >= 90 MM HG: CPT | Mod: CPTII,S$GLB,, | Performed by: INTERNAL MEDICINE

## 2018-04-23 RX ORDER — ALLOPURINOL 100 MG/1
100 TABLET ORAL DAILY
Qty: 90 TABLET | Refills: 5 | Status: SHIPPED | OUTPATIENT
Start: 2018-04-23

## 2018-04-23 RX ORDER — AMLODIPINE BESYLATE 5 MG/1
5 TABLET ORAL DAILY
Qty: 30 TABLET | Refills: 11 | Status: SHIPPED | OUTPATIENT
Start: 2018-04-23 | End: 2018-11-06 | Stop reason: SDUPTHER

## 2018-04-23 NOTE — PROGRESS NOTES
Subjective:       Patient ID: Ibeth Schroeder is a 67 y.o. White female who presents for follow up of Chronic Kidney Disease; Hypercalcemia; and Hypertension    Hypertension   Pertinent negatives include no chest pain, headaches, palpitations or shortness of breath.        Patient is a white female with severe COPD and history of acute kidney injury status post hemodialysis in the year 2012.  She has been followed in the nephrology division.  She was last seen by Dr. Jara in August 2016 when her creatinine was higher.  Today she comes back for follow-up as a new patient to me.  Patient has been having some wheezing.  Patient has lost 5 pounds in last 6 months unintentionally.  Patient has not been drinking very well.  Her creatinine is higher at 1.9.  Patient overall is a very poor historian.  Denies any fevers and chills.  Denied any urinary symptoms.  Denies any cough or sputum.    September 2016 acute kidney injury--- ACE inhibitor was stopped    October 2016 creatinine came down to 1.6; renal ultrasound showed echogenic kidneys with possible renal artery stenosis; AAA of size of 4.0 cm    January 2017: Recurrent UTI instructions given, mild hypercalcemia over-the-counter vitamin D stopped, continued with calcitriol for hyperparathyroidism    April 2017 Norvasc was stopped due to relative hypotension    August 2017 GFR is 28% based on Cystatin C with a corresponding creatinine of 1.6; creatinine in the last 6 months have been fluctuating between 1.6 and 2.2.  There was one creatinine of 0.9 which could have been a lab error. S/p fall with pelvic # and left wrist # and s/p plate ( dr. Lizarraga) pending holter today       April 2018 patient seen for follow-up: Reviewed records from pain management, rheumatology for Prolia and management of gout, internal medicine records reviewed, urology records Dr. Garcia reviewed with stone protocol CT scan done in January 2018 showing renal cyst, pulmonary, psychiatry,  "hematology records also reviewed.  Last labs are from February 2018.  Calcium has gone up to 11.0.  Creatinine has gone up from 1.2 up to 1.7 area      Review of Systems   Constitutional: Negative for activity change, appetite change, chills, diaphoresis, fatigue, fever and unexpected weight change.   HENT: Negative for congestion, dental problem, drooling, postnasal drip, rhinorrhea and voice change.    Eyes: Negative for discharge.   Respiratory: Negative for apnea, cough, choking, chest tightness, shortness of breath, wheezing and stridor.    Cardiovascular: Negative for chest pain, palpitations and leg swelling.   Gastrointestinal: Negative for abdominal distention, blood in stool, constipation, diarrhea, nausea, rectal pain and vomiting.   Endocrine: Negative for cold intolerance, heat intolerance, polydipsia and polyuria.   Genitourinary: Negative for decreased urine volume, difficulty urinating, dysuria, enuresis, flank pain, frequency, hematuria and urgency.   Musculoskeletal: Negative for arthralgias, back pain, gait problem and joint swelling.   Skin: Negative for rash.   Allergic/Immunologic: Negative for food allergies and immunocompromised state.   Neurological: Negative for dizziness, tremors, syncope, numbness and headaches.   Hematological: Does not bruise/bleed easily.   Psychiatric/Behavioral: Negative for agitation, behavioral problems and self-injury. The patient is not nervous/anxious and is not hyperactive.    All other systems reviewed and are negative.      Objective:     BP (!) 158/90   Pulse 80   Ht 5' 5" (1.651 m)   Wt 48.1 kg (106 lb 0.7 oz)   LMP  (LMP Unknown)   BMI 17.65 kg/m²      Physical Exam   Constitutional: She is oriented to person, place, and time. She appears well-developed and well-nourished.   HENT:   Head: Normocephalic and atraumatic.   Eyes: Conjunctivae and EOM are normal. Pupils are equal, round, and reactive to light.   Neck: Normal range of motion and full " passive range of motion without pain. Neck supple. Carotid bruit is not present. No edema present. No thyroid mass and no thyromegaly present.   Cardiovascular: Normal rate, regular rhythm, S1 normal, S2 normal, normal heart sounds, intact distal pulses and normal pulses.  PMI is not displaced.  Exam reveals no friction rub.    No murmur heard.  Pulmonary/Chest: Effort normal and breath sounds normal. No accessory muscle usage. No respiratory distress. She has no wheezes. She has no rales. She exhibits no tenderness.   Abdominal: Soft. Bowel sounds are normal. She exhibits no distension and no mass. There is no hepatosplenomegaly. There is no tenderness. There is no rebound and no CVA tenderness. No hernia.   Musculoskeletal: Normal range of motion. She exhibits no edema or tenderness.   Neurological: She is alert and oriented to person, place, and time. She has normal reflexes. She displays normal reflexes. No cranial nerve deficit or sensory deficit. She exhibits normal muscle tone. Coordination normal.   Skin: Skin is warm and dry. No bruising, no ecchymosis and no rash noted. No cyanosis or erythema. No pallor. Nails show no clubbing.   Psychiatric: She has a normal mood and affect. Her speech is normal and behavior is normal. Judgment and thought content normal.         Lab Results   Component Value Date    CREATININE 1.7 (H) 02/21/2018    BUN 36 (H) 02/21/2018     02/21/2018    K 4.0 02/21/2018     02/21/2018    CO2 29 02/21/2018     Lab Results   Component Value Date    WBC 7.99 11/17/2017    WBC 7.99 11/17/2017    HGB 11.8 (L) 11/17/2017    HGB 11.8 (L) 11/17/2017    HCT 36.4 (L) 11/17/2017    HCT 36.4 (L) 11/17/2017    MCV 92 11/17/2017    MCV 92 11/17/2017     11/17/2017     11/17/2017     Lab Results   Component Value Date    .0 (H) 08/21/2017    CALCIUM 11.0 (H) 02/21/2018    CAION 0.90 (L) 09/18/2012    PHOS 2.6 (L) 08/28/2017     Lab Results   Component Value Date     URICACID 8.6 (H) 08/21/2017       Assessment:    )    1. Stage 4 chronic kidney disease    2. Hyperparathyroidism, secondary renal    3. Hyperkalemia    4. Acidosis    5. Persistent proteinuria    6. 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 Hypercalcemia        Plan:         1. Chronic kidney disease stage III/4: No heavy proteinuria.  Continue current conservative management.  No intervention for renal artery stenosis at this time.  Avoid ACE inhibitor and ARB at this time.  Cystatin C  GFR 28 % recheck labs ; last one from 2/2018       2.  Hypertension: off ACEI  ; currently on small dose of beta blocker.  Restart Norvasc at 5 mg daily and follow-up in 3 months    3.  Anemia:on PO iron and IV iron per hematology --arrange follow up with dr. Mar     4.  AAA  --dr. Liz is is following her       5.  Proteinuria: Stable without  ARB or ACE inhibitor    6.  Hyperparathyroidism with hypercalcemia status post Prolia: DC calcitriol.  Check vitamin D levels on follow-up      7. Gout: Continue on allopurinol 100 mg ---life long with uric acid target < 6 mg/ dl and it protects kidney and prevents gout : followed by rheumatology            Follow up 3 months     Cristina Crowder MD

## 2018-05-14 ENCOUNTER — OFFICE VISIT (OUTPATIENT)
Dept: PAIN MEDICINE | Facility: CLINIC | Age: 68
End: 2018-05-14
Payer: MEDICARE

## 2018-05-14 VITALS
BODY MASS INDEX: 17.66 KG/M2 | DIASTOLIC BLOOD PRESSURE: 89 MMHG | HEIGHT: 65 IN | WEIGHT: 106 LBS | HEART RATE: 80 BPM | SYSTOLIC BLOOD PRESSURE: 140 MMHG

## 2018-05-14 DIAGNOSIS — G89.4 CHRONIC PAIN DISORDER: ICD-10-CM

## 2018-05-14 DIAGNOSIS — M47.816 LUMBAR SPONDYLOSIS: Primary | ICD-10-CM

## 2018-05-14 DIAGNOSIS — M47.816 LUMBAR FACET ARTHROPATHY: ICD-10-CM

## 2018-05-14 PROCEDURE — 3079F DIAST BP 80-89 MM HG: CPT | Mod: CPTII,S$GLB,, | Performed by: ANESTHESIOLOGY

## 2018-05-14 PROCEDURE — 3077F SYST BP >= 140 MM HG: CPT | Mod: CPTII,S$GLB,, | Performed by: ANESTHESIOLOGY

## 2018-05-14 PROCEDURE — 99214 OFFICE O/P EST MOD 30 MIN: CPT | Mod: S$GLB,,, | Performed by: ANESTHESIOLOGY

## 2018-05-14 PROCEDURE — 99499 UNLISTED E&M SERVICE: CPT | Mod: S$GLB,,, | Performed by: ANESTHESIOLOGY

## 2018-05-14 PROCEDURE — 99999 PR PBB SHADOW E&M-EST. PATIENT-LVL III: CPT | Mod: PBBFAC,,, | Performed by: ANESTHESIOLOGY

## 2018-05-14 PROCEDURE — 3008F BODY MASS INDEX DOCD: CPT | Mod: CPTII,S$GLB,, | Performed by: ANESTHESIOLOGY

## 2018-05-14 RX ORDER — OXYCODONE HYDROCHLORIDE 5 MG/1
5 TABLET ORAL EVERY 12 HOURS PRN
Qty: 45 TABLET | Refills: 0 | Status: SHIPPED | OUTPATIENT
Start: 2018-06-14 | End: 2018-07-09 | Stop reason: SDUPTHER

## 2018-05-14 RX ORDER — OXYCODONE HYDROCHLORIDE 5 MG/1
5 TABLET ORAL EVERY 12 HOURS PRN
Qty: 45 TABLET | Refills: 0 | Status: SHIPPED | OUTPATIENT
Start: 2018-05-14 | End: 2018-05-14 | Stop reason: SDUPTHER

## 2018-05-14 NOTE — PROGRESS NOTES
Chief Pain Complaint:  Low Back Pain    Interval History: The patient was last seen 4/6/18. At that time she underwent Bilateral L3, L4, L5 Lumbar Medial Branch Block under Fluoroscopic Guidance. The patient reports that  she is/was better following the procedure. She had 50% releif with functional improvement. The changes lasted 4 weeks. The changes have continued through this visit.      History of Present Illness:  This patient is a 67 y.o. female who presents today complaining of the above noted pain/s. The patient describes this pain as follows.    - duration of pain: pain for years   - timing: intermittent   - character: sharp, aching  - radiating, dermatomal: pain is nonradiating  - antecedent trauma, prior spinal surgery: no prior trauma, no prior spinal surgery, left hip replacement & revision, left radial ORIF on 7-12-17, left hardware revision on 9-6-17  - pertinent negatives: No fever, No chills, No weight loss, No bladder dysfunction, No bowel dysfunction, No extremity weakness, No saddle anesthesia  - pertinent positives: none    - medications, other therapies tried (physical therapy, injections):     >> Tylenol, Ultracet    >> Has previously undergone Physical Therapy    >> Has previously undergone spinal injection/s:    - what sounds like 2 lumbar MBBs at [facility] with great relief   - right lumbar MBB on 1-18-17 & left lumbar MBB on 2-8-17 with excellent relief        IMAGING / Labs / Studies (reviewed on 5/14/2018):    8/11/16 X-Ray Lumbar Spine AP And Lateral  Comparison: 09/24/2013  Technique: AP, lateral, lateral flexion, lateral extension, bilateral oblique, and lumbosacral coned down views were obtained of the lumbar spine  Findings:   Vertebral body heights and alignment are within normal limits.  No change in spinal alignment with flexion or extension to suggest instability. Multilevel degenerative disc height loss again noted throughout the lumbar spine, most severe at the levels of  T12-L1, L1.  Moderate disc loss present at L4-L5 and L5-S1.  There multilevel degenerative endplate changes and osteophyte production.  Findings appear essentially unchanged from prior.  Bilateral facet arthropathy present at L4-L5 and L5-S1.  No pars defects visualized.  Posterior elements appear grossly intact. No acute fractures or subluxations are demonstrated.  IVC filter again noted.  Aortic vascular calcifications present.  Previously described aneurysmal dilatation of the distal abdominal aorta appear similar to prior measuring up to approximately 4.2 cm in diameter.         9/24/13 X-Ray Lumbar Spine AP And Lateral    Narrative Findings:  Comparison is with 6/16/09.  Mild dextroscoliosis and diffuse changes of degenerative disk disease as well as diffuse facet joint hypertrophy are again demonstrated.  These changes of degenerative disk disease are particularly pronounced at the T12-L1, L1-2, L4-5 and L5-S1 levels.  AP alignment appears maintained with no acute compression fractures identified.  Again demonstrated are surgical clips consistent with prior cholecystectomy, IVC filter and left hip prosthesis.  Also again demonstrated is diffuse calcification of the abdominal aorta including distal aneurysmal dilatation measured at 4.4 cm in the AP axis at the level of the superior endplate of L4. This dilatation appears to have slightly increased in the interim previously measured at this level at approximately 3.3 cm.         Review of Systems:  CONSTITUTIONAL: patient denies any fever, chills, or weight loss  SKIN: patient denies any rash or itching  RESPIRATORY: patient denies having any shortness of breath  GASTROINTESTINAL: patient denies having any diarrhea, constipation, or bowel incontinence  GENITOURINARY: patient denies having any abnormal bladder function    MUSCULOSKELETAL:  - patient complains of the above noted pain/s (see chief pain complaint)    NEUROLOGICAL:   - pain as above  - strength in  "Lower extremities is intact, BILATERALLY  - sensation in Lower extremities is intact, BILATERALLY  - patient denies any loss of bowel or bladder control      PSYCHIATRIC: patient reports a history of anxiety and depression        Physical Exam:  Vitals:  BP (!) 140/89   Pulse 80   Ht 5' 5" (1.651 m)   Wt 48.1 kg (106 lb)   LMP  (LMP Unknown)   BMI 17.64 kg/m²    (reviewed on 5/14/2018)    General: alert and oriented, in no apparent distress, poorly nourished  Gait: normal gait  Skin: No rashes, No discoloration, No obvious lesions  HEENT: EOMI  Respiratory: respirations nonlabored    Musculoskeletal:  - Any pain on flexion, extension, rotation:    >> pain on extension and rotation, improved since injection  - Straight Leg Raise:     >> LEFT :: negative    >> RIGHT :: negative  - Any tenderness to palpation across paraspinal muscles, joints, bursae:     >> across lumbar paraspinals, mild    Neuro:  - Extremity Strength:     >> LEFT :: 5/5    >> RIGHT :: 5/5     Psych:  Mood and affect is appropriate      Assessment:   Encounter Diagnoses   Name Primary?    Lumbar spondylosis Yes    Lumbar facet arthropathy     Chronic pain disorder          Plan:  1. Interventional: None for now.    2. Pharmacologic: Patient states tramadol 50 mg PO TID PRN is not helpful as much and wants to try something else. Will change opioid to Oxycodone 5 mg PO BID PRN (45 tabs)  Refill x1 post dated for 6/14. No tylenol secondary to liver problems with Tylenol. Patient tolerating opioids with no side effects, obtaining good pain control with functional improvement. Discussed the risk of chronic opioid medication management.  checked. Opioid contract signed.       3. Rehabilitative: Encouraged regular exercise.    4. Diagnostic: None for now.    5. Follow up: 8 weeks.       >> Pain Disability Index:  4/12/2017 :: 33  7/7/2017 :: 56    >>Opioid Risk Tool:  1/12/2017 :: 1       "

## 2018-05-29 ENCOUNTER — OFFICE VISIT (OUTPATIENT)
Dept: PSYCHIATRY | Facility: CLINIC | Age: 68
End: 2018-05-29
Payer: MEDICARE

## 2018-05-29 DIAGNOSIS — R44.3 HALLUCINATIONS: ICD-10-CM

## 2018-05-29 DIAGNOSIS — F03.90 MAJOR NEUROCOGNITIVE DISORDER: Primary | ICD-10-CM

## 2018-05-29 PROCEDURE — 99213 OFFICE O/P EST LOW 20 MIN: CPT | Mod: S$GLB,,, | Performed by: PSYCHIATRY & NEUROLOGY

## 2018-05-29 PROCEDURE — 99499 UNLISTED E&M SERVICE: CPT | Mod: S$GLB,,, | Performed by: PSYCHIATRY & NEUROLOGY

## 2018-05-29 RX ORDER — BUSPIRONE HYDROCHLORIDE 15 MG/1
15 TABLET ORAL 2 TIMES DAILY
Qty: 60 TABLET | Refills: 2 | Status: SHIPPED | OUTPATIENT
Start: 2018-05-29 | End: 2018-08-27 | Stop reason: SDUPTHER

## 2018-05-29 RX ORDER — QUETIAPINE FUMARATE 25 MG/1
TABLET, FILM COATED ORAL
Qty: 90 TABLET | Refills: 0 | Status: SHIPPED | OUTPATIENT
Start: 2018-05-29 | End: 2018-08-27 | Stop reason: ALTCHOICE
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
## 2018-06-06 ENCOUNTER — HOSPITAL ENCOUNTER (OUTPATIENT)
Dept: RADIOLOGY | Facility: HOSPITAL | Age: 68
Discharge: HOME | End: 2018-06-06
Attending: INTERNAL MEDICINE
Payer: MEDICARE

## 2018-06-06 ENCOUNTER — PROCEDURE VISIT (OUTPATIENT)
Dept: PULMONOLOGY | Facility: CLINIC | Age: 68
End: 2018-06-06
Payer: MEDICARE

## 2018-06-06 ENCOUNTER — OFFICE VISIT (OUTPATIENT)
Dept: PULMONOLOGY | Facility: CLINIC | Age: 68
End: 2018-06-06
Payer: MEDICARE

## 2018-06-06 VITALS
WEIGHT: 108 LBS | BODY MASS INDEX: 17.99 KG/M2 | RESPIRATION RATE: 18 BRPM | HEIGHT: 65 IN | OXYGEN SATURATION: 98 % | HEART RATE: 64 BPM

## 2018-06-06 VITALS
HEART RATE: 62 BPM | WEIGHT: 108 LBS | BODY MASS INDEX: 17.99 KG/M2 | HEIGHT: 65 IN | SYSTOLIC BLOOD PRESSURE: 122 MMHG | OXYGEN SATURATION: 98 % | DIASTOLIC BLOOD PRESSURE: 80 MMHG | RESPIRATION RATE: 18 BRPM

## 2018-06-06 DIAGNOSIS — J44.9 CHRONIC OBSTRUCTIVE PULMONARY DISEASE, UNSPECIFIED COPD TYPE: Primary | ICD-10-CM

## 2018-06-06 DIAGNOSIS — G47.36 NOCTURNAL HYPOXEMIA DUE TO EMPHYSEMA: Chronic | ICD-10-CM

## 2018-06-06 DIAGNOSIS — J44.9 STAGE 3 SEVERE COPD BY GOLD CLASSIFICATION: Chronic | ICD-10-CM

## 2018-06-06 DIAGNOSIS — I27.9 PULMONARY HEART DISEASE: Chronic | ICD-10-CM

## 2018-06-06 DIAGNOSIS — J44.9 STAGE 3 SEVERE COPD BY GOLD CLASSIFICATION: Primary | Chronic | ICD-10-CM

## 2018-06-06 DIAGNOSIS — J44.9 COPD (CHRONIC OBSTRUCTIVE PULMONARY DISEASE): Primary | ICD-10-CM

## 2018-06-06 DIAGNOSIS — R09.02 EXERCISE HYPOXEMIA: ICD-10-CM

## 2018-06-06 DIAGNOSIS — J43.9 NOCTURNAL HYPOXEMIA DUE TO EMPHYSEMA: Chronic | ICD-10-CM

## 2018-06-06 LAB
BRPFT: ABNORMAL
FEF 25 75 CHG: 12.9 %
FEF 25 75 LLN: 1
FEF 25 75 POST REF: 11.5 %
FEF 25 75 POST: 0.24 L/S (ref 1–3.56)
FEF 25 75 PRE REF: 10.2 %
FEF 25 75 PRE: 0.21 L/S (ref 1–3.56)
FEF 25 75 REF: 2.07
FET100 CHG: 24.9 %
FET100 POST: 11.49 SEC
FET100 PRE: 9.2 SEC
FEV1 CHG: 13.4 %
FEV1 FVC CHG: -0.3 %
FEV1 FVC LLN: 66
FEV1 FVC POST REF: 53 %
FEV1 FVC POST: 41.59 % (ref 65.81–89.4)
FEV1 FVC PRE REF: 53.1 %
FEV1 FVC PRE: 41.72 % (ref 65.81–89.4)
FEV1 FVC REF: 79
FEV1 LLN: 1.78
FEV1 POST REF: 33.3 %
FEV1 POST: 0.8 L (ref 1.78–3.01)
FEV1 PRE REF: 29.3 %
FEV1 PRE: 0.71 L (ref 1.78–3.01)
FEV1 REF: 2.41
FEV6 CHG: 9.5 %
FEV6 LLN: 2.46
FEV6 POST REF: 50.3 %
FEV6 POST: 1.59 L (ref 2.46–3.86)
FEV6 PRE REF: 46 %
FEV6 PRE: 1.45 L (ref 2.46–3.86)
FEV6 REF: 3.16
FVC CHG: 13.8 %
FVC LLN: 2.29
FVC POST REF: 62.3 %
FVC POST: 1.93 L (ref 2.29–3.93)
FVC PRE REF: 54.8 %
FVC PRE: 1.69 L (ref 2.29–3.93)
FVC REF: 3.09
MVV LLN: 78
MVV REF: 92
PEF CHG: 5.4 %
PEF LLN: 4.39
PEF POST REF: 38.2 %
PEF POST: 2.35 L/S (ref 4.39–7.92)
PEF PRE REF: 36.3 %
PEF PRE: 2.23 L/S (ref 4.39–7.92)
PEF REF: 6.15

## 2018-06-06 PROCEDURE — 99499 UNLISTED E&M SERVICE: CPT | Mod: S$GLB,,, | Performed by: INTERNAL MEDICINE

## 2018-06-06 PROCEDURE — 3074F SYST BP LT 130 MM HG: CPT | Mod: CPTII,S$GLB,, | Performed by: INTERNAL MEDICINE

## 2018-06-06 PROCEDURE — 3079F DIAST BP 80-89 MM HG: CPT | Mod: CPTII,S$GLB,, | Performed by: INTERNAL MEDICINE

## 2018-06-06 PROCEDURE — 71046 X-RAY EXAM CHEST 2 VIEWS: CPT | Mod: TC

## 2018-06-06 PROCEDURE — 71046 X-RAY EXAM CHEST 2 VIEWS: CPT | Mod: 26,,, | Performed by: RADIOLOGY

## 2018-06-06 PROCEDURE — 99999 PR PBB SHADOW E&M-EST. PATIENT-LVL III: CPT | Mod: PBBFAC,,, | Performed by: INTERNAL MEDICINE

## 2018-06-06 PROCEDURE — 94664 DEMO&/EVAL PT USE INHALER: CPT | Mod: 59,S$GLB,, | Performed by: INTERNAL MEDICINE

## 2018-06-06 PROCEDURE — 94060 EVALUATION OF WHEEZING: CPT | Mod: S$GLB,,, | Performed by: INTERNAL MEDICINE

## 2018-06-06 PROCEDURE — 3008F BODY MASS INDEX DOCD: CPT | Mod: CPTII,S$GLB,, | Performed by: INTERNAL MEDICINE

## 2018-06-06 PROCEDURE — 99214 OFFICE O/P EST MOD 30 MIN: CPT | Mod: 25,S$GLB,, | Performed by: INTERNAL MEDICINE

## 2018-06-06 RX ORDER — ZOSTER VACCINE RECOMBINANT, ADJUVANTED 50 MCG/0.5
KIT INTRAMUSCULAR
Refills: 0 | COMMUNITY
Start: 2018-05-06 | End: 2019-04-03

## 2018-06-06 RX ORDER — TRAMADOL HYDROCHLORIDE 50 MG/1
50 TABLET ORAL
Refills: 0 | COMMUNITY
Start: 2018-05-28 | End: 2018-07-11

## 2018-06-06 NOTE — PROGRESS NOTES
Pulmonary Disease Management  [facility]  Initial Visit    Referring Provider: Dr Barrett  Diagnosis: COPD, GOLD 3  Last Hospital Admission: N/A  Last provider visit: 6/6/18    Current Respiratory Medications:  Current Outpatient Prescriptions:     fluticasone-umeclidin-vilanter (TRELEGY ELLIPTA) 100-62.5-25 mcg DsDv, Inhale 1 puff into the lungs once daily., Disp: 60 each, Rfl: 11    ipratropium-albuterol (COMBIVENT RESPIMAT)  mcg/actuation inhaler, inhale 1 puff INTO THE LUNGS four times a day (Patient taking differently: Inhale 1 puff into the lungs every 6 (six) hours as needed. inhale 1 puff INTO THE LUNGS four times a day), Disp: 3 Package, Rfl: 4  larly, Disp: , Rfl: 0      Allergies:   Review of patient's allergies indicates:   Allergen Reactions    Nsaids (non-steroidal anti-inflammatory drug) Hives and Shortness Of Breath    Pcn [penicillins] Hives and Shortness Of Breath    Sulfa (sulfonamide antibiotics) Hives and Shortness Of Breath    Aspirin      Due to Liver        Macrodantin [nitrofurantoin macrocrystalline] Hives    Wellbutrin [bupropion hcl]      Due to liver         Smoking history:   History   Smoking Status    Former Smoker    Packs/day: 1.00    Years: 30.00    Types: Cigarettes    Quit date: 8/5/2010   Smokeless Tobacco    Never Used     Comment: used nicorette gum in past and prn     Heart Rate: 64  SPO2: 98  Respiratory Rate: 16  BMI (kg/m2): 18.01     Cough Type: productive  Cough Frequency: infrequent  Sputum Color: yellow  Sputum Amount: small  Sputum Consistency: thick    Resting Fe Dyspnea Rating : 0.5     Current Oxygen Use: Yes  Device: nasal cannula  Liter Flow: 2  Oxygen Usage Duration: Only while sleeping          PFT Results:  Pre FVC   Date/Time Value Ref Range Status   03/07/2018 03:08 PM 2.34 (L) 2.57 - 3.22 L Final     Pre FEV1   Date/Time Value Ref Range Status   03/07/2018 03:08 PM 0.77 (L) 1.94 - 2.49 L Final     Pre FEV1 FVC   Date/Time  Value Ref Range Status   03/07/2018 03:08 PM 33 % Final     Pre TLC   Date/Time Value Ref Range Status   02/25/2015 02:13 PM 5.53 4.89 - 5.66 L Final            Method Used for Education: Demonstration, Verbal  Did the patient demonstrate understanding?: Yes     Patient Concerns or Expectations: Concern she is not taking inhalers correctly.     Therapist Comments:   Reviewed respiratory medications purpose and reviewed difference between her rescue and maintenance drugs. Patient instructed to rinse mouth following Trelegy Ellipta.  Patient stated understanding.     Practiced proper inhalation technique using the In-Check DIAL inhaler training device.  Patient had good inhalation effort for both DPI and MDI inhalers.     Plan:   Reinforce education  Meds: BOSSMAN/GINA, ICS,LABA,LAMA  DME needs: none  Assigned pulmonary questionnaire  Immunization: pneumococcal- current  , flu- due in fall  Next provider visit: 10/17/18  Next spirometry/CPFT: 10/17/18    Schedule patient in 1 week to reinstruct DPI inhaler use and spirometry    Approximately 35 minutes spent with patient.

## 2018-06-06 NOTE — PROGRESS NOTES
Subjective:      Ibeth Schroeder is a 67 y.o. female with known Moderate to Severe COPD  Follow-up appointment. Last visit 03/07/2018 accompanied by  spouse with her  COPD test score is 19, mRC score 2-3  Mild  respiratory symptoms   Cough and BRADY but  no wheezing    Main concern: poor memory, poor exercise tolerance  FEV1 declined even after adding TRELEGY  Concern about technique  She has rescue Combivent Respimat  She has nocturnal oxygen 3.0 LPM  She has oxygen  mRC 2-3  X-ray was reviewed stable.  Spirometry did show some decline in FEV1  I have reviewed the patient's medical history in detail and updated the computerized patient record.      The following portions of the patient's history were reviewed and updated as appropriate:   She  has a past medical history of Acute pancreatitis; Alcohol dependence; Allergy; Anemia; Anxiety; Arthritis (6/24/2013); Asthma; Back pain; Colon polyp; Colon polyp; COPD (chronic obstructive pulmonary disease); Dependence on renal dialysis (2012); Depression; Disorder of kidney and ureter; Diverticulosis; Diverticulosis; Hiatal hernia; Hyperlipidemia; Hypertension; Hypocalcemia (7/6/2016); Hypothyroidism (6/24/2013); Osteoporosis; Pulmonary embolism; Restless leg syndrome; RLS (restless legs syndrome); Seizure (6/24/2013); Stroke; Tobacco dependence; and Trouble in sleeping.  She  does not have any pertinent problems on file.  She  has a past surgical history that includes Cholecystectomy; Colonoscopy (2013); Oophorectomy (1977 approx); Fracture surgery (Left, 07/12/2017); Fracture surgery (Left, 09/12/2017); Tonsillectomy (1960 approx); Adenoidectomy (1960 approx); Joint replacement (Left, 2000 approx); Joint replacement (Left, 2004 approx); Joint replacement (Right, 2002 approx); Hysterectomy (1977 approx); Hernia repair (1999 approx); Appendectomy; amputation left 2nd finger tip (Left, 2012); and na hole/ ventriculostomy (Right, 08/24/2012).  Her family history includes  Asthma in her father; Breast cancer in her maternal aunt; Cancer in her maternal aunt, maternal aunt, and maternal grandmother; Colon cancer in her maternal grandmother; Diabetes in her mother and son; Heart disease in her father, maternal aunt, and maternal aunt; Hypertension in her mother and son; Kidney disease in her mother; Parkinsonism in her mother.  She  reports that she quit smoking about 7 years ago. Her smoking use included Cigarettes. She has a 30.00 pack-year smoking history. She has never used smokeless tobacco. She reports that she drinks about 1.2 - 1.8 oz of alcohol per week . She reports that she does not use drugs.  She has a current medication list which includes the following prescription(s): allopurinol, amlodipine, buspirone, diphenoxylate-atropine 2.5-0.025 mg, ferrous sulfate, fluticasone-umeclidin-vilanter, ipratropium-albuterol, levothyroxine, metoprolol succinate, oxycodone, pantoprazole, pravastatin, quetiapine, ropinirole, shingrix (pf), and tramadol, and the following Facility-Administered Medications: denosumab.  Current Outpatient Prescriptions on File Prior to Visit   Medication Sig Dispense Refill    allopurinol (ZYLOPRIM) 100 MG tablet Take 1 tablet (100 mg total) by mouth once daily. 90 tablet 5    amLODIPine (NORVASC) 5 MG tablet Take 1 tablet (5 mg total) by mouth once daily. 30 tablet 11    busPIRone (BUSPAR) 15 MG tablet Take 1 tablet (15 mg total) by mouth 2 (two) times daily. 60 tablet 2    diphenoxylate-atropine 2.5-0.025 mg (LOMOTIL) 2.5-0.025 mg per tablet take 2 tablets by mouth four times a day if needed for diarrhea 60 tablet 2    ferrous sulfate 325 (65 FE) MG EC tablet Take 1 tablet (325 mg total) by mouth once a week. (Patient taking differently: Take 325 mg by mouth 2 (two) times daily. ) 90 tablet 3    fluticasone-umeclidin-vilanter (TRELEGY ELLIPTA) 100-62.5-25 mcg DsDv Inhale 1 puff into the lungs once daily. 60 each 11    ipratropium-albuterol  "(COMBIVENT RESPIMAT)  mcg/actuation inhaler inhale 1 puff INTO THE LUNGS four times a day (Patient taking differently: Inhale 1 puff into the lungs every 6 (six) hours as needed. inhale 1 puff INTO THE LUNGS four times a day) 3 Package 4    levothyroxine (SYNTHROID) 100 MCG tablet TAKE 1 TABLET ONE TIME DAILY 90 tablet 3    metoprolol succinate (TOPROL-XL) 50 MG 24 hr tablet Take 1 tablet (50 mg total) by mouth 2 (two) times daily. 60 tablet 11    [START ON 6/14/2018] oxyCODONE (ROXICODONE) 5 MG immediate release tablet Take 1 tablet (5 mg total) by mouth every 12 (twelve) hours as needed for Pain. 45 tablet 0    pantoprazole (PROTONIX) 40 MG tablet TAKE 1 TABLET ONE TIME DAILY 90 tablet 3    pravastatin (PRAVACHOL) 10 MG tablet TAKE 1 TABLET EVERY DAY 90 tablet 0    QUEtiapine (SEROQUEL) 25 MG Tab Take 1/2 to 1 tablet at bedtime 90 tablet 0    ropinirole (REQUIP) 1 MG tablet Take 1 mg by mouth nightly as needed.        Current Facility-Administered Medications on File Prior to Visit   Medication Dose Route Frequency Provider Last Rate Last Dose    denosumab (PROLIA) injection 60 mg  60 mg Subcutaneous Q6 Months Patria Melara PA-C   60 mg at 02/21/18 1429     She is allergic to nsaids (non-steroidal anti-inflammatory drug); pcn [penicillins]; sulfa (sulfonamide antibiotics); aspirin; macrodantin [nitrofurantoin macrocrystalline]; and wellbutrin [bupropion hcl]..    Review of Systems  A comprehensive review of systems was negative.       Objective:      /80 (BP Location: Right arm)   Pulse 62   Resp 18   Ht 5' 5" (1.651 m)   Wt 49 kg (108 lb)   LMP  (LMP Unknown)   SpO2 98%   BMI 17.97 kg/m²      General appearance: alert, appears stated age and cooperative  Head: Normocephalic, without obvious abnormality  Eyes: negative findings: conjunctivae and sclerae normal  Nose: no discharge  Throat: lips, mucosa, and tongue normal; teeth and gums normal  Neck: no adenopathy, no carotid bruit, no " "JVD, supple, symmetrical, trachea midline and thyroid not enlarged, symmetric, no tenderness/mass/nodules  Lungs: clear to auscultation bilaterally and normal percussion bilaterally  Heart: regular rate and rhythm, S1, S2 normal, no murmur, click, rub or gallop  Abdomen: soft, non-tender; bowel sounds normal; no masses,  no organomegaly  Extremities: extremities normal, atraumatic, no cyanosis or edema  Pulses: 2+ and symmetric  Skin: Skin color, texture, turgor normal. No rashes or lesions  Lymph nodes: Cervical, supraclavicular, and axillary nodes normal.  Neurologic: Grossly normal       Spirometry  FEV1 0.71 L (29.3% predicted) FVC is 1.69 L (54.8% predicted) FEV1/FVC was 42.    CXR   Cardiac silhouette remains at the upper limits of normal in size.  Large hiatal hernia again demonstrated.      Scattered chronic parenchymal scarring again noted throughout the lungs, most notably in the left lung base.    No definite acute parenchymal consolidation or pleural effusion visualized.    Multilevel degenerative findings noted throughout the visualized spine.    Chronic lower thoracic compression fracture deformity again noted and unchanged.    Multiple healed left-sided rib fractures are again noted.      6MWD  6MW Test  Height: 5' 5" (165.1 cm)  Weight: 49 kg (108 lb)  BMI (Calculated): 18  Predicted Distance: 386.73  Interpretation  Predicted Distance Meters (Calculated): 527.48 meters    Distance was 121.93m ( 23.55%)       Assessment:      Problem List Items Addressed This Visit     Nocturnal hypoxemia due to emphysema (Chronic)     Continue Nocturnal Oxygen 2.0 LPM         Stage 3 severe COPD by GOLD classification - Primary (Chronic)     CAT score 19  FEV 1 : 0.71 L  29.3% ( improved by 13.4%)with bronchodilator  Immunizations current  mRC score 2  30 pack year smoker  Decline in FEV1 and FVC  Concern about inhaler technique         Relevant Orders    Ambulatory Referral to Pulmonary Disease Management    " Pulmonary heart disease (Chronic)     PA pressure 50mmHg  PAmean = 0.6 x PASP + 2 mm Hg  32mmHG  Likely Group 3.  FC: 1-2  May need RHC in pressures higher         Relevant Orders    2D echo with color flow doppler    Exercise hypoxemia         STEPH Index Score = 4    Interpretation: This result is indicative of a 52 month mortality of approximately 60%.    Results:  Total Criteria Point Count:    STEPH INDEX: 7  Approximate 4 Year Survival Interpretation    7-10 Points: 18%    Plan:      Follow-up in about 3 months (around 9/6/2018) for Spirometry and CXR next visit, PDM, echo 2 D.    PH likely related to Chr resp failure COPD, Hypoxemia  Hx of +ve JACLYN and Anti DDNA    This note was prepared using voice recognition system and is likely to have sound alike errors that may have been overlooked even after proof reading.  Please call me with any questions    Discussed diagnosis, its evaluation, treatment and usual course. All questions answered.  Thank you for the courtesy of participating in the care of this patient    Andrea Barrett MD

## 2018-06-06 NOTE — ASSESSMENT & PLAN NOTE
PA pressure 50mmHg  PAmean = 0.6 x PASP + 2 mm Hg  32mmHG  Likely Group 3.  FC: 1-2  May need RHC in pressures higher

## 2018-06-06 NOTE — ASSESSMENT & PLAN NOTE
CAT score 19  FEV 1 : 0.71 L  29.3% ( improved by 13.4%)with bronchodilator  Immunizations current  mRC score 2  30 pack year smoker  Decline in FEV1 and FVC  Concern about inhaler technique

## 2018-06-12 RX ORDER — PRAVASTATIN SODIUM 10 MG/1
TABLET ORAL
Qty: 90 TABLET | Refills: 0 | Status: SHIPPED | OUTPATIENT
Start: 2018-06-12 | End: 2018-08-15 | Stop reason: SDUPTHER

## 2018-06-14 ENCOUNTER — PATIENT MESSAGE (OUTPATIENT)
Dept: PULMONOLOGY | Facility: CLINIC | Age: 68
End: 2018-06-14

## 2018-07-05 ENCOUNTER — TELEPHONE (OUTPATIENT)
Dept: PULMONOLOGY | Facility: CLINIC | Age: 68
End: 2018-07-05

## 2018-07-05 NOTE — TELEPHONE ENCOUNTER
----- Message from Guillermo Caro sent at 7/5/2018  9:51 AM CDT -----  Contact: pt   Pt having asthma  issues and would like to be worked in on any Monday,Wednesday,or Friday as soon as possible       ..[phone] (home)

## 2018-07-05 NOTE — TELEPHONE ENCOUNTER
----- Message from Guillermo Caro sent at 7/5/2018  9:51 AM CDT -----  Contact: pt   Pt having asthma  issues and would like to be worked in on any Monday,Wednesday,or Friday as soon as possible       ..[phone] (home)

## 2018-07-09 ENCOUNTER — PATIENT OUTREACH (OUTPATIENT)
Dept: PULMONOLOGY | Facility: CLINIC | Age: 68
End: 2018-07-09

## 2018-07-09 RX ORDER — OXYCODONE HYDROCHLORIDE 5 MG/1
5 TABLET ORAL EVERY 12 HOURS PRN
Qty: 45 TABLET | Refills: 0 | Status: SHIPPED | OUTPATIENT
Start: 2018-08-10 | End: 2018-07-11

## 2018-07-09 RX ORDER — OXYCODONE HYDROCHLORIDE 5 MG/1
5 TABLET ORAL EVERY 12 HOURS PRN
Qty: 45 TABLET | Refills: 0 | Status: SHIPPED | OUTPATIENT
Start: 2018-07-09 | End: 2018-07-09 | Stop reason: SDUPTHER

## 2018-07-09 NOTE — TELEPHONE ENCOUNTER
I spoke with Marino and asked if the patient is feeling ok right now and would like to see a provider soon. Marino said that the patient has episodes of shortness of breath and recovers after using combivent. Ludy stated he would schedule an appointment for patient using the patient portal.

## 2018-07-11 ENCOUNTER — OFFICE VISIT (OUTPATIENT)
Dept: PAIN MEDICINE | Facility: CLINIC | Age: 68
End: 2018-07-11
Payer: MEDICARE

## 2018-07-11 VITALS
BODY MASS INDEX: 17.83 KG/M2 | SYSTOLIC BLOOD PRESSURE: 136 MMHG | RESPIRATION RATE: 18 BRPM | DIASTOLIC BLOOD PRESSURE: 88 MMHG | WEIGHT: 107 LBS | HEART RATE: 83 BPM | HEIGHT: 65 IN

## 2018-07-11 DIAGNOSIS — M47.817 LUMBOSACRAL SPONDYLOSIS WITHOUT MYELOPATHY: ICD-10-CM

## 2018-07-11 DIAGNOSIS — M47.817 SPONDYLOSIS OF LUMBOSACRAL REGION WITHOUT MYELOPATHY OR RADICULOPATHY: ICD-10-CM

## 2018-07-11 DIAGNOSIS — M47.816 LUMBAR FACET ARTHROPATHY: ICD-10-CM

## 2018-07-11 DIAGNOSIS — M51.36 DDD (DEGENERATIVE DISC DISEASE), LUMBAR: ICD-10-CM

## 2018-07-11 DIAGNOSIS — G89.4 CHRONIC PAIN DISORDER: ICD-10-CM

## 2018-07-11 DIAGNOSIS — M47.816 LUMBAR SPONDYLOSIS: Primary | ICD-10-CM

## 2018-07-11 PROCEDURE — 99499 UNLISTED E&M SERVICE: CPT | Mod: HCNC,S$GLB,, | Performed by: PHYSICIAN ASSISTANT

## 2018-07-11 PROCEDURE — 99999 PR PBB SHADOW E&M-EST. PATIENT-LVL IV: CPT | Mod: PBBFAC,,, | Performed by: PHYSICIAN ASSISTANT

## 2018-07-11 PROCEDURE — 3008F BODY MASS INDEX DOCD: CPT | Mod: CPTII,S$GLB,, | Performed by: PHYSICIAN ASSISTANT

## 2018-07-11 PROCEDURE — 99214 OFFICE O/P EST MOD 30 MIN: CPT | Mod: S$GLB,,, | Performed by: PHYSICIAN ASSISTANT

## 2018-07-11 PROCEDURE — 3079F DIAST BP 80-89 MM HG: CPT | Mod: CPTII,S$GLB,, | Performed by: PHYSICIAN ASSISTANT

## 2018-07-11 PROCEDURE — 3075F SYST BP GE 130 - 139MM HG: CPT | Mod: CPTII,S$GLB,, | Performed by: PHYSICIAN ASSISTANT

## 2018-07-11 RX ORDER — TRAMADOL HYDROCHLORIDE 50 MG/1
50 TABLET ORAL EVERY 8 HOURS PRN
Qty: 90 TABLET | Refills: 1 | Status: SHIPPED | OUTPATIENT
Start: 2018-07-11 | End: 2018-07-29

## 2018-07-11 NOTE — PROGRESS NOTES
Chief Pain Complaint:  Low Back Pain    Interval History: She is still reporting Bilateral L3, L4, L5 Lumbar Medial Branch Block.  She complains today mostly of insomnia.  Her  also reports she does not need the oxycodone all the time. She is only taking this for severe pain, and she still has several left.       History of Present Illness:  This patient is a 67 y.o. female who presents today complaining of the above noted pain/s. The patient describes this pain as follows.    - duration of pain: pain for years   - timing: intermittent   - character: sharp, aching  - radiating, dermatomal: pain is nonradiating  - antecedent trauma, prior spinal surgery: no prior trauma, no prior spinal surgery, left hip replacement & revision, left radial ORIF on 7-12-17, left hardware revision on 9-6-17  - pertinent negatives: No fever, No chills, No weight loss, No bladder dysfunction, No bowel dysfunction, No extremity weakness, No saddle anesthesia  - pertinent positives: none    - medications, other therapies tried (physical therapy, injections):     >> Tylenol, Ultracet    >> Has previously undergone Physical Therapy    >> Has previously undergone spinal injection/s:    - what sounds like 2 lumbar MBBs at [facility] with great relief   - right lumbar MBB on 1-18-17 & left lumbar MBB on 2-8-17 with excellent relief   - Bilateral L3, L4, L5 Lumbar Medial Branch Block on 4/6/18 with >50% pain relief        IMAGING / Labs / Studies (reviewed on 7/11/2018):    8/11/16 X-Ray Lumbar Spine AP And Lateral  Comparison: 09/24/2013  Technique: AP, lateral, lateral flexion, lateral extension, bilateral oblique, and lumbosacral coned down views were obtained of the lumbar spine  Findings:   Vertebral body heights and alignment are within normal limits.  No change in spinal alignment with flexion or extension to suggest instability. Multilevel degenerative disc height loss again noted throughout the lumbar spine, most severe at the  levels of T12-L1, L1.  Moderate disc loss present at L4-L5 and L5-S1.  There multilevel degenerative endplate changes and osteophyte production.  Findings appear essentially unchanged from prior.  Bilateral facet arthropathy present at L4-L5 and L5-S1.  No pars defects visualized.  Posterior elements appear grossly intact. No acute fractures or subluxations are demonstrated.  IVC filter again noted.  Aortic vascular calcifications present.  Previously described aneurysmal dilatation of the distal abdominal aorta appear similar to prior measuring up to approximately 4.2 cm in diameter.         9/24/13 X-Ray Lumbar Spine AP And Lateral    Narrative Findings:  Comparison is with 6/16/09.  Mild dextroscoliosis and diffuse changes of degenerative disk disease as well as diffuse facet joint hypertrophy are again demonstrated.  These changes of degenerative disk disease are particularly pronounced at the T12-L1, L1-2, L4-5 and L5-S1 levels.  AP alignment appears maintained with no acute compression fractures identified.  Again demonstrated are surgical clips consistent with prior cholecystectomy, IVC filter and left hip prosthesis.  Also again demonstrated is diffuse calcification of the abdominal aorta including distal aneurysmal dilatation measured at 4.4 cm in the AP axis at the level of the superior endplate of L4. This dilatation appears to have slightly increased in the interim previously measured at this level at approximately 3.3 cm.         Review of Systems:  CONSTITUTIONAL: patient denies any fever, chills, or weight loss  SKIN: patient denies any rash or itching  RESPIRATORY: patient denies having any shortness of breath  GASTROINTESTINAL: patient denies having any diarrhea, constipation, or bowel incontinence  GENITOURINARY: patient denies having any abnormal bladder function    MUSCULOSKELETAL:  - patient complains of the above noted pain/s (see chief pain complaint)    NEUROLOGICAL:   - pain as above  -  "strength in Lower extremities is intact, BILATERALLY  - sensation in Lower extremities is intact, BILATERALLY  - patient denies any loss of bowel or bladder control      PSYCHIATRIC: patient reports a history of anxiety and depression        Physical Exam:  Vitals:  /88 (BP Location: Right arm, Patient Position: Sitting, BP Method: Medium (Automatic))   Pulse 83   Resp 18   Ht 5' 5" (1.651 m)   Wt 48.5 kg (107 lb)   LMP  (LMP Unknown)   BMI 17.81 kg/m²    (reviewed on 7/11/2018)    General: alert and oriented, in no apparent distress, poorly nourished  Gait: normal gait  Skin: No rashes, No discoloration, No obvious lesions  HEENT: EOMI  Respiratory: respirations nonlabored    Musculoskeletal:  - Any pain on flexion, extension, rotation:    >> pain on extension and rotation, improved since injection  - Straight Leg Raise:     >> LEFT :: negative    >> RIGHT :: negative  - Any tenderness to palpation across paraspinal muscles, joints, bursae:     >> across lumbar paraspinals, mild    Neuro:  - Extremity Strength:     >> LEFT :: 5/5    >> RIGHT :: 5/5  - Sensation: normal sensation to touch bilaterally     Psych:  Mood and affect is appropriate        Assessment:   Encounter Diagnoses   Name Primary?    Lumbar spondylosis Yes    Lumbar facet arthropathy     Chronic pain disorder     Spondylosis of lumbosacral region without myelopathy or radiculopathy     Lumbosacral spondylosis without myelopathy     DDD (degenerative disc disease), lumbar          Plan:  1. Interventional: None for now. She is still reporting relief from bilateral lumbar MBB in April 2018.     2. Pharmacologic:   - Re-start tramadol 50 mg PO TID PRN and refill x 2 months. Will send in electronically. Patient tolerating opioids with no side effects, obtaining good pain control with functional improvement. Discussed the risk of chronic opioid medication management.  - Continue Oxycodone 5 mg PO BID PRN (45 tabs) for severe pain only. "  No tylenol secondary to liver problems with Tylenol. She still has several left from her last Rx.   - Opioid contract signed on 5/14/2018.     - LA  reviewed and appropriate. Last filled oxycodone on 5-18-18 & tramadol on 5-28-18.    3. Rehabilitative: Encouraged regular exercise.    4. Diagnostic: None for now.    5. Other: We discussed her sleeping habits in extensive detail, over 20 minutes. I gave her tips for improving sleep habits, and she will further discuss this with Dr. Mckinley (Psychiatry) at her next appt. I have no medication options to offer her, as she is already on Seroquel for this.     6. Follow up: 8 weeks for med refill.     - I discussed the risks, benefits, and alternatives to potential treatment options. All questions and concerns were fully addressed today in clinic. Dr. Javed was consulted regarding the patient plan and agrees.               >>Opioid Risk Tool:  1/12/2017 :: 1

## 2018-07-16 ENCOUNTER — PATIENT OUTREACH (OUTPATIENT)
Dept: FAMILY MEDICINE | Facility: CLINIC | Age: 68
End: 2018-07-16

## 2018-07-16 ENCOUNTER — HOSPITAL ENCOUNTER (OUTPATIENT)
Dept: RADIOLOGY | Facility: HOSPITAL | Age: 68
Discharge: HOME | End: 2018-07-16
Attending: NURSE PRACTITIONER
Payer: MEDICARE

## 2018-07-16 ENCOUNTER — OFFICE VISIT (OUTPATIENT)
Dept: URGENT CARE | Facility: CLINIC | Age: 68
End: 2018-07-16
Payer: MEDICARE

## 2018-07-16 VITALS
HEART RATE: 86 BPM | OXYGEN SATURATION: 94 % | WEIGHT: 112.75 LBS | TEMPERATURE: 96 F | BODY MASS INDEX: 18.78 KG/M2 | SYSTOLIC BLOOD PRESSURE: 136 MMHG | DIASTOLIC BLOOD PRESSURE: 90 MMHG | RESPIRATION RATE: 10 BRPM | HEIGHT: 65 IN

## 2018-07-16 DIAGNOSIS — I10 ESSENTIAL HYPERTENSION: ICD-10-CM

## 2018-07-16 DIAGNOSIS — R53.83 FATIGUE, UNSPECIFIED TYPE: ICD-10-CM

## 2018-07-16 DIAGNOSIS — R06.02 SOB (SHORTNESS OF BREATH): ICD-10-CM

## 2018-07-16 DIAGNOSIS — R05.9 COUGH: ICD-10-CM

## 2018-07-16 DIAGNOSIS — R06.02 SOB (SHORTNESS OF BREATH): Primary | ICD-10-CM

## 2018-07-16 DIAGNOSIS — N18.30 CHRONIC KIDNEY DISEASE, STAGE 3: ICD-10-CM

## 2018-07-16 DIAGNOSIS — R09.89 CHEST CONGESTION: ICD-10-CM

## 2018-07-16 PROCEDURE — 71046 X-RAY EXAM CHEST 2 VIEWS: CPT | Mod: TC,PO

## 2018-07-16 PROCEDURE — 99214 OFFICE O/P EST MOD 30 MIN: CPT | Mod: 25,S$GLB,, | Performed by: NURSE PRACTITIONER

## 2018-07-16 PROCEDURE — 94640 AIRWAY INHALATION TREATMENT: CPT | Mod: S$GLB,,, | Performed by: NURSE PRACTITIONER

## 2018-07-16 PROCEDURE — 99999 PR PBB SHADOW E&M-EST. PATIENT-LVL V: CPT | Mod: PBBFAC,,, | Performed by: NURSE PRACTITIONER

## 2018-07-16 PROCEDURE — 71046 X-RAY EXAM CHEST 2 VIEWS: CPT | Mod: 26,,, | Performed by: RADIOLOGY

## 2018-07-16 PROCEDURE — 3008F BODY MASS INDEX DOCD: CPT | Mod: CPTII,S$GLB,, | Performed by: NURSE PRACTITIONER

## 2018-07-16 PROCEDURE — 3075F SYST BP GE 130 - 139MM HG: CPT | Mod: CPTII,S$GLB,, | Performed by: NURSE PRACTITIONER

## 2018-07-16 PROCEDURE — 3080F DIAST BP >= 90 MM HG: CPT | Mod: CPTII,S$GLB,, | Performed by: NURSE PRACTITIONER

## 2018-07-16 RX ORDER — LEVALBUTEROL INHALATION SOLUTION 1.25 MG/3ML
1.25 SOLUTION RESPIRATORY (INHALATION)
Status: COMPLETED | OUTPATIENT
Start: 2018-07-16 | End: 2018-07-16

## 2018-07-16 RX ORDER — DOXYCYCLINE 100 MG/1
100 CAPSULE ORAL 2 TIMES DAILY
Qty: 20 CAPSULE | Refills: 0 | Status: SHIPPED | OUTPATIENT
Start: 2018-07-16 | End: 2018-07-26

## 2018-07-16 RX ADMIN — LEVALBUTEROL INHALATION SOLUTION 1.25 MG: 1.25 SOLUTION RESPIRATORY (INHALATION) at 05:07

## 2018-07-16 RX ADMIN — LEVALBUTEROL INHALATION SOLUTION 1.25 MG: 1.25 SOLUTION RESPIRATORY (INHALATION) at 04:07

## 2018-07-16 NOTE — PATIENT INSTRUCTIONS
PLAN: CXR,   Consult pulmonology  Nebulizer with Xopenex 1.25 for 15 and clinic now x 2  Drink plenty of clear fluids--water/juice & rest  Simply saline nasal wash  to irrigate sinuses and for congestion/runny nose.  Cool mist humidifier/vaporizer.  Meds: Doxycycline,  / no refills  Practice good handwashing..  Mucinex for cough and chest congestion.  Tylenol  for fever, headache and body aches.  Warm salt water gargles for throat comfort.  Chloraseptic spray or lozenges for throat comfort.  Advise follow up with PCP  Advise go to ER if symptoms worsen or fail to improve with treatment.  AVS provided and reviewed with patient including supportive care, follow up, and red flag symptoms.   Patient verbalizes understanding and agrees with treatment plan. Discharged from Urgent Care in stable condition.

## 2018-07-16 NOTE — PROGRESS NOTES
Chief complaint/reason for visit: nasal congestion, fatigue, cough & low pulse ox    HISTORY OF PRESENT ILLNESS:    66 y/o female with  complains of nasal congestion, fatigue, low pulse ox, shortness of breath, productive cough of dark mucus with wheezing onset 1 week.  Patient complains cough with wheezing worse at night.  Admits tried medication with no relief.   admits patient's pulse oxygen in 70s and 80s last night.  Denies seeking emergency room treatment. Patient denies chest pain, nausea, vomiting, diarrhea, dizziness.      Past Medical History:   Diagnosis Date    Acute pancreatitis     Alcohol dependence     per patient in the past    Allergy     Anemia     Anxiety     Arthritis 6/24/2013    Asthma     per patient    Back pain     Colon polyp     Colon polyp     colonoscopy 8/26/2013    COPD (chronic obstructive pulmonary disease)     Dependence on renal dialysis 2012    when in liver failure    Depression     Disorder of kidney and ureter     ckd4    Diverticulosis     Diverticulosis     colonoscopy 8/26/2013    Hiatal hernia     CXR 2/25/2015--Large hiatal hernia.     Hyperlipidemia     Hypertension     Hypocalcemia 7/6/2016    Hypothyroidism 6/24/2013    Osteoporosis     Pulmonary embolism     per patient unsure of date    Restless leg syndrome     RLS (restless legs syndrome)     Seizure 6/24/2013    Stroke     ischemic injury due to hypotension    Tobacco dependence     resolved    Trouble in sleeping        Past Surgical History:   Procedure Laterality Date    ADENOIDECTOMY  1960 approx    amputation left 2nd finger tip Left 2012    for blood clot/ after liver failure admit ? gangrene    APPENDECTOMY      incidental with another surgery    na hole/ ventriculostomy Right 08/24/2012    frontal after  liver failure    CHOLECYSTECTOMY      COLONOSCOPY  2013    FRACTURE SURGERY Left 07/12/2017    radius and ulna fracture    FRACTURE SURGERY Left  09/12/2017    wrist removal of hardware    HERNIA REPAIR  1999 approx    incisional hernia     HYSTERECTOMY  1977 approx    RAS/BSO  for endometriosis and tubular pregnancy     JOINT REPLACEMENT Left 2000 approx    hip for arthritis     JOINT REPLACEMENT Left 2004 approx    hip, fell then recurent dislocations - 4 times relocarted under mild anesthesia then re replaced    JOINT REPLACEMENT Right 2002 approx    knee for arthritis    OOPHORECTOMY  1977 approx    TONSILLECTOMY  1960 approx            Family History   Problem Relation Age of Onset    Diabetes Mother     Hypertension Mother     Kidney disease Mother         stones    Parkinsonism Mother     Asthma Father     Heart disease Father     Diabetes Son     Hypertension Son     Heart disease Maternal Aunt         CAD     Cancer Maternal Aunt         Breast Ca    Breast cancer Maternal Aunt     Colon cancer Maternal Grandmother     Cancer Maternal Grandmother     Cancer Maternal Aunt         breast    Heart disease Maternal Aunt     Stroke Neg Hx     Alcohol abuse Neg Hx     Drug abuse Neg Hx     Mental retardation Neg Hx     Mental illness Neg Hx             Social History     Social History    Marital status:      Spouse name: N/A    Number of children: N/A    Years of education: N/A     Occupational History    Not on file.     Social History Main Topics    Smoking status: Former Smoker     Packs/day: 1.00     Years: 30.00     Types: Cigarettes     Quit date: 8/5/2010    Smokeless tobacco: Never Used      Comment: used nicorette gum in past and prn    Alcohol use 1.2 - 1.8 oz/week     1 - 2 Shots of liquor, 1 Glasses of wine per week    Drug use: No    Sexual activity: Not Currently     Partners: Male     Birth control/ protection: See Surgical Hx     Other Topics Concern    Not on file     Social History Narrative    . One son  In good health. Does not drive Poor vision left eye. Retired from [employer]  work/. Did AD when in ICU in 2012.        ROS:  GENERAL: Reports  fatigue.   SKIN: No rashes, itching or changes in color or texture of skin.  HEENT: Reports nasal congestion    NODES: No masses or lesions. Denies swollen glands.  CHEST: Reports productive cough of dark mucus. & wheezing  CARDIOVASCULAR: Denies chest pain, shortness of breath  ABDOMEN: Appetite fair, No weight loss.  MUSCULOSKELETAL: reports no back pain.  NEUROLOGIC: No history of seizures, paralysis, alteration of gait or coordination.  PSYCHIATRIC: Edward mood swings, depression.    PE:   APPEARANCE: Well nourished, well developed, in moderate distress  V/S: Reviewed.  SKIN: Normal skin turgor, no lesions.  HEENT: Turbinates red, Minimal red pharynx, TM's poor light reflex bilateral.  CHEST: expiratory wheezing with rhonchi on auscultation.  CARDIOVASCULAR: Regular rate and rhythm.   MUSCULOSKELETAL: RAM without difficulty  NEUROLOGIC: No sensory deficits. Gait & Posture: normal, No cerebellar signs.  MENTAL STATUS: Patient alert, oriented x 3 & conversant.    PLAN: CXR,   Consult pulmonology  Nebulizer with Xopenex 1.25 for 15 and clinic now x 2  Drink plenty of clear fluids--water/juice & rest  Simply saline nasal wash  to irrigate sinuses and for congestion/runny nose.  Cool mist humidifier/vaporizer.  Med's: Doxycycline  / no refills  Practice good handwashing..  Mucinex for cough and chest congestion.  Tylenol  for fever, headache and body aches.  Warm salt water gargles for throat comfort.  Chloraseptic spray or lozenges for throat comfort.  Advise follow up with PCP  Advise go to ER if symptoms worsen or fail to improve with treatment.  AVS provided and reviewed with patient including supportive care, follow up, and red flag symptoms.   Patient verbalizes understanding and agrees with treatment plan. Discharged from Urgent Care in stable condition.      DIAGNOSIS:  Fatigue  SOB  COPD  Hypertension  Cough / Bronchitis   Chronic  kidney disease

## 2018-07-17 ENCOUNTER — TELEPHONE (OUTPATIENT)
Dept: RADIOLOGY | Facility: HOSPITAL | Age: 68
End: 2018-07-17

## 2018-07-18 ENCOUNTER — HOSPITAL ENCOUNTER (OUTPATIENT)
Dept: RADIOLOGY | Facility: HOSPITAL | Age: 68
Discharge: HOME | End: 2018-07-18
Attending: UROLOGY
Payer: MEDICARE

## 2018-07-18 DIAGNOSIS — R31.9 HEMATURIA, UNSPECIFIED TYPE: ICD-10-CM

## 2018-07-18 PROCEDURE — 76770 US EXAM ABDO BACK WALL COMP: CPT | Mod: TC

## 2018-07-18 PROCEDURE — 76770 US EXAM ABDO BACK WALL COMP: CPT | Mod: 26,,, | Performed by: RADIOLOGY

## 2018-07-19 ENCOUNTER — PATIENT MESSAGE (OUTPATIENT)
Dept: PULMONOLOGY | Facility: CLINIC | Age: 68
End: 2018-07-19

## 2018-07-29 ENCOUNTER — HOSPITAL ENCOUNTER (INPATIENT)
Facility: HOSPITAL | Age: 68
LOS: 2 days | Discharge: HOME | DRG: 871 | End: 2018-07-31
Attending: EMERGENCY MEDICINE | Admitting: EMERGENCY MEDICINE
Payer: MEDICARE

## 2018-07-29 DIAGNOSIS — D72.829 LEUKOCYTOSIS, UNSPECIFIED TYPE: ICD-10-CM

## 2018-07-29 DIAGNOSIS — J44.1 COPD WITH ACUTE EXACERBATION: Primary | ICD-10-CM

## 2018-07-29 DIAGNOSIS — R06.02 SHORTNESS OF BREATH: ICD-10-CM

## 2018-07-29 DIAGNOSIS — R09.02 HYPOXIA: ICD-10-CM

## 2018-07-29 DIAGNOSIS — J96.21 ACUTE ON CHRONIC RESPIRATORY FAILURE WITH HYPOXIA: ICD-10-CM

## 2018-07-29 PROBLEM — A41.9 SEPSIS: Status: ACTIVE | Noted: 2018-07-29

## 2018-07-29 PROBLEM — J18.9 PNEUMONIA DUE TO INFECTIOUS ORGANISM: Status: ACTIVE | Noted: 2018-07-29

## 2018-07-29 LAB
ALBUMIN SERPL BCP-MCNC: 3.7 G/DL
ALLENS TEST: ABNORMAL
ALP SERPL-CCNC: 124 U/L
ALT SERPL W/O P-5'-P-CCNC: 9 U/L
ANION GAP SERPL CALC-SCNC: 12 MMOL/L
AST SERPL-CCNC: 25 U/L
BACTERIA #/AREA URNS HPF: NORMAL /HPF
BASOPHILS # BLD AUTO: 0.01 K/UL
BASOPHILS NFR BLD: 0.1 %
BILIRUB SERPL-MCNC: 0.6 MG/DL
BILIRUB UR QL STRIP: NEGATIVE
BNP SERPL-MCNC: 232 PG/ML
BUN SERPL-MCNC: 56 MG/DL
CALCIUM SERPL-MCNC: 8.9 MG/DL
CHLORIDE SERPL-SCNC: 108 MMOL/L
CLARITY UR: CLEAR
CO2 SERPL-SCNC: 20 MMOL/L
COLOR UR: YELLOW
CREAT SERPL-MCNC: 1.7 MG/DL
DELSYS: ABNORMAL
DIFFERENTIAL METHOD: ABNORMAL
EOSINOPHIL # BLD AUTO: 0.2 K/UL
EOSINOPHIL NFR BLD: 1.1 %
ERYTHROCYTE [DISTWIDTH] IN BLOOD BY AUTOMATED COUNT: 14.9 %
EST. GFR  (AFRICAN AMERICAN): 36 ML/MIN/1.73 M^2
EST. GFR  (NON AFRICAN AMERICAN): 31 ML/MIN/1.73 M^2
FIO2: 28
FLOW: 2
GLUCOSE SERPL-MCNC: 117 MG/DL
GLUCOSE UR QL STRIP: NEGATIVE
HCO3 UR-SCNC: 19.7 MMOL/L (ref 24–28)
HCT VFR BLD AUTO: 37.7 %
HGB BLD-MCNC: 12.2 G/DL
HGB UR QL STRIP: NEGATIVE
KETONES UR QL STRIP: NEGATIVE
LACTATE SERPL-SCNC: 1 MMOL/L
LEUKOCYTE ESTERASE UR QL STRIP: ABNORMAL
LYMPHOCYTES # BLD AUTO: 3.7 K/UL
LYMPHOCYTES NFR BLD: 18.9 %
MCH RBC QN AUTO: 31.1 PG
MCHC RBC AUTO-ENTMCNC: 32.4 G/DL
MCV RBC AUTO: 96 FL
MICROSCOPIC COMMENT: NORMAL
MODE: ABNORMAL
MONOCYTES # BLD AUTO: 0.7 K/UL
MONOCYTES NFR BLD: 3.7 %
NEUTROPHILS # BLD AUTO: 14.8 K/UL
NEUTROPHILS NFR BLD: 76.2 %
NITRITE UR QL STRIP: NEGATIVE
PCO2 BLDA: 39.6 MMHG (ref 35–45)
PH SMN: 7.3 [PH] (ref 7.35–7.45)
PH UR STRIP: 6 [PH] (ref 5–8)
PLATELET # BLD AUTO: 238 K/UL
PMV BLD AUTO: 11.2 FL
PO2 BLDA: 59 MMHG (ref 80–100)
POC BE: -7 MMOL/L
POC SATURATED O2: 88 % (ref 95–100)
POCT GLUCOSE: 129 MG/DL (ref 70–110)
POCT GLUCOSE: 173 MG/DL (ref 70–110)
POTASSIUM SERPL-SCNC: 4 MMOL/L
PROCALCITONIN SERPL IA-MCNC: 0.03 NG/ML
PROT SERPL-MCNC: 7.2 G/DL
PROT UR QL STRIP: ABNORMAL
RBC # BLD AUTO: 3.92 M/UL
SAMPLE: ABNORMAL
SITE: ABNORMAL
SODIUM SERPL-SCNC: 140 MMOL/L
SP GR UR STRIP: 1.01 (ref 1–1.03)
TROPONIN I SERPL DL<=0.01 NG/ML-MCNC: 0.01 NG/ML
URN SPEC COLLECT METH UR: ABNORMAL
UROBILINOGEN UR STRIP-ACNC: NEGATIVE EU/DL
WBC # BLD AUTO: 19.35 K/UL
WBC #/AREA URNS HPF: 5 /HPF (ref 0–5)

## 2018-07-29 PROCEDURE — 83880 ASSAY OF NATRIURETIC PEPTIDE: CPT

## 2018-07-29 PROCEDURE — 36600 WITHDRAWAL OF ARTERIAL BLOOD: CPT

## 2018-07-29 PROCEDURE — 96365 THER/PROPH/DIAG IV INF INIT: CPT

## 2018-07-29 PROCEDURE — 25000003 PHARM REV CODE 250: Performed by: NURSE PRACTITIONER

## 2018-07-29 PROCEDURE — 25000003 PHARM REV CODE 250: Performed by: EMERGENCY MEDICINE

## 2018-07-29 PROCEDURE — 63600175 PHARM REV CODE 636 W HCPCS: Performed by: NURSE PRACTITIONER

## 2018-07-29 PROCEDURE — 93010 ELECTROCARDIOGRAM REPORT: CPT | Mod: ,,, | Performed by: INTERNAL MEDICINE

## 2018-07-29 PROCEDURE — 63600175 PHARM REV CODE 636 W HCPCS: Performed by: EMERGENCY MEDICINE

## 2018-07-29 PROCEDURE — 85025 COMPLETE CBC W/AUTO DIFF WBC: CPT

## 2018-07-29 PROCEDURE — 84145 PROCALCITONIN (PCT): CPT

## 2018-07-29 PROCEDURE — 87040 BLOOD CULTURE FOR BACTERIA: CPT | Mod: 59

## 2018-07-29 PROCEDURE — 83605 ASSAY OF LACTIC ACID: CPT

## 2018-07-29 PROCEDURE — 87086 URINE CULTURE/COLONY COUNT: CPT

## 2018-07-29 PROCEDURE — 27000221 HC OXYGEN, UP TO 24 HOURS

## 2018-07-29 PROCEDURE — 21400001 HC TELEMETRY ROOM

## 2018-07-29 PROCEDURE — 25000242 PHARM REV CODE 250 ALT 637 W/ HCPCS: Performed by: NURSE PRACTITIONER

## 2018-07-29 PROCEDURE — 94640 AIRWAY INHALATION TREATMENT: CPT

## 2018-07-29 PROCEDURE — 87186 SC STD MICRODIL/AGAR DIL: CPT

## 2018-07-29 PROCEDURE — 96367 TX/PROPH/DG ADDL SEQ IV INF: CPT

## 2018-07-29 PROCEDURE — 94761 N-INVAS EAR/PLS OXIMETRY MLT: CPT

## 2018-07-29 PROCEDURE — 87088 URINE BACTERIA CULTURE: CPT

## 2018-07-29 PROCEDURE — 82962 GLUCOSE BLOOD TEST: CPT

## 2018-07-29 PROCEDURE — 82803 BLOOD GASES ANY COMBINATION: CPT

## 2018-07-29 PROCEDURE — 81000 URINALYSIS NONAUTO W/SCOPE: CPT

## 2018-07-29 PROCEDURE — 96375 TX/PRO/DX INJ NEW DRUG ADDON: CPT

## 2018-07-29 PROCEDURE — 87205 SMEAR GRAM STAIN: CPT

## 2018-07-29 PROCEDURE — 87077 CULTURE AEROBIC IDENTIFY: CPT

## 2018-07-29 PROCEDURE — 87070 CULTURE OTHR SPECIMN AEROBIC: CPT

## 2018-07-29 PROCEDURE — 80053 COMPREHEN METABOLIC PANEL: CPT

## 2018-07-29 PROCEDURE — 36415 COLL VENOUS BLD VENIPUNCTURE: CPT

## 2018-07-29 PROCEDURE — 99900035 HC TECH TIME PER 15 MIN (STAT)

## 2018-07-29 PROCEDURE — 99285 EMERGENCY DEPT VISIT HI MDM: CPT | Mod: 25

## 2018-07-29 PROCEDURE — 25000242 PHARM REV CODE 250 ALT 637 W/ HCPCS: Performed by: EMERGENCY MEDICINE

## 2018-07-29 PROCEDURE — 84484 ASSAY OF TROPONIN QUANT: CPT

## 2018-07-29 RX ORDER — IPRATROPIUM BROMIDE AND ALBUTEROL SULFATE 2.5; .5 MG/3ML; MG/3ML
3 SOLUTION RESPIRATORY (INHALATION) EVERY 5 MIN PRN
Status: DISCONTINUED | OUTPATIENT
Start: 2018-07-29 | End: 2018-07-29

## 2018-07-29 RX ORDER — LEVOTHYROXINE SODIUM 100 UG/1
100 TABLET ORAL
Status: DISCONTINUED | OUTPATIENT
Start: 2018-07-30 | End: 2018-07-31 | Stop reason: HOSPADM

## 2018-07-29 RX ORDER — METOPROLOL SUCCINATE 50 MG/1
50 TABLET, EXTENDED RELEASE ORAL 2 TIMES DAILY
Status: DISCONTINUED | OUTPATIENT
Start: 2018-07-29 | End: 2018-07-31 | Stop reason: HOSPADM

## 2018-07-29 RX ORDER — QUETIAPINE FUMARATE 25 MG/1
25 TABLET, FILM COATED ORAL NIGHTLY
Status: DISCONTINUED | OUTPATIENT
Start: 2018-07-29 | End: 2018-07-31 | Stop reason: HOSPADM

## 2018-07-29 RX ORDER — AMLODIPINE BESYLATE 5 MG/1
5 TABLET ORAL DAILY
Status: DISCONTINUED | OUTPATIENT
Start: 2018-07-29 | End: 2018-07-31 | Stop reason: HOSPADM

## 2018-07-29 RX ORDER — ALLOPURINOL 100 MG/1
100 TABLET ORAL DAILY
Status: DISCONTINUED | OUTPATIENT
Start: 2018-07-29 | End: 2018-07-31 | Stop reason: HOSPADM

## 2018-07-29 RX ORDER — GUAIFENESIN 600 MG/1
600 TABLET, EXTENDED RELEASE ORAL 2 TIMES DAILY
Status: DISCONTINUED | OUTPATIENT
Start: 2018-07-29 | End: 2018-07-31 | Stop reason: HOSPADM

## 2018-07-29 RX ORDER — MAGNESIUM SULFATE 1 G/100ML
2 INJECTION INTRAVENOUS
Status: COMPLETED | OUTPATIENT
Start: 2018-07-29 | End: 2018-07-29

## 2018-07-29 RX ORDER — MOXIFLOXACIN HYDROCHLORIDE 400 MG/1
400 TABLET ORAL DAILY
Status: DISCONTINUED | OUTPATIENT
Start: 2018-07-30 | End: 2018-07-31 | Stop reason: HOSPADM

## 2018-07-29 RX ORDER — PANTOPRAZOLE SODIUM 40 MG/1
40 TABLET, DELAYED RELEASE ORAL DAILY
Status: DISCONTINUED | OUTPATIENT
Start: 2018-07-29 | End: 2018-07-31 | Stop reason: HOSPADM

## 2018-07-29 RX ORDER — IPRATROPIUM BROMIDE AND ALBUTEROL SULFATE 2.5; .5 MG/3ML; MG/3ML
3 SOLUTION RESPIRATORY (INHALATION) EVERY 6 HOURS
Status: DISCONTINUED | OUTPATIENT
Start: 2018-07-29 | End: 2018-07-31 | Stop reason: HOSPADM

## 2018-07-29 RX ORDER — FUROSEMIDE 10 MG/ML
20 INJECTION INTRAMUSCULAR; INTRAVENOUS ONCE
Status: COMPLETED | OUTPATIENT
Start: 2018-07-29 | End: 2018-07-29

## 2018-07-29 RX ADMIN — GUAIFENESIN 600 MG: 600 TABLET, EXTENDED RELEASE ORAL at 09:07

## 2018-07-29 RX ADMIN — IPRATROPIUM BROMIDE AND ALBUTEROL SULFATE 3 ML: .5; 3 SOLUTION RESPIRATORY (INHALATION) at 06:07

## 2018-07-29 RX ADMIN — IPRATROPIUM BROMIDE AND ALBUTEROL SULFATE 3 ML: .5; 3 SOLUTION RESPIRATORY (INHALATION) at 01:07

## 2018-07-29 RX ADMIN — METHYLPREDNISOLONE SODIUM SUCCINATE 40 MG: 40 INJECTION, POWDER, FOR SOLUTION INTRAMUSCULAR; INTRAVENOUS at 01:07

## 2018-07-29 RX ADMIN — IPRATROPIUM BROMIDE AND ALBUTEROL SULFATE 3 ML: .5; 3 SOLUTION RESPIRATORY (INHALATION) at 07:07

## 2018-07-29 RX ADMIN — METOPROLOL SUCCINATE 50 MG: 50 TABLET, EXTENDED RELEASE ORAL at 09:07

## 2018-07-29 RX ADMIN — FUROSEMIDE 20 MG: 10 INJECTION, SOLUTION INTRAMUSCULAR; INTRAVENOUS at 08:07

## 2018-07-29 RX ADMIN — PANTOPRAZOLE SODIUM 40 MG: 40 TABLET, DELAYED RELEASE ORAL at 09:07

## 2018-07-29 RX ADMIN — AZITHROMYCIN MONOHYDRATE 500 MG: 500 INJECTION, POWDER, LYOPHILIZED, FOR SOLUTION INTRAVENOUS at 07:07

## 2018-07-29 RX ADMIN — ALLOPURINOL 100 MG: 100 TABLET ORAL at 09:07

## 2018-07-29 RX ADMIN — AMLODIPINE BESYLATE 5 MG: 5 TABLET ORAL at 09:07

## 2018-07-29 RX ADMIN — METHYLPREDNISOLONE SODIUM SUCCINATE 40 MG: 40 INJECTION, POWDER, FOR SOLUTION INTRAMUSCULAR; INTRAVENOUS at 10:07

## 2018-07-29 RX ADMIN — QUETIAPINE FUMARATE 25 MG: 25 TABLET ORAL at 09:07

## 2018-07-29 RX ADMIN — CEFTRIAXONE 1 G: 1 INJECTION, SOLUTION INTRAVENOUS at 07:07

## 2018-07-29 RX ADMIN — MAGNESIUM SULFATE IN DEXTROSE 2 G: 10 INJECTION, SOLUTION INTRAVENOUS at 06:07

## 2018-07-29 NOTE — ASSESSMENT & PLAN NOTE
- Not on medications. Pt reports having one seizure during hospitalization years ago  - Seizure precautions

## 2018-07-29 NOTE — HPI
"Ibeth Schroeder is a 67 year old female with a PMHx of Seizure, PE, Hypothyroidism, HTN, HLD, COPD, Anemia, and Anxiety who presented to the Emergency Department with c/o SOB x 1 week. Associated symptoms include: productive cough with thick dark brown, green sputum. Pt denies fever/chills. Pt was seen at [facility] urgent care on 07/16/18 for nasal congestion, fatigue, and cough. Pt reports she was told she had a "touch of pneumonia." Pt given Doxycycline which pt states completed course. Normally wears 2L oxygen at night. Sees Dr. Barrett outpatient. ED workup revealed: WBC 19.35K, Hgb/Hct 12.2/37.7, BUN 56, creatinine 1.7, , procalcitonin negative. ABG PO2 59. Pt admitted to Telemetry for Sepsis secondary to pneumonia. POA is Marino Casillasignac -- [phone].  "

## 2018-07-29 NOTE — ED PROVIDER NOTES
SCRIBE #1 NOTE: IRadha, am scribing for, and in the presence of, Don Ellington MD. I have scribed the HPI, ROS, and PEx.     SCRIBE #2 NOTE: I, Adelia Viveros, am scribing for, and in the presence of,  Michelle Parks MD. I have scribed the remaining portions of the note not scribed by Scribe #1.     History      Chief Complaint   Patient presents with    Respiratory Distress       Review of patient's allergies indicates:   Allergen Reactions    Nsaids (non-steroidal anti-inflammatory drug) Hives and Shortness Of Breath    Pcn [penicillins] Hives and Shortness Of Breath    Sulfa (sulfonamide antibiotics) Hives and Shortness Of Breath    Aspirin      Due to Liver        Macrodantin [nitrofurantoin macrocrystalline] Hives    Wellbutrin [bupropion hcl]      Due to liver          HPI   HPI    7/29/2018, 5:43 AM   History obtained from the patient      History of Present Illness: Ibeth Schroeder is a 67 y.o. female patient with PMHx of COPD who presents to the Emergency Department for SOB which onset gradually a few hours ago. Patient received 125mg solumedrol, combivent, and albuterol x3. Symptoms are constant and moderate in severity. No mitigating or exacerbating factors reported. No associated sxs included. Patient denies any fever, chills, CP, leg swelling, cough, dizziness, N/V, extremity weakness/numbness, recent travel, long car rides, and all other sxs at this time. No further complaints or concerns at this time.     Arrival mode: [facility]    PCP: Geovanna Botello MD       Past Medical History:  Past Medical History:   Diagnosis Date    Acute on chronic respiratory failure with hypoxia 7/30/2018    Acute pancreatitis     Alcohol dependence     per patient in the past    Allergy     Anemia     Anxiety     Arthritis 6/24/2013    Asthma     per patient    Back pain     Colon polyp     Colon polyp     colonoscopy 8/26/2013    COPD (chronic obstructive pulmonary disease)     Dependence on  renal dialysis 2012    when in liver failure    Depression     Disorder of kidney and ureter     ckd4    Diverticulosis     Diverticulosis     colonoscopy 8/26/2013    Hiatal hernia     CXR 2/25/2015--Large hiatal hernia.     Hyperlipidemia     Hypertension     Hypocalcemia 7/6/2016    Hypothyroidism 6/24/2013    Osteoporosis     Pneumonia due to infectious organism 7/29/2018    Pulmonary embolism     per patient unsure of date    Restless leg syndrome     RLS (restless legs syndrome)     Seizure 6/24/2013    Stroke     ischemic injury due to hypotension    Tobacco dependence     resolved    Trouble in sleeping        Past Surgical History:  Past Surgical History:   Procedure Laterality Date    ADENOIDECTOMY  1960 approx    amputation left 2nd finger tip Left 2012    for blood clot/ after liver failure admit ? gangrene    APPENDECTOMY      incidental with another surgery    na hole/ ventriculostomy Right 08/24/2012    frontal after  liver failure    CHOLECYSTECTOMY      COLONOSCOPY  2013    FRACTURE SURGERY Left 07/12/2017    radius and ulna fracture    FRACTURE SURGERY Left 09/12/2017    wrist removal of hardware    HERNIA REPAIR  1999 approx    incisional hernia     HYSTERECTOMY  1977 approx    RAS/BSO  for endometriosis and tubular pregnancy     JOINT REPLACEMENT Left 2000 approx    hip for arthritis     JOINT REPLACEMENT Left 2004 approx    hip, fell then recurent dislocations - 4 times relocarted under mild anesthesia then re replaced    JOINT REPLACEMENT Right 2002 approx    knee for arthritis    OOPHORECTOMY  1977 approx    TONSILLECTOMY  1960 approx         Family History:  Family History   Problem Relation Age of Onset    Diabetes Mother     Hypertension Mother     Kidney disease Mother         stones    Parkinsonism Mother     Asthma Father     Heart disease Father     Diabetes Son     Hypertension Son     Heart disease Maternal Aunt         CAD     Cancer  Maternal Aunt         Breast Ca    Breast cancer Maternal Aunt     Colon cancer Maternal Grandmother     Cancer Maternal Grandmother     Cancer Maternal Aunt         breast    Heart disease Maternal Aunt     Stroke Neg Hx     Alcohol abuse Neg Hx     Drug abuse Neg Hx     Mental retardation Neg Hx     Mental illness Neg Hx        Social History:  Social History     Social History Main Topics    Smoking status: Former Smoker     Packs/day: 1.00     Years: 30.00     Types: Cigarettes     Quit date: 8/5/2010    Smokeless tobacco: Never Used      Comment: used nicorette gum in past and prn    Alcohol use 1.2 - 1.8 oz/week     1 - 2 Shots of liquor, 1 Glasses of wine per week    Drug use: No    Sexual activity: Not Currently     Partners: Male     Birth control/ protection: See Surgical Hx       ROS   Review of Systems   Constitutional: Negative for chills and fever.   HENT: Negative for sore throat.    Respiratory: Positive for shortness of breath. Negative for cough.    Cardiovascular: Negative for chest pain and leg swelling.   Gastrointestinal: Negative for nausea and vomiting.   Genitourinary: Negative for dysuria.   Musculoskeletal: Negative for back pain.   Skin: Negative for rash.   Neurological: Negative for dizziness, weakness and numbness.   Hematological: Does not bruise/bleed easily.   All other systems reviewed and are negative.    Physical Exam      Initial Vitals [07/29/18 0547]   BP Pulse Resp Temp SpO2   (!) 167/85 106 (!) 30 99.6 °F (37.6 °C) 100 %      MAP       --          Physical Exam  Nursing Notes and Vital Signs Reviewed.  Constitutional: Patient is in no acute distress. Well-developed and well-nourished.  Head: Atraumatic. Normocephalic.  Eyes: PERRL. EOM intact. Conjunctivae are not pale. No scleral icterus.  ENT: Mucous membranes are moist. Oropharynx is clear and symmetric.    Neck: Supple. Full ROM. No lymphadenopathy.  Cardiovascular: Regular rate. Regular rhythm. No  murmurs, rubs, or gallops. Distal pulses are 2+ and symmetric.  Pulmonary/Chest: No respiratory distress. Wheezing diffusely. No rales.  Abdominal: Soft and non-distended.  There is no tenderness.  No rebound, guarding, or rigidity. Good bowel sounds.  Genitourinary: No CVA tenderness  Musculoskeletal: Moves all extremities. No obvious deformities. No edema. No calf tenderness.  Skin: Warm and dry.  Neurological:  Alert, awake, and appropriate.  Normal speech.  No acute focal neurological deficits are appreciated.  Psychiatric: Normal affect. Good eye contact. Appropriate in content.    ED Course    Critical Care  Date/Time: 7/29/2018 8:00 AM  Performed by: JACINDA HANSEN.  Authorized by: JACINDA HANSEN.   Direct patient critical care time: 12 minutes  Additional history critical care time: 9 minutes  Ordering / reviewing critical care time: 8 minutes  Documentation critical care time: 8 minutes  Consulting other physicians critical care time: 3 minutes  Total critical care time (exclusive of procedural time) : 40 minutes  Critical care time was exclusive of separately billable procedures and treating other patients and teaching time.  Critical care was necessary to treat or prevent imminent or life-threatening deterioration of the following conditions: respiratory failure.  Critical care was time spent personally by me on the following activities: blood draw for specimens, development of treatment plan with patient or surrogate, evaluation of patient's response to treatment, examination of patient, obtaining history from patient or surrogate, ordering and performing treatments and interventions, ordering and review of laboratory studies, ordering and review of radiographic studies, re-evaluation of patient's condition, review of old charts, discussions with consultants, interpretation of cardiac output measurements and pulse oximetry.        ED Vital Signs:  Vitals:    07/30/18 2317 07/30/18 2337 07/31/18 0012 07/31/18  0135   BP:  127/72     Pulse: 81 77 77 79   Resp:  18 20    Temp:  97.4 °F (36.3 °C)     TempSrc:  Oral     SpO2:  98% 100%    Weight:       Height:        07/31/18 0345 07/31/18 0358 07/31/18 0547 07/31/18 0730   BP:  124/67     Pulse: 84 71 78 79   Resp:  20     Temp:       TempSrc:  Axillary     SpO2:  99%     Weight:  123.7 kg (272 lb 11.3 oz)     Height:        07/31/18 0818 07/31/18 0839 07/31/18 0930 07/31/18 1130   BP:       Pulse: 87  70 70   Resp: 18      Temp:       TempSrc:       SpO2: 95% (S) (!) 93%     Weight:       Height:        07/31/18 1148 07/31/18 1301 07/31/18 1330   BP: 117/74     Pulse: 81 79 70   Resp: (!) 22 20    Temp: 97.6 °F (36.4 °C)     TempSrc:      SpO2: 96% 98%    Weight:      Height:          Abnormal Lab Results:  Labs Reviewed   CBC W/ AUTO DIFFERENTIAL - Abnormal; Notable for the following:        Result Value    WBC 19.35 (*)     RBC 3.92 (*)     MCH 31.1 (*)     RDW 14.9 (*)     Gran # (ANC) 14.8 (*)     Gran% 76.2 (*)     Mono% 3.7 (*)     All other components within normal limits   COMPREHENSIVE METABOLIC PANEL - Abnormal; Notable for the following:     CO2 20 (*)     Glucose 117 (*)     BUN, Bld 56 (*)     Creatinine 1.7 (*)     ALT 9 (*)     eGFR if  36 (*)     eGFR if non  31 (*)     All other components within normal limits   URINALYSIS - Abnormal; Notable for the following:     Protein, UA Trace (*)     Leukocytes, UA Trace (*)     All other components within normal limits   B-TYPE NATRIURETIC PEPTIDE - Abnormal; Notable for the following:      (*)     All other components within normal limits   POCT GLUCOSE - Abnormal; Notable for the following:     POCT Glucose 129 (*)     All other components within normal limits   ISTAT PROCEDURE - Abnormal; Notable for the following:     POC PH 7.305 (*)     POC PO2 59 (*)     POC HCO3 19.7 (*)     POC SATURATED O2 88 (*)     All other components within normal limits   LACTIC ACID, PLASMA    TROPONIN I   PROCALCITONIN   PROCALCITONIN   URINALYSIS MICROSCOPIC        All Lab Results:  Results for orders placed or performed during the hospital encounter of 07/29/18   Blood culture #1 **CANNOT BE ORDERED STAT**   Result Value Ref Range    Blood Culture, Routine No Growth to date     Blood Culture, Routine No Growth to date     Blood Culture, Routine No Growth to date    Blood culture #2 **CANNOT BE ORDERED STAT**   Result Value Ref Range    Blood Culture, Routine No Growth to date     Blood Culture, Routine No Growth to date     Blood Culture, Routine No Growth to date    Urine culture **CANNOT BE ORDERED STAT**   Result Value Ref Range    Urine Culture, Routine ESCHERICHIA COLI  >100,000 cfu/ml          Susceptibility    Escherichia coli - CULTURE, URINE     Amp/Sulbactam <=8/4 Sensitive mcg/mL     Ampicillin <=8 Sensitive mcg/mL     Amox/K Clav'ate <=8/4 Sensitive mcg/mL     Ceftriaxone <=8 Sensitive mcg/mL     Cefazolin <=8 Sensitive mcg/mL     Ciprofloxacin <=1 Sensitive mcg/mL     Cefepime <=8 Sensitive mcg/mL     Ertapenem <=2 Sensitive mcg/mL     Nitrofurantoin <=32 Sensitive mcg/mL     Gentamicin <=4 Sensitive mcg/mL     Meropenem <=4 Sensitive mcg/mL     Piperacillin/Tazo <=16 Sensitive mcg/mL     Trimeth/Sulfa <=2/38 Sensitive mcg/mL     Tetracycline 8 Intermediate mcg/mL     Tobramycin <=4 Sensitive mcg/mL   Culture, Respiratory with Gram Stain   Result Value Ref Range    Respiratory Culture Normal respiratory jaciel     Gram Stain (Respiratory) <10 epithelial cells per low power field.     Gram Stain (Respiratory) Many WBC's     Gram Stain (Respiratory) Moderate Gram positive cocci     Gram Stain (Respiratory) Rare yeast     Gram Stain (Respiratory) Rare Gram negative rods    CBC auto differential   Result Value Ref Range    WBC 19.35 (H) 3.90 - 12.70 K/uL    RBC 3.92 (L) 4.00 - 5.40 M/uL    Hemoglobin 12.2 12.0 - 16.0 g/dL    Hematocrit 37.7 37.0 - 48.5 %    MCV 96 82 - 98 fL    MCH 31.1 (H)  27.0 - 31.0 pg    MCHC 32.4 32.0 - 36.0 g/dL    RDW 14.9 (H) 11.5 - 14.5 %    Platelets 238 150 - 350 K/uL    MPV 11.2 9.2 - 12.9 fL    Gran # (ANC) 14.8 (H) 1.8 - 7.7 K/uL    Lymph # 3.7 1.0 - 4.8 K/uL    Mono # 0.7 0.3 - 1.0 K/uL    Eos # 0.2 0.0 - 0.5 K/uL    Baso # 0.01 0.00 - 0.20 K/uL    Gran% 76.2 (H) 38.0 - 73.0 %    Lymph% 18.9 18.0 - 48.0 %    Mono% 3.7 (L) 4.0 - 15.0 %    Eosinophil% 1.1 0.0 - 8.0 %    Basophil% 0.1 0.0 - 1.9 %    Differential Method Automated    Comprehensive metabolic panel   Result Value Ref Range    Sodium 140 136 - 145 mmol/L    Potassium 4.0 3.5 - 5.1 mmol/L    Chloride 108 95 - 110 mmol/L    CO2 20 (L) 23 - 29 mmol/L    Glucose 117 (H) 70 - 110 mg/dL    BUN, Bld 56 (H) 8 - 23 mg/dL    Creatinine 1.7 (H) 0.5 - 1.4 mg/dL    Calcium 8.9 8.7 - 10.5 mg/dL    Total Protein 7.2 6.0 - 8.4 g/dL    Albumin 3.7 3.5 - 5.2 g/dL    Total Bilirubin 0.6 0.1 - 1.0 mg/dL    Alkaline Phosphatase 124 55 - 135 U/L    AST 25 10 - 40 U/L    ALT 9 (L) 10 - 44 U/L    Anion Gap 12 8 - 16 mmol/L    eGFR if African American 36 (A) >60 mL/min/1.73 m^2    eGFR if non African American 31 (A) >60 mL/min/1.73 m^2   Lactic acid, plasma   Result Value Ref Range    Lactate (Lactic Acid) 1.0 0.5 - 2.2 mmol/L   Troponin I   Result Value Ref Range    Troponin I 0.009 0.000 - 0.026 ng/mL   Urinalysis   Result Value Ref Range    Specimen UA Urine, Catheterized     Color, UA Yellow Yellow, Straw, Gina    Appearance, UA Clear Clear    pH, UA 6.0 5.0 - 8.0    Specific Gravity, UA 1.010 1.005 - 1.030    Protein, UA Trace (A) Negative    Glucose, UA Negative Negative    Ketones, UA Negative Negative    Bilirubin (UA) Negative Negative    Occult Blood UA Negative Negative    Nitrite, UA Negative Negative    Urobilinogen, UA Negative <2.0 EU/dL    Leukocytes, UA Trace (A) Negative   Brain natriuretic peptide   Result Value Ref Range     (H) 0 - 99 pg/mL   Procalcitonin   Result Value Ref Range    Procalcitonin 0.03 <0.25  ng/mL   Urinalysis Microscopic   Result Value Ref Range    WBC, UA 5 0 - 5 /hpf    Bacteria, UA Occasional None-Occ /hpf    Microscopic Comment SEE COMMENT    CBC auto differential   Result Value Ref Range    WBC 12.25 3.90 - 12.70 K/uL    RBC 3.27 (L) 4.00 - 5.40 M/uL    Hemoglobin 10.1 (L) 12.0 - 16.0 g/dL    Hematocrit 30.4 (L) 37.0 - 48.5 %    MCV 93 82 - 98 fL    MCH 30.9 27.0 - 31.0 pg    MCHC 33.2 32.0 - 36.0 g/dL    RDW 14.2 11.5 - 14.5 %    Platelets 179 150 - 350 K/uL    MPV 10.9 9.2 - 12.9 fL    Gran # (ANC) 11.4 (H) 1.8 - 7.7 K/uL    Lymph # 0.7 (L) 1.0 - 4.8 K/uL    Mono # 0.1 (L) 0.3 - 1.0 K/uL    Eos # 0.0 0.0 - 0.5 K/uL    Baso # 0.00 0.00 - 0.20 K/uL    Gran% 93.1 (H) 38.0 - 73.0 %    Lymph% 6.0 (L) 18.0 - 48.0 %    Mono% 0.9 (L) 4.0 - 15.0 %    Eosinophil% 0.0 0.0 - 8.0 %    Basophil% 0.0 0.0 - 1.9 %    Differential Method Automated    Comprehensive metabolic panel   Result Value Ref Range    Sodium 134 (L) 136 - 145 mmol/L    Potassium 4.0 3.5 - 5.1 mmol/L    Chloride 105 95 - 110 mmol/L    CO2 19 (L) 23 - 29 mmol/L    Glucose 176 (H) 70 - 110 mg/dL    BUN, Bld 72 (H) 8 - 23 mg/dL    Creatinine 2.0 (H) 0.5 - 1.4 mg/dL    Calcium 8.4 (L) 8.7 - 10.5 mg/dL    Total Protein 5.7 (L) 6.0 - 8.4 g/dL    Albumin 2.9 (L) 3.5 - 5.2 g/dL    Total Bilirubin 0.3 0.1 - 1.0 mg/dL    Alkaline Phosphatase 78 55 - 135 U/L    AST 14 10 - 40 U/L    ALT 7 (L) 10 - 44 U/L    Anion Gap 10 8 - 16 mmol/L    eGFR if African American 30 (A) >60 mL/min/1.73 m^2    eGFR if non African American 26 (A) >60 mL/min/1.73 m^2   POCT glucose   Result Value Ref Range    POCT Glucose 129 (H) 70 - 110 mg/dL   ISTAT PROCEDURE   Result Value Ref Range    POC PH 7.305 (L) 7.35 - 7.45    POC PCO2 39.6 35 - 45 mmHg    POC PO2 59 (LL) 80 - 100 mmHg    POC HCO3 19.7 (L) 24 - 28 mmol/L    POC BE -7 -2 to 2 mmol/L    POC SATURATED O2 88 (L) 95 - 100 %    Sample ARTERIAL     Site RR     Allens Test Pass     DelSys Nasal Can     Mode SPONT      Flow 2     FiO2 28    POCT glucose   Result Value Ref Range    POCT Glucose 173 (H) 70 - 110 mg/dL     Imaging Results:  Imaging Results          X-Ray Chest 1 View (Final result)  Result time 07/29/18 07:09:38    Final result by Ray De Luna MD (07/29/18 07:09:38)                 Impression:      No acute process seen.      Electronically signed by: Ray De Luna MD  Date:    07/29/2018  Time:    07:09             Narrative:    EXAMINATION:  XR CHEST 1 VIEW    CLINICAL HISTORY:  Shortness of breath    FINDINGS:  Single view of the chest.    Cardiac silhouette is normal.  The lungs demonstrate no evidence of active disease.  No evidence of pleural effusion or pneumothorax.  Bones appear intact.  HH.  Aorta demonstrates atherosclerotic disease.                               The EKG was ordered, reviewed, and independently interpreted by the ED provider.  Interpretation time: 6:22  Rate: 120 BPM  Rhythm: sinus tachycardia with premature supraventricular complexes with occasional PVCs  Interpretation: Nonspecific ST and T wave abnormality. No STEMI.      The Emergency Provider reviewed the vital signs and test results, which are outlined above.    ED Discussion     6:00 AM: Dr. Ellington transfers care of pt to Dr. Parks, pending lab and imaging results.    6:04 AM: Dr. Parks re-evaluated patient. Patient states that she has been SOB since all throughout the night. Associated sxs include wheezing and coughing. She denies having any pain. She is not on steroids at home. She uses oxygen at night. Pt usually sleeps on one pillow but sometimes sleeps with two. She is followed by Dr. Barrett (Pulmonologist).    8:00 AM: Re-evaluated pt. I have discussed test results, shared treatment plan, and the need for admission with patient and family at bedside. Pt and family express understanding at this time and agree with all information. All questions answered. Pt and family have no further questions or concerns at this time.  Pt is ready for admit.    8:08 AM: Discussed case with Soraida Alarcon NP ([facility]). Dr. Shin agrees with current care and management of pt and accepts admission.   Admitting Service: Hospital medicine   Admitting Physician: Dr. Shin  Admit to: inpatient    ED Medication(s):  Medications   magnesium sulfate in dextrose IVPB (premix) 2 g (0 g Intravenous Stopped 7/29/18 0715)   cefTRIAXone (ROCEPHIN) 1 g in dextrose 5 % 50 mL IVPB (0 g Intravenous Stopped 7/29/18 0716)   azithromycin 500 mg in dextrose 5 % 250 mL IVPB (ready to mix system) (0 mg Intravenous Stopped 7/29/18 0843)   furosemide injection 20 mg (20 mg Intravenous Not Given 7/29/18 0945)       Discharge Medication List as of 7/31/2018  2:01 PM      START taking these medications    Details   levoFLOXacin (LEVAQUIN) 500 MG tablet Take 1 tablet (500 mg total) by mouth once daily. for 7 days, Starting Tue 7/31/2018, Until Tue 8/7/2018, Normal      predniSONE (DELTASONE) 10 mg tablet pack 40mg po daily x 4 days, 30mg po daily x 3 days, 20mg po daily x 2 days, 10mg po daily x 1 day, Normal             Follow-up Information     Geovanna Botello MD In 3 days.    Specialty:  Family Medicine  Contact information:  [address] [place] [address] [id number] [phone]                     Medical Decision Making    Medical Decision Making:   Clinical Tests:   Lab Tests: Ordered and Reviewed  Radiological Study: Reviewed and Ordered  Medical Tests: Ordered and Reviewed           Scribe Attestation:   Scribe #1: I performed the above scribed service and the documentation accurately describes the services I performed. I attest to the accuracy of the note.    Attending:   Physician Attestation Statement for Scribe #1: I, Don Ellington MD, personally performed the services described in this documentation, as scribed by Radha Mccoy, in my presence, and it is both accurate and complete.       Scribe Attestation:   Scribe #2: I performed the above scribed  service and the documentation accurately describes the services I performed. I attest to the accuracy of the note.    Attending Attestation:           Physician Attestation for Scribe:    Physician Attestation Statement for Scribe #2: I, Michelle Parks MD, reviewed documentation, as scribed by Adelia Viveros in my presence, and it is both accurate and complete. I also acknowledge and confirm the content of the note done by Scribe #1.          Clinical Impression       ICD-10-CM ICD-9-CM   1. COPD with acute exacerbation J44.1 491.21   2. Shortness of breath R06.02 786.05   3. Hypoxia R09.02 799.02   4. Leukocytosis, unspecified type D72.829 288.60   5. Acute on chronic respiratory failure with hypoxia J96.21 518.84     799.02       Disposition:   Disposition: Admitted  Condition: Fair         Michelle Parks MD  07/31/18 3044

## 2018-07-29 NOTE — H&P
"[facility] Hospital Medicine  History & Physical    Patient Name: Ibeth Schroeder  MRN: [id number]  Admission Date: 7/29/2018  Attending Physician: Vaishali Shin MD   Primary Care Provider: Geovanna Botello MD         Patient information was obtained from patient, spouse/SO and ER records.     Subjective:     Principal Problem:Sepsis    Chief Complaint:   Chief Complaint   Patient presents with    Respiratory Distress        HPI: Ibeth Schroeder is a 67 year old female with a PMHx of Seizure, PE, Hypothyroidism, HTN, HLD, COPD, Anemia, and Anxiety who presented to the Emergency Department with c/o SOB x 1 week. Associated symptoms include: productive cough with thick dark brown, green sputum. Pt denies fever/chills. Pt was seen at [facility] on 07/16/18 for nasal congestion, fatigue, and cough. Pt reports she was told she had a "touch of pneumonia." Pt given Doxycycline which pt states completed course. Normally wears 2L oxygen at night. Sees Dr. Barrett outpatient. ED workup revealed: WBC 19.35K, Hgb/Hct 12.2/37.7, BUN 56, creatinine 1.7, , procalcitonin negative. ABG PO2 59. Pt admitted to Telemetry for Sepsis secondary to pneumonia. POA is Marino Schroeder -- [phone].    Past Medical History:   Diagnosis Date    Acute pancreatitis     Alcohol dependence     per patient in the past    Allergy     Anemia     Anxiety     Arthritis 6/24/2013    Asthma     per patient    Back pain     Colon polyp     Colon polyp     colonoscopy 8/26/2013    COPD (chronic obstructive pulmonary disease)     Dependence on renal dialysis 2012    when in liver failure    Depression     Disorder of kidney and ureter     ckd4    Diverticulosis     Diverticulosis     colonoscopy 8/26/2013    Hiatal hernia     CXR 2/25/2015--Large hiatal hernia.     Hyperlipidemia     Hypertension     Hypocalcemia 7/6/2016    Hypothyroidism 6/24/2013    Osteoporosis     Pulmonary embolism "     per patient unsure of date    Restless leg syndrome     RLS (restless legs syndrome)     Seizure 6/24/2013    Stroke     ischemic injury due to hypotension    Tobacco dependence     resolved    Trouble in sleeping        Past Surgical History:   Procedure Laterality Date    ADENOIDECTOMY  1960 approx    amputation left 2nd finger tip Left 2012    for blood clot/ after liver failure admit ? gangrene    APPENDECTOMY      incidental with another surgery    na hole/ ventriculostomy Right 08/24/2012    frontal after  liver failure    CHOLECYSTECTOMY      COLONOSCOPY  2013    FRACTURE SURGERY Left 07/12/2017    radius and ulna fracture    FRACTURE SURGERY Left 09/12/2017    wrist removal of hardware    HERNIA REPAIR  1999 approx    incisional hernia     HYSTERECTOMY  1977 approx    RAS/BSO  for endometriosis and tubular pregnancy     JOINT REPLACEMENT Left 2000 approx    hip for arthritis     JOINT REPLACEMENT Left 2004 approx    hip, fell then recurent dislocations - 4 times relocarted under mild anesthesia then re replaced    JOINT REPLACEMENT Right 2002 approx    knee for arthritis    OOPHORECTOMY  1977 approx    TONSILLECTOMY  1960 approx       Review of patient's allergies indicates:   Allergen Reactions    Nsaids (non-steroidal anti-inflammatory drug) Hives and Shortness Of Breath    Pcn [penicillins] Hives and Shortness Of Breath    Sulfa (sulfonamide antibiotics) Hives and Shortness Of Breath    Aspirin      Due to Liver        Macrodantin [nitrofurantoin macrocrystalline] Hives    Wellbutrin [bupropion hcl]      Due to liver         No current facility-administered medications on file prior to encounter.      Current Outpatient Prescriptions on File Prior to Encounter   Medication Sig    allopurinol (ZYLOPRIM) 100 MG tablet Take 1 tablet (100 mg total) by mouth once daily.    amLODIPine (NORVASC) 5 MG tablet Take 1 tablet (5 mg total) by mouth once daily.    busPIRone (BUSPAR)  15 MG tablet Take 1 tablet (15 mg total) by mouth 2 (two) times daily.    diphenoxylate-atropine 2.5-0.025 mg (LOMOTIL) 2.5-0.025 mg per tablet take 2 tablets by mouth four times a day if needed for diarrhea    ferrous sulfate 325 (65 FE) MG EC tablet Take 1 tablet (325 mg total) by mouth once a week. (Patient taking differently: Take 325 mg by mouth once daily. )    fluticasone-umeclidin-vilanter (TRELEGY ELLIPTA) 100-62.5-25 mcg DsDv Inhale 1 puff into the lungs once daily.    ipratropium-albuterol (COMBIVENT RESPIMAT)  mcg/actuation inhaler inhale 1 puff INTO THE LUNGS four times a day (Patient taking differently: Inhale 1 puff into the lungs every 6 (six) hours as needed. inhale 1 puff INTO THE LUNGS four times a day)    levothyroxine (SYNTHROID) 100 MCG tablet TAKE 1 TABLET ONE TIME DAILY    metoprolol succinate (TOPROL-XL) 50 MG 24 hr tablet Take 1 tablet (50 mg total) by mouth 2 (two) times daily.    oxyCODONE 5 mg TbOr Take 1 tablet by mouth every 4 to 6 hours as needed.    pantoprazole (PROTONIX) 40 MG tablet TAKE 1 TABLET ONE TIME DAILY    pravastatin (PRAVACHOL) 10 MG tablet TAKE 1 TABLET EVERY DAY    QUEtiapine (SEROQUEL) 25 MG Tab Take 1/2 to 1 tablet at bedtime    ropinirole (REQUIP) 1 MG tablet Take 1 mg by mouth nightly as needed.     SHINGRIX, PF, 50 mcg/0.5 mL injection inject 0.5 milliliter intramuscularly    [DISCONTINUED] traMADol (ULTRAM) 50 mg tablet Take 1 tablet (50 mg total) by mouth every 8 (eight) hours as needed for Pain.     Family History     Problem Relation (Age of Onset)    Asthma Father    Breast cancer Maternal Aunt    Cancer Maternal Aunt, Maternal Grandmother, Maternal Aunt    Colon cancer Maternal Grandmother    Diabetes Mother, Son    Heart disease Father, Maternal Aunt, Maternal Aunt    Hypertension Mother, Son    Kidney disease Mother    Parkinsonism Mother        Social History Main Topics    Smoking status: Former Smoker     Packs/day: 1.00     Years:  30.00     Types: Cigarettes     Quit date: 8/5/2010    Smokeless tobacco: Never Used      Comment: used nicorette gum in past and prn    Alcohol use 1.2 - 1.8 oz/week     1 - 2 Shots of liquor, 1 Glasses of wine per week    Drug use: No    Sexual activity: Not Currently     Partners: Male     Birth control/ protection: See Surgical Hx     Review of Systems   Constitutional: Positive for activity change and fatigue. Negative for chills and fever.   HENT: Negative.    Eyes: Negative.    Respiratory: Positive for cough, shortness of breath and wheezing. Negative for apnea, choking, chest tightness and stridor.    Cardiovascular: Negative for chest pain, palpitations and leg swelling.   Gastrointestinal: Negative for abdominal pain, constipation, diarrhea, nausea and vomiting.   Endocrine: Negative.    Genitourinary: Negative.    Musculoskeletal: Negative.    Skin: Negative.    Allergic/Immunologic: Negative.    Neurological: Positive for weakness. Negative for dizziness, tremors, seizures, syncope, facial asymmetry, speech difficulty, light-headedness, numbness and headaches.   Hematological: Negative.    Psychiatric/Behavioral: Negative.      Objective:     Vital Signs (Most Recent):  Temp: 98.7 °F (37.1 °C) (07/29/18 0932)  Pulse: 96 (07/29/18 0932)  Resp: (!) 22 (07/29/18 0932)  BP: 112/71 (07/29/18 0932)  SpO2: (!) 94 % (07/29/18 0932) Vital Signs (24h Range):  Temp:  [98.7 °F (37.1 °C)-99.6 °F (37.6 °C)] 98.7 °F (37.1 °C)  Pulse:  [] 96  Resp:  [22-30] 22  SpO2:  [88 %-100 %] 94 %  BP: ()/(53-87) 112/71     Weight: 52.2 kg (115 lb)  Body mass index is 18.56 kg/m².    Physical Exam   Constitutional: She is oriented to person, place, and time. She appears well-developed. She is cooperative. She is easily aroused. She appears ill. Nasal cannula in place.   HENT:   Head: Normocephalic and atraumatic.   Eyes: EOM are normal.   Neck: Neck supple.   Cardiovascular: Intact distal pulses.  Tachycardia  present.    No murmur heard.  105 BPM   Pulmonary/Chest: Tachypnea noted. She has decreased breath sounds in the left upper field and the left lower field.   Abdominal: Soft. Bowel sounds are normal. She exhibits no distension. There is no tenderness. There is no rebound and no guarding.   Genitourinary:   Genitourinary Comments: Magallon catheter draining clear yellow urine   Musculoskeletal: She exhibits no edema, tenderness or deformity.   Neurological: She is alert, oriented to person, place, and time and easily aroused. No sensory deficit.   Skin: Skin is warm and dry. Capillary refill takes less than 2 seconds.   Psychiatric: She has a normal mood and affect. Her behavior is normal.   Nursing note and vitals reviewed.        CRANIAL NERVES     CN III, IV, VI   Extraocular motions are normal.        Significant Labs:   ABGs:   Recent Labs  Lab 07/29/18  0615   PH 7.305*   PCO2 39.6   HCO3 19.7*   POCSATURATED 88*   BE -7     CBC:   Recent Labs  Lab 07/29/18  0554   WBC 19.35*   HGB 12.2   HCT 37.7        CMP:   Recent Labs  Lab 07/29/18  0554      K 4.0      CO2 20*   *   BUN 56*   CREATININE 1.7*   CALCIUM 8.9   PROT 7.2   ALBUMIN 3.7   BILITOT 0.6   ALKPHOS 124   AST 25   ALT 9*   ANIONGAP 12   EGFRNONAA 31*     Lactic Acid:   Recent Labs  Lab 07/29/18  0554   LACTATE 1.0     POCT Glucose:   Recent Labs  Lab 07/29/18  0559   POCTGLUCOSE 129*     Troponin:   Recent Labs  Lab 07/29/18  0554   TROPONINI 0.009     Urine Studies:   Recent Labs  Lab 07/29/18  0631   COLORU Yellow   APPEARANCEUA Clear   PHUR 6.0   SPECGRAV 1.010   PROTEINUA Trace*   GLUCUA Negative   KETONESU Negative   BILIRUBINUA Negative   OCCULTUA Negative   NITRITE Negative   UROBILINOGEN Negative   LEUKOCYTESUR Trace*   WBCUA 5   BACTERIA Occasional       Significant Imaging:   Imaging Results          X-Ray Chest 1 View (Final result)  Result time 07/29/18 07:09:38    Final result by Ray De Luna MD (07/29/18  07:09:38)                 Impression:      No acute process seen.      Electronically signed by: Ray De Luna MD  Date:    07/29/2018  Time:    07:09             Narrative:    EXAMINATION:  XR CHEST 1 VIEW    CLINICAL HISTORY:  Shortness of breath    FINDINGS:  Single view of the chest.    Cardiac silhouette is normal.  The lungs demonstrate no evidence of active disease.  No evidence of pleural effusion or pneumothorax.  Bones appear intact.  HH.  Aorta demonstrates atherosclerotic disease.                              Assessment/Plan:     * Sepsis    - Admit to Telemetry  - Sepsis criteria:  HR > 90, WBC count > 12,000, RR > 20, source pneumonia  - Blood/sputum cultures obtained -- follow   - Will treat underlying source with antibiotics, inhaled medications, and supplemental oxygen          Pneumonia due to infectious organism    - CXR impression with no acute findings but appears to have pneumonia to [facility], worsened from previous CXR on 07/16/18. Pt was seen at urgent care on 07/16/18 and given Doxycycline for pneumonia, which pt completed.   - Pt received IV Azithromycin and Rocephin in the ED  - Start Avelox  - Duonebs  - Follow blood/sputum cultures  - Supplemental oxygen, keep O2 sats > 88%          COPD with acute exacerbation    - Exacerbation may be secondary to pneumonia  - Will treat underlying cause with antibiotics  - Scheduled Duonebs  - Low dose IV steroids  - Normally wears oxygen at 2LPM nightly, but now wearing continuously. Keep O2 sats > 88%  - F/U with Dr. Barrett upon discharge          Chronic kidney disease, stage 3    - Creatinine currently 1.7. Appears to be at baseline. Will continue to monitor and consult Nephrology as needed          History of seizure    - Not on medications. Pt reports having one seizure during hospitalization years ago  - Seizure precautions          Essential hypertension    - Continue Amlodipine and Metoprolol          Hypothyroidism    - Continue  Levothyroxine            VTE Risk Mitigation         Ordered     Place sequential compression device  Until discontinued      07/29/18 1121             SJ Shepherd  Department of Hospital Medicine   [facility]

## 2018-07-29 NOTE — ASSESSMENT & PLAN NOTE
- CXR impression with no acute findings but appears to have pneumonia to [facility], worsened from previous CXR on 07/16/18. Pt was seen at urgent care on 07/16/18 and given Doxycycline for pneumonia, which pt completed.   - Pt received IV Azithromycin and Rocephin in the ED  - Start Avelox  - Duonebs  - Follow blood/sputum cultures  - Supplemental oxygen, keep O2 sats > 88%

## 2018-07-29 NOTE — ED NOTES
Pt having trouble breathing and wheezing around 22:00 per pts . Pt used her rescue inhaler and had no relief and the pts  called EMS. The pt was given 125 soul medrol, 3 duo nebs PTA. Pt has audible wheezes and accessory muscle use. pts digits are cyanotic. Pt missing distal phalange on the second finger on the left hand

## 2018-07-29 NOTE — ASSESSMENT & PLAN NOTE
- Admit to Telemetry  - Sepsis criteria:  HR > 90, WBC count > 12,000, RR > 20, source pneumonia  - Blood/sputum cultures obtained -- follow   - Will treat underlying source with antibiotics, inhaled medications, and supplemental oxygen

## 2018-07-29 NOTE — ED NOTES
Bed: 01  Expected date:   Expected time:   Means of arrival:   Comments:  dwain Lewis RN  07/29/18 0544

## 2018-07-29 NOTE — ASSESSMENT & PLAN NOTE
- Exacerbation may be secondary to pneumonia  - Will treat underlying cause with antibiotics  - Scheduled Duonebs  - Low dose IV steroids  - Normally wears oxygen at 2LPM nightly, but now wearing continuously. Keep O2 sats > 88%  - F/U with Dr. Barrett upon discharge

## 2018-07-29 NOTE — ASSESSMENT & PLAN NOTE
- Creatinine currently 1.7. Appears to be at baseline. Will continue to monitor and consult Nephrology as needed

## 2018-07-29 NOTE — SUBJECTIVE & OBJECTIVE
Past Medical History:   Diagnosis Date    Acute pancreatitis     Alcohol dependence     per patient in the past    Allergy     Anemia     Anxiety     Arthritis 6/24/2013    Asthma     per patient    Back pain     Colon polyp     Colon polyp     colonoscopy 8/26/2013    COPD (chronic obstructive pulmonary disease)     Dependence on renal dialysis 2012    when in liver failure    Depression     Disorder of kidney and ureter     ckd4    Diverticulosis     Diverticulosis     colonoscopy 8/26/2013    Hiatal hernia     CXR 2/25/2015--Large hiatal hernia.     Hyperlipidemia     Hypertension     Hypocalcemia 7/6/2016    Hypothyroidism 6/24/2013    Osteoporosis     Pulmonary embolism     per patient unsure of date    Restless leg syndrome     RLS (restless legs syndrome)     Seizure 6/24/2013    Stroke     ischemic injury due to hypotension    Tobacco dependence     resolved    Trouble in sleeping        Past Surgical History:   Procedure Laterality Date    ADENOIDECTOMY  1960 approx    amputation left 2nd finger tip Left 2012    for blood clot/ after liver failure admit ? gangrene    APPENDECTOMY      incidental with another surgery    na hole/ ventriculostomy Right 08/24/2012    frontal after  liver failure    CHOLECYSTECTOMY      COLONOSCOPY  2013    FRACTURE SURGERY Left 07/12/2017    radius and ulna fracture    FRACTURE SURGERY Left 09/12/2017    wrist removal of hardware    HERNIA REPAIR  1999 approx    incisional hernia     HYSTERECTOMY  1977 approx    RAS/BSO  for endometriosis and tubular pregnancy     JOINT REPLACEMENT Left 2000 approx    hip for arthritis     JOINT REPLACEMENT Left 2004 approx    hip, fell then recurent dislocations - 4 times relocarted under mild anesthesia then re replaced    JOINT REPLACEMENT Right 2002 approx    knee for arthritis    OOPHORECTOMY  1977 approx    TONSILLECTOMY  1960 approx       Review of patient's allergies indicates:   Allergen  Reactions    Nsaids (non-steroidal anti-inflammatory drug) Hives and Shortness Of Breath    Pcn [penicillins] Hives and Shortness Of Breath    Sulfa (sulfonamide antibiotics) Hives and Shortness Of Breath    Aspirin      Due to Liver        Macrodantin [nitrofurantoin macrocrystalline] Hives    Wellbutrin [bupropion hcl]      Due to liver         No current facility-administered medications on file prior to encounter.      Current Outpatient Prescriptions on File Prior to Encounter   Medication Sig    allopurinol (ZYLOPRIM) 100 MG tablet Take 1 tablet (100 mg total) by mouth once daily.    amLODIPine (NORVASC) 5 MG tablet Take 1 tablet (5 mg total) by mouth once daily.    busPIRone (BUSPAR) 15 MG tablet Take 1 tablet (15 mg total) by mouth 2 (two) times daily.    diphenoxylate-atropine 2.5-0.025 mg (LOMOTIL) 2.5-0.025 mg per tablet take 2 tablets by mouth four times a day if needed for diarrhea    ferrous sulfate 325 (65 FE) MG EC tablet Take 1 tablet (325 mg total) by mouth once a week. (Patient taking differently: Take 325 mg by mouth once daily. )    fluticasone-umeclidin-vilanter (TRELEGY ELLIPTA) 100-62.5-25 mcg DsDv Inhale 1 puff into the lungs once daily.    ipratropium-albuterol (COMBIVENT RESPIMAT)  mcg/actuation inhaler inhale 1 puff INTO THE LUNGS four times a day (Patient taking differently: Inhale 1 puff into the lungs every 6 (six) hours as needed. inhale 1 puff INTO THE LUNGS four times a day)    levothyroxine (SYNTHROID) 100 MCG tablet TAKE 1 TABLET ONE TIME DAILY    metoprolol succinate (TOPROL-XL) 50 MG 24 hr tablet Take 1 tablet (50 mg total) by mouth 2 (two) times daily.    oxyCODONE 5 mg TbOr Take 1 tablet by mouth every 4 to 6 hours as needed.    pantoprazole (PROTONIX) 40 MG tablet TAKE 1 TABLET ONE TIME DAILY    pravastatin (PRAVACHOL) 10 MG tablet TAKE 1 TABLET EVERY DAY    QUEtiapine (SEROQUEL) 25 MG Tab Take 1/2 to 1 tablet at bedtime    ropinirole (REQUIP) 1 MG  tablet Take 1 mg by mouth nightly as needed.     SHINGRIX, PF, 50 mcg/0.5 mL injection inject 0.5 milliliter intramuscularly    [DISCONTINUED] traMADol (ULTRAM) 50 mg tablet Take 1 tablet (50 mg total) by mouth every 8 (eight) hours as needed for Pain.     Family History     Problem Relation (Age of Onset)    Asthma Father    Breast cancer Maternal Aunt    Cancer Maternal Aunt, Maternal Grandmother, Maternal Aunt    Colon cancer Maternal Grandmother    Diabetes Mother, Son    Heart disease Father, Maternal Aunt, Maternal Aunt    Hypertension Mother, Son    Kidney disease Mother    Parkinsonism Mother        Social History Main Topics    Smoking status: Former Smoker     Packs/day: 1.00     Years: 30.00     Types: Cigarettes     Quit date: 8/5/2010    Smokeless tobacco: Never Used      Comment: used nicorette gum in past and prn    Alcohol use 1.2 - 1.8 oz/week     1 - 2 Shots of liquor, 1 Glasses of wine per week    Drug use: No    Sexual activity: Not Currently     Partners: Male     Birth control/ protection: See Surgical Hx     Review of Systems   Constitutional: Positive for activity change and fatigue. Negative for chills and fever.   HENT: Negative.    Eyes: Negative.    Respiratory: Positive for cough, shortness of breath and wheezing. Negative for apnea, choking, chest tightness and stridor.    Cardiovascular: Negative for chest pain, palpitations and leg swelling.   Gastrointestinal: Negative for abdominal pain, constipation, diarrhea, nausea and vomiting.   Endocrine: Negative.    Genitourinary: Negative.    Musculoskeletal: Negative.    Skin: Negative.    Allergic/Immunologic: Negative.    Neurological: Positive for weakness. Negative for dizziness, tremors, seizures, syncope, facial asymmetry, speech difficulty, light-headedness, numbness and headaches.   Hematological: Negative.    Psychiatric/Behavioral: Negative.      Objective:     Vital Signs (Most Recent):  Temp: 98.7 °F (37.1 °C) (07/29/18  0932)  Pulse: 96 (07/29/18 0932)  Resp: (!) 22 (07/29/18 0932)  BP: 112/71 (07/29/18 0932)  SpO2: (!) 94 % (07/29/18 0932) Vital Signs (24h Range):  Temp:  [98.7 °F (37.1 °C)-99.6 °F (37.6 °C)] 98.7 °F (37.1 °C)  Pulse:  [] 96  Resp:  [22-30] 22  SpO2:  [88 %-100 %] 94 %  BP: ()/(53-87) 112/71     Weight: 52.2 kg (115 lb)  Body mass index is 18.56 kg/m².    Physical Exam   Constitutional: She is oriented to person, place, and time. She appears well-developed. She is cooperative. She is easily aroused. She appears ill. Nasal cannula in place.   HENT:   Head: Normocephalic and atraumatic.   Eyes: EOM are normal.   Neck: Neck supple.   Cardiovascular: Intact distal pulses.  Tachycardia present.    No murmur heard.  105 BPM   Pulmonary/Chest: Tachypnea noted. She has decreased breath sounds in the left upper field and the left lower field.   Abdominal: Soft. Bowel sounds are normal. She exhibits no distension. There is no tenderness. There is no rebound and no guarding.   Genitourinary:   Genitourinary Comments: Magallon catheter draining clear yellow urine   Musculoskeletal: She exhibits no edema, tenderness or deformity.   Neurological: She is alert, oriented to person, place, and time and easily aroused. No sensory deficit.   Skin: Skin is warm and dry. Capillary refill takes less than 2 seconds.   Psychiatric: She has a normal mood and affect. Her behavior is normal.   Nursing note and vitals reviewed.        CRANIAL NERVES     CN III, IV, VI   Extraocular motions are normal.        Significant Labs:   ABGs:   Recent Labs  Lab 07/29/18  0615   PH 7.305*   PCO2 39.6   HCO3 19.7*   POCSATURATED 88*   BE -7     CBC:   Recent Labs  Lab 07/29/18  0554   WBC 19.35*   HGB 12.2   HCT 37.7        CMP:   Recent Labs  Lab 07/29/18  0554      K 4.0      CO2 20*   *   BUN 56*   CREATININE 1.7*   CALCIUM 8.9   PROT 7.2   ALBUMIN 3.7   BILITOT 0.6   ALKPHOS 124   AST 25   ALT 9*   ANIONGAP 12    EGFRNONAA 31*     Lactic Acid:   Recent Labs  Lab 07/29/18  0554   LACTATE 1.0     POCT Glucose:   Recent Labs  Lab 07/29/18  0559   POCTGLUCOSE 129*     Troponin:   Recent Labs  Lab 07/29/18  0554   TROPONINI 0.009     Urine Studies:   Recent Labs  Lab 07/29/18  0631   COLORU Yellow   APPEARANCEUA Clear   PHUR 6.0   SPECGRAV 1.010   PROTEINUA Trace*   GLUCUA Negative   KETONESU Negative   BILIRUBINUA Negative   OCCULTUA Negative   NITRITE Negative   UROBILINOGEN Negative   LEUKOCYTESUR Trace*   WBCUA 5   BACTERIA Occasional       Significant Imaging:   Imaging Results          X-Ray Chest 1 View (Final result)  Result time 07/29/18 07:09:38    Final result by Ray De Luna MD (07/29/18 07:09:38)                 Impression:      No acute process seen.      Electronically signed by: Ray De Luna MD  Date:    07/29/2018  Time:    07:09             Narrative:    EXAMINATION:  XR CHEST 1 VIEW    CLINICAL HISTORY:  Shortness of breath    FINDINGS:  Single view of the chest.    Cardiac silhouette is normal.  The lungs demonstrate no evidence of active disease.  No evidence of pleural effusion or pneumothorax.  Bones appear intact.  HH.  Aorta demonstrates atherosclerotic disease.

## 2018-07-30 PROBLEM — J96.21 ACUTE ON CHRONIC RESPIRATORY FAILURE WITH HYPOXIA: Status: ACTIVE | Noted: 2018-07-30

## 2018-07-30 LAB
ALBUMIN SERPL BCP-MCNC: 2.9 G/DL
ALP SERPL-CCNC: 78 U/L
ALT SERPL W/O P-5'-P-CCNC: 7 U/L
ANION GAP SERPL CALC-SCNC: 10 MMOL/L
AST SERPL-CCNC: 14 U/L
BASOPHILS # BLD AUTO: 0 K/UL
BASOPHILS NFR BLD: 0 %
BILIRUB SERPL-MCNC: 0.3 MG/DL
BUN SERPL-MCNC: 72 MG/DL
CALCIUM SERPL-MCNC: 8.4 MG/DL
CHLORIDE SERPL-SCNC: 105 MMOL/L
CO2 SERPL-SCNC: 19 MMOL/L
CREAT SERPL-MCNC: 2 MG/DL
DIFFERENTIAL METHOD: ABNORMAL
EOSINOPHIL # BLD AUTO: 0 K/UL
EOSINOPHIL NFR BLD: 0 %
ERYTHROCYTE [DISTWIDTH] IN BLOOD BY AUTOMATED COUNT: 14.2 %
EST. GFR  (AFRICAN AMERICAN): 30 ML/MIN/1.73 M^2
EST. GFR  (NON AFRICAN AMERICAN): 26 ML/MIN/1.73 M^2
GLUCOSE SERPL-MCNC: 176 MG/DL
HCT VFR BLD AUTO: 30.4 %
HGB BLD-MCNC: 10.1 G/DL
LYMPHOCYTES # BLD AUTO: 0.7 K/UL
LYMPHOCYTES NFR BLD: 6 %
MCH RBC QN AUTO: 30.9 PG
MCHC RBC AUTO-ENTMCNC: 33.2 G/DL
MCV RBC AUTO: 93 FL
MONOCYTES # BLD AUTO: 0.1 K/UL
MONOCYTES NFR BLD: 0.9 %
NEUTROPHILS # BLD AUTO: 11.4 K/UL
NEUTROPHILS NFR BLD: 93.1 %
PLATELET # BLD AUTO: 179 K/UL
PMV BLD AUTO: 10.9 FL
POTASSIUM SERPL-SCNC: 4 MMOL/L
PROT SERPL-MCNC: 5.7 G/DL
RBC # BLD AUTO: 3.27 M/UL
SODIUM SERPL-SCNC: 134 MMOL/L
WBC # BLD AUTO: 12.25 K/UL

## 2018-07-30 PROCEDURE — 25000242 PHARM REV CODE 250 ALT 637 W/ HCPCS: Performed by: NURSE PRACTITIONER

## 2018-07-30 PROCEDURE — 25000003 PHARM REV CODE 250: Performed by: NURSE PRACTITIONER

## 2018-07-30 PROCEDURE — 63600175 PHARM REV CODE 636 W HCPCS: Performed by: NURSE PRACTITIONER

## 2018-07-30 PROCEDURE — 21400001 HC TELEMETRY ROOM

## 2018-07-30 PROCEDURE — 27000221 HC OXYGEN, UP TO 24 HOURS

## 2018-07-30 PROCEDURE — 94761 N-INVAS EAR/PLS OXIMETRY MLT: CPT

## 2018-07-30 PROCEDURE — 94640 AIRWAY INHALATION TREATMENT: CPT

## 2018-07-30 PROCEDURE — 36415 COLL VENOUS BLD VENIPUNCTURE: CPT

## 2018-07-30 PROCEDURE — 80053 COMPREHEN METABOLIC PANEL: CPT

## 2018-07-30 PROCEDURE — 85025 COMPLETE CBC W/AUTO DIFF WBC: CPT

## 2018-07-30 RX ORDER — RAMELTEON 8 MG/1
8 TABLET ORAL NIGHTLY PRN
Status: DISCONTINUED | OUTPATIENT
Start: 2018-07-30 | End: 2018-07-31 | Stop reason: HOSPADM

## 2018-07-30 RX ORDER — ACETAMINOPHEN 325 MG/1
650 TABLET ORAL EVERY 6 HOURS PRN
Status: DISCONTINUED | OUTPATIENT
Start: 2018-07-30 | End: 2018-07-31 | Stop reason: HOSPADM

## 2018-07-30 RX ORDER — ROPINIROLE 1 MG/1
1 TABLET, FILM COATED ORAL NIGHTLY
Status: DISCONTINUED | OUTPATIENT
Start: 2018-07-30 | End: 2018-07-31 | Stop reason: HOSPADM

## 2018-07-30 RX ORDER — SODIUM CHLORIDE 9 MG/ML
INJECTION, SOLUTION INTRAVENOUS CONTINUOUS
Status: DISCONTINUED | OUTPATIENT
Start: 2018-07-30 | End: 2018-07-31

## 2018-07-30 RX ADMIN — ALLOPURINOL 100 MG: 100 TABLET ORAL at 09:07

## 2018-07-30 RX ADMIN — AMLODIPINE BESYLATE 5 MG: 5 TABLET ORAL at 09:07

## 2018-07-30 RX ADMIN — METOPROLOL SUCCINATE 50 MG: 50 TABLET, EXTENDED RELEASE ORAL at 08:07

## 2018-07-30 RX ADMIN — MOXIFLOXACIN HYDROCHLORIDE 400 MG: 400 TABLET, FILM COATED ORAL at 09:07

## 2018-07-30 RX ADMIN — ACETAMINOPHEN 650 MG: 325 TABLET ORAL at 10:07

## 2018-07-30 RX ADMIN — ACETAMINOPHEN 650 MG: 325 TABLET ORAL at 08:07

## 2018-07-30 RX ADMIN — IPRATROPIUM BROMIDE AND ALBUTEROL SULFATE 3 ML: .5; 3 SOLUTION RESPIRATORY (INHALATION) at 07:07

## 2018-07-30 RX ADMIN — METHYLPREDNISOLONE SODIUM SUCCINATE 40 MG: 40 INJECTION, POWDER, FOR SOLUTION INTRAMUSCULAR; INTRAVENOUS at 09:07

## 2018-07-30 RX ADMIN — GUAIFENESIN 600 MG: 600 TABLET, EXTENDED RELEASE ORAL at 09:07

## 2018-07-30 RX ADMIN — LEVOTHYROXINE SODIUM 100 MCG: 100 TABLET ORAL at 05:07

## 2018-07-30 RX ADMIN — QUETIAPINE FUMARATE 25 MG: 25 TABLET ORAL at 08:07

## 2018-07-30 RX ADMIN — RAMELTEON 8 MG: 8 TABLET, FILM COATED ORAL at 11:07

## 2018-07-30 RX ADMIN — PANTOPRAZOLE SODIUM 40 MG: 40 TABLET, DELAYED RELEASE ORAL at 09:07

## 2018-07-30 RX ADMIN — ROPINIROLE HYDROCHLORIDE 1 MG: 1 TABLET, FILM COATED ORAL at 11:07

## 2018-07-30 RX ADMIN — METHYLPREDNISOLONE SODIUM SUCCINATE 40 MG: 40 INJECTION, POWDER, FOR SOLUTION INTRAMUSCULAR; INTRAVENOUS at 05:07

## 2018-07-30 RX ADMIN — IPRATROPIUM BROMIDE AND ALBUTEROL SULFATE 3 ML: .5; 3 SOLUTION RESPIRATORY (INHALATION) at 12:07

## 2018-07-30 RX ADMIN — IPRATROPIUM BROMIDE AND ALBUTEROL SULFATE 3 ML: .5; 3 SOLUTION RESPIRATORY (INHALATION) at 01:07

## 2018-07-30 RX ADMIN — METOPROLOL SUCCINATE 50 MG: 50 TABLET, EXTENDED RELEASE ORAL at 09:07

## 2018-07-30 RX ADMIN — GUAIFENESIN 600 MG: 600 TABLET, EXTENDED RELEASE ORAL at 08:07

## 2018-07-30 RX ADMIN — METHYLPREDNISOLONE SODIUM SUCCINATE 40 MG: 40 INJECTION, POWDER, FOR SOLUTION INTRAMUSCULAR; INTRAVENOUS at 02:07

## 2018-07-30 RX ADMIN — SODIUM CHLORIDE: 0.9 INJECTION, SOLUTION INTRAVENOUS at 09:07

## 2018-07-30 NOTE — PROGRESS NOTES
Clinical Pharmacy Progress Note: New Medication Education     Patient was counseled by pharmacist on new medication Avelox and its indications, side effects, and reasons for taking this medication.   Patient was given educational drug card/handout.   Patient/caregiver expressed understanding and had no further questions.  Offered bedside delivery of medications to patient.     Patient asked that I call her , Marino, at [phone] to discuss Avelox side effects. Will call him tomorrow morning after discussion with the patient.     Thank you for allowing us to participate in this patient's care.    Tigist Fuentes, PharmD 7/30/2018 3:16 PM

## 2018-07-30 NOTE — SUBJECTIVE & OBJECTIVE
Interval History: still having dyspnea, but improving. Currently on 2L     Review of Systems   Constitutional: Positive for activity change and fatigue. Negative for chills and fever.   HENT: Negative.    Eyes: Negative.    Respiratory: Positive for cough and shortness of breath. Negative for apnea, choking, chest tightness, wheezing and stridor.    Cardiovascular: Negative for chest pain, palpitations and leg swelling.   Gastrointestinal: Negative for abdominal pain, constipation, diarrhea, nausea and vomiting.   Endocrine: Negative.    Genitourinary: Negative.    Musculoskeletal: Negative.    Skin: Negative.    Allergic/Immunologic: Negative.    Neurological: Positive for weakness. Negative for dizziness, tremors, seizures, syncope, facial asymmetry, speech difficulty, light-headedness, numbness and headaches.   Hematological: Negative.    Psychiatric/Behavioral: Negative.       Objective:     Vital Signs (Most Recent):  Temp: 97.7 °F (36.5 °C) (07/30/18 0736)  Pulse: 72 (07/30/18 0754)  Resp: 16 (07/30/18 0754)  BP: 107/69 (07/30/18 0736)  SpO2: 97 % (07/30/18 0754) Vital Signs (24h Range):  Temp:  [97.2 °F (36.2 °C)-99 °F (37.2 °C)] 97.7 °F (36.5 °C)  Pulse:  [72-91] 72  Resp:  [16-22] 16  SpO2:  [93 %-97 %] 97 %  BP: (107-142)/(63-76) 107/69     Weight: 52.2 kg (115 lb)  Body mass index is 18.56 kg/m².    Intake/Output Summary (Last 24 hours) at 07/30/18 1005  Last data filed at 07/30/18 0500   Gross per 24 hour   Intake              960 ml   Output             1125 ml   Net             -165 ml      Physical Exam   Constitutional: She is oriented to person, place, and time. She appears well-developed. She is cooperative. She is easily aroused. She appears ill. Nasal cannula in place.   HENT:   Head: Normocephalic and atraumatic.   Eyes: EOM are normal.   Neck: Neck supple.   Cardiovascular: Intact distal pulses.  Tachycardia present.    No murmur heard.  105 BPM   Pulmonary/Chest: Tachypnea noted. She has  decreased breath sounds in the left upper field and the left lower field.   Abdominal: Soft. Bowel sounds are normal. She exhibits no distension. There is no tenderness. There is no rebound and no guarding.   Musculoskeletal: She exhibits no edema, tenderness or deformity.   Neurological: She is alert, oriented to person, place, and time and easily aroused. No sensory deficit.   Skin: Skin is warm and dry. Capillary refill takes less than 2 seconds.   Psychiatric: She has a normal mood and affect. Her behavior is normal.   Nursing note and vitals reviewed.    Significant Labs:   CBC:   Recent Labs  Lab 07/29/18  0554 07/30/18  0410   WBC 19.35* 12.25   HGB 12.2 10.1*   HCT 37.7 30.4*    179     CMP:   Recent Labs  Lab 07/29/18  0554 07/30/18  0410    134*   K 4.0 4.0    105   CO2 20* 19*   * 176*   BUN 56* 72*   CREATININE 1.7* 2.0*   CALCIUM 8.9 8.4*   PROT 7.2 5.7*   ALBUMIN 3.7 2.9*   BILITOT 0.6 0.3   ALKPHOS 124 78   AST 25 14   ALT 9* 7*   ANIONGAP 12 10   EGFRNONAA 31* 26*       Significant Imaging:  Imaging Results          X-Ray Chest 1 View (Final result)  Result time 07/29/18 07:09:38    Final result by Ray De Luna MD (07/29/18 07:09:38)                 Impression:      No acute process seen.      Electronically signed by: Ray De Luna MD  Date:    07/29/2018  Time:    07:09             Narrative:    EXAMINATION:  XR CHEST 1 VIEW    CLINICAL HISTORY:  Shortness of breath    FINDINGS:  Single view of the chest.    Cardiac silhouette is normal.  The lungs demonstrate no evidence of active disease.  No evidence of pleural effusion or pneumothorax.  Bones appear intact.  HH.  Aorta demonstrates atherosclerotic disease.

## 2018-07-30 NOTE — HOSPITAL COURSE
Ibeth Candelario is a 67 year old female admitted to [facility] for acute on chronic respiratory failure due to pneumonia. Treatment included IV antibiotics, inhaled bronchodilators, and supplemental oxygen. She reports wearing nocturnal oxygen.     7/31/18 patient did not qualify for continuous home oxygen per desat study. She will continue steroid taper and Levaquin x 7 days. She was asked to follow up with Dr. Crowder at scheduled appt on 8/14/17. Patient seen and examined on date of discharge and deemed suitable.

## 2018-07-30 NOTE — ASSESSMENT & PLAN NOTE
-related to pneumonia and COPD exacerbation  -supplemental oxygen  -wears nocturnal oxygen  -will do home O2 eval prior to discharge.

## 2018-07-30 NOTE — PLAN OF CARE
Initial assessment completed by CM. Met with patient. Patient AA&Ox3, however, she could not remember the name of previously used home health or oxygen vendor. CM called  Marino Schroeder ([phone]) and was told that patient goes to [facility] weekly (Thurs) and received oxygen through [facility] . Patient and  deny any post hospital needs or services at this time.Transitional Care Folder, Discharge Planning Begins on Admission pamphlet, [facility] pamphlet, Advance Directive information given to patient along with  contact information on the white board and given by phone to the .      07/30/18 1000   Discharge Assessment   Assessment Type Discharge Planning Assessment   Confirmed/corrected address and phone number on facesheet? Yes   Assessment information obtained from? Patient   Expected Length of Stay (days) 3   Communicated expected length of stay with patient/caregiver yes   Prior to hospitilization cognitive status: Alert/Oriented;No Deficits   Prior to hospitalization functional status: Assistive Equipment;Needs Assistance   Current cognitive status: Alert/Oriented;No Deficits   Current Functional Status: Assistive Equipment   Facility Arrived From: Home   Lives With spouse   Able to Return to Prior Arrangements yes   Is patient able to care for self after discharge? Yes   Who are your caregiver(s) and their phone number(s)? Marino Schroeder ( [phone])   Patient's perception of discharge disposition home or selfcare   Readmission Within The Last 30 Days no previous admission in last 30 days   Patient currently being followed by outpatient case management? No   Patient currently receives any other outside agency services? No   Equipment Currently Used at Home oxygen;walker, rolling;wheelchair   Do you have any problems affording any of your prescribed medications? No   Is the patient taking medications as prescribed? yes   Does the patient have  transportation home? Yes   Transportation Available car   Dialysis Name and Scheduled days N/A   Does the patient receive services at the [facility]? No   Discharge Plan A Home with family   Discharge Plan B Home with family   Patient/Family In Agreement With Plan yes   Instructed patient and  to call with any questions or concerns.      [facility] - [place], [place] - [address]  [address] [facility] [address]  Phone: [phone] Fax: [phone]    [facility] - [place], [place] - [address] [facility] [address]  [address]  [facility] [address]  Phone: [phone] Fax: [phone]    [facility] [address]  [facility] [place] [address]  Phone: [phone] Fax: [phone]    Geovanna Botello MD  Payor: [place] MEDICARE / Plan: [employer] MEDICARE HMO / Product Type: Capitation /

## 2018-07-30 NOTE — PLAN OF CARE
Problem: Patient Care Overview  Goal: Plan of Care Review  Outcome: Ongoing (interventions implemented as appropriate)  Remained free from injury. Tolerating diet. Ambulating to bathroom with assist x1. 2L o2. Mild headache relieved by po medication. NSR on monitor. Continuing iv steroids and po abx. 12 hour chart check complete.

## 2018-07-30 NOTE — PLAN OF CARE
Problem: Patient Care Overview  Goal: Plan of Care Review  Outcome: Ongoing (interventions implemented as appropriate)  Pt alert and oriented. POC reviewed. IV steroids administered per orders. strict I and O's. 2-3 L NC. Pt remained free of falls during shift. Bed alarm set , call light in reach, room free of clutter, side rails  up x2, pt on telemetry monitor SR, will continue to monitor.

## 2018-07-30 NOTE — PLAN OF CARE
Initial assessment completed by CM. Met with patient at her bedside.  Patient awake, alert and oriented, but admits can not remember previously used vendors. Patient referred CM to  for previously used DME company for home oxygen and home health.  Patient denies any post hospital needs or services at this time.Transitional Care Folder, Discharge Planning Begins on Admission pamphlet, [facility] pamphlet, Advance Directive information given to patient along with  contact information. Instructed patient or family to call with any questions or concerns.        [facility] - [place], [place] - [address] [facility]  [address] [facility] [address]  Phone: [phone] Fax: [phone]    [id number] - [place], [place] - [address] [facility] [address]  [address]  [facility] [address]  Phone: [phone] Fax: [phone]    [facility] [address]  [facility] [place] [address]  Phone: [phone] Fax: [phone]    Geovanna Botello MD  Payor: [employer] MEDICARE / Plan: HU[employer] MEDICARE HMO / Product Type: Capitation /

## 2018-07-30 NOTE — ASSESSMENT & PLAN NOTE
- Admit to Telemetry  - Sepsis criteria:  HR > 90, WBC count > 12,000, RR > 20, source pneumonia  - Blood/sputum cultures obtained -- follow   - Will treat underlying source with antibiotics, inhaled medications, and supplemental oxygen    7/30/18  -resolved

## 2018-07-30 NOTE — ASSESSMENT & PLAN NOTE
- CXR impression with no acute findings but appears to have pneumonia to L, worsened from previous CXR on 07/16/18. Pt was seen at urgent care on 07/16/18 and given Doxycycline for pneumonia, which pt completed.   - Pt received IV Azithromycin and Rocephin in the ED  -Continue Avelox  - Duonebs  - Follow blood/sputum cultures  - Supplemental oxygen, keep O2 sats > 88%

## 2018-07-30 NOTE — PROGRESS NOTES
"[facility] Hospital Medicine  Progress Note    Patient Name: Ibeth Schroeder  MRN: [id number]  Patient Class: IP- Inpatient   Admission Date: 7/29/2018  Length of Stay: 1 days  Attending Physician: Vaishali Shin MD  Primary Care Provider: Geovanna Botello MD    Subjective:     Principal Problem:Sepsis    HPI:  Ibeth Schroeder is a 67 year old female with a PMHx of Seizure, PE, Hypothyroidism, HTN, HLD, COPD, Anemia, and Anxiety who presented to the Emergency Department with c/o SOB x 1 week. Associated symptoms include: productive cough with thick dark brown, green sputum. Pt denies fever/chills. Pt was seen at [facility] on 07/16/18 for nasal congestion, fatigue, and cough. Pt reports she was told she had a "touch of pneumonia." Pt given Doxycycline which pt states completed course. Normally wears 2L oxygen at night. Sees Dr. Barrett outpatient. ED workup revealed: WBC 19.35K, Hgb/Hct 12.2/37.7, BUN 56, creatinine 1.7, , procalcitonin negative. ABG PO2 59. Pt admitted to Telemetry for Sepsis secondary to pneumonia. POA is Marino Schroeder -- [phone].    Hospital Course:  Ibeth Candelario is a 67 year old female admitted to [facility] for acute on chronic respiratory failure due to pneumonia. Treatment included IV antibiotics, inhaled bronchodilators, and supplemental oxygen. She reports wearing nocturnal oxygen.     Interval History: still having dyspnea, but improving. Currently on 2L     Review of Systems   Constitutional: Positive for activity change and fatigue. Negative for chills and fever.   HENT: Negative.    Eyes: Negative.    Respiratory: Positive for cough and shortness of breath. Negative for apnea, choking, chest tightness, wheezing and stridor.    Cardiovascular: Negative for chest pain, palpitations and leg swelling.   Gastrointestinal: Negative for abdominal pain, constipation, diarrhea, nausea and vomiting.   Endocrine: Negative.  "   Genitourinary: Negative.    Musculoskeletal: Negative.    Skin: Negative.    Allergic/Immunologic: Negative.    Neurological: Positive for weakness. Negative for dizziness, tremors, seizures, syncope, facial asymmetry, speech difficulty, light-headedness, numbness and headaches.   Hematological: Negative.    Psychiatric/Behavioral: Negative.       Objective:     Vital Signs (Most Recent):  Temp: 97.7 °F (36.5 °C) (07/30/18 0736)  Pulse: 72 (07/30/18 0754)  Resp: 16 (07/30/18 0754)  BP: 107/69 (07/30/18 0736)  SpO2: 97 % (07/30/18 0754) Vital Signs (24h Range):  Temp:  [97.2 °F (36.2 °C)-99 °F (37.2 °C)] 97.7 °F (36.5 °C)  Pulse:  [72-91] 72  Resp:  [16-22] 16  SpO2:  [93 %-97 %] 97 %  BP: (107-142)/(63-76) 107/69     Weight: 52.2 kg (115 lb)  Body mass index is 18.56 kg/m².    Intake/Output Summary (Last 24 hours) at 07/30/18 1005  Last data filed at 07/30/18 0500   Gross per 24 hour   Intake              960 ml   Output             1125 ml   Net             -165 ml      Physical Exam   Constitutional: She is oriented to person, place, and time. She appears well-developed. She is cooperative. She is easily aroused. She appears ill. Nasal cannula in place.   HENT:   Head: Normocephalic and atraumatic.   Eyes: EOM are normal.   Neck: Neck supple.   Cardiovascular: Intact distal pulses.  Tachycardia present.    No murmur heard.  105 BPM   Pulmonary/Chest: Tachypnea noted. She has decreased breath sounds in the left upper field and the left lower field.   Abdominal: Soft. Bowel sounds are normal. She exhibits no distension. There is no tenderness. There is no rebound and no guarding.   Musculoskeletal: She exhibits no edema, tenderness or deformity.   Neurological: She is alert, oriented to person, place, and time and easily aroused. No sensory deficit.   Skin: Skin is warm and dry. Capillary refill takes less than 2 seconds.   Psychiatric: She has a normal mood and affect. Her behavior is normal.   Nursing note and  vitals reviewed.    Significant Labs:   CBC:   Recent Labs  Lab 07/29/18  0554 07/30/18  0410   WBC 19.35* 12.25   HGB 12.2 10.1*   HCT 37.7 30.4*    179     CMP:   Recent Labs  Lab 07/29/18  0554 07/30/18  0410    134*   K 4.0 4.0    105   CO2 20* 19*   * 176*   BUN 56* 72*   CREATININE 1.7* 2.0*   CALCIUM 8.9 8.4*   PROT 7.2 5.7*   ALBUMIN 3.7 2.9*   BILITOT 0.6 0.3   ALKPHOS 124 78   AST 25 14   ALT 9* 7*   ANIONGAP 12 10   EGFRNONAA 31* 26*       Significant Imaging:  Imaging Results          X-Ray Chest 1 View (Final result)  Result time 07/29/18 07:09:38    Final result by Ray De Luna MD (07/29/18 07:09:38)                 Impression:      No acute process seen.      Electronically signed by: Ray De Luna MD  Date:    07/29/2018  Time:    07:09             Narrative:    EXAMINATION:  XR CHEST 1 VIEW    CLINICAL HISTORY:  Shortness of breath    FINDINGS:  Single view of the chest.    Cardiac silhouette is normal.  The lungs demonstrate no evidence of active disease.  No evidence of pleural effusion or pneumothorax.  Bones appear intact.  HH.  Aorta demonstrates atherosclerotic disease.                                  Assessment/Plan:      * Sepsis    - Admit to Telemetry  - Sepsis criteria:  HR > 90, WBC count > 12,000, RR > 20, source pneumonia  - Blood/sputum cultures obtained -- follow   - Will treat underlying source with antibiotics, inhaled medications, and supplemental oxygen    7/30/18  -resolved          Pneumonia due to infectious organism    - CXR impression with no acute findings but appears to have pneumonia to RML, worsened from previous CXR on 07/16/18. Pt was seen at urgent care on 07/16/18 and given Doxycycline for pneumonia, which pt completed.   - Pt received IV Azithromycin and Rocephin in the ED  -Continue Avelox  - Duonebs  - Follow blood/sputum cultures  - Supplemental oxygen, keep O2 sats > 88%          Acute on chronic respiratory failure with hypoxia     -related to pneumonia and COPD exacerbation  -supplemental oxygen  -wears nocturnal oxygen  -will do home O2 eval prior to discharge.         COPD with acute exacerbation    - Exacerbation may be secondary to pneumonia  - Will treat underlying cause with antibiotics  - Scheduled Duonebs  - Low dose IV steroids  - Normally wears oxygen at 2LPM nightly, but now wearing continuously. Keep O2 sats > 88%  - F/U with Dr. Barrett upon discharge          Chronic kidney disease, stage 3    - Creatinine currently 1.7. Appears to be at baseline. Will continue to monitor and consult Nephrology as needed    7/30/18  --slightly worsening at 2.0 today. Gentle hydration          History of seizure    - Not on medications. Pt reports having one seizure during hospitalization years ago  - Seizure precautions          Essential hypertension    - Continue Amlodipine and Metoprolol          Hypothyroidism    - Continue Levothyroxine            VTE Risk Mitigation         Ordered     Place sequential compression device  Until discontinued      07/29/18 1121        Evert Mota NP  Department of Hospital Medicine   [facility]

## 2018-07-30 NOTE — ASSESSMENT & PLAN NOTE
- Creatinine currently 1.7. Appears to be at baseline. Will continue to monitor and consult Nephrology as needed    7/30/18  --slightly worsening at 2.0 today. Gentle hydration

## 2018-07-31 VITALS
HEART RATE: 70 BPM | DIASTOLIC BLOOD PRESSURE: 74 MMHG | WEIGHT: 272.69 LBS | TEMPERATURE: 98 F | BODY MASS INDEX: 43.82 KG/M2 | RESPIRATION RATE: 20 BRPM | SYSTOLIC BLOOD PRESSURE: 117 MMHG | OXYGEN SATURATION: 98 % | HEIGHT: 66 IN

## 2018-07-31 LAB
BACTERIA SPEC AEROBE CULT: NORMAL
BACTERIA UR CULT: NORMAL
GRAM STN SPEC: NORMAL

## 2018-07-31 PROCEDURE — 27000221 HC OXYGEN, UP TO 24 HOURS

## 2018-07-31 PROCEDURE — 63600175 PHARM REV CODE 636 W HCPCS: Performed by: NURSE PRACTITIONER

## 2018-07-31 PROCEDURE — 94761 N-INVAS EAR/PLS OXIMETRY MLT: CPT

## 2018-07-31 PROCEDURE — 25000003 PHARM REV CODE 250: Performed by: NURSE PRACTITIONER

## 2018-07-31 PROCEDURE — 25000242 PHARM REV CODE 250 ALT 637 W/ HCPCS: Performed by: NURSE PRACTITIONER

## 2018-07-31 PROCEDURE — 94640 AIRWAY INHALATION TREATMENT: CPT

## 2018-07-31 RX ORDER — PREDNISONE 10 MG/1
TABLET ORAL
Qty: 30 TABLET | Refills: 0 | Status: SHIPPED | OUTPATIENT
Start: 2018-07-31 | End: 2018-07-31

## 2018-07-31 RX ORDER — LEVOFLOXACIN 500 MG/1
500 TABLET, FILM COATED ORAL DAILY
Qty: 7 TABLET | Refills: 0 | Status: SHIPPED | OUTPATIENT
Start: 2018-07-31 | End: 2018-07-31

## 2018-07-31 RX ORDER — LEVOFLOXACIN 500 MG/1
500 TABLET, FILM COATED ORAL DAILY
Qty: 7 TABLET | Refills: 0 | Status: SHIPPED | OUTPATIENT
Start: 2018-07-31 | End: 2018-08-01 | Stop reason: HOSPADM

## 2018-07-31 RX ORDER — PREDNISONE 10 MG/1
TABLET ORAL
Qty: 30 TABLET | Refills: 0 | Status: SHIPPED | OUTPATIENT
Start: 2018-07-31 | End: 2018-08-01 | Stop reason: HOSPADM

## 2018-07-31 RX ADMIN — METOPROLOL SUCCINATE 50 MG: 50 TABLET, EXTENDED RELEASE ORAL at 08:07

## 2018-07-31 RX ADMIN — ALLOPURINOL 100 MG: 100 TABLET ORAL at 08:07

## 2018-07-31 RX ADMIN — IPRATROPIUM BROMIDE AND ALBUTEROL SULFATE 3 ML: .5; 3 SOLUTION RESPIRATORY (INHALATION) at 01:07

## 2018-07-31 RX ADMIN — PANTOPRAZOLE SODIUM 40 MG: 40 TABLET, DELAYED RELEASE ORAL at 08:07

## 2018-07-31 RX ADMIN — IPRATROPIUM BROMIDE AND ALBUTEROL SULFATE 3 ML: .5; 3 SOLUTION RESPIRATORY (INHALATION) at 12:07

## 2018-07-31 RX ADMIN — MOXIFLOXACIN HYDROCHLORIDE 400 MG: 400 TABLET, FILM COATED ORAL at 08:07

## 2018-07-31 RX ADMIN — METHYLPREDNISOLONE SODIUM SUCCINATE 40 MG: 40 INJECTION, POWDER, FOR SOLUTION INTRAMUSCULAR; INTRAVENOUS at 05:07

## 2018-07-31 RX ADMIN — GUAIFENESIN 600 MG: 600 TABLET, EXTENDED RELEASE ORAL at 08:07

## 2018-07-31 RX ADMIN — AMLODIPINE BESYLATE 5 MG: 5 TABLET ORAL at 08:07

## 2018-07-31 RX ADMIN — IPRATROPIUM BROMIDE AND ALBUTEROL SULFATE 3 ML: .5; 3 SOLUTION RESPIRATORY (INHALATION) at 08:07

## 2018-07-31 RX ADMIN — SODIUM CHLORIDE: 0.9 INJECTION, SOLUTION INTRAVENOUS at 05:07

## 2018-07-31 RX ADMIN — LEVOTHYROXINE SODIUM 100 MCG: 100 TABLET ORAL at 05:07

## 2018-07-31 NOTE — PLAN OF CARE
Met with pt to discuss IMM. Initialed by her, copy given and original placed in chart. When discussing needs, pt wants to stay with [facility] weekly and she denies having other needs. Lluvia< NP     07/31/18 1112   Medicare Message   Important Message from Medicare regarding Discharge Appeal Rights Given to patient/caregiver;Explained to patient/caregiver;Signed/date by patient/caregiver   Date IMM was signed 07/31/18   Time IMM was signed 1029    informed.

## 2018-07-31 NOTE — NURSING
at bedside to pick patient up. AVS summary and paper prescription given to Marino (pt spouse). Pt and spouse verbalized understanding of follow up appt, d/c orders and prescription meds, and next dose due. telemonitor returned to monitor tech.

## 2018-07-31 NOTE — PLAN OF CARE
Order received to continue OP Rehab. Spoke with Nirav at [facility] and faxed to [facility] in [place] [phone].

## 2018-07-31 NOTE — PLAN OF CARE
07/31/18 1653   Final Note   Assessment Type Final Discharge Note   Discharge Disposition (OP Rehab with [facility])   Hospital Follow Up  Appt(s) scheduled? Yes

## 2018-07-31 NOTE — DISCHARGE SUMMARY
"[facility] Hospital Medicine  Discharge Summary      Patient Name: Ibeth Schroeder  MRN: [id number]  Admission Date: 7/29/2018  Hospital Length of Stay: 2 days  Discharge Date and Time:  07/31/2018 11:27 AM  Attending Physician: Vaishali Shin MD   Discharging Provider: Evert Mota NP  Primary Care Provider: Geovanna Botello MD      HPI:   Ibeth Schroeder is a 67 year old female with a PMHx of Seizure, PE, Hypothyroidism, HTN, HLD, COPD, Anemia, and Anxiety who presented to the Emergency Department with c/o SOB x 1 week. Associated symptoms include: productive cough with thick dark brown, green sputum. Pt denies fever/chills. Pt was seen at [facility] on 07/16/18 for nasal congestion, fatigue, and cough. Pt reports she was told she had a "touch of pneumonia." Pt given Doxycycline which pt states completed course. Normally wears 2L oxygen at night. Sees Dr. Barrett outpatient. ED workup revealed: WBC 19.35K, Hgb/Hct 12.2/37.7, BUN 56, creatinine 1.7, , procalcitonin negative. ABG PO2 59. Pt admitted to Telemetry for Sepsis secondary to pneumonia. POA is Marino Schroeder -- [phone].    * No surgery found *      Hospital Course:   Ibeth Candelario is a 67 year old female admitted to [facility] for acute on chronic respiratory failure due to pneumonia. Treatment included IV antibiotics, inhaled bronchodilators, and supplemental oxygen. She reports wearing nocturnal oxygen.     7/31/18 patient did not qualify for continuous home oxygen per desat study. She will continue steroid taper and Levaquin x 7 days. She was asked to follow up with Dr. Crowder at scheduled appt on 8/14/17. Patient seen and examined on date of discharge and deemed suitable.      Consults:   Consults         Status Ordering Provider     Inpatient consult to Hospitalist  Once     Provider:  Vaishali Shin MD    Acknowledged JACINDA HANSEN.          No new Assessment & Plan notes have been " filed under this hospital service since the last note was generated.  Service: Hospital Medicine    Final Active Diagnoses:    Diagnosis Date Noted POA    PRINCIPAL PROBLEM:  Sepsis [A41.9] 07/29/2018 Yes    Pneumonia due to infectious organism [J18.9] 07/29/2018 Yes    Acute on chronic respiratory failure with hypoxia [J96.21] 07/30/2018 Yes    COPD with acute exacerbation [J44.1] 07/29/2018 Yes    Chronic kidney disease, stage 3 [N18.3] 08/04/2014 Yes     Chronic    Essential hypertension [I10] 06/24/2013 Yes     Chronic    Hypothyroidism [E03.9] 06/24/2013 Yes     Chronic    History of seizure [Z87.898] 06/24/2013 Not Applicable     Chronic      Problems Resolved During this Admission:    Diagnosis Date Noted Date Resolved POA       Discharged Condition: stable    Disposition: Home or Self Care    Follow Up:  Follow-up Information     Geovanna Botello MD In 3 days.    Specialty:  Family Medicine  Contact information:  [address] [place] [id number] [phone]                 Patient Instructions:     Activity as tolerated         Significant Diagnostic Studies: Labs:   CMP   Recent Labs  Lab 07/30/18  0410   *   K 4.0      CO2 19*   *   BUN 72*   CREATININE 2.0*   CALCIUM 8.4*   PROT 5.7*   ALBUMIN 2.9*   BILITOT 0.3   ALKPHOS 78   AST 14   ALT 7*   ANIONGAP 10   ESTGFRAFRICA 30*   EGFRNONAA 26*    and CBC   Recent Labs  Lab 07/30/18  0410   WBC 12.25   HGB 10.1*   HCT 30.4*          Pending Diagnostic Studies:     None         Medications:  Reconciled Home Medications:      Medication List      START taking these medications    levoFLOXacin 500 MG tablet  Commonly known as:  LEVAQUIN  Take 1 tablet (500 mg total) by mouth once daily. for 7 days     predniSONE 10 mg tablet pack  Commonly known as:  DELTASONE  40mg po daily x 4 days, 30mg po daily x 3 days, 20mg po daily x 2 days, 10mg po daily x 1 day        CHANGE how you take these medications     ferrous sulfate 325 (65 FE) MG EC tablet  Take 1 tablet (325 mg total) by mouth once a week.  What changed:  when to take this     ipratropium-albuterol  mcg/actuation inhaler  Commonly known as:  COMBIVENT RESPIMAT  inhale 1 puff INTO THE LUNGS four times a day  What changed:  · how much to take  · how to take this  · when to take this  · reasons to take this  · additional instructions        CONTINUE taking these medications    allopurinol 100 MG tablet  Commonly known as:  ZYLOPRIM  Take 1 tablet (100 mg total) by mouth once daily.     amLODIPine 5 MG tablet  Commonly known as:  NORVASC  Take 1 tablet (5 mg total) by mouth once daily.     busPIRone 15 MG tablet  Commonly known as:  BUSPAR  Take 1 tablet (15 mg total) by mouth 2 (two) times daily.     diphenoxylate-atropine 2.5-0.025 mg 2.5-0.025 mg per tablet  Commonly known as:  LOMOTIL  take 2 tablets by mouth four times a day if needed for diarrhea     fluticasone-umeclidin-vilanter 100-62.5-25 mcg Dsdv  Commonly known as:  TRELEGY ELLIPTA  Inhale 1 puff into the lungs once daily.     levothyroxine 100 MCG tablet  Commonly known as:  SYNTHROID  TAKE 1 TABLET ONE TIME DAILY     metoprolol succinate 50 MG 24 hr tablet  Commonly known as:  TOPROL-XL  Take 1 tablet (50 mg total) by mouth 2 (two) times daily.        STOP taking these medications    traMADol 50 mg tablet  Commonly known as:  ULTRAM        ASK your doctor about these medications    oxyCODONE 5 mg Tbor  Take 1 tablet by mouth every 4 to 6 hours as needed.     pantoprazole 40 MG tablet  Commonly known as:  PROTONIX  TAKE 1 TABLET ONE TIME DAILY     pravastatin 10 MG tablet  Commonly known as:  PRAVACHOL  TAKE 1 TABLET EVERY DAY     QUEtiapine 25 MG Tab  Commonly known as:  SEROQUEL  Take 1/2 to 1 tablet at bedtime     rOPINIRole 1 MG tablet  Commonly known as:  REQUIP  Take 1 mg by mouth nightly as needed.     SHINGRIX (PF) 50 mcg/0.5 mL injection  Generic drug:  varicella-zoster [id number]  (PF)  inject 0.5 milliliter intramuscularly            Indwelling Lines/Drains at time of discharge:   Lines/Drains/Airways     Epidural Line                 Perineural Analgesia/Anesthesia Assessment (using dermatomes) Epidural 09/06/17 1246 327 days                Time spent on the discharge of patient: >35 minutes  Patient was seen and examined on the date of discharge and determined to be suitable for discharge.      Evert Mota NP  Department of Hospital Medicine  [facility]

## 2018-07-31 NOTE — PROGRESS NOTES
Home Oxygen Evaluation    Date Performed: 2018    1) Patient's Home O2 Sat on room air, while at rest: 94        If O2 sats on room air at rest are 88% or below, patient qualifies. No additional testing needed. Document N/A in steps 2 and 3. If 89% or above, complete steps 2.      2) Patient's O2 Sat on room air while exercisin        If O2 sats on room air while exercising remain 89% or above patient does not qualify, no further testing needed Document N/A in step 3. If O2 sats on room air while exercising are 88% or below, continue to step 3.      3) Patient's O2 Sat while exercising on O2: 94 at 94 LPM         (Must show improvement from #2 for patients to qualify)    If O2 sats improve on oxygen, patient qualifies for portable oxygen. If not, the patient does not qualify.

## 2018-08-01 ENCOUNTER — HOSPITAL ENCOUNTER (OUTPATIENT)
Facility: HOSPITAL | Age: 68
Discharge: HOME | End: 2018-08-01
Attending: EMERGENCY MEDICINE | Admitting: INTERNAL MEDICINE
Payer: MEDICARE

## 2018-08-01 VITALS
OXYGEN SATURATION: 98 % | HEIGHT: 66 IN | SYSTOLIC BLOOD PRESSURE: 138 MMHG | DIASTOLIC BLOOD PRESSURE: 82 MMHG | HEART RATE: 64 BPM | BODY MASS INDEX: 19.44 KG/M2 | TEMPERATURE: 98 F | RESPIRATION RATE: 34 BRPM | WEIGHT: 121 LBS

## 2018-08-01 DIAGNOSIS — J44.9 COPD (CHRONIC OBSTRUCTIVE PULMONARY DISEASE): ICD-10-CM

## 2018-08-01 DIAGNOSIS — R41.82 ALTERED MENTAL STATUS: ICD-10-CM

## 2018-08-01 DIAGNOSIS — R56.9 SEIZURE: Primary | ICD-10-CM

## 2018-08-01 PROBLEM — N39.0 URINARY TRACT INFECTION WITHOUT HEMATURIA: Status: ACTIVE | Noted: 2018-08-01

## 2018-08-01 LAB
ALBUMIN SERPL BCP-MCNC: 3.1 G/DL
ALLENS TEST: ABNORMAL
ALP SERPL-CCNC: 72 U/L
ALT SERPL W/O P-5'-P-CCNC: 12 U/L
ANION GAP SERPL CALC-SCNC: 9 MMOL/L
APTT BLDCRRT: 24.6 SEC
AST SERPL-CCNC: 24 U/L
BASOPHILS # BLD AUTO: 0 K/UL
BASOPHILS NFR BLD: 0 %
BILIRUB SERPL-MCNC: 0.3 MG/DL
BILIRUB UR QL STRIP: NEGATIVE
BNP SERPL-MCNC: 145 PG/ML
BUN SERPL-MCNC: 63 MG/DL
CALCIUM SERPL-MCNC: 8.2 MG/DL
CHLORIDE SERPL-SCNC: 112 MMOL/L
CLARITY UR: CLEAR
CO2 SERPL-SCNC: 18 MMOL/L
COLOR UR: YELLOW
CREAT SERPL-MCNC: 1.4 MG/DL
DELSYS: ABNORMAL
DIFFERENTIAL METHOD: ABNORMAL
EOSINOPHIL # BLD AUTO: 0 K/UL
EOSINOPHIL NFR BLD: 0 %
ERYTHROCYTE [DISTWIDTH] IN BLOOD BY AUTOMATED COUNT: 14.6 %
EST. GFR  (AFRICAN AMERICAN): 45 ML/MIN/1.73 M^2
EST. GFR  (NON AFRICAN AMERICAN): 39 ML/MIN/1.73 M^2
FIO2: 28
FLOW: 2
GLUCOSE SERPL-MCNC: 145 MG/DL
GLUCOSE UR QL STRIP: NEGATIVE
HCO3 UR-SCNC: 18.9 MMOL/L (ref 24–28)
HCT VFR BLD AUTO: 31.5 %
HGB BLD-MCNC: 10.7 G/DL
HGB UR QL STRIP: NEGATIVE
INR PPP: 1
KETONES UR QL STRIP: NEGATIVE
LEUKOCYTE ESTERASE UR QL STRIP: NEGATIVE
LYMPHOCYTES # BLD AUTO: 0.6 K/UL
LYMPHOCYTES NFR BLD: 5.4 %
MAGNESIUM SERPL-MCNC: 1.6 MG/DL
MCH RBC QN AUTO: 31.5 PG
MCHC RBC AUTO-ENTMCNC: 34 G/DL
MCV RBC AUTO: 93 FL
MODE: ABNORMAL
MONOCYTES # BLD AUTO: 0.3 K/UL
MONOCYTES NFR BLD: 2.6 %
NEUTROPHILS # BLD AUTO: 9.7 K/UL
NEUTROPHILS NFR BLD: 92 %
NITRITE UR QL STRIP: NEGATIVE
PCO2 BLDA: 41 MMHG (ref 35–45)
PH SMN: 7.27 [PH] (ref 7.35–7.45)
PH UR STRIP: 7 [PH] (ref 5–8)
PHOSPHATE SERPL-MCNC: 4.2 MG/DL
PLATELET # BLD AUTO: 190 K/UL
PMV BLD AUTO: 11.3 FL
PO2 BLDA: 82 MMHG (ref 80–100)
POC BE: -8 MMOL/L
POC SATURATED O2: 94 % (ref 95–100)
POTASSIUM SERPL-SCNC: 4.1 MMOL/L
PROT SERPL-MCNC: 6.1 G/DL
PROT UR QL STRIP: NEGATIVE
PROTHROMBIN TIME: 10.6 SEC
RBC # BLD AUTO: 3.4 M/UL
SAMPLE: ABNORMAL
SITE: ABNORMAL
SODIUM SERPL-SCNC: 139 MMOL/L
SP GR UR STRIP: <=1.005 (ref 1–1.03)
TROPONIN I SERPL DL<=0.01 NG/ML-MCNC: 0.01 NG/ML
URN SPEC COLLECT METH UR: ABNORMAL
UROBILINOGEN UR STRIP-ACNC: NEGATIVE EU/DL
WBC # BLD AUTO: 10.56 K/UL

## 2018-08-01 PROCEDURE — 83735 ASSAY OF MAGNESIUM: CPT

## 2018-08-01 PROCEDURE — 81003 URINALYSIS AUTO W/O SCOPE: CPT

## 2018-08-01 PROCEDURE — 99285 EMERGENCY DEPT VISIT HI MDM: CPT | Mod: 25

## 2018-08-01 PROCEDURE — 99900035 HC TECH TIME PER 15 MIN (STAT)

## 2018-08-01 PROCEDURE — 80053 COMPREHEN METABOLIC PANEL: CPT

## 2018-08-01 PROCEDURE — 85730 THROMBOPLASTIN TIME PARTIAL: CPT

## 2018-08-01 PROCEDURE — 85347 COAGULATION TIME ACTIVATED: CPT

## 2018-08-01 PROCEDURE — 27000221 HC OXYGEN, UP TO 24 HOURS

## 2018-08-01 PROCEDURE — 96374 THER/PROPH/DIAG INJ IV PUSH: CPT

## 2018-08-01 PROCEDURE — 94640 AIRWAY INHALATION TREATMENT: CPT

## 2018-08-01 PROCEDURE — 85025 COMPLETE CBC W/AUTO DIFF WBC: CPT

## 2018-08-01 PROCEDURE — G0378 HOSPITAL OBSERVATION PER HR: HCPCS

## 2018-08-01 PROCEDURE — 95816 EEG AWAKE AND DROWSY: CPT

## 2018-08-01 PROCEDURE — 84484 ASSAY OF TROPONIN QUANT: CPT

## 2018-08-01 PROCEDURE — 36415 COLL VENOUS BLD VENIPUNCTURE: CPT

## 2018-08-01 PROCEDURE — 36600 WITHDRAWAL OF ARTERIAL BLOOD: CPT

## 2018-08-01 PROCEDURE — 85610 PROTHROMBIN TIME: CPT

## 2018-08-01 PROCEDURE — 83880 ASSAY OF NATRIURETIC PEPTIDE: CPT

## 2018-08-01 PROCEDURE — 84100 ASSAY OF PHOSPHORUS: CPT

## 2018-08-01 PROCEDURE — 25000003 PHARM REV CODE 250: Performed by: INTERNAL MEDICINE

## 2018-08-01 PROCEDURE — 93010 ELECTROCARDIOGRAM REPORT: CPT | Mod: ,,, | Performed by: INTERNAL MEDICINE

## 2018-08-01 PROCEDURE — 25000242 PHARM REV CODE 250 ALT 637 W/ HCPCS: Performed by: EMERGENCY MEDICINE

## 2018-08-01 RX ORDER — ALLOPURINOL 100 MG/1
100 TABLET ORAL DAILY
Status: DISCONTINUED | OUTPATIENT
Start: 2018-08-01 | End: 2018-08-01 | Stop reason: HOSPADM

## 2018-08-01 RX ORDER — CIPROFLOXACIN 500 MG/1
500 TABLET ORAL EVERY 12 HOURS
Status: DISCONTINUED | OUTPATIENT
Start: 2018-08-01 | End: 2018-08-01 | Stop reason: HOSPADM

## 2018-08-01 RX ORDER — ENOXAPARIN SODIUM 100 MG/ML
40 INJECTION SUBCUTANEOUS EVERY 24 HOURS
Status: DISCONTINUED | OUTPATIENT
Start: 2018-08-01 | End: 2018-08-01 | Stop reason: HOSPADM

## 2018-08-01 RX ORDER — BUDESONIDE 0.5 MG/2ML
0.5 INHALANT ORAL EVERY 12 HOURS
Status: DISCONTINUED | OUTPATIENT
Start: 2018-08-01 | End: 2018-08-01 | Stop reason: HOSPADM

## 2018-08-01 RX ORDER — AZITHROMYCIN 1 G/1
1 POWDER, FOR SUSPENSION ORAL ONCE
Qty: 1 PACKET | Refills: 0 | Status: SHIPPED | OUTPATIENT
Start: 2018-08-01 | End: 2018-08-01

## 2018-08-01 RX ORDER — LEVOTHYROXINE SODIUM 50 UG/1
100 TABLET ORAL
Status: DISCONTINUED | OUTPATIENT
Start: 2018-08-01 | End: 2018-08-01 | Stop reason: HOSPADM

## 2018-08-01 RX ORDER — DIPHENHYDRAMINE HCL 25 MG
25 CAPSULE ORAL EVERY 6 HOURS PRN
Status: DISCONTINUED | OUTPATIENT
Start: 2018-08-01 | End: 2018-08-01 | Stop reason: HOSPADM

## 2018-08-01 RX ORDER — LEVETIRACETAM 500 MG/1
500 TABLET ORAL 2 TIMES DAILY
Qty: 60 TABLET | Refills: 0 | Status: SHIPPED | OUTPATIENT
Start: 2018-08-01 | End: 2018-08-01

## 2018-08-01 RX ORDER — MAG HYDROX/ALUMINUM HYD/SIMETH 200-200-20
30 SUSPENSION, ORAL (FINAL DOSE FORM) ORAL EVERY 6 HOURS PRN
Status: DISCONTINUED | OUTPATIENT
Start: 2018-08-01 | End: 2018-08-01 | Stop reason: HOSPADM

## 2018-08-01 RX ORDER — AMLODIPINE BESYLATE 5 MG/1
5 TABLET ORAL DAILY
Status: DISCONTINUED | OUTPATIENT
Start: 2018-08-01 | End: 2018-08-01 | Stop reason: HOSPADM

## 2018-08-01 RX ORDER — GUAIFENESIN 100 MG/5ML
200 SOLUTION ORAL EVERY 4 HOURS PRN
Status: DISCONTINUED | OUTPATIENT
Start: 2018-08-01 | End: 2018-08-01 | Stop reason: HOSPADM

## 2018-08-01 RX ORDER — ARFORMOTEROL TARTRATE 15 UG/2ML
15 SOLUTION RESPIRATORY (INHALATION) 2 TIMES DAILY
Status: DISCONTINUED | OUTPATIENT
Start: 2018-08-01 | End: 2018-08-01 | Stop reason: HOSPADM

## 2018-08-01 RX ORDER — ONDANSETRON 2 MG/ML
4 INJECTION INTRAMUSCULAR; INTRAVENOUS EVERY 8 HOURS PRN
Status: DISCONTINUED | OUTPATIENT
Start: 2018-08-01 | End: 2018-08-01 | Stop reason: HOSPADM

## 2018-08-01 RX ORDER — PRAVASTATIN SODIUM 10 MG/1
10 TABLET ORAL DAILY
Status: DISCONTINUED | OUTPATIENT
Start: 2018-08-01 | End: 2018-08-01 | Stop reason: HOSPADM

## 2018-08-01 RX ORDER — IPRATROPIUM BROMIDE AND ALBUTEROL SULFATE 2.5; .5 MG/3ML; MG/3ML
3 SOLUTION RESPIRATORY (INHALATION) EVERY 4 HOURS PRN
Status: DISCONTINUED | OUTPATIENT
Start: 2018-08-01 | End: 2018-08-01 | Stop reason: HOSPADM

## 2018-08-01 RX ORDER — METOPROLOL SUCCINATE 50 MG/1
50 TABLET, EXTENDED RELEASE ORAL 2 TIMES DAILY
Status: DISCONTINUED | OUTPATIENT
Start: 2018-08-01 | End: 2018-08-01 | Stop reason: HOSPADM

## 2018-08-01 RX ORDER — LEVETIRACETAM 500 MG/1
500 TABLET ORAL 2 TIMES DAILY
Qty: 60 TABLET | Refills: 0 | Status: ON HOLD | OUTPATIENT
Start: 2018-08-01 | End: 2019-03-29 | Stop reason: HOSPADM

## 2018-08-01 RX ORDER — ACETAMINOPHEN 325 MG/1
650 TABLET ORAL EVERY 6 HOURS PRN
Status: DISCONTINUED | OUTPATIENT
Start: 2018-08-01 | End: 2018-08-01 | Stop reason: HOSPADM

## 2018-08-01 RX ORDER — PANTOPRAZOLE SODIUM 40 MG/1
40 TABLET, DELAYED RELEASE ORAL DAILY
Status: DISCONTINUED | OUTPATIENT
Start: 2018-08-01 | End: 2018-08-01 | Stop reason: HOSPADM

## 2018-08-01 RX ADMIN — CIPROFLOXACIN 500 MG: 500 TABLET, FILM COATED ORAL at 11:08

## 2018-08-01 RX ADMIN — PANTOPRAZOLE SODIUM 40 MG: 40 TABLET, DELAYED RELEASE ORAL at 11:08

## 2018-08-01 RX ADMIN — ALLOPURINOL 100 MG: 100 TABLET ORAL at 11:08

## 2018-08-01 RX ADMIN — BUDESONIDE 0.5 MG: 0.5 SUSPENSION RESPIRATORY (INHALATION) at 07:08

## 2018-08-01 RX ADMIN — BUSPIRONE HYDROCHLORIDE 15 MG: 10 TABLET ORAL at 11:08

## 2018-08-01 RX ADMIN — PRAVASTATIN SODIUM 10 MG: 10 TABLET ORAL at 11:08

## 2018-08-01 RX ADMIN — AMLODIPINE BESYLATE 5 MG: 5 TABLET ORAL at 11:08

## 2018-08-01 RX ADMIN — METOPROLOL SUCCINATE 50 MG: 50 TABLET, EXTENDED RELEASE ORAL at 11:08

## 2018-08-01 RX ADMIN — SODIUM BICARBONATE 150 MEQ: 84 INJECTION, SOLUTION INTRAVENOUS at 11:08

## 2018-08-01 RX ADMIN — ARFORMOTEROL TARTRATE 15 MCG: 15 SOLUTION RESPIRATORY (INHALATION) at 07:08

## 2018-08-01 RX ADMIN — LEVOTHYROXINE SODIUM 100 MCG: 50 TABLET ORAL at 11:08

## 2018-08-01 NOTE — DISCHARGE INSTRUCTIONS
Recommendations:  Advised to cut down on alcohol intake  Advised not to blend sedatives with alcohol  Advised to follow up with Neurologist  Patient can be given prescription  Keppra  500 mg po bid       Seizure precautions     1) Do not drive for 6 months or assist any minor in driving with learners permit  2) No activities associated with water and or elevation  3) No baby sitting an infant or a child under the age of 6 years  4) Compliance with the seizure medications.  5) Follow up with Neurologist

## 2018-08-01 NOTE — SUBJECTIVE & OBJECTIVE
Past Medical History:   Diagnosis Date    Acute on chronic respiratory failure with hypoxia 7/30/2018    Acute pancreatitis     Alcohol dependence     per patient in the past    Allergy     Anemia     Anxiety     Arthritis 6/24/2013    Asthma     per patient    Back pain     Colon polyp     Colon polyp     colonoscopy 8/26/2013    COPD (chronic obstructive pulmonary disease)     Dependence on renal dialysis 2012    when in liver failure    Depression     Disorder of kidney and ureter     ckd4    Diverticulosis     Diverticulosis     colonoscopy 8/26/2013    Hiatal hernia     CXR 2/25/2015--Large hiatal hernia.     Hyperlipidemia     Hypertension     Hypocalcemia 7/6/2016    Hypothyroidism 6/24/2013    Osteoporosis     Pneumonia due to infectious organism 7/29/2018    Pulmonary embolism     per patient unsure of date    Restless leg syndrome     RLS (restless legs syndrome)     Seizure 6/24/2013    Stroke     ischemic injury due to hypotension    Tobacco dependence     resolved    Trouble in sleeping        Past Surgical History:   Procedure Laterality Date    ADENOIDECTOMY  1960 approx    amputation left 2nd finger tip Left 2012    for blood clot/ after liver failure admit ? gangrene    APPENDECTOMY      incidental with another surgery    na hole/ ventriculostomy Right 08/24/2012    frontal after  liver failure    CHOLECYSTECTOMY      COLONOSCOPY  2013    FRACTURE SURGERY Left 07/12/2017    radius and ulna fracture    FRACTURE SURGERY Left 09/12/2017    wrist removal of hardware    HERNIA REPAIR  1999 approx    incisional hernia     HYSTERECTOMY  1977 approx    RAS/BSO  for endometriosis and tubular pregnancy     JOINT REPLACEMENT Left 2000 approx    hip for arthritis     JOINT REPLACEMENT Left 2004 approx    hip, fell then recurent dislocations - 4 times relocarted under mild anesthesia then re replaced    JOINT REPLACEMENT Right 2002 approx    knee for arthritis     OOPHORECTOMY  1977 approx    TONSILLECTOMY  1960 approx       Review of patient's allergies indicates:   Allergen Reactions    Nsaids (non-steroidal anti-inflammatory drug) Hives and Shortness Of Breath    Pcn [penicillins] Hives and Shortness Of Breath    Sulfa (sulfonamide antibiotics) Hives and Shortness Of Breath    Aspirin      Due to Liver        Macrodantin [nitrofurantoin macrocrystalline] Hives    Wellbutrin [bupropion hcl]      Due to liver         Current Facility-Administered Medications on File Prior to Encounter   Medication    denosumab (PROLIA) injection 60 mg    [DISCONTINUED] 0.9%  NaCl infusion    [DISCONTINUED] acetaminophen tablet 650 mg    [DISCONTINUED] albuterol-ipratropium 2.5 mg-0.5 mg/3 mL nebulizer solution 3 mL    [DISCONTINUED] allopurinol tablet 100 mg    [DISCONTINUED] amLODIPine tablet 5 mg    [DISCONTINUED] guaiFENesin 12 hr tablet 600 mg    [DISCONTINUED] levothyroxine tablet 100 mcg    [DISCONTINUED] methylPREDNISolone sodium succinate injection 40 mg    [DISCONTINUED] metoprolol succinate (TOPROL-XL) 24 hr tablet 50 mg    [DISCONTINUED] moxifloxacin tablet 400 mg    [DISCONTINUED] pantoprazole EC tablet 40 mg    [DISCONTINUED] QUEtiapine tablet 25 mg    [DISCONTINUED] ramelteon tablet 8 mg    [DISCONTINUED] rOPINIRole tablet 1 mg     Current Outpatient Prescriptions on File Prior to Encounter   Medication Sig    allopurinol (ZYLOPRIM) 100 MG tablet Take 1 tablet (100 mg total) by mouth once daily.    amLODIPine (NORVASC) 5 MG tablet Take 1 tablet (5 mg total) by mouth once daily.    busPIRone (BUSPAR) 15 MG tablet Take 1 tablet (15 mg total) by mouth 2 (two) times daily.    diphenoxylate-atropine 2.5-0.025 mg (LOMOTIL) 2.5-0.025 mg per tablet take 2 tablets by mouth four times a day if needed for diarrhea    ferrous sulfate 325 (65 FE) MG EC tablet Take 1 tablet (325 mg total) by mouth once a week. (Patient taking differently: Take 325 mg by mouth once  daily. )    fluticasone-umeclidin-vilanter (TRELEGY ELLIPTA) 100-62.5-25 mcg DsDv Inhale 1 puff into the lungs once daily.    ipratropium-albuterol (COMBIVENT RESPIMAT)  mcg/actuation inhaler inhale 1 puff INTO THE LUNGS four times a day (Patient taking differently: Inhale 1 puff into the lungs every 6 (six) hours as needed. inhale 1 puff INTO THE LUNGS four times a day)    levoFLOXacin (LEVAQUIN) 500 MG tablet Take 1 tablet (500 mg total) by mouth once daily. for 7 days    levothyroxine (SYNTHROID) 100 MCG tablet TAKE 1 TABLET ONE TIME DAILY    metoprolol succinate (TOPROL-XL) 50 MG 24 hr tablet Take 1 tablet (50 mg total) by mouth 2 (two) times daily.    oxyCODONE 5 mg TbOr Take 1 tablet by mouth every 4 to 6 hours as needed.    pantoprazole (PROTONIX) 40 MG tablet TAKE 1 TABLET ONE TIME DAILY    pravastatin (PRAVACHOL) 10 MG tablet TAKE 1 TABLET EVERY DAY    predniSONE (DELTASONE) 10 mg tablet pack 40mg po daily x 4 days, 30mg po daily x 3 days, 20mg po daily x 2 days, 10mg po daily x 1 day    QUEtiapine (SEROQUEL) 25 MG Tab Take 1/2 to 1 tablet at bedtime    ropinirole (REQUIP) 1 MG tablet Take 1 mg by mouth nightly as needed.     SHINGRIX, PF, 50 mcg/0.5 mL injection inject 0.5 milliliter intramuscularly     Family History     Problem Relation (Age of Onset)    Asthma Father    Breast cancer Maternal Aunt    Cancer Maternal Aunt, Maternal Grandmother, Maternal Aunt    Colon cancer Maternal Grandmother    Diabetes Mother, Son    Heart disease Father, Maternal Aunt, Maternal Aunt    Hypertension Mother, Son    Kidney disease Mother    Parkinsonism Mother        Social History Main Topics    Smoking status: Former Smoker     Packs/day: 1.00     Years: 30.00     Types: Cigarettes     Quit date: 8/5/2010    Smokeless tobacco: Never Used      Comment: used nicorette gum in past and prn    Alcohol use 1.2 - 1.8 oz/week     1 - 2 Shots of liquor, 1 Glasses of wine per week    Drug use: No     Sexual activity: Not Currently     Partners: Male     Birth control/ protection: See Surgical Hx     Review of Systems   Constitutional: Positive for fatigue. Negative for chills and fever.   HENT: Negative for congestion, nosebleeds, sore throat and trouble swallowing.    Eyes: Negative for visual disturbance.   Respiratory: Negative for chest tightness and shortness of breath.    Cardiovascular: Negative for chest pain, palpitations and leg swelling.   Gastrointestinal: Negative for nausea and vomiting.   Endocrine: Negative for polyuria.   Genitourinary: Negative for dysuria, flank pain and hematuria.   Musculoskeletal: Negative for back pain and neck pain.   Allergic/Immunologic: Negative for immunocompromised state.   Neurological: Positive for seizures. Negative for dizziness, tremors, light-headedness and headaches.   Hematological: Negative for adenopathy. Does not bruise/bleed easily.   Psychiatric/Behavioral: Positive for confusion. Negative for agitation, hallucinations and sleep disturbance. The patient is not nervous/anxious.    All other systems reviewed and are negative.    Objective:     Vital Signs (Most Recent):  Temp: 98.9 °F (37.2 °C) (08/01/18 0500)  Pulse: 62 (08/01/18 0500)  Resp: 18 (08/01/18 0500)  BP: 126/86 (08/01/18 0500)  SpO2: 97 % (08/01/18 0500) Vital Signs (24h Range):  Temp:  [97.6 °F (36.4 °C)-98.9 °F (37.2 °C)] 98.9 °F (37.2 °C)  Pulse:  [62-87] 62  Resp:  [13-23] 18  SpO2:  [93 %-99 %] 97 %  BP: ()/(67-86) 126/86        Body mass index is 44.02 kg/m².    Physical Exam   Constitutional: No distress.   Appears comfortable, disoriented at this time,  at the bedside.   HENT:   Head: Normocephalic and atraumatic.   Bite sydnie on the right side of the tongue noted.   Eyes: Conjunctivae and EOM are normal. Pupils are equal, round, and reactive to light. No scleral icterus.   Neck: Normal range of motion. No JVD present.   Cardiovascular: Normal rate, regular rhythm, normal  heart sounds and intact distal pulses.    No murmur heard.  Pulmonary/Chest: Effort normal. No respiratory distress. She has no wheezes. She has rhonchi.   Abdominal: Soft. She exhibits no distension. There is no tenderness. No hernia.   Musculoskeletal: Normal range of motion. She exhibits no edema or tenderness.   Lymphadenopathy:     She has no cervical adenopathy.   Neurological: She is alert. She exhibits normal muscle tone. Coordination normal.   Patient is alert, but disoriented at this time.  No obvious seizure activity noted.  Her recent/short-term memory is poor.  Does not recollect events of last night.  Follows all commands at this time.   Skin: Skin is warm. She is not diaphoretic. No erythema.   Psychiatric: She has a normal mood and affect. Her speech is delayed. Cognition and memory are impaired.   Nursing note and vitals reviewed.        CRANIAL NERVES     CN III, IV, VI   Pupils are equal, round, and reactive to light.  Extraocular motions are normal.        Significant Labs:   Bilirubin:   Recent Labs  Lab 07/29/18  0554 07/30/18  0410 08/01/18 0223   BILITOT 0.6 0.3 0.3     BMP:   Recent Labs  Lab 08/01/18 0223   *      K 4.1   *   CO2 18*   BUN 63*   CREATININE 1.4   CALCIUM 8.2*   MG 1.6     CBC:   Recent Labs  Lab 08/01/18 0223   WBC 10.56   HGB 10.7*   HCT 31.5*        CMP:   Recent Labs  Lab 08/01/18 0223      K 4.1   *   CO2 18*   *   BUN 63*   CREATININE 1.4   CALCIUM 8.2*   PROT 6.1   ALBUMIN 3.1*   BILITOT 0.3   ALKPHOS 72   AST 24   ALT 12   ANIONGAP 9   EGFRNONAA 39*     Cardiac Markers:   Recent Labs  Lab 08/01/18 0223   *     Lactic Acid: No results for input(s): LACTATE in the last 48 hours.  Magnesium:   Recent Labs  Lab 08/01/18 0223   MG 1.6     Troponin:   Recent Labs  Lab 08/01/18 0223   TROPONINI 0.006     TSH: No results for input(s): TSH in the last 4320 hours.  Urine Studies:   Recent Labs  Lab 08/01/18  2817    COLORU Yellow   APPEARANCEUA Clear   PHUR 7.0   SPECGRAV <=1.005*   PROTEINUA Negative   GLUCUA Negative   KETONESU Negative   BILIRUBINUA Negative   OCCULTUA Negative   NITRITE Negative   UROBILINOGEN Negative   LEUKOCYTESUR Negative     All pertinent labs within the past 24 hours have been reviewed.    Significant Imaging: I have reviewed and interpreted all pertinent imaging results/findings within the past 24 hours.     Imaging Results          X-Ray Chest AP Portable (In process)                CT Head Without Contrast (In process)                 I have independently reviewed and interpreted the EKG.     I have independently reviewed all pertinent labs within the past 24 hours.    I have independently reviewed, visualized and interpreted all pertinent imaging results within the past 24 hours and discussed the findings with the ED physician, Dr. Stevenson

## 2018-08-01 NOTE — ASSESSMENT & PLAN NOTE
Urine cultures from 07/29/2018 positive for pansensitive E coli.  Continue oral levofloxacin as prescribed at discharge yesterday  Denies fever, chills at this time.  Currently denies dysuria like symptoms.

## 2018-08-01 NOTE — ASSESSMENT & PLAN NOTE
First episode of seizure after almost 10 years.  Patient not on any routine antiepileptic agent at home.  Associated with urinary incontinence and tongue biting.  CT head unremarkable.  Check MRI brain.  May need EEG.  Consult Neurology for further recommendations.  Patient was initially in postictal state, slowly improving.

## 2018-08-01 NOTE — ED NOTES
Pt AAOx3, resting in bed, side rails up x 2, call bell within reach. Patient  at bedside. NAD at this time. Will continue to monitor.

## 2018-08-01 NOTE — ED PROVIDER NOTES
"SCRIBE #1 NOTE: I, Adelia Viveros, am scribing for, and in the presence of, Michelle Parks MD. I have scribed the entire note.      History      Chief Complaint   Patient presents with    Altered Mental Status     seen at facility yesterday but has had recent confusion per family member since discharge       Review of patient's allergies indicates:   Allergen Reactions    Nsaids (non-steroidal anti-inflammatory drug) Hives and Shortness Of Breath    Pcn [penicillins] Hives and Shortness Of Breath    Sulfa (sulfonamide antibiotics) Hives and Shortness Of Breath    Aspirin      Due to Liver        Macrodantin [nitrofurantoin macrocrystalline] Hives    Wellbutrin [bupropion hcl]      Due to liver          HPI   HPI    8/1/2018, 2:21 AM   History obtained from the family member  HPI limited secondary to patient's change in mental status      History of Present Illness: Ibeth Schroeder is a 68 y.o. female patient with HTN who presents to the Emergency Department for evaluation of altered mental status which onset gradually PTA. Family member reports that he woke up because he heard patient making noises. He saw a puddle of blood-streaked saliva and states that patient was difficult to arouse so he called 911. Associated sxs include confusion and bladder incontinence. Patient states that she has no idea why she is here. Sxs are moderate in severity. Family member states that patient had a seizure in the past when her "liver shut down." No bowel incontinence, falls/head trauma, CP, SOB, dizziness, lightheadedness reported. Pt has COPD and was seen on 7/29 and admitted with COPD exacerbation. She was d/c yesterday evening.    Arrival mode: Ambulance    PCP: Geovanna Botello MD       Past Medical History:  Past Medical History:   Diagnosis Date    Acute on chronic respiratory failure with hypoxia 7/30/2018    Acute pancreatitis     Alcohol dependence     per patient in the past    Allergy     Anemia     " Anxiety     Arthritis 6/24/2013    Asthma     per patient    Back pain     Colon polyp     Colon polyp     colonoscopy 8/26/2013    COPD (chronic obstructive pulmonary disease)     Dependence on renal dialysis 2012    when in liver failure    Depression     Disorder of kidney and ureter     ckd4    Diverticulosis     Diverticulosis     colonoscopy 8/26/2013    Hiatal hernia     CXR 2/25/2015--Large hiatal hernia.     Hyperlipidemia     Hypertension     Hypocalcemia 7/6/2016    Hypothyroidism 6/24/2013    Osteoporosis     Pneumonia due to infectious organism 7/29/2018    Pulmonary embolism     per patient unsure of date    Restless leg syndrome     RLS (restless legs syndrome)     Seizure 6/24/2013    Stroke     ischemic injury due to hypotension    Tobacco dependence     resolved    Trouble in sleeping        Past Surgical History:  Past Surgical History:   Procedure Laterality Date    ADENOIDECTOMY  1960 approx    amputation left 2nd finger tip Left 2012    for blood clot/ after liver failure admit ? gangrene    APPENDECTOMY      incidental with another surgery    na hole/ ventriculostomy Right 08/24/2012    frontal after  liver failure    CHOLECYSTECTOMY      COLONOSCOPY  2013    FRACTURE SURGERY Left 07/12/2017    radius and ulna fracture    FRACTURE SURGERY Left 09/12/2017    wrist removal of hardware    HERNIA REPAIR  1999 approx    incisional hernia     HYSTERECTOMY  1977 approx    RAS/BSO  for endometriosis and tubular pregnancy     JOINT REPLACEMENT Left 2000 approx    hip for arthritis     JOINT REPLACEMENT Left 2004 approx    hip, fell then recurent dislocations - 4 times relocarted under mild anesthesia then re replaced    JOINT REPLACEMENT Right 2002 approx    knee for arthritis    OOPHORECTOMY  1977 approx    TONSILLECTOMY  1960 approx         Family History:  Family History   Problem Relation Age of Onset    Diabetes Mother     Hypertension Mother     Kidney  disease Mother         stones    Parkinsonism Mother     Asthma Father     Heart disease Father     Diabetes Son     Hypertension Son     Heart disease Maternal Aunt         CAD     Cancer Maternal Aunt         Breast Ca    Breast cancer Maternal Aunt     Colon cancer Maternal Grandmother     Cancer Maternal Grandmother     Cancer Maternal Aunt         breast    Heart disease Maternal Aunt     Stroke Neg Hx     Alcohol abuse Neg Hx     Drug abuse Neg Hx     Mental retardation Neg Hx     Mental illness Neg Hx        Social History:  Social History     Social History Main Topics    Smoking status: Former Smoker     Packs/day: 1.00     Years: 30.00     Types: Cigarettes     Quit date: 8/5/2010    Smokeless tobacco: Never Used      Comment: used nicorette gum in past and prn    Alcohol use 1.2 - 1.8 oz/week     1 - 2 Shots of liquor, 1 Glasses of wine per week    Drug use: No    Sexual activity: Not Currently     Partners: Male     Birth control/ protection: See Surgical Hx       ROS   Review of Systems   Constitutional: Negative for chills and fever.   HENT: Negative for sore throat.         (+) tongue biting   Respiratory: Negative for shortness of breath.    Cardiovascular: Negative for chest pain.   Gastrointestinal: Negative for abdominal pain, nausea and vomiting.   Genitourinary: Negative for dysuria.   Musculoskeletal: Negative for back pain.   Skin: Negative for rash.   Neurological: Negative for weakness.        (+) bladder incontinence, (-) bowel incontinence   Hematological: Does not bruise/bleed easily.   Psychiatric/Behavioral: Positive for confusion.   All other systems reviewed and are negative.      Physical Exam      Initial Vitals [08/01/18 0206]   BP Pulse Resp Temp SpO2   124/74 85 16 97.7 °F (36.5 °C) 99 %      MAP       --          Physical Exam  Nursing Notes and Vital Signs Reviewed.  Constitutional: Patient is in no acute distress. Awake and alert. Well-developed and  well-nourished.  Head: Atraumatic. Normocephalic.  Eyes: PERRL. EOM intact. Conjunctivae are not pale. No scleral icterus.  Mouth/ENT: Abrasion to the L side of the tongue. Mucous membranes are moist. Oropharynx is clear and symmetric.    Neck: Supple. Full ROM. No lymphadenopathy.  Cardiovascular: Regular rate. Regular rhythm. No murmurs, rubs, or gallops. Distal pulses are 2+ and symmetric.  Pulmonary/Chest: No respiratory distress. Clear to auscultation bilaterally. No wheezing, rales, or rhonchi.  Abdominal: Soft and non-distended.  There is no tenderness.  No rebound, guarding, or rigidity.  Good bowel sounds.    Musculoskeletal: Moves all extremities. No obvious deformities. No edema. No calf tenderness.  Skin: Warm and dry.  Neurological: Patient is alert and oriented to person and place but not time. Pupils ERRL and EOM normal. Cranial nerves II-XII are intact. There is no pronator drift of outstretched arms. Speech is clear and normal. No acute focal neurological deficits noted.  Psychiatric: Normal affect. Good eye contact. Appropriate in content.    ED Course    Procedures  ED Vital Signs:  Vitals:    08/01/18 0630 08/01/18 0712 08/01/18 0724 08/01/18 0728   BP: 110/69      Pulse: 64 67 65    Resp: 18 18 12    Temp: 98 °F (36.7 °C)      TempSrc: Oral      SpO2: 96% 96%     Weight:    123.7 kg (272 lb 11.3 oz)   Height:        08/01/18 0729 08/01/18 0733 08/01/18 1030 08/01/18 1114   BP:   122/74 120/72   Pulse:   (!) 59    Resp:   20    Temp:       TempSrc:       SpO2:   97%    Weight: 52.2 kg (115 lb 1.3 oz) 54.9 kg (121 lb)     Height:        08/01/18 1115 08/01/18 1120 08/01/18 1200 08/01/18 1300   BP: (!) 151/73 (!) 151/73 138/80 121/79   Pulse:  62 62 63   Resp:  16 17 18   Temp:       TempSrc:       SpO2:    98%   Weight:       Height:        08/01/18 1302 08/01/18 1321 08/01/18 1350   BP:  138/82    Pulse: 64     Resp: (!) 34     Temp:   97.7 °F (36.5 °C)   TempSrc:  Oral Oral   SpO2:      Weight:       Height:          Abnormal Lab Results:  Labs Reviewed   CBC W/ AUTO DIFFERENTIAL - Abnormal; Notable for the following:        Result Value    RBC 3.40 (*)     Hemoglobin 10.7 (*)     Hematocrit 31.5 (*)     MCH 31.5 (*)     RDW 14.6 (*)     Gran # (ANC) 9.7 (*)     Lymph # 0.6 (*)     Gran% 92.0 (*)     Lymph% 5.4 (*)     Mono% 2.6 (*)     All other components within normal limits   COMPREHENSIVE METABOLIC PANEL - Abnormal; Notable for the following:     Chloride 112 (*)     CO2 18 (*)     Glucose 145 (*)     BUN, Bld 63 (*)     Calcium 8.2 (*)     Albumin 3.1 (*)     eGFR if  45 (*)     eGFR if non  39 (*)     All other components within normal limits   B-TYPE NATRIURETIC PEPTIDE - Abnormal; Notable for the following:      (*)     All other components within normal limits   URINALYSIS - Abnormal; Notable for the following:     Specific Gravity, UA <=1.005 (*)     All other components within normal limits   ISTAT PROCEDURE - Abnormal; Notable for the following:     POC PH 7.273 (*)     POC HCO3 18.9 (*)     POC SATURATED O2 94 (*)     All other components within normal limits   PROTIME-INR   APTT   TROPONIN I   MAGNESIUM   PHOSPHORUS        All Lab Results:  Results for orders placed or performed during the hospital encounter of 08/01/18   CBC auto differential   Result Value Ref Range    WBC 10.56 3.90 - 12.70 K/uL    RBC 3.40 (L) 4.00 - 5.40 M/uL    Hemoglobin 10.7 (L) 12.0 - 16.0 g/dL    Hematocrit 31.5 (L) 37.0 - 48.5 %    MCV 93 82 - 98 fL    MCH 31.5 (H) 27.0 - 31.0 pg    MCHC 34.0 32.0 - 36.0 g/dL    RDW 14.6 (H) 11.5 - 14.5 %    Platelets 190 150 - 350 K/uL    MPV 11.3 9.2 - 12.9 fL    Gran # (ANC) 9.7 (H) 1.8 - 7.7 K/uL    Lymph # 0.6 (L) 1.0 - 4.8 K/uL    Mono # 0.3 0.3 - 1.0 K/uL    Eos # 0.0 0.0 - 0.5 K/uL    Baso # 0.00 0.00 - 0.20 K/uL    Gran% 92.0 (H) 38.0 - 73.0 %    Lymph% 5.4 (L) 18.0 - 48.0 %    Mono% 2.6 (L) 4.0 - 15.0 %    Eosinophil% 0.0 0.0 - 8.0 %     Basophil% 0.0 0.0 - 1.9 %    Differential Method Automated    Comprehensive metabolic panel   Result Value Ref Range    Sodium 139 136 - 145 mmol/L    Potassium 4.1 3.5 - 5.1 mmol/L    Chloride 112 (H) 95 - 110 mmol/L    CO2 18 (L) 23 - 29 mmol/L    Glucose 145 (H) 70 - 110 mg/dL    BUN, Bld 63 (H) 8 - 23 mg/dL    Creatinine 1.4 0.5 - 1.4 mg/dL    Calcium 8.2 (L) 8.7 - 10.5 mg/dL    Total Protein 6.1 6.0 - 8.4 g/dL    Albumin 3.1 (L) 3.5 - 5.2 g/dL    Total Bilirubin 0.3 0.1 - 1.0 mg/dL    Alkaline Phosphatase 72 55 - 135 U/L    AST 24 10 - 40 U/L    ALT 12 10 - 44 U/L    Anion Gap 9 8 - 16 mmol/L    eGFR if African American 45 (A) >60 mL/min/1.73 m^2    eGFR if non African American 39 (A) >60 mL/min/1.73 m^2   Protime-INR   Result Value Ref Range    Prothrombin Time 10.6 9.0 - 12.5 sec    INR 1.0 0.8 - 1.2   APTT   Result Value Ref Range    aPTT 24.6 21.0 - 32.0 sec   Brain natriuretic peptide   Result Value Ref Range     (H) 0 - 99 pg/mL   Troponin I   Result Value Ref Range    Troponin I 0.006 0.000 - 0.026 ng/mL   Urinalysis   Result Value Ref Range    Specimen UA Urine, Catheterized     Color, UA Yellow Yellow, Straw, Gina    Appearance, UA Clear Clear    pH, UA 7.0 5.0 - 8.0    Specific Gravity, UA <=1.005 (A) 1.005 - 1.030    Protein, UA Negative Negative    Glucose, UA Negative Negative    Ketones, UA Negative Negative    Bilirubin (UA) Negative Negative    Occult Blood UA Negative Negative    Nitrite, UA Negative Negative    Urobilinogen, UA Negative <2.0 EU/dL    Leukocytes, UA Negative Negative   Magnesium   Result Value Ref Range    Magnesium 1.6 1.6 - 2.6 mg/dL   Phosphorus   Result Value Ref Range    Phosphorus 4.2 2.7 - 4.5 mg/dL   ISTAT PROCEDURE   Result Value Ref Range    POC PH 7.273 (LL) 7.35 - 7.45    POC PCO2 41.0 35 - 45 mmHg    POC PO2 82 80 - 100 mmHg    POC HCO3 18.9 (L) 24 - 28 mmol/L    POC BE -8 -2 to 2 mmol/L    POC SATURATED O2 94 (L) 95 - 100 %    Sample ARTERIAL     Site  RB     Allens Test Pass     DelSys Nasal Can     Mode SPONT     Flow 2     FiO2 28        Imaging Results:  CT Head w/o Contrast  Findings: No acute intracranial abnormality. Small region of encephalomalacia in the right frontal lobe consisten with chronic infarct. Atrophy and white matter changes of chronic microangiopathy.    The EKG was ordered, reviewed, and independently interpreted by the ED provider.  Interpretation time: 2:35  Rate: 68 BPM  Rhythm: sinus rhythm with premature atrial complexes  Interpretation: No acute ST changes. No STEMI.    The Emergency Provider reviewed the vital signs and test results, which are outlined above.    ED Discussion     5:43 AM: Discussed case with Dr. Tillman ([facility]). Dr. Tillman agrees with current care and management of pt and accepts admission.   Admitting Service: [facility]   Admitting Physician: Dr. Tillman  Admit to: Tele-obs      5:43 AM: Re-evaluated pt. I have discussed test results, shared treatment plan, and the need for admission with patient and family at bedside. Pt and family express understanding at this time and agree with all information. All questions answered. Pt and family have no further questions or concerns at this time. Pt is ready for admit.    ED Medication(s):  Medications   sodium bicarbonate 1 mEq/mL (8.4 %) 150 mEq in dextrose 5 % 1,150 mL infusion (150 mEq Intravenous New Bag 8/1/18 1110)       Discharge Medication List as of 8/1/2018  1:45 PM          Follow-up Information     Geovanna Botello MD In 3 days.    Specialty:  Family Medicine  Contact information:  [address] [facility] [address] [phone]             Guido Dowd MD In 1 week.    Specialty:  Neurology  Contact information:  [address]  [place] [address] [phone]                    Medical Decision Making    Medical Decision Making:   Clinical Tests:   Lab Tests: Ordered and Reviewed  Radiological Study: Ordered and  Reviewed  Medical Tests: Ordered and Reviewed           Scribe Attestation:   Scribe #1: I performed the above scribed service and the documentation accurately describes the services I performed. I attest to the accuracy of the note.    Attending:   Physician Attestation Statement for Scribe #1: I, Michelle Parks MD, personally performed the services described in this documentation, as scribed by Adelia Viveros, in my presence, and it is both accurate and complete.          Clinical Impression       ICD-10-CM ICD-9-CM   1. Seizure R56.9 780.39   2. Altered mental status R41.82 780.97   3. COPD (chronic obstructive pulmonary disease) J44.9 496       Disposition:   Disposition: Admitted (Tele-obs)  Condition: Fair         Michelle Parks MD  08/07/18 0455

## 2018-08-01 NOTE — HPI
Ms. Ibeth Schroeder is a 67-year-old  female discharged from this hospital yesterday, with a diagnosis of pneumonia and urinary tract infection.  She was sent home on oral levofloxacin and steroid taper.  She carries a diagnosis of seizure, 10 years ago, not on any anti seizure medications at home on a daily basis.  She has hypothyroidism, hypertension.    After being discharged from the hospital yesterday, patient was in her usual state of health last night.   reports that they went to bed as usual.  In the middle of the night he found her making grunting noises, when he checked up on her, she was frothing at the mouth, with a blood on the bed, and right arm shaking, unresponsive at that time.  The episode lasted for few minutes.  She remained confused thereafter.   reports urinary incontinence, and tongue biting.  He called 911 and was brought to the ED.  Patient is currently alert, but remains disoriented.  CT head unremarkable.  Patient will be admitted to the hospital as observation for seizure-like activity after 10 years.  Discussing with the ED staff, patient was in a postictal state upon initial presentation, mental status slowly improving at this point in time.

## 2018-08-01 NOTE — PROCEDURES
ELECTROENCEPHALOGRAM PROCEDURE NOTE  History of presenting illness:  Patient with difficult to arouse  Reason for EEG: Rule out seizures, patient has prior history of hypoxia, EVD placement, stroke, Gliosis of the frontal cortex  Medications:  Tramadol, Oxycodone, Seroquel, Requip  History of seizures: One suspicious episode in 2012        EEG: This study is done to evaluate for change in mental status/Seizures/Encephalopathy    This is a multichannel digital EEG recording using the international 10-20 placement system.     Photic Stimulation: done  Hyperventilation; Not done      EEG begins with The resting record is fairly well organized and symmetric.  There is generalized slowing, A dominant posterior rhythm is seen. It consists of a 8 hertz 20-70 microvolt alpha rhythm. This attenuates with eye opening. During drowsiness, there is mild attenuation and slowing of the background rhythm.  Photic stimulation was done and did provide some driving response    Stage II sleep was not achieved. Hyperventilation was not performed.   There were no epileptic form discharges or seizure like activity.   There was no abnormal suppression, no focal discharges, there is overlap of breach rhythm vs frontal eye field artifacts  Drowsiness was captured    IMPRESSION: This is an abnormal EEG  Because of diffused slowing, No seizures or ictal activity recorded.  Clinical correlation is suggested as to the etiology of this. If indicated, repeat EEG and/or 24-hour ambulatory EEG monitoring might be useful in the future.          Simon Ivan MD., Ph.D., MS

## 2018-08-01 NOTE — ED NOTES
Helped patient to restroom.    Pt AAOx3, still resting in bed, side rails up x 2, call bell within reach. NAD at this time. Will continue to monitor.

## 2018-08-01 NOTE — DISCHARGE SUMMARY
[facility] Hospital Medicine  Discharge Summary      Patient Name: Ibeth Schroeder  MRN: [id number]  Admission Date: 8/1/2018  Hospital Length of Stay: 0 days  Discharge Date and Time:  08/01/2018 5:55 PM  Attending Physician:Dr. Shin  Discharging Provider: Kathy Foy NP  Primary Care Provider: Geovanna Botello MD      HPI:   Ms. Ibeth Schroeder is a 67-year-old  female discharged from this hospital yesterday, with a diagnosis of pneumonia and urinary tract infection.  She was sent home on oral levofloxacin and steroid taper.  She carries a diagnosis of seizure, 10 years ago, not on any anti seizure medications at home on a daily basis.  She has hypothyroidism, hypertension.    After being discharged from the hospital yesterday, patient was in her usual state of health last night.   reports that they went to bed as usual.  In the middle of the night he found her making grunting noises, when he checked up on her, she was frothing at the mouth, with a blood on the bed, and right arm shaking, unresponsive at that time.  The episode lasted for few minutes.  She remained confused thereafter.   reports urinary incontinence, and tongue biting.  He called 911 and was brought to the ED.  Patient is currently alert, but remains disoriented.  CT head unremarkable.  Patient will be admitted to the hospital as observation for seizure-like activity after 10 years.  Discussing with the ED staff, patient was in a postictal state upon initial presentation, mental status slowly improving at this point in time.      * No surgery found *      Hospital Course:   The pt was placed in observation for possible seizure. Care was discussed with Dr. Bagley with neurology. Prior to episode pt had multiple alcoholic drinks and took multiple sedating medications at same time. It is unclear if seizure or just period of unresponsiveness from toxic encephalopathy. 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  EEG negative. Neurology recommended Keppra 500mg po BID. Pt and pt's spouse advised to decrease alcohol intake and decrease sedating medications. No further episodes since prior to admission.     As per Neurologist:   Pt/family advised on Seizure precautions     1) Do not drive for 6 months or assist any minor in driving with learners permit  2) No activities associated with water and or elevation  3) No baby sitting an infant or a child under the age of 6 years  4) Compliance with the seizure medications.  5) Follow up with Neurologist         Consults:   Consults         Status Ordering Provider     Inpatient consult to Neurology  Once     Provider:  Simon aBgley MD PhD    Completed FRANKY MORA     Inpatient consult to Neurology  Once     Provider:  Simon Bagley MD PhD    Completed ANITA RIVERA          No new Assessment & Plan notes have been filed under this hospital service since the last note was generated.  Service: Hospital Medicine    Final Active Diagnoses:    Diagnosis Date Noted POA    PRINCIPAL PROBLEM:  Seizure [R56.9] 08/01/2018 Yes    Chronic kidney disease, stage 3 [N18.3] 08/04/2014 Yes     Chronic    Essential hypertension [I10] 06/24/2013 Yes     Chronic      Problems Resolved During this Admission:    Diagnosis Date Noted Date Resolved POA       Discharged Condition: stable    Disposition: Home or Self Care    Follow Up:  Follow-up Information     Geovanna Botello MD In 3 days.    Specialty:  Family Medicine  Contact information:  [address] [facility] [address] [phone]             Guido Dowd MD In 1 week.    Specialty:  Neurology  Contact information:  [address]  [place] [address] [phone]                 Patient Instructions:     Ambulatory consult to Neurology   Referral Priority: Routine Referral Type:  Consultation   Referral Reason: Specialty Services Required    Requested Specialty: Neurology    Number of Visits Requested: 1      Activity as tolerated         Significant Diagnostic Studies:   Imaging Results          MRI Brain Epilepsy Without Contrast (Final result)  Result time 08/01/18 09:39:46    Final result by Arslan Peacock Jr., MD (08/01/18 09:39:46)                 Impression:      1. No acute infarct.  2. Prior right frontal ventriculostomy shunt placement with surrounding gliosis.  There are also findings consistent with moderate to severe chronic microvascular ischemic change of the white matter with central atrophy.  3. 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  4. No convincing evidence of mesial temporal sclerosis.      Electronically signed by: Arslan Peacock Jr., MD  Date:    08/01/2018  Time:    09:39             Narrative:    EXAMINATION:  MRI BRAIN EPILEPSY WITHOUT CONTRAST    CLINICAL HISTORY:  Epilepsy;    TECHNIQUE:  Multiplanar multisequence MR imaging of the brain was performed without intravenous contrast. Additional thin cut images were performed through the hippocampi per epilepsy protocol.    COMPARISON:  CT of the head from August 1, 2018 was reviewed.    FINDINGS:  Intracranial Compartment:    No restricted diffusion.  There has been a prior placement of a right frontal ventriculostomy shunt.  There is T2 FLAIR hyperintensity adjacent to the shunt tract radiating to gliosis.  There is volume loss involving the right occipital lobe relating to old infarct.  There is patchy T2 FLAIR hyperintensity of a moderate to severe degree involving the periventricular and deep white matter with central atrophy/ex vacuo dilation of the ventricular system.  No evidence of hydrocephalus.  There is a tiny hippocampal sulcal remnant cyst on the right.  No convincing evidence of medial temporal sclerosis.  No extra-axial blood or fluid collections.No mass lesion or acute  hemorrhage.    Skull/Extracranial Contents (limited evaluation): Bone marrow signal intensity is normal.                               X-Ray Chest AP Portable (Final result)  Result time 08/01/18 07:47:46    Final result by Micheal Ace MD (08/01/18 07:47:46)                 Impression:      No infiltrates.  Right basilar plate atelectasis.      Electronically signed by: Micheal Ace MD  Date:    08/01/2018  Time:    07:47             Narrative:    EXAMINATION:  XR CHEST AP PORTABLE    CLINICAL HISTORY:  Chronic obstructive pulmonary disease, unspecified    COMPARISON:  07/29/2018    FINDINGS:  Normal heart size. Patient is rotated.  Previously demonstrated moderately large hiatal hernia is noted.  No definite active infiltrates.  There is right basilar plate atelectasis.                               CT Head Without Contrast (Final result)  Result time 08/01/18 07:39:28    Final result by Micheal Ace MD (08/01/18 07:39:28)                 Impression:      No acute findings.    All CT scans at this facility use dose modulation, iterative reconstruction, and/or weight based dosing when appropriate to reduce radiation dose to as low as reasonably achievable.      Electronically signed by: Micheal Ace MD  Date:    08/01/2018  Time:    07:39             Narrative:    EXAMINATION:  CT HEAD WITHOUT CONTRAST    CLINICAL HISTORY:  Seizures new or progressive;    TECHNIQUE:  Standard non contrast CT scan of the brain.    COMPARISON:  11/17/2017    FINDINGS:  There is generalized atrophy and white matter low density microangiopathy.  Old right frontal infarct.  No evidence of intracranial hemorrhage or acute focal parenchymal abnormality.  The cranium is intact.  Visualized paranasal sinuses and mastoid air cells are clear.                                Pending Diagnostic Studies:     None         Medications:  Reconciled Home Medications:      Medication List      START taking these medications     azithromycin 1 gram Pack  Commonly known as:  ZITHROMAX  Take 1 g by mouth once. for 1 dose     levETIRAcetam 500 MG Tab  Commonly known as:  KEPPRA  Take 1 tablet (500 mg total) by mouth 2 (two) times daily.        CHANGE how you take these medications    ferrous sulfate 325 (65 FE) MG EC tablet  Take 1 tablet (325 mg total) by mouth once a week.  What changed:  when to take this     ipratropium-albuterol  mcg/actuation inhaler  Commonly known as:  COMBIVENT RESPIMAT  inhale 1 puff INTO THE LUNGS four times a day  What changed:  · how much to take  · how to take this  · when to take this  · reasons to take this  · additional instructions        CONTINUE taking these medications    allopurinol 100 MG tablet  Commonly known as:  ZYLOPRIM  Take 1 tablet (100 mg total) by mouth once daily.     amLODIPine 5 MG tablet  Commonly known as:  NORVASC  Take 1 tablet (5 mg total) by mouth once daily.     busPIRone 15 MG tablet  Commonly known as:  BUSPAR  Take 1 tablet (15 mg total) by mouth 2 (two) times daily.     diphenoxylate-atropine 2.5-0.025 mg 2.5-0.025 mg per tablet  Commonly known as:  LOMOTIL  take 2 tablets by mouth four times a day if needed for diarrhea     fluticasone-umeclidin-vilanter 100-62.5-25 mcg Dsdv  Commonly known as:  TRELEGY ELLIPTA  Inhale 1 puff into the lungs once daily.     levothyroxine 100 MCG tablet  Commonly known as:  SYNTHROID  TAKE 1 TABLET ONE TIME DAILY     metoprolol succinate 50 MG 24 hr tablet  Commonly known as:  TOPROL-XL  Take 1 tablet (50 mg total) by mouth 2 (two) times daily.     pantoprazole 40 MG tablet  Commonly known as:  PROTONIX  TAKE 1 TABLET ONE TIME DAILY     pravastatin 10 MG tablet  Commonly known as:  PRAVACHOL  TAKE 1 TABLET EVERY DAY     QUEtiapine 25 MG Tab  Commonly known as:  SEROQUEL  Take 1/2 to 1 tablet at bedtime     rOPINIRole 1 MG tablet  Commonly known as:  REQUIP  Take 1 mg by mouth nightly as needed.     SHINGRIX (PF) 50 mcg/0.5 mL  injection  Generic drug:  varicella-zoster [id number] (PF)  inject 0.5 milliliter intramuscularly        STOP taking these medications    levoFLOXacin 500 MG tablet  Commonly known as:  LEVAQUIN     oxyCODONE 5 mg Tbor     predniSONE 10 mg tablet pack  Commonly known as:  DELTASONE            Indwelling Lines/Drains at time of discharge:   Lines/Drains/Airways     Epidural Line                 Perineural Analgesia/Anesthesia Assessment (using dermatomes) Epidural 09/06/17 1246 329 days                Time spent on the discharge of patient: 41 minutes  Patient was seen and examined on the date of discharge and determined to be suitable for discharge.         Kathy Foy NP  Department of Hospital Medicine  [facility]

## 2018-08-01 NOTE — HOSPITAL COURSE
The pt was placed in observation for possible seizure. Care was discussed with Dr. Bagley with neurology. Pt had drank alcohol and took multiple sedating medications- unsure if seizure or just period of unresponsiveness from toxic encephalopathy. 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 EEG negative. Neurology recommended Keppra 500mg po BID. Pt and pt's spouse advised to decrease alcohol intake and decrease sedating medications. No further episodes since prior to admission.     As per Neurologist:   Pt/family advised on Seizure precautions     1) Do not drive for 6 months or assist any minor in driving with learners permit  2) No activities associated with water and or elevation  3) No baby sitting an infant or a child under the age of 6 years  4) Compliance with the seizure medications.  5) Follow up with Neurologist

## 2018-08-01 NOTE — ED NOTES
"The pts  called EMS because pt was snoring loudly and blood was noted to her pillow and tongue. Per the  it appeared the pt had a seizure. Pt has had one [resvious seizure in the past when her liver was bad. The pt is awake alert and confused. The pt is oriented to person and place only. The pt repeatedly ask what's going?, what happened to me? and on and why am I here?". The pt was discharged from the hospital 7/31/18. The pts gown is soiled with urine. The gown was removed and given to the pts    "

## 2018-08-01 NOTE — PLAN OF CARE
08/01/18 1200   CAMILO Message   Medicare Outpatient and Observation Notification regarding financial responsibility Given to patient/caregiver;Explained to patient/caregiver;Signed/date by patient/caregiver   Date CAMILO was signed 08/01/18   Time CAMILO was signed 1200

## 2018-08-01 NOTE — ASSESSMENT & PLAN NOTE
See him creatinine appears to be at baseline, 1.4.  However BUN slightly elevated at 63, likely due to steroids that were prescribed to her recently.

## 2018-08-01 NOTE — CONSULTS
INPATIENT   NEUROLOGY CONSULT NOTE    Ibeth Schroeder   67 y.o. female  Consult Requested By: Vaishali Shin MD  Reason for Consult: Unresponsiveness  DATE 8/1/2018        History obtained from the patient's  who provides limited and inconsistent history.     Chief Complaint:  Unresponsiveness  History of Present Illness: This is a 66 yo RHWF with progressive decline in memory loss. In 2013 she had liver and renal failure and she was transferred to [facility], and on her way, she had hypoxia and was intubated. She had a prolonged stay in the ICU, she was started on dialysis, had a seizure like episode post hypoxia. She had elevated ICP and was subjected to EVD.  Patient was started on keppra. Post hypoxic injury patient never returned back to baseline. She has been having visual and audio hallucinations and has been seen by a psychiatrist. Reviewed his note from 3/2018. She is on Buspar and Seroquel .    She had seen Dr. Dowd in 2013 and 2014 - reviewed the notes. Patient's   had insisted to discontinue Keppra because he did not want more medications. Keppra was tapered.  She has chronic back pain and has mary seen PMR and receiving Opiates, tramadol and steroid injections. She has been congested, she came to the hospital, she received antibiotics. She goes home she takes her routine 2-3 shots of liquor, takes her Requip and Seroquel and falls asleep, she had also taken expectorant. Per  she was constantly coughing thick mucus. Around midnight, she was grunting, her  tried to wake her up she was not responsive. There were no seizure like activity, she was not foaming through the mouth, per  she had mucus stuff coming out of her mouth. She was flaccid.   Patient currently is awake, alert and follows commands. She has poor memory recall. She denies headaches, no nausea, no vomiting, no dizziness, no chest pain, no sob, no abdominal pain, no diplopia, she has prior falls due to  lack of balance. She mentions she does have to have liquor at night to go to bed.   She also takes Seroquel and Requip with her alcohol drinks. She has no complaints at this time.   Her  describes episode that does not correspond to seizures. Additionally, he does not want to add medications at this time. No symptoms associated with infectious encephalitis, no focal deficits.   She has a history of RA and not on immunosuppression.     NIH Stroke Scale        Time: 8:30 AM  Person Administering Scale: Simon Bagley MD PhD          1a  Level of consciousness: 0=alert; keenly responsive   1b. LOC questions:  0=Answers both tasks correctly   1c. LOC commands: 0=Answers both tasks correctly   2.  Best Gaze: 0=normal   3.  Visual: 0=No visual loss   4. Facial Palsy: 0=Normal symmetric movement   5a.  Motor left arm: 0=No drift, limb holds 90 (or 45) degrees for full 10 seconds   5b.  Motor right arm: 0=No drift, limb holds 90 (or 45) degrees for full 10 seconds   6a. motor left le=No drift, limb holds 90 (or 45) degrees for full 10 seconds   6b  Motor right le=No drift, limb holds 90 (or 45) degrees for full 10 seconds   7. Limb Ataxia: 0=Absent   8.  Sensory: 0=Normal; no sensory loss   9. Best Language:  0=No aphasia, normal   10. Dysarthria: 0=Normal   11. Extinction and Inattention: 0=No abnormality         Total:   0                     [place] Coma Scale (GCS): 15  Past Medical History:   Diagnosis Date    Acute on chronic respiratory failure with hypoxia 2018    Acute pancreatitis     Alcohol dependence     per patient in the past    Allergy     Anemia     Anxiety     Arthritis 2013    Asthma     per patient    Back pain     Colon polyp     Colon polyp     colonoscopy 2013    COPD (chronic obstructive pulmonary disease)     Dependence on renal dialysis     when in liver failure    Depression     Disorder of kidney and ureter     ckd4    Diverticulosis      Diverticulosis     colonoscopy 8/26/2013    Hiatal hernia     CXR 2/25/2015--Large hiatal hernia.     Hyperlipidemia     Hypertension     Hypocalcemia 7/6/2016    Hypothyroidism 6/24/2013    Osteoporosis     Pneumonia due to infectious organism 7/29/2018    Pulmonary embolism     per patient unsure of date    Restless leg syndrome     RLS (restless legs syndrome)     Seizure 6/24/2013    Stroke     ischemic injury due to hypotension    Tobacco dependence     resolved    Trouble in sleeping      Past Surgical History:   Procedure Laterality Date    ADENOIDECTOMY  1960 approx    amputation left 2nd finger tip Left 2012    for blood clot/ after liver failure admit ? gangrene    APPENDECTOMY      incidental with another surgery    na hole/ ventriculostomy Right 08/24/2012    frontal after  liver failure    CHOLECYSTECTOMY      COLONOSCOPY  2013    FRACTURE SURGERY Left 07/12/2017    radius and ulna fracture    FRACTURE SURGERY Left 09/12/2017    wrist removal of hardware    HERNIA REPAIR  1999 approx    incisional hernia     HYSTERECTOMY  1977 approx    RAS/BSO  for endometriosis and tubular pregnancy     JOINT REPLACEMENT Left 2000 approx    hip for arthritis     JOINT REPLACEMENT Left 2004 approx    hip, fell then recurent dislocations - 4 times relocarted under mild anesthesia then re replaced    JOINT REPLACEMENT Right 2002 approx    knee for arthritis    OOPHORECTOMY  1977 approx    TONSILLECTOMY  1960 approx     Family History   Problem Relation Age of Onset    Diabetes Mother     Hypertension Mother     Kidney disease Mother         stones    Parkinsonism Mother     Asthma Father     Heart disease Father     Diabetes Son     Hypertension Son     Heart disease Maternal Aunt         CAD     Cancer Maternal Aunt         Breast Ca    Breast cancer Maternal Aunt     Colon cancer Maternal Grandmother     Cancer Maternal Grandmother     Cancer Maternal Aunt         breast     Heart disease Maternal Aunt     Stroke Neg Hx     Alcohol abuse Neg Hx     Drug abuse Neg Hx     Mental retardation Neg Hx     Mental illness Neg Hx      Social History   Substance Use Topics    Smoking status: Former Smoker     Packs/day: 1.00     Years: 30.00     Types: Cigarettes     Quit date: 8/5/2010    Smokeless tobacco: Never Used      Comment: used nicorette gum in past and prn    Alcohol use 1.2 - 1.8 oz/week     1 - 2 Shots of liquor, 1 Glasses of wine per week       Review of patient's allergies indicates:   Allergen Reactions    Nsaids (non-steroidal anti-inflammatory drug) Hives and Shortness Of Breath    Pcn [penicillins] Hives and Shortness Of Breath    Sulfa (sulfonamide antibiotics) Hives and Shortness Of Breath    Aspirin      Due to Liver        Macrodantin [nitrofurantoin macrocrystalline] Hives    Wellbutrin [bupropion hcl]      Due to liver          Scheduled Meds:   allopurinol  100 mg Oral Daily    amLODIPine  5 mg Oral Daily    arformoterol  15 mcg Nebulization BID    And    budesonide  0.5 mg Nebulization Q12H    busPIRone  15 mg Oral BID    ciprofloxacin HCl  500 mg Oral Q12H    enoxaparin  40 mg Subcutaneous Daily    levothyroxine  100 mcg Oral Before breakfast    metoprolol succinate  50 mg Oral BID    pantoprazole  40 mg Oral Daily    pravastatin  10 mg Oral Daily    sodium bicarbonate drip  150 mEq Intravenous Once     Continuous Infusions:  PRN Meds:acetaminophen, albuterol-ipratropium, aluminum-magnesium hydroxide-simethicone, diphenhydrAMINE, guaifenesin 100 mg/5 ml, lorazepam, ondansetron      Review of Systems:  All the 14 ROS were reviewed and the pertinent ones are mentioned in the HPI           OBJECTIVE:     Vital Signs (Most Recent)  Temp: 98 °F (36.7 °C) (08/01/18 0630)  Pulse: 65 (08/01/18 0724)  Resp: 12 (08/01/18 0724)  BP: 110/69 (08/01/18 0630)  SpO2: 96 % (08/01/18 0712)     Vital Signs Range (Last 24H):  Temp:  [97.6 °F (36.4 °C)-98.9 °F  (37.2 °C)]   Pulse:  [62-85]   Resp:  [12-23]   BP: ()/(67-86)   SpO2:  [94 %-99 %]     Physical Exam:  General:  She is awake, alert and follows commands  HEENT:   Acephalic, Atraumatic,  EOMI, PERRLA, no nystagmus, no ptosis, no lid lag, no neglect, no gaze palsy  Neck: supple, no JVD, no Carotid bruit, no torticollis,   Lungs: CTA,  No rhonchi, no rales  Heart: Regular Rate and rhythm, no murmurs, rubs and or gallops  Abdomen, Soft, non tender, non distended, no abdominal  bruit, bowel sounds present  Extremities: No edema,  Left hand middle finger distal- amputation   Musculoskeletal:  RA,   Skin: No rash, no ecchymoses,         NEURO    Mental Status:   Awake, alert   X 4  Memory, Recent and Remote: very poor  Mood: anxious  Affect: flat  Behavior:appropriately  Attention and Concentration: intaact  Insight and thought processes:poor  Higher Executive functions: severely compromised  Visual spatial: compromised  Hallucinations and delusions; None today  No suicidal ideation   Language: No abnormalities        SPEECH:   No aphasia, no Dysarthria,     CRANIAL NERVES:      II: visual acuity  Intact   II: visual fields Full to confrontation   II: pupils Equal, round, reactive to light   III,VII: ptosis None   III,IV,VI: extraocular muscles  Full ROM   V: mastication Normal   V: facial light touch sensation  Normal   V,VII: corneal reflex  Present   VII: facial muscle function - upper  Normal   VII: facial muscle function - lower Normal   VIII: hearing Bone Conduction > nerve conduction b/l   IX: soft palate elevation  Normal   IX,X: gag reflex Present   XI: trapezius strength  Intact   XI: sternocleidomastoid strength Intact   XI: neck flexion strength  Intact   XII: tongue strength  Normal         MOTOR:Upper Extremities and Lower Extremities  5/5 proximal and distal  No pronation NO drift  Significant muscle wasting  TONE:   Intact, no rigidity, no spasticity    MUSCLE MASS:  REFLEXES: Deep tendon reflexes  tested:  B/L  Upper extremities:               biceps (C5, C6):1               brachioradialis (C5, C6):1               triceps (C6, C7),1     Lower extremities:                knee or patellar (L2, 3, 4):1               ankle (S1, S2):1    Plantar responses:  Flexors b/l  Clonus:  none  Muscle Fasciculations: none    SENSORY  PIN PRICK and TEMP: intact  Soft TOUCH: intact  VIBRATION:intact        CEREBELLAR  No dysmetria  Dysdiadochokinesia b/l  No rebound Phenomenon  No Nystagmus  No scanning speech      ROMBERG and  GAIT: deferred    EXTRAPYRAMIDALS: resting tremors that gets worse with movement        Laboratory:  Lab Results   Component Value Date    WBC 10.56 08/01/2018    HGB 10.7 (L) 08/01/2018    HCT 31.5 (L) 08/01/2018     08/01/2018    CHOL 170 08/17/2017    TRIG 145 08/17/2017    HDL 41 08/17/2017    ALT 12 08/01/2018    AST 24 08/01/2018     08/01/2018    K 4.1 08/01/2018     (H) 08/01/2018    CREATININE 1.4 08/01/2018    BUN 63 (H) 08/01/2018    CO2 18 (L) 08/01/2018    TSH 1.275 07/11/2017    INR 1.0 08/01/2018    HGBA1C 4.8 04/21/2016        Recent Labs  Lab 08/01/18  0333   COLORU Yellow   SPECGRAV <=1.005*   PHUR 7.0   PROTEINUA Negative         Diagnostic Results:MRI studies   ( All images reviewed Independently)   Imaging Results          MRI Brain Epilepsy Without Contrast (Final result)  Result time 08/01/18 09:39:46    Final result by Arslan Peacock Jr., MD (08/01/18 09:39:46)                 Impression:      1. No acute infarct.  2. Prior right frontal ventriculostomy shunt placement with surrounding gliosis.  There are also findings consistent with moderate to severe chronic microvascular ischemic change of the white matter with central atrophy.  3. 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  4. No convincing evidence of mesial temporal sclerosis.      Electronically signed by: Arslan Peacock Jr., MD  Date:    08/01/2018  Time:    09:39             Narrative:     EXAMINATION:  MRI BRAIN EPILEPSY WITHOUT CONTRAST    CLINICAL HISTORY:  Epilepsy;    TECHNIQUE:  Multiplanar multisequence MR imaging of the brain was performed without intravenous contrast. Additional thin cut images were performed through the hippocampi per epilepsy protocol.    COMPARISON:  CT of the head from August 1, 2018 was reviewed.    FINDINGS:  Intracranial Compartment:    No restricted diffusion.  There has been a prior placement of a right frontal ventriculostomy shunt.  There is T2 FLAIR hyperintensity adjacent to the shunt tract radiating to gliosis.  There is volume loss involving the right occipital lobe relating to old infarct.  There is patchy T2 FLAIR hyperintensity of a moderate to severe degree involving the periventricular and deep white matter with central atrophy/ex vacuo dilation of the ventricular system.  No evidence of hydrocephalus.  There is a tiny hippocampal sulcal remnant cyst on the right.  No convincing evidence of medial temporal sclerosis.  No extra-axial blood or fluid collections.No mass lesion or acute hemorrhage.    Skull/Extracranial Contents (limited evaluation): Bone marrow signal intensity is normal.                               X-Ray Chest AP Portable (Final result)  Result time 08/01/18 07:47:46    Final result by Micheal Ace MD (08/01/18 07:47:46)                 Impression:      No infiltrates.  Right basilar plate atelectasis.      Electronically signed by: Micheal Ace MD  Date:    08/01/2018  Time:    07:47             Narrative:    EXAMINATION:  XR CHEST AP PORTABLE    CLINICAL HISTORY:  Chronic obstructive pulmonary disease, unspecified    COMPARISON:  07/29/2018    FINDINGS:  Normal heart size. Patient is rotated.  Previously demonstrated moderately large hiatal hernia is noted.  No definite active infiltrates.  There is right basilar plate atelectasis.                               CT Head Without Contrast (Final result)  Result time 08/01/18 07:39:28     Final result by Micheal Ace MD (08/01/18 07:39:28)                 Impression:      No acute findings.    All CT scans at this facility use dose modulation, iterative reconstruction, and/or weight based dosing when appropriate to reduce radiation dose to as low as reasonably achievable.      Electronically signed by: Micheal Ace MD  Date:    08/01/2018  Time:    07:39             Narrative:    EXAMINATION:  CT HEAD WITHOUT CONTRAST    CLINICAL HISTORY:  Seizures new or progressive;    TECHNIQUE:  Standard non contrast CT scan of the brain.    COMPARISON:  11/17/2017    FINDINGS:  There is generalized atrophy and white matter low density microangiopathy.  Old right frontal infarct.  No evidence of intracranial hemorrhage or acute focal parenchymal abnormality.  The cranium is intact.  Visualized paranasal sinuses and mastoid air cells are clear.                                        08/01/2018    Assessment and Recommendations:  Patient with history of hypoxia, renal failure, respiratory failure, liver failure, chronic depression, anxiety, visual and audio hallucinations, delusions,   Comes with unable to arouse, per  patient had her alcohol drink with seroquel and requip, additionally, she was on tramdol and buspar  Tramadol was just started. History is inconsistent and lots of variability provided by the patient's . He is very hesitant to start on seizure medications, patient and her  are refusing for seizure medications. I am not sure if this was a seizure, or patient was just unresponsive secondary to alcohol and polypharmacy. Patient has had prior EVD placement. MRI of the brain does not indicate structural abnormalities other than prior stroke and EVD placement.      Differential diagnosis:  1) Metabolic Encephalopathy  2) Toxic Encephalopathy  3) [place] Disease  4) Post Hypoxic syndrome  5) Neurodegenerative  Disorder  6) provoked seizure  ( Tramadol, Buspar,  Levaquin)          Recommendations:  Advised to cut down on alcohol intake  Advised not to blend sedatives with alcohol  Advised to follow up with Neurologist  Patient can be given prescription  Keppra  500 mg po bid  EEG stat in the ED    Seizure precautions    1) Do not drive for 6 months or assist any minor in driving with learners permit  2) No activities associated with water and or elevation  3) No baby sitting an infant or a child under the age of 6 years  4) Compliance with the seizure medications.  5) Follow up with Neurologist          70 minutes of critical care provided in the ED for the seizure management, extensive chart review by the other providers    Thank you for the consult  Simon Ivan MD., Ph.D., MS

## 2018-08-01 NOTE — ED NOTES
Pt AAOx3, sitting up in bed visiting with , side rails up x 2, call bell within reach. NAD at this time. Will continue to monitor.

## 2018-08-01 NOTE — H&P
[facility] Medicine  History & Physical    Patient Name: Ibeth Schroeder  MRN: [id number]  Admission Date: 8/1/2018  Attending Physician: Durga Tillman MD  Primary Care Provider: Geovanna Botello MD         Patient information was obtained from patient, spouse/SO, past medical records and ER records.     Subjective:     Principal Problem:Seizure    Chief Complaint:   Chief Complaint   Patient presents with    Altered Mental Status     seen at facility yesterday but has had recent confusion per family member since discharge        HPI: Ms. Ibeth Schroeder is a 67-year-old  female discharged from this hospital yesterday, with a diagnosis of pneumonia and urinary tract infection.  She was sent home on oral levofloxacin and steroid taper.  She carries a diagnosis of seizure, 10 years ago, not on any anti seizure medications at home on a daily basis.  She has hypothyroidism, hypertension.    After being discharged from the hospital yesterday, patient was in her usual state of health last night.   reports that they went to bed as usual.  In the middle of the night he found her making grunting noises, when he checked up on her, she was frothing at the mouth, with a blood on the bed, and right arm shaking, unresponsive at that time.  The episode lasted for few minutes.  She remained confused thereafter.   reports urinary incontinence, and tongue biting.  He called 911 and was brought to the ED.  Patient is currently alert, but remains disoriented.  CT head unremarkable.  Patient will be admitted to the hospital as observation for seizure-like activity after 10 years.  Discussing with the ED staff, patient was in a postictal state upon initial presentation, mental status slowly improving at this point in time.      Past Medical History:   Diagnosis Date    Acute on chronic respiratory failure with hypoxia 7/30/2018    Acute pancreatitis     Alcohol dependence     per  patient in the past    Allergy     Anemia     Anxiety     Arthritis 6/24/2013    Asthma     per patient    Back pain     Colon polyp     Colon polyp     colonoscopy 8/26/2013    COPD (chronic obstructive pulmonary disease)     Dependence on renal dialysis 2012    when in liver failure    Depression     Disorder of kidney and ureter     ckd4    Diverticulosis     Diverticulosis     colonoscopy 8/26/2013    Hiatal hernia     CXR 2/25/2015--Large hiatal hernia.     Hyperlipidemia     Hypertension     Hypocalcemia 7/6/2016    Hypothyroidism 6/24/2013    Osteoporosis     Pneumonia due to infectious organism 7/29/2018    Pulmonary embolism     per patient unsure of date    Restless leg syndrome     RLS (restless legs syndrome)     Seizure 6/24/2013    Stroke     ischemic injury due to hypotension    Tobacco dependence     resolved    Trouble in sleeping        Past Surgical History:   Procedure Laterality Date    ADENOIDECTOMY  1960 approx    amputation left 2nd finger tip Left 2012    for blood clot/ after liver failure admit ? gangrene    APPENDECTOMY      incidental with another surgery    na hole/ ventriculostomy Right 08/24/2012    frontal after  liver failure    CHOLECYSTECTOMY      COLONOSCOPY  2013    FRACTURE SURGERY Left 07/12/2017    radius and ulna fracture    FRACTURE SURGERY Left 09/12/2017    wrist removal of hardware    HERNIA REPAIR  1999 approx    incisional hernia     HYSTERECTOMY  1977 approx    RAS/BSO  for endometriosis and tubular pregnancy     JOINT REPLACEMENT Left 2000 approx    hip for arthritis     JOINT REPLACEMENT Left 2004 approx    hip, fell then recurent dislocations - 4 times relocarted under mild anesthesia then re replaced    JOINT REPLACEMENT Right 2002 approx    knee for arthritis    OOPHORECTOMY  1977 approx    TONSILLECTOMY  1960 approx       Review of patient's allergies indicates:   Allergen Reactions    Nsaids (non-steroidal  anti-inflammatory drug) Hives and Shortness Of Breath    Pcn [penicillins] Hives and Shortness Of Breath    Sulfa (sulfonamide antibiotics) Hives and Shortness Of Breath    Aspirin      Due to Liver        Macrodantin [nitrofurantoin macrocrystalline] Hives    Wellbutrin [bupropion hcl]      Due to liver         Current Facility-Administered Medications on File Prior to Encounter   Medication    denosumab (PROLIA) injection 60 mg    [DISCONTINUED] 0.9%  NaCl infusion    [DISCONTINUED] acetaminophen tablet 650 mg    [DISCONTINUED] albuterol-ipratropium 2.5 mg-0.5 mg/3 mL nebulizer solution 3 mL    [DISCONTINUED] allopurinol tablet 100 mg    [DISCONTINUED] amLODIPine tablet 5 mg    [DISCONTINUED] guaiFENesin 12 hr tablet 600 mg    [DISCONTINUED] levothyroxine tablet 100 mcg    [DISCONTINUED] methylPREDNISolone sodium succinate injection 40 mg    [DISCONTINUED] metoprolol succinate (TOPROL-XL) 24 hr tablet 50 mg    [DISCONTINUED] moxifloxacin tablet 400 mg    [DISCONTINUED] pantoprazole EC tablet 40 mg    [DISCONTINUED] QUEtiapine tablet 25 mg    [DISCONTINUED] ramelteon tablet 8 mg    [DISCONTINUED] rOPINIRole tablet 1 mg     Current Outpatient Prescriptions on File Prior to Encounter   Medication Sig    allopurinol (ZYLOPRIM) 100 MG tablet Take 1 tablet (100 mg total) by mouth once daily.    amLODIPine (NORVASC) 5 MG tablet Take 1 tablet (5 mg total) by mouth once daily.    busPIRone (BUSPAR) 15 MG tablet Take 1 tablet (15 mg total) by mouth 2 (two) times daily.    diphenoxylate-atropine 2.5-0.025 mg (LOMOTIL) 2.5-0.025 mg per tablet take 2 tablets by mouth four times a day if needed for diarrhea    ferrous sulfate 325 (65 FE) MG EC tablet Take 1 tablet (325 mg total) by mouth once a week. (Patient taking differently: Take 325 mg by mouth once daily. )    fluticasone-umeclidin-vilanter (TRELEGY ELLIPTA) 100-62.5-25 mcg DsDv Inhale 1 puff into the lungs once daily.    ipratropium-albuterol  (COMBIVENT RESPIMAT)  mcg/actuation inhaler inhale 1 puff INTO THE LUNGS four times a day (Patient taking differently: Inhale 1 puff into the lungs every 6 (six) hours as needed. inhale 1 puff INTO THE LUNGS four times a day)    levoFLOXacin (LEVAQUIN) 500 MG tablet Take 1 tablet (500 mg total) by mouth once daily. for 7 days    levothyroxine (SYNTHROID) 100 MCG tablet TAKE 1 TABLET ONE TIME DAILY    metoprolol succinate (TOPROL-XL) 50 MG 24 hr tablet Take 1 tablet (50 mg total) by mouth 2 (two) times daily.    oxyCODONE 5 mg TbOr Take 1 tablet by mouth every 4 to 6 hours as needed.    pantoprazole (PROTONIX) 40 MG tablet TAKE 1 TABLET ONE TIME DAILY    pravastatin (PRAVACHOL) 10 MG tablet TAKE 1 TABLET EVERY DAY    predniSONE (DELTASONE) 10 mg tablet pack 40mg po daily x 4 days, 30mg po daily x 3 days, 20mg po daily x 2 days, 10mg po daily x 1 day    QUEtiapine (SEROQUEL) 25 MG Tab Take 1/2 to 1 tablet at bedtime    ropinirole (REQUIP) 1 MG tablet Take 1 mg by mouth nightly as needed.     SHINGRIX, PF, 50 mcg/0.5 mL injection inject 0.5 milliliter intramuscularly     Family History     Problem Relation (Age of Onset)    Asthma Father    Breast cancer Maternal Aunt    Cancer Maternal Aunt, Maternal Grandmother, Maternal Aunt    Colon cancer Maternal Grandmother    Diabetes Mother, Son    Heart disease Father, Maternal Aunt, Maternal Aunt    Hypertension Mother, Son    Kidney disease Mother    Parkinsonism Mother        Social History Main Topics    Smoking status: Former Smoker     Packs/day: 1.00     Years: 30.00     Types: Cigarettes     Quit date: 8/5/2010    Smokeless tobacco: Never Used      Comment: used nicorette gum in past and prn    Alcohol use 1.2 - 1.8 oz/week     1 - 2 Shots of liquor, 1 Glasses of wine per week    Drug use: No    Sexual activity: Not Currently     Partners: Male     Birth control/ protection: See Surgical Hx     Review of Systems   Constitutional: Positive for  fatigue. Negative for chills and fever.   HENT: Negative for congestion, nosebleeds, sore throat and trouble swallowing.    Eyes: Negative for visual disturbance.   Respiratory: Negative for chest tightness and shortness of breath.    Cardiovascular: Negative for chest pain, palpitations and leg swelling.   Gastrointestinal: Negative for nausea and vomiting.   Endocrine: Negative for polyuria.   Genitourinary: Negative for dysuria, flank pain and hematuria.   Musculoskeletal: Negative for back pain and neck pain.   Allergic/Immunologic: Negative for immunocompromised state.   Neurological: Positive for seizures. Negative for dizziness, tremors, light-headedness and headaches.   Hematological: Negative for adenopathy. Does not bruise/bleed easily.   Psychiatric/Behavioral: Positive for confusion. Negative for agitation, hallucinations and sleep disturbance. The patient is not nervous/anxious.    All other systems reviewed and are negative.    Objective:     Vital Signs (Most Recent):  Temp: 98.9 °F (37.2 °C) (08/01/18 0500)  Pulse: 62 (08/01/18 0500)  Resp: 18 (08/01/18 0500)  BP: 126/86 (08/01/18 0500)  SpO2: 97 % (08/01/18 0500) Vital Signs (24h Range):  Temp:  [97.6 °F (36.4 °C)-98.9 °F (37.2 °C)] 98.9 °F (37.2 °C)  Pulse:  [62-87] 62  Resp:  [13-23] 18  SpO2:  [93 %-99 %] 97 %  BP: ()/(67-86) 126/86        Body mass index is 44.02 kg/m².    Physical Exam   Constitutional: No distress.   Appears comfortable, disoriented at this time,  at the bedside.   HENT:   Head: Normocephalic and atraumatic.   Bite sydnie on the right side of the tongue noted.   Eyes: Conjunctivae and EOM are normal. Pupils are equal, round, and reactive to light. No scleral icterus.   Neck: Normal range of motion. No JVD present.   Cardiovascular: Normal rate, regular rhythm, normal heart sounds and intact distal pulses.    No murmur heard.  Pulmonary/Chest: Effort normal. No respiratory distress. She has no wheezes. She has  rhonchi.   Abdominal: Soft. She exhibits no distension. There is no tenderness. No hernia.   Musculoskeletal: Normal range of motion. She exhibits no edema or tenderness.   Lymphadenopathy:     She has no cervical adenopathy.   Neurological: She is alert. She exhibits normal muscle tone. Coordination normal.   Patient is alert, but disoriented at this time.  No obvious seizure activity noted.  Her recent/short-term memory is poor.  Does not recollect events of last night.  Follows all commands at this time.   Skin: Skin is warm. She is not diaphoretic. No erythema.   Psychiatric: She has a normal mood and affect. Her speech is delayed. Cognition and memory are impaired.   Nursing note and vitals reviewed.        CRANIAL NERVES     CN III, IV, VI   Pupils are equal, round, and reactive to light.  Extraocular motions are normal.        Significant Labs:   Bilirubin:   Recent Labs  Lab 07/29/18  0554 07/30/18  0410 08/01/18 0223   BILITOT 0.6 0.3 0.3     BMP:   Recent Labs  Lab 08/01/18 0223   *      K 4.1   *   CO2 18*   BUN 63*   CREATININE 1.4   CALCIUM 8.2*   MG 1.6     CBC:   Recent Labs  Lab 08/01/18 0223   WBC 10.56   HGB 10.7*   HCT 31.5*        CMP:   Recent Labs  Lab 08/01/18 0223      K 4.1   *   CO2 18*   *   BUN 63*   CREATININE 1.4   CALCIUM 8.2*   PROT 6.1   ALBUMIN 3.1*   BILITOT 0.3   ALKPHOS 72   AST 24   ALT 12   ANIONGAP 9   EGFRNONAA 39*     Cardiac Markers:   Recent Labs  Lab 08/01/18 0223   *     Lactic Acid: No results for input(s): LACTATE in the last 48 hours.  Magnesium:   Recent Labs  Lab 08/01/18 0223   MG 1.6     Troponin:   Recent Labs  Lab 08/01/18 0223   TROPONINI 0.006     TSH: No results for input(s): TSH in the last 4320 hours.  Urine Studies:   Recent Labs  Lab 08/01/18 0333   COLORU Yellow   APPEARANCEUA Clear   PHUR 7.0   SPECGRAV <=1.005*   PROTEINUA Negative   GLUCUA Negative   KETONESU Negative   BILIRUBINUA Negative    OCCULTUA Negative   NITRITE Negative   UROBILINOGEN Negative   LEUKOCYTESUR Negative     All pertinent labs within the past 24 hours have been reviewed.    Significant Imaging: I have reviewed and interpreted all pertinent imaging results/findings within the past 24 hours.     Imaging Results          X-Ray Chest AP Portable (In process)                CT Head Without Contrast (In process)                 I have independently reviewed and interpreted the EKG.     I have independently reviewed all pertinent labs within the past 24 hours.    I have independently reviewed, visualized and interpreted all pertinent imaging results within the past 24 hours and discussed the findings with the ED physician, Dr. Stevenson            Assessment/Plan:     * Seizure    First episode of seizure after almost 10 years.  Patient not on any routine antiepileptic agent at home.  Associated with urinary incontinence and tongue biting.  CT head unremarkable.  Check MRI brain.  May need EEG.  Consult Neurology for further recommendations.  Patient was initially in postictal state, slowly improving.        Urinary tract infection without hematuria    Urine cultures from 07/29/2018 positive for pansensitive E coli.  Continue oral levofloxacin as prescribed at discharge yesterday  Denies fever, chills at this time.  Currently denies dysuria like symptoms.          Chronic kidney disease, stage 3    See him creatinine appears to be at baseline, 1.4.  However BUN slightly elevated at 63, likely due to steroids that were prescribed to her recently.        Essential hypertension    Blood pressure controlled.  Continue home antihypertensive agents.            VTE Risk Mitigation         Ordered     enoxaparin injection 40 mg  Daily      08/01/18 0616     Place sequential compression device  Until discontinued      08/01/18 0616             Durga Tillman MD  Department of Hospital Medicine   [facility]

## 2018-08-02 ENCOUNTER — PATIENT OUTREACH (OUTPATIENT)
Dept: ADMINISTRATIVE | Facility: CLINIC | Age: 68
End: 2018-08-02

## 2018-08-02 NOTE — PATIENT INSTRUCTIONS
Bacteremia, Suspected (Adult)  Your fever today is probably due to a viral illness. However, sometimes fever can be an early sign of a more serious bacterial infection. Bacteremia is a bacterial infection that has spread to the bloodstream. This is serious because it can spread to other organs, including the kidneys, brain, and lungs.  Your healthcare provider will perform tests (cultures) to check for bacteremia. Until the test results are known you should watch for the signs listed below.  Causes  Bacteremia usually starts with a typical infection, but it then spreads to the blood. Almost any type of infection can cause bacteremia, including a urinary tract infection, skin infection, gastrointestinal problem, surgical complication, or pneumonia.  Symptoms  At first symptoms may seem like any typical infection or illness, but then they worsen. Symptoms of bacteremia can include:  · Fever and chills  · Loss of appetite  · Nausea or vomiting  · Trouble breathing or fast breathing  · Fast heart rate  · Feeling lightheaded or faint  · Skin rashes or blotches  Home care  Follow these guidelines when caring for yourself at home.  · Rest at home for the first 2 to 3 days. When resuming activity, don't let yourself become overly tired.  · You can take acetaminophen or ibuprofen for pain, unless you were given a different pain medicine to use. (Note: If you have chronic liver or kidney disease or have ever had a stomach ulcer or gastrointestinal bleeding, talk with your healthcare provider before using these medicines. Also talk to your provider if you are taking medicine to prevent blood clots.) Aspirin should never be given to anyone younger than 18 years of age who is ill with a viral infection or fever. It may cause severe liver or brain damage.  · If you were given antibiotics, take them until they are used up, or your healthcare provider tells you to stop. It is important to finish the antibiotics even though you feel  better. This is to make sure the infection has cleared.  · Your appetite may be poor, so a light diet is fine. Avoid dehydration by drinking 6 to 8 glasses of fluid per day (such as water, soft drinks, sports drinks, juices, tea, or soup).  Follow-up care  Follow up with your healthcare provider, or as advised.  · If a culture was done, you will be notified if your treatment needs to be changed. You can call as directed for the results.  · If X-rays, a CT, or an ultrasound were done, a specialist will review them. You will be notified of any findings that may affect your care.  Call 911  Contact emergency services right away if any of these occur:  · Trouble breathing or swallowing, or wheezing  · Chest pain  · Confusion or sudden change in behavior  · Extreme drowsiness or trouble awakening  · Fainting or loss of consciousness  · Rapid heart rate  · Low blood pressure  · Vomiting blood, or large amounts of blood in stool  · Seizure  When to seek medical advice  Call your healthcare provider right away if any of these occur:  · Cough with lots of colored sputum (mucus), or blood in your sputum  · Severe headache  · Severe face, neck, throat, or ear pain  · Pain in the right lower abdomen  · Weakness, dizziness, repeated vomiting, or diarrhea  · Joint pain or a new rash  · Burning when urinating  · Fever of 100.4°F (38°C) or higher  Date Last Reviewed: 7/30/2015  © 9231-6595 [employer]. [address] [place] [address], [place] [address]. All rights reserved. This information is not intended as a substitute for professional medical care. Always follow your healthcare professional's instructions.

## 2018-08-03 ENCOUNTER — OFFICE VISIT (OUTPATIENT)
Dept: FAMILY MEDICINE | Facility: CLINIC | Age: 68
End: 2018-08-03
Payer: MEDICARE

## 2018-08-03 VITALS
HEIGHT: 66 IN | OXYGEN SATURATION: 95 % | TEMPERATURE: 97 F | SYSTOLIC BLOOD PRESSURE: 110 MMHG | BODY MASS INDEX: 18.09 KG/M2 | WEIGHT: 112.56 LBS | HEART RATE: 90 BPM | DIASTOLIC BLOOD PRESSURE: 80 MMHG

## 2018-08-03 DIAGNOSIS — G40.909 SEIZURE DISORDER: ICD-10-CM

## 2018-08-03 DIAGNOSIS — Z12.39 BREAST CANCER SCREENING: Primary | ICD-10-CM

## 2018-08-03 DIAGNOSIS — I71.40 ABDOMINAL AORTIC ANEURYSM (AAA) WITHOUT RUPTURE: ICD-10-CM

## 2018-08-03 DIAGNOSIS — G89.4 CHRONIC PAIN SYNDROME: ICD-10-CM

## 2018-08-03 DIAGNOSIS — F41.0 ANXIETY ATTACK: ICD-10-CM

## 2018-08-03 DIAGNOSIS — F10.10 ALCOHOL ABUSE: ICD-10-CM

## 2018-08-03 LAB
BACTERIA BLD CULT: NORMAL
BACTERIA BLD CULT: NORMAL

## 2018-08-03 PROCEDURE — 99495 TRANSJ CARE MGMT MOD F2F 14D: CPT | Mod: S$GLB,,, | Performed by: FAMILY MEDICINE

## 2018-08-03 PROCEDURE — 99999 PR PBB SHADOW E&M-EST. PATIENT-LVL IV: CPT | Mod: PBBFAC,,, | Performed by: FAMILY MEDICINE

## 2018-08-03 NOTE — PHYSICIAN QUERY
PT Name: Ibeth Schroeder  MR #: [id number]     Physician Query Form - Documentation Clarification      CDS/: Brigitte Cortes RN             Contact information: [phone]   This form is a permanent document in the medical record.     Query Date: August 3, 2018    By submitting this query, we are merely seeking further clarification of documentation. Please utilize your independent clinical judgment when addressing the question(s) below.    The Medical record reflects the following:    Supporting Clinical Findings Location in Medical Record     ESCHERICHIA COLI   >100,000 cfu/ml        7/29 Urine culture   Ceftriaxone IVPB  Azithromyxin   Moxifloxican     Sepsis  Sepsis criteria:  HR > 90, WBC count > 12,000, RR > 20, source    7/29 MAR    7/30 MAR    7/29 HP                                                                            Doctor, Please specify diagnosis or diagnoses associated with above clinical findings.    [ xx  ] Ecoli UTI    [   ] Bacteriuria    [   ] other_______________________                                                                                                       [  ] Clinically undetermined

## 2018-08-03 NOTE — PROGRESS NOTES
"Subjective:       Patient ID: Ibeth Schroeder is a 68 y.o. female.    Chief Complaint: Hospital Follow Up    Transitional Care Note    Family and/or Caretaker present at visit? Yes   Diagnostic tests reviewed/disposition: yes   Disease/illness education: yes   Home health/community services discussion/referrals: YEs   Establishment or re-establishment of referral orders for community resources: yes   Discussion with other health care providers: No     68 y old female with alcohol abuse, seizure disorder, aortic aneurysm , htn here for f.u on Hosp due to presumably seizure  like activity after  found her grunting in her sleep  . Discharged on 8/1 .. She also bit tongue .seen by Neuro , ecg  Findings;"This is an abnormal EEG  Because of diffused slowing, No seizures or ictal activity recorded.  Clinical correlation is suggested as to the etiology of this. If indicated, repeat EEG and/or 24-hour ambulatory EEG monitoring might be useful in the future"Recommendations  Were  To stop drinking alcohol and stopped opioids and  psychotropic were discussed  (tramadol , buspar ) and  Started on Keppra. Doing well since.            Review of Systems   Constitutional: Negative.    HENT: Negative.    Eyes: Negative.    Respiratory: Negative.    Cardiovascular: Negative.    Gastrointestinal: Negative.    Genitourinary: Negative.    Musculoskeletal: Negative.    Skin: Negative.    Hematological: Negative.        Objective:      Physical Exam   Constitutional: She is oriented to person, place, and time. She appears well-developed and well-nourished. No distress.   HENT:   Head: Normocephalic and atraumatic.   Right Ear: External ear normal.   Left Ear: External ear normal.   Mouth/Throat: No oropharyngeal exudate.   Eyes: Conjunctivae and EOM are normal. Pupils are equal, round, and reactive to light. Right eye exhibits no discharge. Left eye exhibits no discharge. No scleral icterus.   Neck: Normal range of motion. Neck " supple. No JVD present. No tracheal deviation present. No thyromegaly present.   Cardiovascular: Normal rate, regular rhythm and normal heart sounds.  Exam reveals no gallop and no friction rub.    No murmur heard.  Pulmonary/Chest: Effort normal and breath sounds normal. No stridor. No respiratory distress. She has no wheezes. She has no rales. She exhibits no tenderness.   Abdominal: Soft. Bowel sounds are normal. She exhibits no distension. There is no tenderness. There is no rebound and no guarding.   Musculoskeletal: Normal range of motion.   Lymphadenopathy:     She has no cervical adenopathy.   Neurological: She is alert and oriented to person, place, and time.   Skin: Skin is warm and dry. She is not diaphoretic.   Psychiatric: She has a normal mood and affect. Her behavior is normal. Judgment and thought content normal.       Assessment:         Ibeth was seen today for hospital follow up.    Diagnoses and all orders for this visit:    Breast cancer screening  -     Mammo Digital Screening Bilateral With CAD; Future  -     Ambulatory referral to Home Health    Seizure disorder  -     Ambulatory referral to Home Health    Chronic pain syndrome  -     Ambulatory referral to Home Health    Anxiety attack  -     Ambulatory referral to Home Health    Alcohol abuse  -     Ambulatory referral to Home Health    Abdominal aortic aneurysm (AAA) without rupture      Plan:     Ibeth was seen today for hospital follow up.    Diagnoses and all orders for this visit:    Breast cancer screening  -     Mammo Digital Screening Bilateral With CAD; Future  -     Ambulatory referral to Home Health    COPD exacerbation  -     Ambulatory referral to Home Health    Seizure disorder  -     Ambulatory referral to Home Health    Chronic pain syndrome  -     Ambulatory referral to Home Health    Anxiety attack  -     Ambulatory referral to Home Health    Alcohol abuse  -     Ambulatory referral to Home Health     cont keppra.;f/u with  neuro   dicuss with PM alteratives to tramadol   Stop ETOH . F.u with psych   Stable. follow up with Vasc yenni

## 2018-08-06 NOTE — PHYSICIAN QUERY
PT Name: Ibeth Schroeder  MR #: [id number]    Physician Query Form - Cause and Effect Relationship Clarification      CDS/: Brigitte Cortes RN             Contact information: [phone]  This form is a permanent document in the medical record.     Query Date: August 6, 2018    By submitting this query, we are merely seeking further clarification of documentation. Please utilize your independent clinical judgment when addressing the question(s) below.    The Medical record contains the following:  Supporting Clinical Findings   Location in record   Sepsis                                                                                                                                                                                     7/31 Dc summary   Ecoli UTI                                                                                                                                                                                   8/3 Query         Provider, please clarify if there is any correlation between Ecoli  and Sepsis .           Are the conditions:     [ x ] Due to or associated with each other     [  ] Unrelated to each other     [  ] Other (Please Specify): _________________________     [  ] Clinically Undetermined

## 2018-08-07 ENCOUNTER — LAB VISIT (OUTPATIENT)
Dept: LAB | Facility: HOSPITAL | Age: 68
End: 2018-08-07
Attending: INTERNAL MEDICINE
Payer: MEDICARE

## 2018-08-07 DIAGNOSIS — E87.5 HYPERKALEMIA: ICD-10-CM

## 2018-08-07 DIAGNOSIS — E87.20 ACIDOSIS: ICD-10-CM

## 2018-08-07 DIAGNOSIS — R80.1 PERSISTENT PROTEINURIA: ICD-10-CM

## 2018-08-07 DIAGNOSIS — E83.52 HYPERCALCEMIA: ICD-10-CM

## 2018-08-07 DIAGNOSIS — N18.4 STAGE 4 CHRONIC KIDNEY DISEASE: ICD-10-CM

## 2018-08-07 DIAGNOSIS — M1A.3710 CHRONIC GOUT DUE TO RENAL IMPAIRMENT INVOLVING TOE OF RIGHT FOOT WITHOUT TOPHUS: ICD-10-CM

## 2018-08-07 DIAGNOSIS — N25.81 HYPERPARATHYROIDISM, SECONDARY RENAL: ICD-10-CM

## 2018-08-07 LAB
BILIRUB UR QL STRIP: NEGATIVE
CLARITY UR REFRACT.AUTO: CLEAR
COLOR UR AUTO: NORMAL
CREAT UR-MCNC: 65 MG/DL
GLUCOSE UR QL STRIP: NEGATIVE
HGB UR QL STRIP: NEGATIVE
KETONES UR QL STRIP: NEGATIVE
LEUKOCYTE ESTERASE UR QL STRIP: NEGATIVE
NITRITE UR QL STRIP: NEGATIVE
PH UR STRIP: 6 [PH] (ref 5–8)
PROT UR QL STRIP: NEGATIVE
PROT UR-MCNC: 23 MG/DL
PROT/CREAT UR: 0.35 MG/G{CREAT}
SP GR UR STRIP: 1.01 (ref 1–1.03)
URN SPEC COLLECT METH UR: NORMAL
UROBILINOGEN UR STRIP-ACNC: NEGATIVE EU/DL

## 2018-08-07 PROCEDURE — 81003 URINALYSIS AUTO W/O SCOPE: CPT

## 2018-08-07 PROCEDURE — 84156 ASSAY OF PROTEIN URINE: CPT

## 2018-08-14 ENCOUNTER — OFFICE VISIT (OUTPATIENT)
Dept: NEPHROLOGY | Facility: CLINIC | Age: 68
End: 2018-08-14
Payer: MEDICARE

## 2018-08-14 VITALS
DIASTOLIC BLOOD PRESSURE: 76 MMHG | WEIGHT: 110 LBS | BODY MASS INDEX: 18.33 KG/M2 | HEART RATE: 70 BPM | HEIGHT: 65 IN | SYSTOLIC BLOOD PRESSURE: 120 MMHG

## 2018-08-14 DIAGNOSIS — E87.5 HYPERKALEMIA: ICD-10-CM

## 2018-08-14 DIAGNOSIS — N25.81 HYPERPARATHYROIDISM, SECONDARY RENAL: ICD-10-CM

## 2018-08-14 DIAGNOSIS — E83.52 HYPERCALCEMIA: ICD-10-CM

## 2018-08-14 DIAGNOSIS — N18.30 STAGE 3 CHRONIC KIDNEY DISEASE: Primary | ICD-10-CM

## 2018-08-14 DIAGNOSIS — R80.1 PERSISTENT PROTEINURIA: ICD-10-CM

## 2018-08-14 DIAGNOSIS — E87.20 ACIDOSIS: ICD-10-CM

## 2018-08-14 DIAGNOSIS — M1A.3710 CHRONIC GOUT DUE TO RENAL IMPAIRMENT INVOLVING TOE OF RIGHT FOOT WITHOUT TOPHUS: ICD-10-CM

## 2018-08-14 PROCEDURE — 99999 PR PBB SHADOW E&M-EST. PATIENT-LVL IV: CPT | Mod: PBBFAC,,, | Performed by: INTERNAL MEDICINE

## 2018-08-14 PROCEDURE — 3078F DIAST BP <80 MM HG: CPT | Mod: CPTII,S$GLB,, | Performed by: INTERNAL MEDICINE

## 2018-08-14 PROCEDURE — 3074F SYST BP LT 130 MM HG: CPT | Mod: CPTII,S$GLB,, | Performed by: INTERNAL MEDICINE

## 2018-08-14 PROCEDURE — 99214 OFFICE O/P EST MOD 30 MIN: CPT | Mod: S$GLB,,, | Performed by: INTERNAL MEDICINE

## 2018-08-14 PROCEDURE — 99499 UNLISTED E&M SERVICE: CPT | Mod: HCNC,S$GLB,, | Performed by: INTERNAL MEDICINE

## 2018-08-14 NOTE — PATIENT INSTRUCTIONS
1. Chronic kidney disease stage III: No heavy proteinuria.  Continue current conservative management.  No intervention for renal artery stenosis at this time.  Avoid ACE inhibitor and ARB at this time.  GFR 36% with a corresponding creatinine of 1.3.  Last months acute kidney injury with a creatinine up to 1.7 was due to shortness of breath from pneumonia.    USD reviewed from 7/2018    2.  Hypertension: off ACEI  ; currently on small dose of beta blocker.   Norvasc at 5 mg daily --controlled at this time.    3.  Anemia:on PO iron and IV iron per hematology --  follow up with dr. Mar     4.  AAA  --dr. Liz is is following her       5.  Proteinuria: Stable without  ARB or ACE inhibitor    6.  Hyperparathyroidism with hypercalcemia status post Prolia: DC calcitriol.  vitamin D levels are WNL       7. Gout: Continue on allopurinol 100 mg ---life long with uric acid target < 6 mg/ dl and it protects kidney and prevents gout : followed by rheumatology            Follow up 3 months

## 2018-08-14 NOTE — PROGRESS NOTES
Subjective:       Patient ID: Ibeth Schroeder is a 68 y.o. White female who presents for follow up of Chronic Kidney Disease; Hypertension; hyperparathyroid of renal origin; and Anemia    Hypertension   Pertinent negatives include no chest pain, headaches, palpitations or shortness of breath.   Anemia   There has been no bruising/bleeding easily, fever or palpitations.        Patient is a white female with severe COPD and history of acute kidney injury status post hemodialysis in the year 2012.  She has been followed in the nephrology division.  She was last seen by Dr. Jara in August 2016 when her creatinine was higher.  Today she comes back for follow-up as a new patient to me.  Patient has been having some wheezing.  Patient has lost 5 pounds in last 6 months unintentionally.  Patient has not been drinking very well.  Her creatinine is higher at 1.9.  Patient overall is a very poor historian.  Denies any fevers and chills.  Denied any urinary symptoms.  Denies any cough or sputum.    September 2016 acute kidney injury--- ACE inhibitor was stopped    October 2016 creatinine came down to 1.6; renal ultrasound showed echogenic kidneys with possible renal artery stenosis; AAA of size of 4.0 cm    January 2017: Recurrent UTI instructions given, mild hypercalcemia over-the-counter vitamin D stopped, continued with calcitriol for hyperparathyroidism    April 2017 Norvasc was stopped due to relative hypotension    August 2017 GFR is 28% based on Cystatin C with a corresponding creatinine of 1.6; creatinine in the last 6 months have been fluctuating between 1.6 and 2.2.  There was one creatinine of 0.9 which could have been a lab error. S/p fall with pelvic # and left wrist # and s/p plate ( dr. Lizarraga) pending holter today       April 2018 patient seen for follow-up: Reviewed records from pain management, rheumatology for Prolia and management of gout, internal medicine records reviewed, urology records Dr. Garcia  reviewed with stone protocol CT scan done in January 2018 showing renal cyst, pulmonary, psychiatry, hematology records also reviewed.  Last labs are from February 2018.  Calcium has gone up to 11.0.  Creatinine has gone up from 1.2 up to 1.7       July 2018 patient was hospitalized for acute shortness of breath due to pneumonia.  Patient was treated with bronchodilators and oxygen along with IV antibiotics.  Patient did not qualify for home oxygen.  Maximum creatinine was 1.7    August 2018 creatinine down to 1.3 GFR 36%.  Vitamin-D level is normal.  UA negative. ER visit for SZ ( dr. Bagley) due to levaquin/ tramadol/ buspar now on keppra pending follow up with dr. Dowd     Review of Systems   Constitutional: Negative for activity change, appetite change, chills, diaphoresis, fatigue, fever and unexpected weight change.   HENT: Negative for congestion, dental problem, drooling, postnasal drip, rhinorrhea and voice change.    Eyes: Negative for discharge.   Respiratory: Negative for apnea, cough, choking, chest tightness, shortness of breath, wheezing and stridor.    Cardiovascular: Negative for chest pain, palpitations and leg swelling.   Gastrointestinal: Negative for abdominal distention, blood in stool, constipation, diarrhea, nausea, rectal pain and vomiting.   Endocrine: Negative for cold intolerance, heat intolerance, polydipsia and polyuria.   Genitourinary: Negative for decreased urine volume, difficulty urinating, dysuria, enuresis, flank pain, frequency, hematuria and urgency.   Musculoskeletal: Negative for arthralgias, back pain, gait problem and joint swelling.   Skin: Negative for rash.   Allergic/Immunologic: Negative for food allergies and immunocompromised state.   Neurological: Negative for dizziness, tremors, syncope, numbness and headaches.   Hematological: Does not bruise/bleed easily.   Psychiatric/Behavioral: Negative for agitation, behavioral problems and self-injury. The patient is not  "nervous/anxious and is not hyperactive.    All other systems reviewed and are negative.      Objective:     /76   Pulse 70   Ht 5' 5" (1.651 m)   Wt 49.9 kg (110 lb 0.2 oz)   LMP  (LMP Unknown)   BMI 18.31 kg/m²      Physical Exam   Constitutional: She is oriented to person, place, and time. She appears well-developed and well-nourished.   HENT:   Head: Normocephalic and atraumatic.   Eyes: Conjunctivae and EOM are normal. Pupils are equal, round, and reactive to light.   Neck: Normal range of motion and full passive range of motion without pain. Neck supple. Carotid bruit is not present. No edema present. No thyroid mass and no thyromegaly present.   Cardiovascular: Normal rate, regular rhythm, S1 normal, S2 normal, normal heart sounds, intact distal pulses and normal pulses. PMI is not displaced. Exam reveals no friction rub.   No murmur heard.  Pulmonary/Chest: Effort normal and breath sounds normal. No accessory muscle usage. No respiratory distress. She has no wheezes. She has no rales. She exhibits no tenderness.   Abdominal: Soft. Bowel sounds are normal. She exhibits no distension and no mass. There is no hepatosplenomegaly. There is no tenderness. There is no rebound and no CVA tenderness. No hernia.   Musculoskeletal: Normal range of motion. She exhibits no edema or tenderness.   Neurological: She is alert and oriented to person, place, and time. She has normal reflexes. She displays normal reflexes. No cranial nerve deficit or sensory deficit. She exhibits normal muscle tone. Coordination normal.   Skin: Skin is warm and dry. No bruising, no ecchymosis and no rash noted. No cyanosis or erythema. No pallor. Nails show no clubbing.   Psychiatric: She has a normal mood and affect. Her speech is normal and behavior is normal. Judgment and thought content normal.         Lab Results   Component Value Date    CREATININE 1.3 08/07/2018    BUN 29 (H) 08/07/2018     08/07/2018    K 4.3 08/07/2018 "     08/07/2018    CO2 29 08/07/2018     Lab Results   Component Value Date    WBC 10.45 08/07/2018    HGB 12.3 08/07/2018    HCT 38.7 08/07/2018    MCV 98 08/07/2018     08/07/2018     Lab Results   Component Value Date    .0 (H) 08/07/2018    CALCIUM 9.9 08/07/2018    CAION 0.90 (L) 09/18/2012    PHOS 3.3 08/07/2018     Lab Results   Component Value Date    URICACID 6.0 (H) 08/07/2018       Assessment:    )    1. Stage 3 chronic kidney disease    2. Hyperparathyroidism, secondary renal    3. Hyperkalemia    4. Acidosis    5. Persistent proteinuria    6. 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 Hypercalcemia        Plan:         1. Chronic kidney disease stage III: No heavy proteinuria.  Continue current conservative management.  No intervention for renal artery stenosis at this time.  Avoid ACE inhibitor and ARB at this time.  GFR 36% with a corresponding creatinine of 1.3.  Last months acute kidney injury with a creatinine up to 1.7 was due to shortness of breath from pneumonia. USD reviewed from 7/2018     2.  Hypertension: off ACEI  ; currently on small dose of beta blocker.   Norvasc at 5 mg daily --controlled at this time.    3.  Anemia:on PO iron and IV iron per hematology --  follow up with dr. Mar     4.  AAA  --dr. Liz is is following her       5.  Proteinuria: Stable without  ARB or ACE inhibitor    6.  Hyperparathyroidism with hypercalcemia status post Prolia: DC calcitriol.  vitamin D levels are WNL       7. Gout: Continue on allopurinol 100 mg ---life long with uric acid target < 6 mg/ dl and it protects kidney and prevents gout : followed by rheumatology            Follow up 3 months     Cristina Crowder MD

## 2018-08-15 RX ORDER — PRAVASTATIN SODIUM 10 MG/1
TABLET ORAL
Qty: 90 TABLET | Refills: 0 | Status: SHIPPED | OUTPATIENT
Start: 2018-08-15 | End: 2019-01-02 | Stop reason: SDUPTHER

## 2018-08-15 RX ORDER — PANTOPRAZOLE SODIUM 40 MG/1
TABLET, DELAYED RELEASE ORAL
Qty: 90 TABLET | Refills: 3 | Status: SHIPPED | OUTPATIENT
Start: 2018-08-15

## 2018-08-16 ENCOUNTER — TELEPHONE (OUTPATIENT)
Dept: ADMINISTRATIVE | Facility: CLINIC | Age: 68
End: 2018-08-16

## 2018-08-16 NOTE — TELEPHONE ENCOUNTER
Home Health SOC 08/06/2018 - 10/04/2018 with [facility] ([facility] Urena) - Dr. Geovanna Botello. Patient received PT and OT services.

## 2018-08-17 ENCOUNTER — PES CALL (OUTPATIENT)
Dept: ADMINISTRATIVE | Facility: CLINIC | Age: 68
End: 2018-08-17

## 2018-08-23 ENCOUNTER — TELEPHONE (OUTPATIENT)
Dept: PULMONOLOGY | Facility: CLINIC | Age: 68
End: 2018-08-23

## 2018-08-23 DIAGNOSIS — R06.02 SOB (SHORTNESS OF BREATH): Primary | ICD-10-CM

## 2018-08-23 NOTE — TELEPHONE ENCOUNTER
----- Message from Lilly Arias sent at 8/23/2018  8:48 AM CDT -----  Contact: Nimesh @ [facility]  Good morning! Pt is in need of new oxygen equipment, but she is overdue for her re-certification. Please schedule 6 min walk to be done when Pt comes in on 9/5/18. If Pt still qualifies, please submit new order for home oxygen as well at that time as well. Thank you!
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
## 2018-08-27 ENCOUNTER — OFFICE VISIT (OUTPATIENT)
Dept: PSYCHIATRY | Facility: CLINIC | Age: 68
End: 2018-08-27
Payer: MEDICARE

## 2018-08-27 DIAGNOSIS — F03.90 MAJOR NEUROCOGNITIVE DISORDER: Primary | ICD-10-CM

## 2018-08-27 DIAGNOSIS — G47.00 INSOMNIA, UNSPECIFIED TYPE: ICD-10-CM

## 2018-08-27 PROCEDURE — 1101F PT FALLS ASSESS-DOCD LE1/YR: CPT | Mod: CPTII,,, | Performed by: PSYCHIATRY & NEUROLOGY

## 2018-08-27 PROCEDURE — 99214 OFFICE O/P EST MOD 30 MIN: CPT | Mod: S$PBB,,, | Performed by: PSYCHIATRY & NEUROLOGY

## 2018-08-27 RX ORDER — BUSPIRONE HYDROCHLORIDE 15 MG/1
7.5 TABLET ORAL 2 TIMES DAILY
Qty: 30 TABLET | Refills: 2 | Status: SHIPPED | OUTPATIENT
Start: 2018-08-27 | End: 2018-10-25 | Stop reason: SDUPTHER

## 2018-08-27 RX ORDER — TRAZODONE HYDROCHLORIDE 50 MG/1
TABLET ORAL
Qty: 30 TABLET | Refills: 2 | Status: SHIPPED | OUTPATIENT
Start: 2018-08-27 | End: 2018-10-25 | Stop reason: SDUPTHER

## 2018-08-28 ENCOUNTER — HOSPITAL ENCOUNTER (OUTPATIENT)
Dept: RADIOLOGY | Facility: HOSPITAL | Age: 68
Discharge: HOME | End: 2018-08-28
Attending: FAMILY MEDICINE
Payer: MEDICARE

## 2018-08-28 VITALS — HEIGHT: 65 IN | WEIGHT: 110 LBS | BODY MASS INDEX: 18.33 KG/M2

## 2018-08-28 DIAGNOSIS — Z12.39 BREAST CANCER SCREENING: ICD-10-CM

## 2018-08-28 PROCEDURE — 77067 SCR MAMMO BI INCL CAD: CPT | Mod: TC

## 2018-08-28 PROCEDURE — 77067 SCR MAMMO BI INCL CAD: CPT | Mod: 26,,, | Performed by: RADIOLOGY

## 2018-08-29 ENCOUNTER — PROCEDURE VISIT (OUTPATIENT)
Dept: PULMONOLOGY | Facility: CLINIC | Age: 68
End: 2018-08-29
Payer: MEDICARE

## 2018-08-29 ENCOUNTER — OFFICE VISIT (OUTPATIENT)
Dept: RHEUMATOLOGY | Facility: CLINIC | Age: 68
End: 2018-08-29
Payer: MEDICARE

## 2018-08-29 ENCOUNTER — LAB VISIT (OUTPATIENT)
Dept: LAB | Facility: HOSPITAL | Age: 68
End: 2018-08-29
Attending: PHYSICIAN ASSISTANT
Payer: MEDICARE

## 2018-08-29 VITALS
BODY MASS INDEX: 20.73 KG/M2 | SYSTOLIC BLOOD PRESSURE: 136 MMHG | WEIGHT: 110 LBS | HEIGHT: 62 IN | BODY MASS INDEX: 20.24 KG/M2 | HEART RATE: 103 BPM | DIASTOLIC BLOOD PRESSURE: 86 MMHG | WEIGHT: 112.63 LBS | HEIGHT: 62 IN

## 2018-08-29 DIAGNOSIS — E83.52 HYPERCALCEMIA: ICD-10-CM

## 2018-08-29 DIAGNOSIS — M81.8 OSTEOPOROSIS, IDIOPATHIC: Primary | Chronic | ICD-10-CM

## 2018-08-29 DIAGNOSIS — R26.89 IMBALANCE: ICD-10-CM

## 2018-08-29 DIAGNOSIS — R26.9 IMPAIRED GAIT: ICD-10-CM

## 2018-08-29 DIAGNOSIS — M15.9 PRIMARY OSTEOARTHRITIS INVOLVING MULTIPLE JOINTS: ICD-10-CM

## 2018-08-29 DIAGNOSIS — Z51.81 MEDICATION MONITORING ENCOUNTER: ICD-10-CM

## 2018-08-29 DIAGNOSIS — N18.30 CHRONIC KIDNEY DISEASE, STAGE 3: Chronic | ICD-10-CM

## 2018-08-29 DIAGNOSIS — E79.0 HYPERURICEMIA: Chronic | ICD-10-CM

## 2018-08-29 DIAGNOSIS — M81.8 OSTEOPOROSIS, IDIOPATHIC: Chronic | ICD-10-CM

## 2018-08-29 DIAGNOSIS — R06.02 SOB (SHORTNESS OF BREATH): ICD-10-CM

## 2018-08-29 LAB
ALBUMIN SERPL BCP-MCNC: 3.9 G/DL
ALP SERPL-CCNC: 91 U/L
ALT SERPL W/O P-5'-P-CCNC: 10 U/L
ANION GAP SERPL CALC-SCNC: 9 MMOL/L
AST SERPL-CCNC: 21 U/L
BILIRUB SERPL-MCNC: 0.5 MG/DL
BUN SERPL-MCNC: 32 MG/DL
CALCIUM SERPL-MCNC: 10.1 MG/DL
CHLORIDE SERPL-SCNC: 106 MMOL/L
CO2 SERPL-SCNC: 27 MMOL/L
CREAT SERPL-MCNC: 1.4 MG/DL
EST. GFR  (AFRICAN AMERICAN): 45 ML/MIN/1.73 M^2
EST. GFR  (NON AFRICAN AMERICAN): 39 ML/MIN/1.73 M^2
GLUCOSE SERPL-MCNC: 82 MG/DL
POTASSIUM SERPL-SCNC: 4.2 MMOL/L
PROT SERPL-MCNC: 7.1 G/DL
SODIUM SERPL-SCNC: 142 MMOL/L

## 2018-08-29 PROCEDURE — 96372 THER/PROPH/DIAG INJ SC/IM: CPT | Mod: S$GLB,,, | Performed by: PHYSICIAN ASSISTANT

## 2018-08-29 PROCEDURE — 36415 COLL VENOUS BLD VENIPUNCTURE: CPT | Mod: PO

## 2018-08-29 PROCEDURE — 99499 UNLISTED E&M SERVICE: CPT | Mod: HCNC,S$GLB,, | Performed by: PHYSICIAN ASSISTANT

## 2018-08-29 PROCEDURE — 80053 COMPREHEN METABOLIC PANEL: CPT | Mod: PO

## 2018-08-29 PROCEDURE — 99214 OFFICE O/P EST MOD 30 MIN: CPT | Mod: 25,S$GLB,, | Performed by: PHYSICIAN ASSISTANT

## 2018-08-29 PROCEDURE — 94618 PULMONARY STRESS TESTING: CPT | Mod: S$GLB,,, | Performed by: INTERNAL MEDICINE

## 2018-08-29 PROCEDURE — 99999 PR PBB SHADOW E&M-EST. PATIENT-LVL V: CPT | Mod: PBBFAC,,, | Performed by: PHYSICIAN ASSISTANT

## 2018-08-29 NOTE — PROGRESS NOTES
Subjective:       Patient ID: Ibeth Schroeder is a 68 y.o. female.    Chief Complaint: Osteoporosis    Ibeth is back for rheumatology follow-up     Osteoporosis now on  Prolia for osteoporosis.  She does have some chronic kidney disease Stage III-IV, bisphosphonates contraindicated. In the past had low Ca. Renal had her on Calcitrol and daily mv. Her Ca was wnl then trended   Up to high. Calcitriol stopped. She stopped her daily MV. Had pneumonia about 8 weeks ago. Had seizure from med combination. Now on keppra and being monitored by neurology. No falls since last visit. No recent fx.  2016 sustained a pelvic fracture and left distal radial fx. prolia was started 6/2016. Last prolia done 2/21/18    Last year she completed  PT for balance and strengthening. She did well but post pneumonia feels like her strength is declined some.       She does have a history of hyperuricemia with gout attacks but none recently it's been almost 8 years since her last attack.  She is on allopurinol 100 mg/d per renal.  Her pain level today 2/10. .  She does have osteoarthritis affecting multiple joints as well as her spine. Has gotten epidural injections in the past with some help.      Osteoarthritis   Associated symptoms include arthralgias. Pertinent negatives include no abdominal pain, chest pain, chills, fatigue, fever, joint swelling, myalgias, nausea, neck pain, rash, vomiting or weakness.     Review of Systems   Constitutional: Negative.  Negative for activity change, appetite change, chills, fatigue and fever.   HENT: Negative.  Negative for mouth sores and trouble swallowing.         No dry mouth   Eyes: Negative.  Negative for photophobia, pain and redness.        No swollen or red eyes, no dry eye     Respiratory: Negative.  Negative for chest tightness, shortness of breath, wheezing and stridor.    Cardiovascular: Negative.  Negative for chest pain.   Gastrointestinal: Negative.  Negative for abdominal pain, blood in  "stool, diarrhea, nausea and vomiting.   Genitourinary: Negative.  Negative for dysuria, frequency, hematuria and urgency.   Musculoskeletal: Positive for arthralgias. Negative for back pain, gait problem, joint swelling, myalgias, neck pain and neck stiffness.   Skin: Negative.  Negative for color change, pallor and rash.   Neurological: Negative.  Negative for weakness.   Hematological: Negative for adenopathy.   Psychiatric/Behavioral: Negative for suicidal ideas.         Objective:     /86   Pulse 103   Ht 5' 2" (1.575 m)   Wt 51.1 kg (112 lb 10.5 oz)   LMP  (LMP Unknown)   BMI 20.60 kg/m²      Physical Exam   Constitutional: She is oriented to person, place, and time and well-developed, well-nourished, and in no distress. No distress.   HENT:   Head: Normocephalic and atraumatic.   Right Ear: External ear normal.   Left Ear: External ear normal.   Mouth/Throat: No oropharyngeal exudate.   Eyes: Conjunctivae and EOM are normal. Pupils are equal, round, and reactive to light. No scleral icterus.   Neck: Normal range of motion. Neck supple. No thyromegaly present.   Cardiovascular: Normal rate, regular rhythm and normal heart sounds.    No murmur heard.  Pulmonary/Chest: Effort normal and breath sounds normal. She exhibits no tenderness.   Abdominal: Soft. Bowel sounds are normal.   Lymphadenopathy:     She has no cervical adenopathy.   Neurological: She is alert and oriented to person, place, and time. She displays normal reflexes. No cranial nerve deficit. She exhibits normal muscle tone. Gait normal.   Skin: Skin is warm and dry. No rash noted.     Musculoskeletal: Normal range of motion. She exhibits deformity. She exhibits no edema or tenderness.   She does have some kyphosis and scoliosis in the thoracic spine but no tenderness to palpation  Some mild osteoarthritic changes in her hands                 Recent Results (from the past 168 hour(s))   Comprehensive metabolic panel    Collection Time: " 08/29/18  1:50 PM   Result Value Ref Range    Sodium 142 136 - 145 mmol/L    Potassium 4.2 3.5 - 5.1 mmol/L    Chloride 106 95 - 110 mmol/L    CO2 27 23 - 29 mmol/L    Glucose 82 70 - 110 mg/dL    BUN, Bld 32 (H) 8 - 23 mg/dL    Creatinine 1.4 0.5 - 1.4 mg/dL    Calcium 10.1 8.7 - 10.5 mg/dL    Total Protein 7.1 6.0 - 8.4 g/dL    Albumin 3.9 3.5 - 5.2 g/dL    Total Bilirubin 0.5 0.1 - 1.0 mg/dL    Alkaline Phosphatase 91 55 - 135 U/L    AST 21 10 - 40 U/L    ALT 10 10 - 44 U/L    Anion Gap 9 8 - 16 mmol/L    eGFR if African American 45 (A) >60 mL/min/1.73 m^2    eGFR if non African American 39 (A) >60 mL/min/1.73 m^2       DEXA 7/6/16 total femur T score -2.7, femur neck -2.7, spine +1.9 impression osteoporosis  DEXA 6/5/13 total femur T score -3.1, spine +1.7 impression osteoporosis    Assessment:       1. Osteoporosis, idiopathic    2. Chronic kidney disease, stage 3    3. Medication monitoring encounter    4. Hyperuricemia    5. Primary osteoarthritis involving multiple joints          1.  Severe osteoporosis at the hip--Prolia X 1.5 years after 3 years no treatment, pt lost to follow up  2016 pelvic fracture and left distal radial fx- prolia therapy started 7/2016    2.  Hyperuricemia with no recent gout attacks- on l allopurinol 100 mg daily     3.  Chronic kidney disease monitored by nephrology     4.  Generalized osteoarthritis    5.  Medication Monitoring- no current issues, no evidence of toxicity    6. Hypercalcemia- ckd - no wnl off supplemental Calcitrol and daily mv       Plan:               Labs reviewed and done within past 14 days  Ca 10.1, Creat 1.4, eGFR 39  No contraindication for Prolia today, ok for nurse to administer today  Re-evaluate patient again in 6 months to determine if Prolia still medically indicated and appropriate to administer.    KDIGO lab monitoring will be followed according to KDIGO 2017 Clinical Practice Guideline Update for the Diagnosis, Evaluation, Prevention, and Treatment  of Chronic Kidney Disease-Mineral and Bone Disorder (CKD-MBD)  Chapter 3.1: Diagnosis of CKD-MBD: biochemical abnormalities      dexa next visit     C/w PT for strength and balance- peak performance    Allopurinol 100 mg/d per renal, no gout attacks    Avoid non-steroidal's, okay for Tylenol for pain    Return to clinic in 6 months with labs- cmp and vit D and dexa     Call with any questions, changes, or concerns.

## 2018-08-29 NOTE — PROCEDURES
"[facility]  Six Minute Walk     SUMMARY     Ordering Provider: Dr Barrett   Interpreting Provider: Dr Barrett  Performing nurse/tech/RT: ANATOLIY Johnson  Diagnosis: (SOB)  Height: 5' 2" (157.5 cm)  Weight: 49.9 kg (110 lb)  BMI (Calculated): 20.2   Patient Race:             Phase Oxygen Assessment Supplemental O2 Heart   Rate Blood Pressure Fe Dyspnea Scale Rating   Resting 95 % Room Air 62 bpm 124/77 1   Exercise        Minute        1 87 % Room Air 96 bpm     2 92 % 2 L/M 85 bpm     3 89 % 2 L/M 97 bpm     4 94 % 3 L/M 97 bpm     5 93 % 3 L/M 98 bpm     6  90 % 3 L/M 105 bpm 141/87 5-6   Recovery        Minute        1 93 % 3 L/M 100 bpm     2 94 % 3 L/M 82 bpm     3 93 % 3 L/M 72 bpm     4 92 % 3 L/M 73 bpm 124/76 4     Six Minute Walk Summary  6MWT Status: completed without stopping  Patient Reported: No complaints     Interpretation:  Did the patient stop or pause?: No         Total Time Walked (Calculated): 360 seconds  Final Partial Lap Distance (feet): 100 feet  Total Distance Meters (Calculated): 213.36 meters   LLN was 364.53m  Predicted Distance Meters (Calculated): 503.58 meters  Percentage of Predicted (Calculated): 42.37  Peak VO2 (Calculated): 10.38  Mets: 2.97  Has The Patient Had a Previous Six Minute Walk Test?: Yes       Previous 6MWT Results  Has The Patient Had a Previous Six Minute Walk Test?: Yes  Date of Previous Test: 03/27/18  Total Time Walked: 264.6 seconds  Total Distance (meters): 121.92  Predicted Distance (meters): 517.67 meters  Percentage of Predicted: 23.55  Percent Change (Calculated): -0.75    REPORT    CLINICAL INTERPRETATION:  Six minute walk distance is 213.36m (42.37 % predicted) with moderate dyspnea.  During exercise, there was significant desaturation while breathing room air.  SpO2 krishan was 87% and Supplementary oxygen added 2.0 to 3.0 LPM  Significant exercise impairment is likely due to desaturation.  Since the previous study in 03/27/2018, " exercise capacity may be somewhat improved.  Based upon age and body mass index, exercise capacity is less than predicted.    Andrea Barrett MD

## 2018-08-29 NOTE — PROGRESS NOTES
Administered 1cc Prolia 60mg/cc to right upper quad of abdomen. Pt tolerated well. No acute reaction noted at site. Pt instructed on S/S of reaction to report. Pt verbalized understanding. Patient waited 15 minutes post injection    Lot:[id number]  Exp.11/20  Manu:Sangita    Calcium:10.1  Creatinine:1.4

## 2018-09-04 ENCOUNTER — PATIENT MESSAGE (OUTPATIENT)
Dept: FAMILY MEDICINE | Facility: CLINIC | Age: 68
End: 2018-09-04

## 2018-09-04 DIAGNOSIS — I49.1 PAC (PREMATURE ATRIAL CONTRACTION): ICD-10-CM

## 2018-09-04 DIAGNOSIS — E03.9 ACQUIRED HYPOTHYROIDISM: Primary | ICD-10-CM

## 2018-09-04 RX ORDER — METOPROLOL SUCCINATE 50 MG/1
TABLET, EXTENDED RELEASE ORAL
Qty: 180 TABLET | Refills: 3 | Status: SHIPPED | OUTPATIENT
Start: 2018-09-04

## 2018-09-04 RX ORDER — LEVOTHYROXINE SODIUM 100 UG/1
TABLET ORAL
Qty: 30 TABLET | Refills: 0 | Status: SHIPPED | OUTPATIENT
Start: 2018-09-04 | End: 2018-09-24

## 2018-09-05 ENCOUNTER — OFFICE VISIT (OUTPATIENT)
Dept: PULMONOLOGY | Facility: CLINIC | Age: 68
End: 2018-09-05
Payer: MEDICARE

## 2018-09-05 ENCOUNTER — TELEPHONE (OUTPATIENT)
Dept: RHEUMATOLOGY | Facility: CLINIC | Age: 68
End: 2018-09-05

## 2018-09-05 VITALS
DIASTOLIC BLOOD PRESSURE: 82 MMHG | BODY MASS INDEX: 20.9 KG/M2 | OXYGEN SATURATION: 94 % | WEIGHT: 113.56 LBS | HEART RATE: 69 BPM | HEIGHT: 62 IN | RESPIRATION RATE: 18 BRPM | SYSTOLIC BLOOD PRESSURE: 130 MMHG

## 2018-09-05 DIAGNOSIS — J43.9 NOCTURNAL HYPOXEMIA DUE TO EMPHYSEMA: Chronic | ICD-10-CM

## 2018-09-05 DIAGNOSIS — R09.02 EXERCISE HYPOXEMIA: ICD-10-CM

## 2018-09-05 DIAGNOSIS — R09.02 HYPOXEMIA REQUIRING SUPPLEMENTAL OXYGEN: ICD-10-CM

## 2018-09-05 DIAGNOSIS — Z99.81 HYPOXEMIA REQUIRING SUPPLEMENTAL OXYGEN: ICD-10-CM

## 2018-09-05 DIAGNOSIS — R76.8 POSITIVE ANA (ANTINUCLEAR ANTIBODY): Primary | ICD-10-CM

## 2018-09-05 DIAGNOSIS — J44.9 STAGE 3 SEVERE COPD BY GOLD CLASSIFICATION: Primary | Chronic | ICD-10-CM

## 2018-09-05 DIAGNOSIS — G47.36 NOCTURNAL HYPOXEMIA DUE TO EMPHYSEMA: Chronic | ICD-10-CM

## 2018-09-05 PROBLEM — G47.00 INSOMNIA: Status: ACTIVE | Noted: 2018-09-05

## 2018-09-05 PROBLEM — J44.1 COPD WITH ACUTE EXACERBATION: Status: RESOLVED | Noted: 2018-07-29 | Resolved: 2018-09-05

## 2018-09-05 PROBLEM — J18.9 PNEUMONIA DUE TO INFECTIOUS ORGANISM: Status: RESOLVED | Noted: 2018-07-29 | Resolved: 2018-09-05

## 2018-09-05 PROCEDURE — 99214 OFFICE O/P EST MOD 30 MIN: CPT | Mod: S$PBB,,, | Performed by: INTERNAL MEDICINE

## 2018-09-05 PROCEDURE — 99999 PR PBB SHADOW E&M-EST. PATIENT-LVL III: CPT | Mod: PBBFAC,,, | Performed by: INTERNAL MEDICINE

## 2018-09-05 PROCEDURE — 1101F PT FALLS ASSESS-DOCD LE1/YR: CPT | Mod: CPTII,,, | Performed by: INTERNAL MEDICINE

## 2018-09-05 PROCEDURE — 3079F DIAST BP 80-89 MM HG: CPT | Mod: CPTII,,, | Performed by: INTERNAL MEDICINE

## 2018-09-05 PROCEDURE — 3075F SYST BP GE 130 - 139MM HG: CPT | Mod: CPTII,,, | Performed by: INTERNAL MEDICINE

## 2018-09-05 PROCEDURE — 99213 OFFICE O/P EST LOW 20 MIN: CPT | Mod: PBBFAC | Performed by: INTERNAL MEDICINE

## 2018-09-05 NOTE — TELEPHONE ENCOUNTER
----- Message from Andrea Barrett MD sent at 9/5/2018  2:37 PM CDT -----  THIS PATIENT HAD +VE ds DNA Ab IN 2007, SHOULD THIS RALF FOLLOWED?

## 2018-09-05 NOTE — TELEPHONE ENCOUNTER
+ JACLYN and ds-dna ab 2007    Subsequent neg JACLYN 2003 and repeat later in 2007  Neg ccp and rf    Will retest JACLYN  with labs scheduled 11/1/18  Will check complements C3, c4 and esr, crp also

## 2018-09-05 NOTE — PROGRESS NOTES
Subjective:      Ibeth Schroeder is a 68 y.o. female with known Moderate to Severe COPD  Follow-up appointment. Last visit 06/06/2018 accompanied by  spouse with her   reason events were reviewed was in the emergency room with possible   Seizure event.    Adjustments for medications were made.    COPD test score is 19, mRC score 2-3  Mild to none  respiratory symptoms   No Cough and No BRADY ,  no wheezing    Main concern: poor memory, poor exercise tolerance  On TRELEGY  Concern about technique  She has rescue Combivent Respimat  She has nocturnal oxygen 3.0 LPM  She has oxygen  mRC 2-3  New oxygen orders  I have reviewed the patient's medical history in detail and updated the computerized patient record.      The following portions of the patient's history were reviewed and updated as appropriate:   She  has a past medical history of Acute on chronic respiratory failure with hypoxia (7/30/2018), Acute pancreatitis, Alcohol dependence, Allergy, Anemia, Anxiety, Arthritis (6/24/2013), Asthma, Back pain, Colon polyp, Colon polyp, COPD (chronic obstructive pulmonary disease), Dependence on renal dialysis (2012), Depression, Disorder of kidney and ureter, Diverticulosis, Diverticulosis, Hiatal hernia, Hyperlipidemia, Hypertension, Hypocalcemia (7/6/2016), Hypothyroidism (6/24/2013), Nocturnal hypoxemia due to emphysema (9/24/2014), Osteoporosis, Pneumonia due to infectious organism (7/29/2018), Pulmonary embolism, Restless leg syndrome, RLS (restless legs syndrome), Seizure (6/24/2013), Stroke, Tobacco dependence, and Trouble in sleeping.  She does not have any pertinent problems on file.  She  has a past surgical history that includes Cholecystectomy; Colonoscopy (2013); Oophorectomy (1977 approx); Fracture surgery (Left, 07/12/2017); Fracture surgery (Left, 09/12/2017); Tonsillectomy (1960 approx); Adenoidectomy (1960 approx); Joint replacement (Left, 2000 approx); Joint replacement (Left, 2004 approx); Joint  replacement (Right, 2002 approx); Hysterectomy (1977 approx); Hernia repair (1999 approx); Appendectomy; amputation left 2nd finger tip (Left, 2012); na hole/ ventriculostomy (Right, 08/24/2012); REMOVAL-HARDWARE (Left, 9/6/2017); EXCISION-CYST (Left, 9/6/2017); SYNOVECTOMY-WRIST (Left, 9/6/2017); OPEN REDUCTION INTERNAL FIXATION-RADIUS (Left, 7/12/2017); OPEN REDUCTION INTERNAL FIXATION-ULNA (Left, 7/12/2017); and COLONOSCOPY (N/A, 8/26/2013).  Her family history includes Asthma in her father; Breast cancer in her maternal aunt; Cancer in her maternal aunt, maternal aunt, and maternal grandmother; Colon cancer in her maternal grandmother; Diabetes in her mother and son; Heart disease in her father, maternal aunt, and maternal aunt; Hypertension in her mother and son; Kidney disease in her mother; Parkinsonism in her mother.  She  reports that she quit smoking about 8 years ago. Her smoking use included cigarettes. She has a 30.00 pack-year smoking history. she has never used smokeless tobacco. She reports that she drinks about 1.2 - 1.8 oz of alcohol per week. She reports that she does not use drugs.  She has a current medication list which includes the following prescription(s): allopurinol, amlodipine, buspirone, diphenoxylate-atropine 2.5-0.025 mg, ferrous sulfate, fluticasone-umeclidin-vilanter, ipratropium-albuterol, levetiracetam, levothyroxine, metoprolol succinate, pantoprazole, pravastatin, ropinirole, shingrix (pf), and trazodone, and the following Facility-Administered Medications: denosumab.  Current Outpatient Medications on File Prior to Visit   Medication Sig Dispense Refill    allopurinol (ZYLOPRIM) 100 MG tablet Take 1 tablet (100 mg total) by mouth once daily. 90 tablet 5    amLODIPine (NORVASC) 5 MG tablet Take 1 tablet (5 mg total) by mouth once daily. 30 tablet 11    busPIRone (BUSPAR) 15 MG tablet Take 0.5 tablets (7.5 mg total) by mouth 2 (two) times daily. 30 tablet 2     diphenoxylate-atropine 2.5-0.025 mg (LOMOTIL) 2.5-0.025 mg per tablet take 2 tablets by mouth four times a day if needed for diarrhea 60 tablet 2    ferrous sulfate 325 (65 FE) MG EC tablet Take 1 tablet (325 mg total) by mouth once a week. (Patient taking differently: Take 325 mg by mouth 2 (two) times daily. ) 90 tablet 3    fluticasone-umeclidin-vilanter (TRELEGY ELLIPTA) 100-62.5-25 mcg DsDv Inhale 1 puff into the lungs once daily. 60 each 11    ipratropium-albuterol (COMBIVENT RESPIMAT)  mcg/actuation inhaler inhale 1 puff INTO THE LUNGS four times a day (Patient taking differently: Inhale 1 puff into the lungs once daily. inhale 1 puff INTO THE LUNGS four times a day) 3 Package 4    levETIRAcetam (KEPPRA) 500 MG Tab Take 1 tablet (500 mg total) by mouth 2 (two) times daily. 60 tablet 0    levothyroxine (SYNTHROID) 100 MCG tablet TAKE 1 TABLET EVERY DAY 30 tablet 0    metoprolol succinate (TOPROL-XL) 50 MG 24 hr tablet TAKE 1 TABLET TWICE DAILY 180 tablet 3    pantoprazole (PROTONIX) 40 MG tablet TAKE 1 TABLET EVERY DAY 90 tablet 3    pravastatin (PRAVACHOL) 10 MG tablet TAKE 1 TABLET EVERY DAY 90 tablet 0    ropinirole (REQUIP) 1 MG tablet Take 1 mg by mouth nightly as needed.       SHINGRIX, PF, 50 mcg/0.5 mL injection inject 0.5 milliliter intramuscularly  0    traZODone (DESYREL) 50 MG tablet Take 1/2 to 1 tablet at bedtime as needed. 30 tablet 2     Current Facility-Administered Medications on File Prior to Visit   Medication Dose Route Frequency Provider Last Rate Last Dose    denosumab (PROLIA) injection 60 mg  60 mg Subcutaneous Q6 Months Patria Melara PA-C   60 mg at 08/29/18 1441     She is allergic to nsaids (non-steroidal anti-inflammatory drug); pcn [penicillins]; sulfa (sulfonamide antibiotics); aspirin; macrodantin [nitrofurantoin macrocrystalline]; and wellbutrin [bupropion hcl]..    Review of Systems  A comprehensive review of systems was negative.       Objective:      BP  "130/82   Pulse 69   Resp 18   Ht 5' 2" (1.575 m)   Wt 51.5 kg (113 lb 8.6 oz)   LMP  (LMP Unknown)   SpO2 (!) 94%   BMI 20.77 kg/m²      General appearance: alert, appears stated age and cooperative  Head: Normocephalic, without obvious abnormality  Eyes: negative findings: conjunctivae and sclerae normal  Nose: no discharge  Throat: lips, mucosa, and tongue normal; teeth and gums normal  Neck: no adenopathy, no carotid bruit, no JVD, supple, symmetrical, trachea midline and thyroid not enlarged, symmetric, no tenderness/mass/nodules  Lungs: clear to auscultation bilaterally and normal percussion bilaterally  Heart: regular rate and rhythm, S1, S2 normal, no murmur, click, rub or gallop  Abdomen: soft, non-tender; bowel sounds normal; no masses,  no organomegaly  Extremities: extremities normal, atraumatic, no cyanosis or edema  Pulses: 2+ and symmetric  Skin: Skin color, texture, turgor normal. No rashes or lesions  Lymph nodes: Cervical, supraclavicular, and axillary nodes normal.  Neurologic: Grossly normal       S    6MWD  CLINICAL INTERPRETATION:  Six minute walk distance is 213.36m (42.37 % predicted) with   moderate dyspnea.  During exercise, there was significant desaturation while   breathing room air.  SpO2 krishan was 87% and Supplementary oxygen added 2.0 to 3.0 LPM  Significant exercise impairment is likely due to desaturation.  Since the previous study in 03/27/2018, exercise capacity may be   somewhat improved.  Based upon age and body mass index, exercise capacity is less   than predicted.       Assessment:      Problem List Items Addressed This Visit     Nocturnal hypoxemia due to emphysema (Chronic)    Relevant Orders    OXYGEN FOR HOME USE    Stage 3 severe COPD by GOLD classification - Primary (Chronic)     CAT score 19  FEV 1 : 0.71 L  29.3% ( improved by 13.4%)with bronchodilator  Immunizations current  mRC score 2  30 pack year smoker  Improved technique  benefits         Relevant Orders "    OXYGEN FOR HOME USE    X-Ray Chest PA And Lateral    Spirometry with/without bronchodilator    Exercise hypoxemia     SpO2 was 87% at rest  Portable concentator order         Hypoxemia requiring supplemental oxygen    Relevant Orders    OXYGEN FOR HOME USE         STEPH Index Score = 4    Interpretation: This result is indicative of a 52 month mortality of approximately 60%.    Results:  Total Criteria Point Count:    STEPH INDEX: 7  Approximate 4 Year Survival Interpretation    7-10 Points: 18%    Plan:      Follow-up in about 6 months (around 3/5/2019) for Spirometry and CXR next visit, Home oxygen ordered.    PH likely related to Chr resp failure COPD, Hypoxemia  Hx of +ve JACLYN and Anti DDNA    New orders for portable oxygen were given  This note was prepared using voice recognition system and is likely to have sound alike errors that may have been overlooked even after proof reading.  Please call me with any questions    Discussed diagnosis, its evaluation, treatment and usual course. All questions answered.  Thank you for the courtesy of participating in the care of this patient    Andrea Barrett MD

## 2018-09-05 NOTE — LETTER
September 5, 2018      Phoebe Bell, NP  [address] [facility] [address]           [facility]  [id number] [place] [address]  Phone: [phone]  Fax: [phone]          Patient: Ibeth Schroeder   MR Number: [id number]   YOB: 1950   Date of Visit: 9/5/2018       Dear Phoebe Bell:    Thank you for referring Ibeth Schroeder to me for evaluation. Attached you will find relevant portions of my assessment and plan of care.    If you have questions, please do not hesitate to call me. I look forward to following Ibeth Schroeder along with you.    Sincerely,    Andrea Barrett MD    Enclosure  CC:  No Recipients    If you would like to receive this communication electronically, please contact [email or web address] or [phone] to request more information on [employer] Link access.    For providers and/or their staff who would like to refer a patient to [facility], please contact us through our one-stop-shop provider referral line, [facility], at [phone].    If you feel you have received this communication in error or would no longer like to receive these types of communications, please e-mail [email or web address]

## 2018-09-05 NOTE — ASSESSMENT & PLAN NOTE
CAT score 19  FEV 1 : 0.71 L  29.3% ( improved by 13.4%)with bronchodilator  Immunizations current  mRC score 2  30 pack year smoker  Improved technique  benefits

## 2018-09-06 ENCOUNTER — OFFICE VISIT (OUTPATIENT)
Dept: PAIN MEDICINE | Facility: CLINIC | Age: 68
End: 2018-09-06
Payer: MEDICARE

## 2018-09-06 VITALS
SYSTOLIC BLOOD PRESSURE: 129 MMHG | BODY MASS INDEX: 20.9 KG/M2 | HEART RATE: 87 BPM | WEIGHT: 113.56 LBS | HEIGHT: 62 IN | DIASTOLIC BLOOD PRESSURE: 84 MMHG

## 2018-09-06 DIAGNOSIS — M47.817 LUMBOSACRAL SPONDYLOSIS WITHOUT MYELOPATHY: ICD-10-CM

## 2018-09-06 DIAGNOSIS — M47.816 LUMBAR SPONDYLOSIS: Primary | ICD-10-CM

## 2018-09-06 DIAGNOSIS — M47.816 LUMBAR FACET ARTHROPATHY: ICD-10-CM

## 2018-09-06 DIAGNOSIS — M47.817 SPONDYLOSIS OF LUMBOSACRAL REGION WITHOUT MYELOPATHY OR RADICULOPATHY: ICD-10-CM

## 2018-09-06 DIAGNOSIS — G89.4 CHRONIC PAIN DISORDER: ICD-10-CM

## 2018-09-06 PROCEDURE — 1101F PT FALLS ASSESS-DOCD LE1/YR: CPT | Mod: CPTII,,, | Performed by: PHYSICIAN ASSISTANT

## 2018-09-06 PROCEDURE — 3079F DIAST BP 80-89 MM HG: CPT | Mod: CPTII,,, | Performed by: PHYSICIAN ASSISTANT

## 2018-09-06 PROCEDURE — 99214 OFFICE O/P EST MOD 30 MIN: CPT | Mod: S$PBB,,, | Performed by: PHYSICIAN ASSISTANT

## 2018-09-06 PROCEDURE — 3074F SYST BP LT 130 MM HG: CPT | Mod: CPTII,,, | Performed by: PHYSICIAN ASSISTANT

## 2018-09-06 PROCEDURE — 99214 OFFICE O/P EST MOD 30 MIN: CPT | Mod: PBBFAC | Performed by: PHYSICIAN ASSISTANT

## 2018-09-06 PROCEDURE — 99499 UNLISTED E&M SERVICE: CPT | Mod: HCNC,S$GLB,, | Performed by: PHYSICIAN ASSISTANT

## 2018-09-06 PROCEDURE — 99999 PR PBB SHADOW E&M-EST. PATIENT-LVL IV: CPT | Mod: PBBFAC,,, | Performed by: PHYSICIAN ASSISTANT

## 2018-09-06 RX ORDER — OXYCODONE HYDROCHLORIDE 5 MG/1
2.5 TABLET ORAL EVERY 12 HOURS PRN
Qty: 30 TABLET | Refills: 0 | Status: SHIPPED | OUTPATIENT
Start: 2018-09-06 | End: 2018-10-06

## 2018-09-06 NOTE — PROGRESS NOTES
Chief Pain Complaint:  Low Back Pain      Interval History: Since her last visit, she was admitted into the hospital for pneumonia on 7/30. She was released on 8/1, but her  brought her back to the hospital because she was having a seizure.  There was no clear cut reason of why she had a seizure, but there was concern that part of the cause was related to tramadol.       History of Present Illness:  This patient is a 68 y.o. female who presents today complaining of the above noted pain/s. The patient describes this pain as follows.    - duration of pain: pain for years   - timing: intermittent   - character: sharp, aching  - radiating, dermatomal: pain is nonradiating  - antecedent trauma, prior spinal surgery: no prior trauma, no prior spinal surgery, left hip replacement & revision, left radial ORIF on 7-12-17, left hardware revision on 9-6-17  - pertinent negatives: No fever, No chills, No weight loss, No bladder dysfunction, No bowel dysfunction, No extremity weakness, No saddle anesthesia  - pertinent positives: none    - medications, other therapies tried (physical therapy, injections):     >> Tylenol, Ultracet    >> Has previously undergone Physical Therapy    >> Has previously undergone spinal injection/s:    - what sounds like 2 lumbar MBBs at [facility] with great relief   - right lumbar MBB on 1-18-17 & left lumbar MBB on 2-8-17 with excellent relief   - Bilateral L3, L4, L5 Lumbar Medial Branch Block on 4/6/18 with >50% pain relief        IMAGING / Labs / Studies (reviewed on 9/6/2018):    8/11/16 X-Ray Lumbar Spine AP And Lateral  Comparison: 09/24/2013  Technique: AP, lateral, lateral flexion, lateral extension, bilateral oblique, and lumbosacral coned down views were obtained of the lumbar spine  Findings:   Vertebral body heights and alignment are within normal limits.  No change in spinal alignment with flexion or extension to suggest instability. Multilevel degenerative disc height loss  again noted throughout the lumbar spine, most severe at the levels of T12-L1, L1.  Moderate disc loss present at L4-L5 and L5-S1.  There multilevel degenerative endplate changes and osteophyte production.  Findings appear essentially unchanged from prior.  Bilateral facet arthropathy present at L4-L5 and L5-S1.  No pars defects visualized.  Posterior elements appear grossly intact. No acute fractures or subluxations are demonstrated.  IVC filter again noted.  Aortic vascular calcifications present.  Previously described aneurysmal dilatation of the distal abdominal aorta appear similar to prior measuring up to approximately 4.2 cm in diameter.         9/24/13 X-Ray Lumbar Spine AP And Lateral    Narrative Findings:  Comparison is with 6/16/09.  Mild dextroscoliosis and diffuse changes of degenerative disk disease as well as diffuse facet joint hypertrophy are again demonstrated.  These changes of degenerative disk disease are particularly pronounced at the T12-L1, L1-2, L4-5 and L5-S1 levels.  AP alignment appears maintained with no acute compression fractures identified.  Again demonstrated are surgical clips consistent with prior cholecystectomy, IVC filter and left hip prosthesis.  Also again demonstrated is diffuse calcification of the abdominal aorta including distal aneurysmal dilatation measured at 4.4 cm in the AP axis at the level of the superior endplate of L4. This dilatation appears to have slightly increased in the interim previously measured at this level at approximately 3.3 cm.         Review of Systems:  CONSTITUTIONAL: patient denies any fever, chills, or weight loss  SKIN: patient denies any rash or itching  RESPIRATORY: patient denies having any shortness of breath  GASTROINTESTINAL: patient denies having any diarrhea, constipation, or bowel incontinence  GENITOURINARY: patient denies having any abnormal bladder function    MUSCULOSKELETAL:  - patient complains of the above noted pain/s (see  "chief pain complaint)    NEUROLOGICAL:   - pain as above  - strength in Lower extremities is intact, BILATERALLY  - sensation in Lower extremities is intact, BILATERALLY  - patient denies any loss of bowel or bladder control      PSYCHIATRIC: patient reports a history of anxiety and depression        Physical Exam:  Vitals:  /84 (BP Location: Right arm, Patient Position: Sitting)   Pulse 87   Ht 5' 2" (1.575 m)   Wt 51.5 kg (113 lb 8.6 oz)   LMP  (LMP Unknown)   BMI 20.77 kg/m²    (reviewed on 9/6/2018)    General: alert and oriented, in no apparent distress, poorly nourished  Gait: normal gait  Skin: No rashes, No discoloration, No obvious lesions  HEENT: EOMI  Respiratory: respirations nonlabored    Musculoskeletal:  - Any pain on flexion, extension, rotation:    >> pain on extension and rotation, improved since injection  - Straight Leg Raise:     >> LEFT :: negative    >> RIGHT :: negative  - Any tenderness to palpation across paraspinal muscles, joints, bursae:     >> across lumbar paraspinals, mild    Neuro:  - Extremity Strength:     >> LEFT :: 5/5    >> RIGHT :: 5/5  - Sensation: normal sensation to touch bilaterally     Psych:  Mood and affect is appropriate        Assessment:   Encounter Diagnoses   Name Primary?    Lumbar spondylosis Yes    Lumbar facet arthropathy     Chronic pain disorder     Spondylosis of lumbosacral region without myelopathy or radiculopathy     Lumbosacral spondylosis without myelopathy        Plan:  1. Interventional: None for now. She is still reporting relief from bilateral lumbar MBB in April 2018.     2. Pharmacologic:   - Will give Rx of Oxycodone 5 mg PO BID PRN (30 tabs) to take 1/2 tablet BID PRN pain.  Patient tolerating opioids with no side effects, obtaining good pain control with functional improvement. She has been feeling better overall and has not been taking pain medication.  - D/c tramadol.   - Opioid contract signed on 5/14/2018.     - RUTH PLASENCIA " reviewed and appropriate. Last filled tramadol on 7-13-18.    3. Rehabilitative: Encouraged regular exercise.  Continue PT, which she just started back at [facility].    4. Diagnostic: None for now.    5. Follow up: PRN.    - I discussed the risks, benefits, and alternatives to potential treatment options. All questions and concerns were fully addressed today in clinic. Dr. Javed was consulted regarding the patient plan and agrees.               >>Opioid Risk Tool:  1/12/2017 :: 1

## 2018-09-10 ENCOUNTER — OUTPATIENT CASE MANAGEMENT (OUTPATIENT)
Dept: ADMINISTRATIVE | Facility: OTHER | Age: 68
End: 2018-09-10

## 2018-09-10 PROBLEM — I70.0 ATHEROSCLEROSIS OF AORTA: Status: ACTIVE | Noted: 2018-09-10

## 2018-09-10 NOTE — PROGRESS NOTES
Please note the following patient has been assigned to Alta Raymond RN in Outpatient Case Management for COPD Disease Management.      Please contact [facility] at Ext. [phone] with any questions.    Thank you,    Malorie Amaya    Outpatient Case Management

## 2018-09-12 ENCOUNTER — PATIENT MESSAGE (OUTPATIENT)
Dept: NEUROLOGY | Facility: CLINIC | Age: 68
End: 2018-09-12

## 2018-09-12 ENCOUNTER — OFFICE VISIT (OUTPATIENT)
Dept: INTERNAL MEDICINE | Facility: CLINIC | Age: 68
End: 2018-09-12
Payer: MEDICARE

## 2018-09-12 VITALS
WEIGHT: 115.75 LBS | OXYGEN SATURATION: 95 % | SYSTOLIC BLOOD PRESSURE: 134 MMHG | HEART RATE: 70 BPM | DIASTOLIC BLOOD PRESSURE: 78 MMHG | HEIGHT: 62 IN | BODY MASS INDEX: 21.3 KG/M2

## 2018-09-12 DIAGNOSIS — I71.40 ABDOMINAL AORTIC ANEURYSM (AAA) WITHOUT RUPTURE: ICD-10-CM

## 2018-09-12 DIAGNOSIS — Z91.81 AT RISK FOR FALLS: ICD-10-CM

## 2018-09-12 DIAGNOSIS — Z89.9 HISTORY OF AMPUTATION: ICD-10-CM

## 2018-09-12 DIAGNOSIS — Z99.81 HYPOXEMIA REQUIRING SUPPLEMENTAL OXYGEN: ICD-10-CM

## 2018-09-12 DIAGNOSIS — G25.81 RLS (RESTLESS LEGS SYNDROME): ICD-10-CM

## 2018-09-12 DIAGNOSIS — M81.0 OSTEOPOROSIS, UNSPECIFIED OSTEOPOROSIS TYPE, UNSPECIFIED PATHOLOGICAL FRACTURE PRESENCE: ICD-10-CM

## 2018-09-12 DIAGNOSIS — I70.0 ATHEROSCLEROSIS OF AORTA: ICD-10-CM

## 2018-09-12 DIAGNOSIS — K58.9 IRRITABLE BOWEL SYNDROME, UNSPECIFIED TYPE: ICD-10-CM

## 2018-09-12 DIAGNOSIS — R41.3 MEMORY DIFFICULTIES: ICD-10-CM

## 2018-09-12 DIAGNOSIS — Z86.711 HISTORY OF PULMONARY EMBOLUS (PE): Chronic | ICD-10-CM

## 2018-09-12 DIAGNOSIS — I27.9 PULMONARY HEART DISEASE: Chronic | ICD-10-CM

## 2018-09-12 DIAGNOSIS — I10 ESSENTIAL HYPERTENSION: Chronic | ICD-10-CM

## 2018-09-12 DIAGNOSIS — M19.90 OSTEOARTHRITIS, UNSPECIFIED OSTEOARTHRITIS TYPE, UNSPECIFIED SITE: ICD-10-CM

## 2018-09-12 DIAGNOSIS — E78.5 DYSLIPIDEMIA: Chronic | ICD-10-CM

## 2018-09-12 DIAGNOSIS — Z00.00 ENCOUNTER FOR PREVENTIVE HEALTH EXAMINATION: Primary | ICD-10-CM

## 2018-09-12 DIAGNOSIS — G47.00 INSOMNIA, UNSPECIFIED TYPE: ICD-10-CM

## 2018-09-12 DIAGNOSIS — Z87.898 HISTORY OF SEIZURE: ICD-10-CM

## 2018-09-12 DIAGNOSIS — F10.11 HISTORY OF ETOH ABUSE: ICD-10-CM

## 2018-09-12 DIAGNOSIS — G89.4 CHRONIC PAIN DISORDER: ICD-10-CM

## 2018-09-12 DIAGNOSIS — F03.90 MAJOR NEUROCOGNITIVE DISORDER: ICD-10-CM

## 2018-09-12 DIAGNOSIS — R09.02 HYPOXEMIA REQUIRING SUPPLEMENTAL OXYGEN: ICD-10-CM

## 2018-09-12 DIAGNOSIS — J44.9 CHRONIC OBSTRUCTIVE PULMONARY DISEASE, UNSPECIFIED COPD TYPE: ICD-10-CM

## 2018-09-12 DIAGNOSIS — E03.9 HYPOTHYROIDISM, UNSPECIFIED TYPE: Chronic | ICD-10-CM

## 2018-09-12 DIAGNOSIS — N25.81 HYPERPARATHYROIDISM, SECONDARY RENAL: ICD-10-CM

## 2018-09-12 DIAGNOSIS — N18.30 CKD (CHRONIC KIDNEY DISEASE) STAGE 3, GFR 30-59 ML/MIN: ICD-10-CM

## 2018-09-12 DIAGNOSIS — D64.9 ANEMIA, UNSPECIFIED TYPE: ICD-10-CM

## 2018-09-12 DIAGNOSIS — M10.9 GOUT, UNSPECIFIED CAUSE, UNSPECIFIED CHRONICITY, UNSPECIFIED SITE: ICD-10-CM

## 2018-09-12 PROBLEM — Z86.19 HISTORY OF SEPSIS: Status: ACTIVE | Noted: 2018-07-29

## 2018-09-12 PROBLEM — Z87.81 HISTORY OF FRACTURE: Status: ACTIVE | Noted: 2017-07-18

## 2018-09-12 PROCEDURE — 99999 PR PBB SHADOW E&M-EST. PATIENT-LVL V: CPT | Mod: PBBFAC,,, | Performed by: NURSE PRACTITIONER

## 2018-09-12 PROCEDURE — 99215 OFFICE O/P EST HI 40 MIN: CPT | Mod: PBBFAC | Performed by: NURSE PRACTITIONER

## 2018-09-12 PROCEDURE — 3075F SYST BP GE 130 - 139MM HG: CPT | Mod: CPTII,,, | Performed by: NURSE PRACTITIONER

## 2018-09-12 PROCEDURE — 3078F DIAST BP <80 MM HG: CPT | Mod: CPTII,,, | Performed by: NURSE PRACTITIONER

## 2018-09-12 PROCEDURE — G0439 PPPS, SUBSEQ VISIT: HCPCS | Mod: S$GLB,,, | Performed by: NURSE PRACTITIONER

## 2018-09-12 NOTE — Clinical Note
Your patient was seen for a HRA visit. Abnormalities (BOLDED) have been identified during this visit that may require additional testing and  follow up. I have included a copy of my visit note, please review the note and feel free to contact me with any questions. Thank you for allowing me to participate in the care of your patients. Tasha Contreras NP

## 2018-09-12 NOTE — PATIENT INSTRUCTIONS
Counseling and Referral of Other Preventative  (Italic type indicates deductible and co-insurance are waived)    Patient Name: Ibeth Schroeder  Today's Date: 9/12/2018    Health Maintenance       Date Due Completion Date    DEXA SCAN 07/06/2018 7/6/2016    Influenza Vaccine 08/01/2018 11/20/2017    Override on 8/9/2017: Not Clinically Appropriate    Lipid Panel 08/17/2018 8/17/2017    Mammogram 08/28/2019 8/28/2018    Override on 8/9/2017: Done    High Dose Statin 09/06/2019 9/6/2018    Colonoscopy 08/26/2020 8/26/2013    Pneumococcal (65+) (2 of 2 - PPSV23) 09/09/2020 11/20/2017    TETANUS VACCINE 07/07/2025 7/7/2015        No orders of the defined types were placed in this encounter.    The following information is provided to all patients.  This information is to help you find resources for any of the problems found today that may be affecting your health:                Living healthy guide: www.[facility].[place].[facility]      Understanding Diabetes: www.diabetes.org      Eating healthy: www.cdc.gov/healthyweight      [facility] home safety checklist: www.cdc.gov/steadi/patient.html      Agency on Aging: www.goea.[place].gov      Alcoholics anonymous (AA): www.aa.org      Physical Activity: www.kiersten.nih.gov/wj2zbpj      Tobacco use: www.quitwithusla.org

## 2018-09-13 NOTE — PROGRESS NOTES
"Ibeth Schroeder presented for a  Medicare AWV and comprehensive Health Risk Assessment today. The following components were reviewed and updated:    · Medical history  · Family History  · Social history  · Allergies and Current Medications  · Health Risk Assessment  · Health Maintenance  · Care Team     ** See Completed Assessments for Annual Wellness Visit within the encounter summary.**       The following assessments were completed:  · Living Situation  · CAGE  · Depression Screening  · Timed Get Up and Go  · Whisper Test  · Cognitive Function Screening  · Nutrition Screening  · ADL Screening  · PAQ Screening    Vitals:    09/12/18 1511 09/12/18 1614   BP:  134/78   Pulse: 70    SpO2: 95%    Weight: 52.5 kg (115 lb 11.9 oz)    Height: 5' 2" (1.575 m)      Body mass index is 21.17 kg/m².  Physical Exam   Constitutional: She appears well-developed and well-nourished.   Patient accompanied by , who was present throughout the visit and aided in information gathering.      HENT:   Head: Normocephalic.   Cardiovascular: Normal rate, regular rhythm and normal heart sounds.   No murmur heard.  Pulmonary/Chest: Effort normal. No respiratory distress.   Intermittent rales noted.    Abdominal: Soft. She exhibits no mass. There is no tenderness.   Musculoskeletal: Normal range of motion.   Trace edema noted to bilateral lower extremities (R>L). No erythema, increased warmth, or tenderness noted to either calf.     Fingertip amputation noted to left index finger.    Neurological: She is alert. She exhibits normal muscle tone.   Oriented to person, place, and year. Unable to give correct month.    Skin: Skin is warm, dry and intact.   Psychiatric: She has a normal mood and affect. Her speech is normal and behavior is normal.   Nursing note and vitals reviewed.        Diagnoses and health risks identified today and associated recommendations/orders:    1. Encounter for preventive health examination  Discussed health " maintenance. Reports she will get flu vaccine at a later date.     Reports they will call to schedule appointments with Dr. Davis and Dr. Mar.     Recommended to get outside records sent to PCP to be scanned into chart. They expressed understanding.       2. Abdominal aortic aneurysm (AAA) without rupture  CT Renal 1/16/2018-Stable infrarenal abdominal aortic aneurysm.   Sees outside vascular surgeon, Dr. Liz.   Stable and controlled. Continue current treatment plan as previously prescribed with your PCP, cardiologist, Dr. Davis, and vascular surgeon, Dr. Liz.     3. Atherosclerosis of aorta  CXR 7/29/2018---Aorta demonstrates atherosclerotic disease.  Discussed diagnosis and risk reduction.   Advised to follow up with PCP and cardiologist for further recommendations. She and  expressed understanding.      4. Essential hypertension  Stable and controlled. Continue current treatment plan as previously prescribed with your PCP and cardiologist.     5. Dyslipidemia  No recent check. Lipid due. Reports they will discuss repeat lipid with PCP and cardiologist.   Advised to follow up with PCP and cardiologist for further evaluation and recommendations. They expressed understanding.      6. Chronic obstructive pulmonary disease, unspecified COPD type  [place] 6/6/2018-Very Severe Obstructive Defect/PhysiologyFVC is increased by >12% and >200mls indicating a response to inhaled bronchodilatorsFEV1 is increased by >12% and >200mls, indicating a response to inhaled bronchodilators.Overall FEV1 and FVC have declined.  Denies any increase in dyspnea, wheeze, cough, confusion, fever, or upper respiratory symptoms. Reports she is at her respiratory baseline.   Reports she was told she could use her home oxygen during the day, but she has not needed to do that. She does reports she wears 2L O2 via NC at night.   Stable. Continue current treatment plan as previously prescribed with your pulmonologist, Dr. Barrett.      7. Pulmonary heart disease  Echo 7/12/2017-The estimated PA systolic pressure is 50 mmHg.   Advised to follow up with PCP and pulmonologist for further evaluation and recommendations. They expressed understanding.      8. Hypoxemia requiring supplemental oxygen  See #6    9. Major neurocognitive disorder  PHQ 2-0  Continue current treatment plan as previously prescribed with your psychiatrist, Dr. Perez.     10. History of seizure  Per  first seizure was about 15 years ago. Reports most recent was during a hospitalization a couple of months ago. Reports was told due to a certain medication she was on. Denies any seizures since.   On Keppra.   Has upcoming appointment with neurologist.   Stable and controlled. Continue current treatment plan as previously prescribed with your PCP and neurologist, Dr. Dowd.     11. CKD (chronic kidney disease) stage 3, GFR 30-59 ml/min  Advised to avoid NSAIDs.   Stable. Continue current treatment plan as previously prescribed with your nephrologist, Dr. Crowder.     12. Hyperparathyroidism, secondary renal  PTH intact 156.0H, increased from prior check.   Continue current treatment plan as previously prescribed with your nephrologist.     13. Hypothyroidism, unspecified type  No recent check.   Advised to follow up with PCP for further evaluation and recommendations. They expressed understanding.      14. Anemia, unspecified type  Denies hemoptysis, hematemesis, epistaxis, hematuria, hematochezia, or melena.    Improved from prior check.   Continue current treatment plan as previously prescribed with your hematologist, Dr. Mar.     15. Irritable bowel syndrome, unspecified type  Reports IBS is stable. Reports she still has diarrhea every now and again but is better. Denies any recent diarrhea.   Stable. Continue current treatment plan as previously prescribed with your gastroenterologist, Dr. Orr.     16. Insomnia, unspecified type  Reports stable and controlled  on Trazodone.   Stable and controlled. Continue current treatment plan as previously prescribed with your psychiatrist.     17. Memory difficulties  Reports memory difficulties.   Abnormal cognitive function screening (4).   Advised to follow up with PCP and neurologist for further evaluation and recommendations. They expressed understanding.      18. Gout, unspecified cause, unspecified chronicity, unspecified site  On Allopurinol. Denies any recent flare ups.   Stable and controlled. Continue current treatment plan as previously prescribed with your nephrologist and rheumatology PAFRANCISCO.     19. RLS (restless legs syndrome)  Reports stable on Requip. Reports takes prn  Advised to follow up with PCP for further discussion. They expressed understanding.      20. Osteoarthritis, unspecified osteoarthritis type, unspecified site  Per patient stable.   Stable. Continue current treatment plan as previously prescribed with your rheumatology PA.     21. Chronic pain disorder  Per patient stable.   On Roxicodone prn.   Stable. Continue current treatment plan as previously prescribed with your pain management PAFRANCISCO.     22. Osteoporosis, unspecified osteoporosis type, unspecified pathological fracture presence  DEXA 7/6/2016; Reports DEXA is already scheduled per rheumatology.   Reports she has lost about 6 inches in height. History of fractures.   Advised to follow up with rheumatology PA for further evaluation and recommendations. They expressed understanding.      23. History of amputation  Per patient occurred in 2012 due to necrosis of finger (had a lengthy hospital stay). Fingertip of left index finger.   Denies complications.   Continue current treatment plan as previously prescribed with your PCP.     24. History of ETOH abuse  Reports she drinks a lot less than what she used to. Reports she does not feel she is dependent on alcohol. She can go without drinking. Reports she drinks about 5-6 shots of  liquor a week.   CAGE-2.   Discussed the importance of continuing to cut back alcohol intake with the goal being cessation. She expressed understanding.   Continue current treatment plan as previously prescribed with your PCP.     25. History of pulmonary embolus (PE); s/p IVC filter  Continue current treatment plan as previously prescribed with your providers.     26. At risk for falls  Abnormal timed get up and go test. Denies any falls in the last 12 months.   Abnormal gait; reports this is not new. Reports she is currently going to PT.   Fall precautions reviewed with patient. They expressed understanding.   Advised to follow up with PCP for further recommendations. They expressed understanding.      27. Abnormal whisper test-left normal; right abnormal   Denies difficulty hearing.   Advised to follow up with PCP for further evaluation and recommendations. They expressed understanding.      Provided Ibeth with a 5-10 year written screening schedule and personal prevention plan.  Education, counseling, and referrals were provided as needed. After Visit Summary printed and given to patient which includes a list of additional screenings\tests needed.    Follow-up in about 1 year (around 9/12/2019) for AWV.    Tasha Contreras NP

## 2018-09-17 ENCOUNTER — TELEPHONE (OUTPATIENT)
Dept: FAMILY MEDICINE | Facility: CLINIC | Age: 68
End: 2018-09-17

## 2018-09-17 ENCOUNTER — OUTPATIENT CASE MANAGEMENT (OUTPATIENT)
Dept: ADMINISTRATIVE | Facility: OTHER | Age: 68
End: 2018-09-17

## 2018-09-17 NOTE — TELEPHONE ENCOUNTER
----- Message from Gin Baird MA sent at 9/17/2018  2:40 PM CDT -----      ----- Message -----  From: Alta Raymond RN  Sent: 9/17/2018   2:27 PM  To: [facility]. This is Alta Raymond RN. I am with the Outpatient Case Management team with [facility]. I received a referral on the above patient. I spoke with patients Caregiver/ Marino Schroeder today on the telephone.    Patient  states patient is only taking Ferrous Sulfate 325 mg QOD    Please clarify/update dose/frequency in EPIC    Please notify patient of your recommendations.     Thank you,  Alta Raymond RN

## 2018-09-17 NOTE — LETTER
September 17, 2018    Ibeth COPE Alexandre  [id number]  [place] [address]             [facility] [address] [place] [address] Dear  and Mrs. Schroeder,            Thank you for speaking with me and completing your Health Assessment. Here are the educational and resource materials that I believe you may find useful. Please take your time to review them. Do not hesitate to call me for any questions or concerns.      General appointment scheduling [phone]  [facility] (nurse line) [phone]    Sincerely,     Alta Raymond, LEANNA   Outpatient Case Management  [facility]  [phone]

## 2018-09-17 NOTE — PATIENT INSTRUCTIONS
Fall Prevention  Falls often occur due to slipping, tripping or losing your balance. Millions of people fall every year and injure themselves. Here are ways to reduce your risk of falling again.  · Think about your fall, was there anything that caused your fall that can be fixed, removed, or replaced?  · Make your home safe by keeping walkways clear of objects you may trip over.  · Use non-slip pads under rugs. Do not use area rugs or small throw rugs.  · Use non-slip mats in bathtubs and showers.  · Install handrails and lights on staircases.  · Do not walk in poorly lit areas.  · Do not stand on chairs or wobbly ladders.  · Use caution when reaching overhead or looking upward. This position can cause a loss of balance.  · Be sure your shoes fit properly, have non-slip bottoms and are in good condition.   · Wear shoes both inside and out. Avoid going barefoot or wearing slippers.  · Be cautious when going up and down stairs, curbs, and when walking on uneven sidewalks.  · If your balance is poor, consider using a cane or walker.  · If your fall was related to alcohol use, stop or limit alcohol intake.   · If your fall was related to use of sleeping medicines, talk to your doctor about this. You may need to reduce your dosage at bedtime if you awaken during the night to go to the bathroom.    · To reduce the need for nighttime bathroom trips:  ¨ Avoid drinking fluids for several hours before going to bed  ¨ Empty your bladder before going to bed  ¨ Men can keep a urinal at the bedside  · Stay as active as you can. Balance, flexibility, strength, and endurance all come from exercise. They all play a role in preventing falls. Ask your healthcare provider which types of activity are right for you.  · Get your vision checked on a regular basis.  · If you have pets, know where they are before you stand up or walk so you don't trip over them.  · Use night lights.  Date Last Reviewed: 11/5/2015  © 8079-9515 [employer]  "[employer]. [address] [place] [address], [place] [address]. All rights reserved. This information is not intended as a substitute for professional medical care. Always follow your healthcare professional's instructions.        Exercises to Prevent Falls  Certain types of exercises may help make you less likely to fall. Try the ones below. Or do other exercises that your health care provider suggests. Depending on your health, you may need to start slowly. Don't let that stop you. Even small amounts of exercise can help you. Be sure to talk to your health care provider before starting any exercise program.       Improve balance  Many types of exercise can help improve balance. Augustine chi and yoga are good examples. Here's another one to try. You can do it anytime and almost anywhere.  · Stand next to a counter or solid support.  · Push yourself up onto your tiptoes.  · Hold for 5 seconds. If you start to lose your balance, hold on to the counter.  · Rest and repeat 5 times. Work up to holding for 20 to 30 seconds, if you can. Increase flexibility  Being more flexible makes it easier for you to move around safely. Try exercises like the seated hamstring stretch.  · Sit in a chair and put one foot on a stool.  · Straighten your leg and reach with both hands down either side of your leg. Reach as far down your leg as you can.  · Hold for about 20 seconds.  · Go back to the starting position. Then repeat 5 times. Switch legs. Build strength  "Resistance" exercises help build strength. You can do them without equipment. Or you can use weights, elastic bands, or special machines. One such exercise is called the biceps curl. You can hold a 1-pound weight or even a can of soup. Do this exercise at least 3 times a week. Strive for every day.  · Sit up straight in a chair.  · Keep your elbow close to your body and your wrist straight.  · Bend your arm, moving your hand up to your shoulder. Then slowly lower your arm.  · Repeat 5 " times. Switch to the other arm.   Build your staying power  Aerobic exercises make your heart and lungs stronger so you can keep moving longer. Walking and swimming are two of the best types of exercises you can do. Using a stationary bike is great, too. Find an aerobic exercise that you enjoy. Start slowly and build up. Even 5 minutes is helpful. Aim for a goal of 30 minutes, at least 3 times a week. You don't have to do 30 minutes in 1 session. Break it up and walk a little throughout the day.  More helpful tips  · Start easy. Slowly work up to doing more.  · Talk with your health care provider about the best exercises for you.  · Call senior centers or health clubs about exercise programs.  · If needed, have a family member watch you walk every so often to check your stability.  · Exercise with a friend. Choose an activity you both enjoy.  · Consider kesha chi or yoga to strengthen your balance.  · Try exercises that you can do anytime, anywhere. Here are 2 examples. Have someone with you when you first try these:  ¨ Practice walking by placing 1 foot right in front of the other.  ¨ Stand up and sit down 10 times. Repeat this throughout the day.   Date Last Reviewed: 6/13/2015  © 0759-2804 "[employer] [address] [place] [address], [place] [address]. All rights reserved. This information is not intended as a substitute for professional medical care. Always follow your healthcare professional's instructions.        Preventing Falls: Are You At Risk of Falling?     Ask for help to reduce risk of falling in your home.     As you get older, you're not as steady on your feet as you once were. And you may have health problems you didn't have when you were younger. So, it's not surprising that older people are more likely to trip and fall. Falling can be very serious. It can change your overall health and quality of life. That's why it's important to be aware of your own risk of falling.  The dangers of  "falling  Falls are one of the main causes of injury in people over age 65. An older person who falls may take longer to get better than a younger person. And, after a fall, an older person is more likely to have problems that don't go away. So, preventing falls can help you avoid serious health problems.  Are you at risk of falling?  Answer these questions to rate your level of risk.  · Are you a woman?  · Have you fallen or stumbled in the last year?  · Are you over age 65?  · Are you ever dizzy or lightheaded with standing?  · Do you have a hard time getting in and out of the bathtub or on and off the toilet?  · Do you lean on objects to help you get around? Or do you use a cane or walker?  · Do you have vision or hearing problems? For example, do you need new glasses or hearing aids?  · Do you have 2 or more long-lasting (chronic) medical conditions?  · Do you take 3 or more medicines?  · Have you felt depressed recently?  · Have you had more trouble with your memory in recent months?  · Are there hazards in your home that might cause you to fall, such as loose rugs or poor lighting?  · Do you have a pet that jumps on you or might trip you?  · Have you stopped getting regular exercise?  · Do you have diabetes?   · Do you have a neurologic disease, such as Parkinson or Alzheimer disease?   · Do you drink alcohol?  · Do you wear athletic shoes or slippers, or go barefoot at home?  You can help prevent falls  If you answered "yes" to any of the above questions, you should take steps to reduce your risk of a fall. Monitoring health conditions and keeping walkways in your home free of clutter are just 2 ways. Changing is sometimes easier said than done. But keep in mind that even small changes can make you less likely to fall.  The fear of falling  It's normal to be scared of falling, especially if you've fallen before. But being afraid can actually make you more likely to fall. This is because:  · Fear might cause " you to become less active. Being less active can lead to a loss of strength and balance.  · Fear can lead to isolation from others, depression, or the use of more medicines or alcohol. And all these things make falling even more likely.  To break the cycle, learn more about ways to avoid falling. As you take control, you may find yourself feeling less afraid.   Date Last Reviewed: 6/12/2015  © 7993-4156 [employer]. [address] [place] [address], [place] [address]. All rights reserved. This information is not intended as a substitute for professional medical care. Always follow your healthcare professional's instructions.        Chronic Lung Disease: Preventing Lung Infections  Chronic lung diseases include chronic obstructive pulmonary disease (COPD), which includes chronic bronchitis and emphysema. Other chronic lung diseases include pulmonary fibrosis, sarcoidosis, and other conditions. When you have chronic lung diseases, it's very important to protect yourself from respiratory infections, like colds, the flu, and lung infections. Infections may cause your lung condition to worsen. Although you can't completely avoid them, there are things you can do to lessen the chance of infections.    Take precautions  Taking the following precautions can help you avoid illness:  · Remember to keep your hands away from your nose and mouth. Germs on your hands get into your respiratory system this way.  · Wash your hands often. When you wash them:  ¨ Use soap and warm water.  ¨ Rub your hands together well for at least 20 seconds.  ¨ Make sure to rinse them well.  ¨ Dry your hands on clean towels or air-dry them.  · Use hand  containing alcohol, if you are unable to wash your hands. Use the  after touching doorknobs, handles, and supermarket carts, for example, since lots of people touch them. Then wash your hands as soon as you can.  · To help prevent the flu, get a flu vaccination every year. This  may be given at your healthcare provider's office, a drugstore, or pharmacy, or at work. Get your flu shot as soon as the vaccines are available in your area. This is usually around September each year.  · To help prevent pneumococcal pneumonia, get pneumonia vaccinations. Talk with your healthcare provider about which pneumococcal vaccinations you need.  · Try to stay away from people with respiratory infections, such as colds or the flu. Stay away from crowded places, like shopping centers or movie theatres during cold and flu season.  · If you smoke, think about quitting. In addition to causing or worsening many lung conditions, the lung damage from smoking increases your risk of infections. Stay away from others who smoke, too. This is also harmful and increases your chance of infections.  Date Last Reviewed: 4/14/2016 © 2000-2017 [employer]. [address] [place] [address]. All rights reserved. This information is not intended as a substitute for professional medical care. Always follow your healthcare professional's instructions.        COPD Flare    You have had a flare-up of your COPD.  COPD, or chronic obstructive pulmonary disease, is a common lung disease. It causes your airways to become irritated and narrower. This makes it harder for you to breathe. Emphysema and chronic bronchitis are both types of COPD. This is a chronic condition, which means you always have it. Sometimes it gets worse. When this happens, it is called a flare-up.  Symptoms of COPD  People with COPD may have symptoms most of the time. In a flare-up, your symptoms get worse. These symptoms may mean you are having a flare-up:  · Shortness of breath, shallow or rapid breathing, or wheezing that gets worse  · Lung infection  · Cough that gets worse  · More mucus, thicker mucus or mucus of a different color  · Tiredness, decreased energy, or trouble doing your usual activities  · Fever  · Chest tightness  · Your  symptoms dont get better even when you use your usual medicines, inhalers, and nebulizer  · Trouble talking  · You feel confused  Causes of flare-ups  Unfortunately, a flare-up can happen even though you did everything right, and you followed your doctors instructions. Some causes of flare-ups are:  · Smoking or secondhand smoke  · Colds, the flu, or respiratory infections  · Air pollution  · Sudden change in the weather  · Dust, irritating chemicals, or strong fumes  · Not taking your medicines as prescribed  Home care  Here are some things you can do at home to treat a flare-up:  · Try not to panic. This makes it harder to breathe, and keeps you from doing the right things.  · Dont smoke or be around others who are smoking.  · Try to drink more fluids than usual during a flare-up, unless your doctor has told you not to because of heart and kidney problems. More fluids can help loosen the mucus.  · Use your inhalers and nebulizer, if you have one, as you have been told to.  · If you were given antibiotics, take them until they are used up or your doctor tells you to stop. Its important to finish the antibiotics, even though you feel better. This will make sure the infection has cleared.  · If you were given prednisone or another steroid, finish it even if you feel better.  Preventing a flare-up  Even though flare-ups happen, the best way to treat one is to prevent it before it starts. Here are some pointers:  · Dont smoke or be around others who are smoking.  · Take your medicines as you have been told.  · Talk with your doctor about getting a flu shot every year. Also find out if you need a pneumonia shot.  · If there is a weather advisory warning to stay indoors, try to stay inside when possible.  · Try to eat healthy and get plenty of sleep.  · Try to avoid things that usually set you off, like dust, chemical fumes, hairsprays, or strong perfumes.  Follow-up care  Follow up with your healthcare provider, or  as advised.  If a culture was done, you will be told if your treatment needs to be changed. You can call as directed for the results.  If X-rays were done, you will be notified of any new findings that may affect your care.  Call 911  Call 911 if any of these occur:  · You have trouble breathing  · You feel confused or its difficult to wake you up  · You faint or lose consciousness  · You have a rapid heart rate  · You have new pain in your chest, arm, shoulder, neck or upper back  When to seek medical advice  Call your healthcare provider right away if any of these occur:  · Wheezing or shortness of breath gets worse  · You need to use your inhalers more often than usual without relief  · Fever of 100.4°F (38ºC) or higher, or as directed by your healthcare provider  · Coughing up lots of dark-colored or bloody mucus (sputum)  · Chest pain with each breath  · You do not start to get better within 24 hours  · Swelling of your ankles gets worse  · Dizziness or weakness  Date Last Reviewed: 9/1/2016  © 5003-1010 [employer]. [address] [place] [address]. All rights reserved. This information is not intended as a substitute for professional medical care. Always follow your healthcare professional's instructions.        Coughing Techniques    Airway clearance techniques help to remove mucus from your airways. Clearing the airways helps you breathe better and lowers the chance for infection. One method is controlled coughing. Be sure to also use any medicines before or after clearing your airways, as instructed by your healthcare provider. For example, some people use inhaled bronchodilators before clearing their airways.      Note: Be sure to keep a box of tissues beside you. Wash your hands when you are done.   Controlled coughing  Here is how to do it:   · Sit on a chair with both feet on the floor.  · Take a slow, deep breath through your nose. Hold for 2 counts.  · Lean forward  "slightly.  · Cough twice--2 short coughs.  · Relax for a few seconds.  Repeat the steps as needed.   The felipe technique  Here is how to do it:   · Sit on a chair with both feet on the floor.  · Take a slow, deep breath through your nose. Hold for 2 counts.  · To breathe out, open your mouth and make a felipe sound in your throat. (This is the same way you might breathe to clean a pair of eyeglasses.)  · Felipe 2 to 3 times as you breathe out.  · Relax for a few seconds.  Repeat the steps as needed.  Follow-up care  Make a follow-up appointment as directed by our staff.     When to call the healthcare provider  Call your healthcare provider right away if you have any of the following:  · Shortness of breath, wheezing, or coughing  · Increased mucus  · Yellow, green, bloody, or smelly mucus  · Fever or chills  · Tightness in your chest that does not go away with rest or medicine  · An irregular heartbeat         Date Last Reviewed: 5/1/2016  © 9046-7399 [employer]. [address] [place] [address]. All rights reserved. This information is not intended as a substitute for professional medical care. Always follow your healthcare professional's instructions.        Using an Inhaler  Your healthcare provider may prescribe medicine that you breathe in using a metered-dose inhaler (MDI). An inhaler sends a measured amount of medicine in a fine mist.  Step 1:  · Shake the inhaler and remove the cap.  · Take a deep breath and let it out.  Step 2:  · Close your lips around the end of the inhaler mouthpiece. Or if you were told to use the "open-mouth" method, hold the inhaler 1 to 2 inches from your mouth.  Step 3:  · Breathe in slowly and deeply as you press down on the inhaler to release the medicine.  · Inhale fully.  Step 4:  · Hold your breath for a count of 10, or as long as you can comfortably.  · Then breathe out slowly through your mouth.  · Repeat these steps for each puff of medicine " prescribed.             Important  · If the inhaler is being used for the first time or has not been used for a while, prime it as directed by the product maker. You can find important information about the medicine in the package insert. This is the paper that comes with the medicine.  · If you use more than one inhaler, make sure you know which one to use first.  · Your healthcare provider or pharmacist can show you how to use your inhaler the right way. Even if you think you are using it the right way, it is still a good idea to check.   Date Last Reviewed: 10/1/2016  © 6986-9029 [employer]. [address] [place] [address]. All rights reserved. This information is not intended as a substitute for professional medical care. Always follow your healthcare professional's instructions.        COPD: Using Inhalers  Some COPD medicines are taken by using inhalers. Inhalers deliver measured doses of medicine into your lungs. Not all inhalers work the same way. Have your healthcare provider show you how to use your inhaler.     Breathe out        Breathe in   Using metered-dose inhalers (MDIs) with spacers  Metered-dose inhalers deliver medicine with a fine spray. You may also use a spacer (holding tube) with your inhaler. The spacer makes it easier for all of the medicine to get into your lungs.  1. Remove the caps from the inhaler and spacer. Shake the inhaler well and attach the spacer. Make sure to follow any special instructions if the inhaler is being used for the first time or has not been used for a while.  2. Breathe out normally. Put the spacer between your teeth and close your lips tightly around it. Keep your chin level.  3. Spray 1 puff into the spacer by pressing down on the inhaler. Then slowly breathe in as deeply as you can. This should take 3 to 5 seconds. If you breathe in too quickly, you may hear a whistling sound in the spacer.  4. Take the spacer out of your mouth. Hold your  breath for a count of 10, if possible. Then slowly breathe out. If a second dose is prescribed, wait at least 30 seconds before taking the next puff.  Using MDIs without spacers  Inhalers work best with spacers. But if you dont use one, follow these steps:  1. Shake the inhaler and remove the cap. Breathe out through your mouth.  2. Put the inhaler mouthpiece in your mouth and close your lips tightly around it. Or if told to do so by your healthcare provider, hold the inhaler 1 to 2 inches from your mouth.  3. Keep your chin level. Spray 1 puff by pressing down on the inhaler while breathing in deeply through your mouth for about 5 seconds. Hold your breath for a count of 10. Then breathe out slowly. If a second dose is prescribed, wait at least 30 seconds before taking the next puff.  Using dry-powder inhalers (DPIs)      Some inhalers use tiny grains of powder to deliver medicine. These dont need spacers. They often have counters that track how many doses you use. Dry-powder inhalers dont all work the same way. Be sure you know how to use yours properly.  1. Load the prescribed dose of medicine by following the instructions that come with the inhaler.  2. Breathe out normally, holding the inhaler away from your mouth. Hold your chin level.  3. Put the mouthpiece between your lips. Breathe in quickly and deeply through the inhaler--not through your nose. You may not feel or taste the medicine as you breathe in. This is normal.  4. Take the mouthpiece out of your mouth. Hold your breath for a count of 10, if possible.  5. Breathe out slowly--but not through the inhaler. Moisture from your breath can make the powder stick inside the inhaler. Also be sure to close the inhaler and store it in a dry place.  Date Last Reviewed: 10/1/2016  © 1571-6427 [employer]. [address] [place] [address], [place] [address]. All rights reserved. This information is not intended as a substitute for professional medical care.  Always follow your healthcare professional's instructions.        Chronic Lung Disease: Avoiding Irritants and Allergens    Many people with chronic lung disease, such as asthma or COPD, need to avoid irritants that can trigger symptoms making it more difficult to breathe. Irritants are certain substances in the air that irritate the airways. Some people are also sensitive to certain allergens. These are substances that cause inflammation in the lungs. Allergens can also cause runny nose, or itchy, watery eyes. You probably cant avoid all these things, all the time. But youll most likely breathe better if you stay away from the substances that bother you.   Try to avoid  Smoke. This includes cigarettes, cigars, pipes, and fireplaces.  · Dont smoke. And dont allow anyone else to smoke near you or in your home.  · Ask for smoke-free hotel rooms and rental cars.  · Make sure fireplaces and wood stoves are well ventilated, and sit well away from them.  Smog. This is made up of car exhaust and other air pollutants.  · Read or listen to local air quality reports. These let you know when air quality is poor.  · Stay indoors as much as you can on smoggy days.  Strong odors. These include scented room fresheners, mothballs, and insect sprays. Perfume and cooking can be other causes of strong odors.  · Avoid using bleach and ammonia for cleaning.  · Use scent-free deodorant, lotion, and other products.  Other irritants. These include dust, aerosol sprays, and fine powders.  · Wear a mask while doing tasks like dusting, sweeping, and yard work.  Cold weather. This can make breathing more difficult.  · Protect your lungs by wearing a scarf over your nose and mouth.   You may also need to avoid  If you have allergies, you should try to avoid the allergens that cause them. Ask your healthcare provider if you need to avoid any of these:  Pollen. This is a fine powder made by trees, grasses, and weeds.  · Try to learn what  types of pollen affect you the most. Pollen levels vary during the year.  · Avoid outdoor activities when pollen levels are high. Use air conditioning instead of opening the windows in your home and car.  Animal dander. This is shed by animals with fur or feathers. The particles can float through the air and stick to carpet, clothing, and furniture.  · Wash your hands and clothes after handling pets.  Dust mites. These are tiny bugs too small to see. They live in mattresses, bedding, carpets, curtains, and indoor dust.  · Wash bedding in hot water (130°F/54.4°C) each week.  · Cover mattresses and pillows with special mite-proof cases.  Mold. This grows in damp places, such as bathrooms, basements, and closets.  · Run an exhaust fan while bathing. Or, leave a window open in the bathroom.  · Use a dehumidifier in damp areas.  Date Last Reviewed: 5/1/2016 © 2000-2017 [employer]. [address] [place] [address]. All rights reserved. This information is not intended as a substitute for professional medical care. Always follow your healthcare professional's instructions.        Chronic Lung Disease: Controlling Stress    Stress and anxiety can make breathing harder. When its hard to breathe, its natural to get anxious and start to panic. This makes you even more short of breath. This sequence is known as the dyspnea cycle, and its common among people with chronic lung disease. Talk with your healthcare provider about how you are feeling. It's important for him or her to understand what is going on and how it is affecting your life. Breathing training and coping strategies can help you manage stress and anxiety.  Understanding the cycle  When youre short of breath, your breathing muscles get tense. Its hard to take a deep breath. You may worry that youre not getting enough air. Then you start breathing faster and become more short of breath. You may even start to panic, which makes symptoms  seem worse. Often, people with chronic lung disease try to prevent this cycle by limiting activity, staying at home, and avoiding anything that could cause shortness of breath. You dont have to live this way.   Ways to relax  When you find yourself getting stressed or anxious, make an effort to relax. Doing so will help break the dyspnea cycle. Sit in a quiet, comfortable place. Do pursed-lip and diaphragmatic breathing. You may also find the following helpful:  · Certain activities can help you relax. These can include reading a good book, listening to music or relaxation tapes, practicing yoga or kesha chi, meditating, and praying. Find activities that work for you.  · Try visualization. Picture yourself in a peaceful place, such as the beach. Feel the warm sand. Hear the waves. Smell the ocean. Doing this may help you feel more relaxed.  · Your healthcare provider may advise using a bronchodilator along with these or other relaxation techniques.     What you can do to break the cycle  To prevent shortness of breath from limiting your life:  · Right now. Learn to stop an attack with pursed-lip breathing, diaphragmatic breathing, and relaxation techniques. If you dont know how to do these, ask your healthcare provider.  · In day-to-day life. Learn to maximize your energy and to breathe during activity, so you can do more.  · Over time. Start exercising, so your body can start to handle more activity.   Date Last Reviewed: 5/1/2016  © 4185-0093 [employer]. [address] [place] [address], [place] [address]. All rights reserved. This information is not intended as a substitute for professional medical care. Always follow your healthcare professional's instructions.        Chronic Lung Disease: If Oxygen Is Prescribed   Supplemental oxygen is prescribed if tests show that the level of oxygen in your blood is too low. If the level stays too low for too long, serious problems can develop in many parts of the body.  Supplemental oxygen helps to relieve your symptoms and prevent future problems by getting more oxygen to the blood. Depending on your test results, you may need oxygen all the time. Or it may only be needed during certain activities, such as exercise or sleep. When oxygen is prescribed, youll be referred to a medical equipment company. They will set up the oxygen unit and teach you how to use it.  Nasal cannula     A nasal cannula should be placed in the nose with the prongs arching toward you.     Oxygen is most often inhaled through a nasal cannula (lightweight tube with two hollow prongs that fit just inside the nose).  Types of supplemental oxygen    Compressed oxygen   Oxygen concentrator   Liquid oxygen   Prescribed oxygen comes in several forms. You may use more than one type, depending on when you need oxygen:  · Compressed oxygen is oxygen gas stored in a tank. Because the oxygen is stored under pressure, these tanks must be handled carefully. Gauges on the tank can be used to adjust the oxygen flow rate. Your healthcare provider will determine what this should be. Small tanks can be carried. Larger tanks are on wheels and can be pulled around the house.  · An oxygen concentrator is a machine about the size of a large suitcase. It plugs into an electrical outlet. (A back-up oxygen supply is recommended in case of a power outage.) The machine takes oxygen from the air and concentrates it. Its then delivered to you through plastic tubing. The tubing is long enough so that you can move around the house. When youre using the concentrator, it must be kept somewhere that has a good supply of fresh air. (Dont keep it in a confined space, like a closet.) You may be set up on a concentrator if you need oxygen all the time or while youre sleeping.  · Liquid oxygen results when oxygen gas is cooled to a very low temperature. Its kept in special containers that maintain this low temperature. When you use liquid  oxygen, its warmed and becomes gas before reaching the cannula. Most tanks come with a portable unit that you can carry or pull on a cart. Some of these weigh only a few pounds. Liquid oxygen units are easy to carry around. If you need oxygen all the time or during activity, this kind of unit can help you stay active.  Oxygen is prescribed just for you  Your healthcare provider will prescribe oxygen based on your needs. Here are a few things you should know:  · A therapist from the medical equipment company will explain when to use oxygen and what type to use. Youll be taught how to use and maintain your oxygen equipment.  · You must use the exact rate of oxygen prescribed for each activity. Dont increase or decrease the amount without asking your healthcare provider first.  · Supplemental oxygen is a medicine. Its not addictive and causes no side effects when used as directed.   Date Last Reviewed: 5/1/2016  © 1132-5566 [employer]. [address] [place] [address]. All rights reserved. This information is not intended as a substitute for professional medical care. Always follow your healthcare professional's instructions.        Chronic Lung Disease: Notes for Family and Friends    Being close to someone with chronic lung disease will likely mean some changes in your life. As your loved one teetee with chronic lung disease, you may be asked to be a helper, caregiver, or source of support.  Helpful tips  Here are general tips to follow:   · Learn as much as you can about chronic lung disease. This will help you know what to expect. It will also show you ways that you can help.  · Talk to your loved ones healthcare team. Ask any questions you have. Make sure you understand your role in treatment.  · Spend time with your loved one. Take time to talk and to do things you both enjoy.  · Join a local support group. Or, contact the [employer]ouse [employer] at 414-671-8906 or  www.wellspouse.org.  Tips for caregivers  Caregivers' tips include the following:   · Keep in mind that you cant take good care of someone else if you dont take care of yourself, too. Take breaks when you need them. Considering your own needs is not selfish, its vital.  · You may want to do things for your loved one to help save time. But let your loved one do some things for him- or herself. This can help your loved one feel involved and independent. Encourage your loved one to stick with old hobbies or try new ones. And ask friends to visit if your loved one agrees.  · Be on the lookout for signs of depression in your loved one. These include trouble sleeping; loss of interest in activities, food, or people; and talk about feeling hopeless or very sad. If you notice these signs, tell your loved ones healthcare provider.  · Watch for depression in yourself. If you feel sad, guilty, tired, hopeless, or helpless most of the time, talk to your healthcare provider. Depression can and should be treated.  Date Last Reviewed: 5/1/2016  © 8313-6213 [employer]. [address] [place] [address]. All rights reserved. This information is not intended as a substitute for professional medical care. Always follow your healthcare professional's instructions.        Using Oxygen at Home  Your healthcare provider has prescribed oxygen to help make breathing easier for you. You will be shown how to use your oxygen unit. Below are some guidelines on using oxygen at home safely. Do all steps each time you use your oxygen unit.   Note: Instructions will vary based on the type of oxygen device you use.    Step 1. Check your supply  · Pressurize your oxygen tank (compressed oxygen tanks only). Other devices may simply be switched on. Make sure you follow the instructions provided by your healthcare provider or medical equipment company.  · Check the oxygen supply level on the tank to be sure you have enough.  Your medical supply company will tell you when to call them to let them know that you need more oxygen. Or they will deliver your oxygen on a regular schedule.  · If you have a humidifier bottle, check the water level. When it is at or below 1/2 full, refill it with sterile or distilled water.  Step 2. Attach the tubing  · Attach the cannula tubing (long oxygen tubing) to your oxygen source as you have been shown.  · Be sure the tubing is not bent or blocked.  Step 3. Set your prescribed flow rate  · Set the oxygen to flow at the rate your healthcare provider has prescribed. This is _____________.  · Never change this rate unless told to by your healthcare provider.  Step 4. Insert the cannula  · Insert the nasal cannula into your nose and breathe through your nose normally.  · If youre not sure whether oxygen is flowing, place the nasal cannula in a glass of water. Bubbles mean that oxygen is flowing.  Follow safety guidelines when using oxygen in your home  · Avoid open flames. This includes cigarettes, matches, candles, fireplaces, gas burners, pipes, or anything else that could start a fire.  · Don't smoke or be around others who are smoking.  · Keep oxygen tanks at least 5 feet from gas stoves, space heaters, electric or gas heaters, or any heat sources.  · Don't use lotions or creams that contain petroleum jelly. This substance can be flammable when mixed with pure oxygen.  · Turn oxygen off when you aren't using it.  · Store the oxygen cylinder upright in a secure, approved storage device.   · Make sure you know what to do in an emergency. Your emergency numbers should include 911 (or your area's emergency number), your healthcare provider, and your medical supply company.  · Always follow the instructions for safe use as recommended by your medical supply company. Not using oxygen safely at home can put you and your neighbors at higher risk for fires and burns.   Maintain your equipment  Ask your medical  supply company how often you should change your nasal cannula tubing, your cannula, and your humidifier bottle, if you have one.  Date Last Reviewed: 5/1/2016  © 4827-0726 [employer]. [address] [place] [address], [place] [address]. All rights reserved. This information is not intended as a substitute for professional medical care. Always follow your healthcare professional's instructions.        Pursed-Lip Breathing  Pursed-lip breathing can help you get more oxygen into your lungs when you are short of breath. When you start to feel short of breath, use pursed-lip breathing to control your breathing. Breathing in through the nose and exhaling through pursed or closed lips makes breathing easier. You can practice breathing this way anytime, anywhere. For example, if you are watching TV, practice during the commercials. Try to practice several times a day. Over time, pursed-lip breathing will feel natural.  Home care  Practice these steps every day so that you'll know how to do pursed-lip breathing when you have shortness of breath.  5. Sit in a comfortable chair.  6. Relax the muscles in your neck and shoulders.  7. Breathe in slowly through your nose while counting to 2.  8. Hold your lips together as if you are trying to whistle or blow out a candle.  9. Breathe out slowly and gently through your pursed lips while counting to 4.  10. Repeat the above steps as needed.  Use pursed-lip breathing to prevent shortness of breath when you do things such as exercising, climbing stairs, and bending or lifting. Breathe out during the difficult part of any activity, such as when you bend, lift, or reach. Always breathe out for longer than you breathe in. This allows your lungs to empty as much as possible. Never hold your breath when doing pursed-lip breathing.    Follow-up care  Make a follow-up appointment as directed by our staff.     When to call the healthcare provider  Call your healthcare provider right away  if you have any of the following:  · Shortness of breath, wheezing, or coughing  · Increased mucus; yellow, green, or bloody mucus  · Fever of 100.4°F (38°C) or higher, or as directed by your healthcare provider  · Tightness in your chest that does not go away with rest or medicine  · An irregular heartbeat  · Swollen ankles   Date Last Reviewed: 5/1/2016  © 1640-8984 [employer]. [address] [place] [address]. All rights reserved. This information is not intended as a substitute for professional medical care. Always follow your healthcare professional's instructions.        Step-by-Step  Using an Inhaler (in Mouth) Without a Spacer    Date Last Reviewed: 2/1/2017 © 2000-2017 [employer]. [address] [place] [address]. All rights reserved. This information is not intended as a substitute for professional medical care. Always follow your healthcare professional's instructions.        Step-by-Step  Using an Inhaler Without a Spacer       Date Last Reviewed: 2/1/2017 © 2000-2017 [employer]. [address] [place] [address]. All rights reserved. This information is not intended as a substitute for professional medical care. Always follow your healthcare professional's instructions.

## 2018-09-17 NOTE — PROGRESS NOTES
Summary:  Initial and RN Assessments completed on the phone today with patients  Marino Schroeder with patients verbal permission. Patient is AAOx4 but  states she has short term memory loss and sometimes difficulty in remembering or following through with a thought process. Patients  states patient is totally independent with all of her ADLs.  Patients  states patient does little household chores as she is able but he helps her with her medications and with all of of the cooking/grocery shopping etc. Patients  states patient takes all of her medications as directed.  Patients  states patient very seldom has to use her rescue inhaler and only uses 02 at night to sleep with. Patients  states they are aware of the s/s of a COPD exacerbation, ie. wheezing, SOB, productive or severe cough with greenish or yellow colored sputum. Patient is UTD on her flu and Pneumonia vaccines.  Patients  states they are aware of triggers that can exacerbate patient COPD, ie. Smoke, car exhaust, bad air quality and heat. Patients  states patient has not been through Pulmonary Rehab session but it would be very difficult as he works full time and has to drive her to all of her appointments. OPCM recommended the Pulmonary Disease Management 1 time class for Dz process education, understanding rescue/routine inhaler use and  voiced understanding/agreement.  Patients  states patient has not fallen in the last year and uses her cane occasionally for stability.  Patients  states patient is participating in outpatient PT at [facility] once weekly and is improving in her strength and stability/gait. Educated on fall prevention/home safety, ie. good lighting, clear pathways, no throw rugs and no electrical cords and patient voiced understanding/agreement.   Encouraged patient/caregiver to communicate with physician and call this RN if having any  questions/concerns. This RN will continue to follow. LEANNA Barr OPCM        Interventions:  Mailed educational materials and educated on COPD and Fall prevention  Referral to Pharmacy Assistance program for cost of Trelegy Ellipta inhaler   In basket message to Dr. Galindo regarding Keppra dose (patient is only taking 500 mg QD)  In basket message to Dr. Bliss regarding Fe So4 frequency (Patisnt is only taking every other day)  Referral to [facility] for Disaster Planning  Referral for Pulmonary Disease Management class      Plan:  F/u on receipt of educational materials. Continue education on COPD and Fall prevention  F/u on Referral to Pharmacy Assistance program for cost of Trelegy Ellipta inhaler   F/u on In basket message to Dr. Galindo regarding Keppra dose (patient is only taking 500 mg QD)  F/u on In basket message to Dr. Bliss regarding Fe So4 frequency (Patisnt is only taking every other day)  F/u on Referral to [facility] for Disaster Planning  F/u on Referral for Pulmonary Disease Management class    Todays OPCM Self-Management Care Plan was developed with the patients/caregivers input and was based on identified barriers from todays assessment.  Goals were written today with the patient/caregiver and the patient has agreed to work towards these goals to improve his/her overall well-being. Patient verbalized understanding of the care plan, goals, and all of today's instructions. Encouraged patient/caregiver to communicate with his/her physician and health care team about health conditions and the treatment plan.  Provided my contact information today and encouraged patient/caregiver to call me with any questions as needed.

## 2018-09-18 ENCOUNTER — TELEPHONE (OUTPATIENT)
Dept: PHARMACY | Facility: CLINIC | Age: 68
End: 2018-09-18

## 2018-09-18 ENCOUNTER — TELEPHONE (OUTPATIENT)
Dept: PULMONOLOGY | Facility: CLINIC | Age: 68
End: 2018-09-18

## 2018-09-18 ENCOUNTER — PATIENT OUTREACH (OUTPATIENT)
Dept: PULMONOLOGY | Facility: CLINIC | Age: 68
End: 2018-09-18

## 2018-09-18 DIAGNOSIS — D50.9 IRON DEFICIENCY ANEMIA: ICD-10-CM

## 2018-09-18 RX ORDER — FERROUS SULFATE 325(65) MG
325 TABLET, DELAYED RELEASE (ENTERIC COATED) ORAL DAILY
Qty: 90 TABLET | Refills: 3 | Status: SHIPPED | OUTPATIENT
Start: 2018-09-18

## 2018-09-18 NOTE — TELEPHONE ENCOUNTER
Spoke with patient  about medications (inhalers).  After speaking with the spouse the household income is over the guidelines.  The spouse understands he will have to pay for the medication until the end of the year.

## 2018-09-19 NOTE — ASSESSMENT & PLAN NOTE
- PRN Ativan and Haldol as above.  Monitor for DTs.  - Counseled on cessation.  - Scheduled Seroquel at bedtime.  - Will add home thiamine.   (2) potential problem

## 2018-09-20 ENCOUNTER — LAB VISIT (OUTPATIENT)
Dept: LAB | Facility: HOSPITAL | Age: 68
End: 2018-09-20
Attending: FAMILY MEDICINE
Payer: MEDICARE

## 2018-09-20 ENCOUNTER — OFFICE VISIT (OUTPATIENT)
Dept: FAMILY MEDICINE | Facility: CLINIC | Age: 68
End: 2018-09-20
Payer: MEDICARE

## 2018-09-20 VITALS
SYSTOLIC BLOOD PRESSURE: 122 MMHG | WEIGHT: 114.5 LBS | DIASTOLIC BLOOD PRESSURE: 72 MMHG | BODY MASS INDEX: 21.07 KG/M2 | HEART RATE: 77 BPM | OXYGEN SATURATION: 94 % | TEMPERATURE: 96 F | HEIGHT: 62 IN

## 2018-09-20 DIAGNOSIS — F10.10 ALCOHOL ABUSE: ICD-10-CM

## 2018-09-20 DIAGNOSIS — E61.1 IRON DEFICIENCY: ICD-10-CM

## 2018-09-20 DIAGNOSIS — E78.5 DYSLIPIDEMIA: ICD-10-CM

## 2018-09-20 DIAGNOSIS — E03.9 HYPOTHYROIDISM, UNSPECIFIED TYPE: ICD-10-CM

## 2018-09-20 DIAGNOSIS — E61.1 IRON DEFICIENCY: Primary | ICD-10-CM

## 2018-09-20 PROBLEM — Z86.19 HISTORY OF SEPSIS: Status: RESOLVED | Noted: 2018-07-29 | Resolved: 2018-09-20

## 2018-09-20 PROBLEM — E83.52 HYPERCALCEMIA: Status: RESOLVED | Noted: 2018-02-21 | Resolved: 2018-09-20

## 2018-09-20 PROBLEM — N39.0 URINARY TRACT INFECTION WITHOUT HEMATURIA: Status: RESOLVED | Noted: 2018-08-01 | Resolved: 2018-09-20

## 2018-09-20 PROBLEM — R41.0 DELIRIUM: Status: RESOLVED | Noted: 2017-07-17 | Resolved: 2018-09-20

## 2018-09-20 PROBLEM — M25.539 WRIST PAIN: Status: RESOLVED | Noted: 2017-09-06 | Resolved: 2018-09-20

## 2018-09-20 LAB
ALBUMIN SERPL BCP-MCNC: 3.4 G/DL
ALP SERPL-CCNC: 88 U/L
ALT SERPL W/O P-5'-P-CCNC: 5 U/L
ANION GAP SERPL CALC-SCNC: 9 MMOL/L
AST SERPL-CCNC: 19 U/L
BASOPHILS # BLD AUTO: 0.03 K/UL
BASOPHILS NFR BLD: 0.4 %
BILIRUB SERPL-MCNC: 0.5 MG/DL
BUN SERPL-MCNC: 28 MG/DL
CALCIUM SERPL-MCNC: 8.4 MG/DL
CHLORIDE SERPL-SCNC: 105 MMOL/L
CHOLEST SERPL-MCNC: 181 MG/DL
CHOLEST/HDLC SERPL: 3.1 {RATIO}
CO2 SERPL-SCNC: 26 MMOL/L
CREAT SERPL-MCNC: 1.2 MG/DL
DIFFERENTIAL METHOD: ABNORMAL
EOSINOPHIL # BLD AUTO: 0.2 K/UL
EOSINOPHIL NFR BLD: 2.4 %
ERYTHROCYTE [DISTWIDTH] IN BLOOD BY AUTOMATED COUNT: 14.6 %
EST. GFR  (AFRICAN AMERICAN): 54.4 ML/MIN/1.73 M^2
EST. GFR  (NON AFRICAN AMERICAN): 47.2 ML/MIN/1.73 M^2
FERRITIN SERPL-MCNC: 81 NG/ML
GLUCOSE SERPL-MCNC: 71 MG/DL
HCT VFR BLD AUTO: 38.3 %
HDLC SERPL-MCNC: 58 MG/DL
HDLC SERPL: 32 %
HGB BLD-MCNC: 11.8 G/DL
IMM GRANULOCYTES # BLD AUTO: 0.03 K/UL
IMM GRANULOCYTES NFR BLD AUTO: 0.4 %
IRON SERPL-MCNC: 62 UG/DL
LDLC SERPL CALC-MCNC: 105.8 MG/DL
LYMPHOCYTES # BLD AUTO: 1.5 K/UL
LYMPHOCYTES NFR BLD: 21.7 %
MCH RBC QN AUTO: 30.6 PG
MCHC RBC AUTO-ENTMCNC: 30.8 G/DL
MCV RBC AUTO: 100 FL
MONOCYTES # BLD AUTO: 0.4 K/UL
MONOCYTES NFR BLD: 5.5 %
NEUTROPHILS # BLD AUTO: 4.9 K/UL
NEUTROPHILS NFR BLD: 69.6 %
NONHDLC SERPL-MCNC: 123 MG/DL
NRBC BLD-RTO: 0 /100 WBC
PLATELET # BLD AUTO: 252 K/UL
PMV BLD AUTO: 11.3 FL
POTASSIUM SERPL-SCNC: 4.8 MMOL/L
PROT SERPL-MCNC: 6.5 G/DL
RBC # BLD AUTO: 3.85 M/UL
SATURATED IRON: 16 %
SODIUM SERPL-SCNC: 140 MMOL/L
T4 FREE SERPL-MCNC: 1.48 NG/DL
TOTAL IRON BINDING CAPACITY: 394 UG/DL
TRANSFERRIN SERPL-MCNC: 266 MG/DL
TRIGL SERPL-MCNC: 86 MG/DL
TSH SERPL DL<=0.005 MIU/L-ACNC: 0.38 UIU/ML
WBC # BLD AUTO: 7.1 K/UL

## 2018-09-20 PROCEDURE — 84439 ASSAY OF FREE THYROXINE: CPT

## 2018-09-20 PROCEDURE — 3078F DIAST BP <80 MM HG: CPT | Mod: CPTII,,, | Performed by: FAMILY MEDICINE

## 2018-09-20 PROCEDURE — 80061 LIPID PANEL: CPT

## 2018-09-20 PROCEDURE — 99999 PR PBB SHADOW E&M-EST. PATIENT-LVL III: CPT | Mod: PBBFAC,,, | Performed by: FAMILY MEDICINE

## 2018-09-20 PROCEDURE — 36415 COLL VENOUS BLD VENIPUNCTURE: CPT | Mod: PO

## 2018-09-20 PROCEDURE — 82728 ASSAY OF FERRITIN: CPT

## 2018-09-20 PROCEDURE — 85025 COMPLETE CBC W/AUTO DIFF WBC: CPT

## 2018-09-20 PROCEDURE — 1101F PT FALLS ASSESS-DOCD LE1/YR: CPT | Mod: CPTII,,, | Performed by: FAMILY MEDICINE

## 2018-09-20 PROCEDURE — 99213 OFFICE O/P EST LOW 20 MIN: CPT | Mod: PBBFAC,PO | Performed by: FAMILY MEDICINE

## 2018-09-20 PROCEDURE — 99214 OFFICE O/P EST MOD 30 MIN: CPT | Mod: S$PBB,,, | Performed by: FAMILY MEDICINE

## 2018-09-20 PROCEDURE — 80053 COMPREHEN METABOLIC PANEL: CPT

## 2018-09-20 PROCEDURE — 84443 ASSAY THYROID STIM HORMONE: CPT

## 2018-09-20 PROCEDURE — 83540 ASSAY OF IRON: CPT

## 2018-09-20 PROCEDURE — 3074F SYST BP LT 130 MM HG: CPT | Mod: CPTII,,, | Performed by: FAMILY MEDICINE

## 2018-09-20 NOTE — PROGRESS NOTES
Subjective:       Patient ID: Ibeth Schroeder is a 68 y.o. female.    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 Doing well . Still has significant issues with memory ,  assists her on ADL. Stable respiratory symptoms . Denies any seizure since last hospitalizations  . drinking about 5-6 shots of liquor a week. Compliant with levothyroxine and statin       Review of Systems   Constitutional: Negative.    HENT: Negative.    Eyes: Negative.    Respiratory: Negative.    Cardiovascular: Negative.    Gastrointestinal: Negative.    Genitourinary: Negative.    Musculoskeletal: Negative.    Skin: Negative.    Neurological: Negative.    Hematological: Negative.    Psychiatric/Behavioral: Positive for confusion.       Objective:      Physical Exam   Constitutional: She is oriented to person, place, and time. She appears well-developed and well-nourished. No distress.   HENT:   Head: Normocephalic and atraumatic.   Right Ear: External ear normal.   Left Ear: External ear normal.   Mouth/Throat: No oropharyngeal exudate.   Eyes: Conjunctivae and EOM are normal. Pupils are equal, round, and reactive to light. Right eye exhibits no discharge. Left eye exhibits no discharge. No scleral icterus.   Neck: Normal range of motion. Neck supple. No JVD present. No tracheal deviation present. No thyromegaly present.   Cardiovascular: Normal rate, regular rhythm and normal heart sounds. Exam reveals no gallop and no friction rub.   No murmur heard.  Pulmonary/Chest: Effort normal and breath sounds normal. No stridor. No respiratory distress. She has no wheezes. She has no rales. She exhibits no tenderness.   Abdominal: Soft. Bowel sounds are normal. She exhibits no distension. There is no tenderness. There is no rebound and no guarding.   Musculoskeletal: Normal range of motion.    Lymphadenopathy:     She has no cervical adenopathy.   Neurological: She is alert and oriented to person, place, and time.   Skin: Skin is warm and dry. She is not diaphoretic.   Psychiatric: She has a normal mood and affect. Her behavior is normal. Judgment and thought content normal.       Assessment:       Ibeth was seen today for follow-up.    Diagnoses and all orders for this visit:    Iron deficiency  -     Iron and TIBC; Future  -     Ferritin; Future    Dyslipidemia  -     CBC auto differential; Future  -     Comprehensive metabolic panel; Future  -     Lipid panel; Future    Hypothyroidism, unspecified type  -     TSH; Future    Alcohol abuse      Plan:     Ibeth was seen today for follow-up.    Diagnoses and all orders for this visit:    Iron deficiency  -     Iron and TIBC; Future  -     Ferritin; Future    Dyslipidemia  -     CBC auto differential; Future  -     Comprehensive metabolic panel; Future  -     Lipid panel; Future    Hypothyroidism, unspecified type  -     TSH; Future     labs   Diet and ex   Labs   Cut back on ETOh

## 2018-09-21 ENCOUNTER — OUTPATIENT CASE MANAGEMENT (OUTPATIENT)
Dept: ADMINISTRATIVE | Facility: OTHER | Age: 68
End: 2018-09-21

## 2018-09-21 NOTE — PROGRESS NOTES
Summary:1st Attempt to complete Social Work Assessment for Outpatient Care Management; left message requesting a return call.  LCSW will reattempt at a later date.      Interventions:Left message.     Plan:Follow up next week to complete assessment.

## 2018-09-24 ENCOUNTER — TELEPHONE (OUTPATIENT)
Dept: FAMILY MEDICINE | Facility: CLINIC | Age: 68
End: 2018-09-24

## 2018-09-24 DIAGNOSIS — E03.9 HYPOTHYROIDISM, UNSPECIFIED TYPE: Primary | ICD-10-CM

## 2018-09-24 RX ORDER — LEVOTHYROXINE SODIUM 88 UG/1
TABLET ORAL
Qty: 90 TABLET | Refills: 0 | Status: SHIPPED | OUTPATIENT
Start: 2018-09-24 | End: 2019-01-13 | Stop reason: SDUPTHER

## 2018-09-26 ENCOUNTER — OUTPATIENT CASE MANAGEMENT (OUTPATIENT)
Dept: ADMINISTRATIVE | Facility: OTHER | Age: 68
End: 2018-09-26

## 2018-09-26 NOTE — PROGRESS NOTES
"Summary:LCSW received referral from OPCM RN, Alta Raymond, for disaster planning. LCSW phoned patient's , Marino Schroeder. He stated that he was at work and is unable to complete the assessment. Reviewed reason for the referral. He stated that they are "alright" on having a disaster plan. He declined social work services at this time. Offered to call patient. He stated that she has short term memory loss and would not be able to complete the assessment.     Interventions:Offered social work assessment, patient's  declined.     Plan:Case Closure.         "

## 2018-10-01 ENCOUNTER — OUTPATIENT CASE MANAGEMENT (OUTPATIENT)
Dept: ADMINISTRATIVE | Facility: OTHER | Age: 68
End: 2018-10-01

## 2018-10-01 ENCOUNTER — TELEPHONE (OUTPATIENT)
Dept: PULMONOLOGY | Facility: CLINIC | Age: 68
End: 2018-10-01

## 2018-10-01 DIAGNOSIS — J44.9 STAGE 3 SEVERE COPD BY GOLD CLASSIFICATION: Primary | Chronic | ICD-10-CM

## 2018-10-01 RX ORDER — ALBUTEROL SULFATE 0.63 MG/3ML
0.63 SOLUTION RESPIRATORY (INHALATION) EVERY 6 HOURS PRN
Qty: 1 BOX | Refills: 0 | Status: SHIPPED | OUTPATIENT
Start: 2018-10-01 | End: 2019-10-01

## 2018-10-01 NOTE — TELEPHONE ENCOUNTER
----- Message from Alta Raymond RN sent at 10/1/2018  3:55 PM CDT -----  Hi. This is Alta Raymond RN. I am with the Outpatient Case Management team with [facility]. I spoke with patients /Caregiver Marino Schroeder today on the telephone.    Patients  states patient had difficulty with SOB this past weekend and was inquiring regarding having a rescue portable nebulizer for patients home use?     Please notify patient of your recommendations.     Thank you,  Alta Raymond RN

## 2018-10-01 NOTE — PROGRESS NOTES
10/1   Summary:  Contacted patient's  Marino Schroeder by phone today for follow up visit. Patients  reports patient takes all of her medications as directed, now taking FE dose daily.  Patients  states patient had some difficulty with SOB this past weekend and he almost brought her to ED.  inquired regarding obtaining rescue portable nebulizer for home use? OPCM sent in basket message to Dr. Barrett regarding same. We discussed s/s of a COPD exacerbation, ie. wheezing, SOB, productive or severe cough. We discussed triggers of exacerbation, ie. poor air quality and heat. We discussed oxygen safety precautions, ie. no petroleum or vapor rub, standing 02 tanks upright and not having w/i 5 ft of open flame and  voiced understanding/agreement. Patients  denies falls since our last visit. Continued education on fall prevention/home safety, ie. good lighting, clear pathways, no throw rugs and no electrical cords and patient voiced understanding/agreement. Patients  states they received educational materials mailed. Encouraged patient/caregiver to communicate with physician and call this RN if having any questions/concerns. This RN will continue to follow. Momo, LEANNA OPCM        Interventions:  Continued education on COPD and Fall prevention  In basket message to Dr. Barrett regarding nebulizer for home use     Plan:  Continue education on COPD and Fall prevention  F/u on Referral to Pharmacy Assistance program for cost of Trelegy Ellipta inhaler complete  F/u on In basket message to Dr. Galindo regarding Keppra dose (patient is only taking 500 mg QD) complete  F/u on In basket message to Dr. Bliss regarding Fe So4 frequency (Patisnt is only taking every other day) complete  F/u on Referral to [facility] for Disaster Planning complete  F/u on Referral for Pulmonary Disease Management class (speak to )      OPCM Self-Management Care Plan was reviewed with the patient. Goals  were reviewed with the patient and the patient has agreed to work towards these goals to improve his/her overall well-being. Patient verbalized understanding of the care plan, goals, and all of today's instructions. Encouraged patient/caregiver to communicate with his/her physician and health care team about health conditions and the treatment plan.  Provided my contact information today and encouraged patient/caregiver to call me with any questions as needed.

## 2018-10-03 ENCOUNTER — OFFICE VISIT (OUTPATIENT)
Dept: NEUROLOGY | Facility: CLINIC | Age: 68
End: 2018-10-03
Payer: MEDICARE

## 2018-10-03 VITALS
SYSTOLIC BLOOD PRESSURE: 120 MMHG | BODY MASS INDEX: 19.03 KG/M2 | WEIGHT: 114.19 LBS | DIASTOLIC BLOOD PRESSURE: 80 MMHG | HEART RATE: 80 BPM | HEIGHT: 65 IN

## 2018-10-03 DIAGNOSIS — F03.90 MAJOR NEUROCOGNITIVE DISORDER: Primary | ICD-10-CM

## 2018-10-03 DIAGNOSIS — Z87.898 HISTORY OF SEIZURE: Chronic | ICD-10-CM

## 2018-10-03 PROCEDURE — 1101F PT FALLS ASSESS-DOCD LE1/YR: CPT | Mod: CPTII,,, | Performed by: PSYCHIATRY & NEUROLOGY

## 2018-10-03 PROCEDURE — 3074F SYST BP LT 130 MM HG: CPT | Mod: CPTII,,, | Performed by: PSYCHIATRY & NEUROLOGY

## 2018-10-03 PROCEDURE — 99213 OFFICE O/P EST LOW 20 MIN: CPT | Mod: PBBFAC | Performed by: PSYCHIATRY & NEUROLOGY

## 2018-10-03 PROCEDURE — 3079F DIAST BP 80-89 MM HG: CPT | Mod: CPTII,,, | Performed by: PSYCHIATRY & NEUROLOGY

## 2018-10-03 PROCEDURE — 99999 PR PBB SHADOW E&M-EST. PATIENT-LVL III: CPT | Mod: PBBFAC,,, | Performed by: PSYCHIATRY & NEUROLOGY

## 2018-10-03 PROCEDURE — 99204 OFFICE O/P NEW MOD 45 MIN: CPT | Mod: S$PBB,,, | Performed by: PSYCHIATRY & NEUROLOGY

## 2018-10-03 NOTE — PROGRESS NOTES
Subjective:      Patient ID: Ibeth Schroeder is a 68 y.o. female.    Chief Complaint:   She had a seizure while she was taking Levaquin and Wellbutrin    The patient and her  indicate that the patient had a recent episode of pneumonia several months ago that was treated as an outpatient with appropriate antibiotics.  However 2 months ago, she had a recurrence of the pneumonia that required hospitalization.  Upon completion of that hospitalization, she was sent home on Levaquin.  The patient states that he was awakened in the night with the patient making a coughing sound but when he investigated he noted that she was spitting up blood and was unresponsive.  She was taken to the hospital emergency room where she was seen by a neurologist who performed an examination, obtain MRI of the brain which demonstrated some older changes but no acute changes, and then had an EEG done which showed only generalized slowing without seizure activity.  It was the neurologist opinion at that time that the seizure was probably provoked by the combination of Levaquin and Wellbutrin.  However, she was placed on Keppra for the past 2 months, but this is now been discontinued.    Since that event, the patient has not had any further seizure or unexplained loss of consciousness.  She has been  Independent with activities of daily living.  She however does have known and pre-existing cognitive deficits related to a prior severe acute illness associated with acute liver failure several years ago.  During that illness, she had ventricular catheter placed for management of increased intracranial pressure.  The more recent MRI does demonstrate some scarring that would be consistent with that prior catheter placement.  Since the illness with the liver failure, the patient has had significant cognitive deficits, but the  is of the opinion that these deficits are not progressive in nature.  The primary deficit is that of recent  memory.          ROS:  GENERAL: NO FEVER, CHILLS, FATIGABILITY OR WEIGHT LOSS.  SKIN: NO RASHES, ITCHING OR CHANGES IN COLOR OR TEXTURE OF SKIN.  HEAD: NO HEADACHES OR RECENT HEAD TRAUMA.  EYES: VISUAL ACUITY FINE. NO PHOTOPHOBIA, OCULAR PAIN OR DIPLOPIA.  EARS: DENIES EAR PAIN, DISCHARGE OR VERTIGO.  NOSE: NO LOSS OF SMELL, NO EPISTAXIS OR POSTNASAL DRIP.  MOUTH & THROAT: NO HOARSENESS OR CHANGE IN VOICE. NO EXCESSIVE GUM BLEEDING.  NODES: DENIES SWOLLEN GLANDS.  CHEST: DENIES CYANOSIS, WHEEZING, COUGH AND SPUTUM PRODUCTION.  CARDIOVASCULAR: DENIES CHEST PAIN, PND, ORTHOPNEA OR REDUCED EXERCISE TOLERANCE.  ABDOMEN: APPETITE FINE. NO WEIGHT LOSS. DENIES DIARRHEA, ABDOMINAL PAIN, HEMATEMESIS OR BLOOD IN STOOL.  URINARY: NO FLANK PAIN, DYSURIA OR HEMATURIA.  PERIPHERAL VASCULAR: NO CLAUDICATION OR CYANOSIS.  MUSCULOSKELETAL: NO JOINT STIFFNESS OR SWELLING. DENIES BACK PAIN.  NEUROLOGIC: NO HISTORY OF  PARALYSIS, ALTERATION OF GAIT OR COORDINATION.      Past Medical History:   Diagnosis Date    Acute on chronic respiratory failure with hypoxia 7/30/2018    Acute pancreatitis     Alcohol dependence     per patient in the past    Allergy     Anemia     Anxiety     Arthritis 6/24/2013    Asthma     per patient    Back pain     Closed avulsion fracture of anterior inferior iliac spine of pelvis 7/17/2017 7/16/17 CT abd pelvis- Acute comminuted fracture involving the left iliac bone, new since the prior exam.  No other pelvic fractures seen.    Closed fracture of right iliac wing 7/18/2017    Colon polyp     Colon polyp     colonoscopy 8/26/2013    COPD (chronic obstructive pulmonary disease)     Dependence on renal dialysis 2012    when in liver failure    Depression     Disorder of kidney and ureter     ckd4    Diverticulosis     Diverticulosis     colonoscopy 8/26/2013    Hiatal hernia     CXR 2/25/2015--Large hiatal hernia.     Hyperlipidemia     Hypertension     Hypocalcemia 7/6/2016     Hypothyroidism 6/24/2013    Kidney cysts     us retro 7/18/2018-Simple bilateral renal cysts as above.  No acute findings.   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 FEV1 is 1.08 (44% predicted) FVC is 2.25 (71% predicted) FEV1/FVC is 48  She uses 2 pillows at night. Patient currently is on oxygen at 2 L/min per nasal cannula at night  Last visit overnight sat result:Overnight sat: 9 hours of sampling with room air . Her saturation was below 88% for 30 minutes  2/25/15 Overnight sat  indicated supplementary O2 r    Osteoporosis     Pneumonia due to infectious organism 7/29/2018    Pulmonary embolism     per patient unsure of date    Recurrent major depressive disorder, in partial remission 4/20/2016    Restless leg syndrome     RLS (restless legs syndrome)     Seizure 6/24/2013    Stage 3 severe COPD by GOLD classification 3/16/2016    Spirometry 3/16/2016---Physician Interpretation Moderately severe obstruction (FEV1>50 and <59% of predicted).compared to previous study performed 2/25/2015 FEV1 improved by 24%, FVC improved by 15%.    Stroke     ischemic injury due to hypotension    Tobacco dependence     resolved    Trouble in sleeping      Past Surgical History:   Procedure Laterality Date    ADENOIDECTOMY  1960 approx    amputation left 2nd finger tip Left 2012    for blood clot/ after liver failure admit ? gangrene    APPENDECTOMY      incidental with another surgery    na hole/ ventriculostomy Right 08/24/2012    frontal after  liver failure    CHOLECYSTECTOMY      COLONOSCOPY  2013    COLONOSCOPY N/A 8/26/2013    [facility]    EXCISION-CYST Left 9/6/2017    Performed by Binu Lizarraga Sr., [facility] at [facility] OR    FRACTURE SURGERY Left 07/12/2017    radius and ulna fracture    FRACTURE SURGERY Left 09/12/2017    wrist removal of hardware    BRENDEN FILTER PLACEMENT      HERNIA REPAIR  1999 approx     incisional hernia     HYSTERECTOMY  1977 approx    RAS/BSO  for endometriosis and tubular pregnancy     JOINT REPLACEMENT Left 2000 approx    hip for arthritis     JOINT REPLACEMENT Left 2004 approx    hip, fell then recurent dislocations - 4 times relocarted under mild anesthesia then re replaced    JOINT REPLACEMENT Right 2002 approx    knee for arthritis    OOPHORECTOMY  1977 approx    OPEN REDUCTION INTERNAL FIXATION-RADIUS Left 7/12/2017    [facility]    OPEN REDUCTION INTERNAL FIXATION-ULNA Left 7/12/2017    [facility] OR    REMOVAL-HARDWARE Left 9/6/2017    [facility] OR    SYNOVECTOMY-WRIST Left 9/6/2017    [facility] OR    TONSILLECTOMY  1960 approx     Family History   Problem Relation Age of Onset    Diabetes Mother     Hypertension Mother     Kidney disease Mother         stones    Parkinsonism Mother     Asthma Father     Heart disease Father     Diabetes Son     Hypertension Son     Heart disease Maternal Aunt         CAD     Cancer Maternal Aunt         Breast Ca    Breast cancer Maternal Aunt     Colon cancer Maternal Grandmother     Cancer Maternal Grandmother     Cancer Maternal Aunt         breast    Heart disease Maternal Aunt     Breast cancer Maternal Aunt     Alcohol abuse Maternal Uncle     Stroke Neg Hx     Drug abuse Neg Hx     Mental retardation Neg Hx     Mental illness Neg Hx      Social History     Socioeconomic History    Marital status:      Spouse name: Not on file    Number of children: Not on file    Years of education: Not on file    Highest education level: Not on file   Social Needs    Financial resource strain: Not on file    Food insecurity - worry: Not on file    Food insecurity - inability: Not on file    Transportation needs - medical: Not on file    Transportation needs - non-medical: Not on  file   Occupational History    Not on file   Tobacco Use    Smoking status: Former Smoker     Packs/day: 1.00     Years: 30.00     Pack years: 30.00     Types: Cigarettes     Last attempt to quit: 2010     Years since quittin.1    Smokeless tobacco: Never Used    Tobacco comment: used nicorette gum in past and prn   Substance and Sexual Activity    Alcohol use: Yes     Alcohol/week: 3.0 - 3.6 oz     Types: 5 - 6 Shots of liquor per week    Drug use: No    Sexual activity: Yes     Partners: Male     Birth control/protection: See Surgical Hx   Other Topics Concern    Not on file   Social History Narrative    . One son  In good health. Does not drive Poor vision left eye. Retired from [employer] work/[employer]. Did AD when in ICU in .          Objective:   PE:   VITAL SIGNS:   Vitals:    10/03/18 1309   BP: 120/80   Pulse: 80     APPEARANCE: WELL NOURISHED, WELL DEVELOPED, IN NO ACUTE DISTRESS.    HEAD: NORMOCEPHALIC, ATRAUMATIC.  EYES: PERRL. EOMI.  NON-ICTERIC SCLERAE.    EARS: TM'S INTACT. LIGHT REFLEX NORMAL. NO RETRACTION OR PERFORATION.    NOSE: MUCOSA PINK. AIRWAY CLEAR.  MOUTH & THROAT: NO TONSILLAR ENLARGEMENT. NO PHARYNGEAL ERYTHEMA OR EXUDATE. NO STRIDOR.  NECK: SUPPLE. NO BRUITS.  CHEST: LUNGS CLEAR TO AUSCULTATION.  CARDIOVASCULAR: REGULAR RHYTHM WITHOUT SIGNIFICANT MURMURS.  ABDOMEN: BOWEL SOUNDS NORMAL. NOT DISTENDED.   MUSCULOSKELETAL:  NO BONY DEFORMITY SEEN.  MUSCLE TONE   AND MUSCLE MASS ARE NORMAL IN BOTH UPPER AND LOWER EXTREMITIES CONSIDERING PATIENT'S HEIGHT AND WEIGHT AS WELL AS AGE.    NEUROLOGIC:   MENTAL STATUS:  THE PATIENT IS WELL ORIENTED TO PERSON, TIME, PLACE, AND SITUATION.   THE PATIENT WAS UNABLE TO RECALL 3 UNRELATED WORDS AT 3 MIN OR 5 MIN.  THE PATIENT STATES THAT THIS IS VERY COMMON FOR HER NOT TO REMEMBER AND THIS IS NO DIFFERENT THAN WHAT SHE IS NOTED IN THE PAST.  SHE IS ABLE TO FOLLOW COMMANDS WITHOUT DIFFICULTY.  HER SPEECH IS CLEAR AND ARTICULATE.  SHE  DID NOT SEEM TO HAVE ANY DIFFICULTY WITH WORD FINDING. THE PATIENT IS ATTENTIVE TO THE ENVIRONMENT AND COOPERATIVE FOR THE EXAM.  CRANIAL NERVES: II-XII GROSSLY INTACT. FUNDOSCOPIC EXAM IS NORMAL.  NO HEMORRHAGE, EXUDATE OR PAPILLEDEMA IS PRESENT. THE EXTRAOCULAR MUSCLES ARE INTACT IN THE CARDINAL DIRECTIONS OF GAZE.  NO PTOSIS IS PRESENT. FACIAL FEATURES ARE SYMMETRICAL.  SPEECH IS NORMAL IN FLUENCY, DICTION, AND PHRASING.  TONGUE PROTRUDES IN THE MIDLINE.    GAIT AND STATION:    THE PATIENT WALKS WITH HAND-HELD ASSIST.  HER GAIT IS SLIGHTLY WIDE-BASED AND ATAXIC.  MOTOR:  NO DOWNDRIFT OF EITHER ARM WHEN HELD AT SHOULDER LEVEL.  MANUAL MUSCLE TESTING OF PROXIMAL AND DISTAL MUSCLES OF BOTH UPPER AND LOWER EXTREMITIES IS NORMAL. MUSCLE MASS IS NORMAL.  MUSCLE TONE IS NORMAL.  SENSORY:  INTACT BOTH UPPER AND LOWER EXTREMITIES TO PIN PRICK, TOUCH, AND VIBRATION.  CEREBELLAR:  FINGER TO NOSE DONE WELL.  ALTERNATING MOVEMENTS INTACT.  NO INVOLUNTARY MOVEMENTS OR TREMOR SEEN.  REFLEXES:  STRETCH REFLEXES ARE 2+ BOTH UPPER AND LOWER EXTREMITIES.  PLANTAR STIMULATION IS FLEXOR BILATERALLY AND NO PATHOLOGICAL REFLEXES ARE SEEN              Assessment:   No diagnosis found.     1.  History of hepatic encephalopathy with residual cognitive deficits  2. History of generalized seizure, medication induced (Levaquin and Wellbutrin )                Plan:    based upon the patient's history, I do not think she needs to be kept on Keppra chronically.  She obviously has an increased risk of seizure which the patient and her  clearly understand, but they want to withdraw from seizure medications if at all possible.  The patient has been advised that she is at risk for seizure simply because of the previous surgical procedure on the brain.  She was advised to monitor closely and if any event occurs that suggests the presence of seizure, we would then consider anticonvulsant therapy at that time.  She is to return to Neurology as  needed.    This was a 35 min visit with the patient with over 50% of the time spent counseling the patient and her  regarding the history of this seizure and the risk of future seizure.  This note is generated with speech recognition software and is subject to transcription error and sound alike phrases that may be missed by proofreading.

## 2018-10-12 ENCOUNTER — OUTPATIENT CASE MANAGEMENT (OUTPATIENT)
Dept: ADMINISTRATIVE | Facility: OTHER | Age: 68
End: 2018-10-12

## 2018-10-12 NOTE — PROGRESS NOTES
10/12  Summary:  Contacted patient's  Marino Schroeder by phone today for follow up visit. Patients  reports patient is doing very well.  reports patient is only using 02 at night.  states they reeived nebulizer for home use as well as the medication for it.  states now they will be ready if patient has any difficulties. We continued discussions on s/s of COPD Exacerbation, ie. wheezing, SOB, severe or productive cough with yellow or greenish colored sputum or fever and triggers to avoid.  is agreeable to Pulmonary rehab referral and states they may have already taken the class he does not remember which ones they took.  reports patient takes all of her medications as directed.  denies falls since our last visit. Continued education on fall prevention/home safety, ie. good lighting, clear pathways, no throw rugs and no electrical cords and patient voiced understanding/agreement. Encouraged patient/caregiver to communicate with physician and call this RN if having any questions/concerns. This RN will continue to follow. LEANNA Barr OPCM         Interventions:  Continued education on COPD and Fall prevention  Referral for Pulmonary Rehab Disease Management class     Plan:  Continue education on COPD and Fall prevention and oxygen safety  F/u on Referral for Pulmonary Disease Management class      OPCM Self-Management Care Plan was reviewed with the patient. Goals were reviewed with the patient and the patient has agreed to work towards these goals to improve his/her overall well-being. Patient verbalized understanding of the care plan, goals, and all of today's instructions. Encouraged patient/caregiver to communicate with his/her physician and health care team about health conditions and the treatment plan.  Provided my contact information today and encouraged patient/caregiver to call me with any questions as needed.
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
## 2018-10-25 ENCOUNTER — OFFICE VISIT (OUTPATIENT)
Dept: PSYCHIATRY | Facility: CLINIC | Age: 68
End: 2018-10-25
Payer: MEDICARE

## 2018-10-25 DIAGNOSIS — F03.90 MAJOR NEUROCOGNITIVE DISORDER: Primary | ICD-10-CM

## 2018-10-25 DIAGNOSIS — G47.00 INSOMNIA, UNSPECIFIED TYPE: ICD-10-CM

## 2018-10-25 PROCEDURE — 99213 OFFICE O/P EST LOW 20 MIN: CPT | Mod: S$GLB,,, | Performed by: PSYCHIATRY & NEUROLOGY

## 2018-10-25 PROCEDURE — 99499 UNLISTED E&M SERVICE: CPT | Mod: HCNC,S$GLB,, | Performed by: PSYCHIATRY & NEUROLOGY

## 2018-10-25 PROCEDURE — 1101F PT FALLS ASSESS-DOCD LE1/YR: CPT | Mod: CPTII,S$GLB,, | Performed by: PSYCHIATRY & NEUROLOGY

## 2018-10-25 PROCEDURE — 99999 PR PBB SHADOW E&M-EST. PATIENT-LVL I: CPT | Mod: PBBFAC,,, | Performed by: PSYCHIATRY & NEUROLOGY

## 2018-10-25 RX ORDER — BUSPIRONE HYDROCHLORIDE 15 MG/1
7.5 TABLET ORAL 2 TIMES DAILY
Qty: 30 TABLET | Refills: 2 | Status: SHIPPED | OUTPATIENT
Start: 2018-10-25 | End: 2019-01-24 | Stop reason: SDUPTHER

## 2018-10-25 RX ORDER — TRAZODONE HYDROCHLORIDE 50 MG/1
TABLET ORAL
Qty: 30 TABLET | Refills: 2 | Status: SHIPPED | OUTPATIENT
Start: 2018-10-25 | End: 2019-01-24 | Stop reason: SDUPTHER

## 2018-10-29 ENCOUNTER — PATIENT OUTREACH (OUTPATIENT)
Dept: PULMONOLOGY | Facility: CLINIC | Age: 68
End: 2018-10-29

## 2018-10-29 NOTE — TELEPHONE ENCOUNTER
Chronic Disease Management  Called patient to complete Pulmonary Disease Management Questionnaire. Spoke with  Patients .  Stated patient is doing well at this time.     states patient is compliant with controller medications.

## 2018-11-01 ENCOUNTER — LAB VISIT (OUTPATIENT)
Dept: LAB | Facility: HOSPITAL | Age: 68
End: 2018-11-01
Attending: INTERNAL MEDICINE
Payer: MEDICARE

## 2018-11-01 ENCOUNTER — OUTPATIENT CASE MANAGEMENT (OUTPATIENT)
Dept: ADMINISTRATIVE | Facility: OTHER | Age: 68
End: 2018-11-01

## 2018-11-01 DIAGNOSIS — R80.1 PERSISTENT PROTEINURIA: ICD-10-CM

## 2018-11-01 DIAGNOSIS — E87.20 ACIDOSIS: ICD-10-CM

## 2018-11-01 DIAGNOSIS — E87.5 HYPERKALEMIA: ICD-10-CM

## 2018-11-01 DIAGNOSIS — E83.52 HYPERCALCEMIA: ICD-10-CM

## 2018-11-01 DIAGNOSIS — M1A.3710 CHRONIC GOUT DUE TO RENAL IMPAIRMENT INVOLVING TOE OF RIGHT FOOT WITHOUT TOPHUS: ICD-10-CM

## 2018-11-01 DIAGNOSIS — N18.30 STAGE 3 CHRONIC KIDNEY DISEASE: ICD-10-CM

## 2018-11-01 DIAGNOSIS — N25.81 HYPERPARATHYROIDISM, SECONDARY RENAL: ICD-10-CM

## 2018-11-01 LAB
CREAT UR-MCNC: 99 MG/DL
PROT UR-MCNC: 95 MG/DL
PROT/CREAT UR: 0.96 MG/G{CREAT}

## 2018-11-01 PROCEDURE — 81001 URINALYSIS AUTO W/SCOPE: CPT

## 2018-11-01 PROCEDURE — 82570 ASSAY OF URINE CREATININE: CPT

## 2018-11-01 NOTE — PROGRESS NOTES
11/1  Summary:  Contacted patient's  Marino Schroeder by phone today for follow up visit. Patients  reports patient is has been feeling tired but no report of s/s of COPD exacerbation.  reports patient continues to only using 02 at night and nebulizer as needed during the day. We continued discussions on s/s of COPD Exacerbation, ie. wheezing, SOB, severe or productive cough with yellow or greenish colored sputum or fever and triggers to avoid.  states he did receive call from Pulmonary nurse regarding class with Pulmonary rehab.  reports patient takes all of her medications as directed.  denies falls since our last visit. Continued education on fall prevention/home safety, ie. good lighting, clear pathways, no throw rugs and no electrical cords and patient voiced understanding/agreement.  reports they have only electric in their home so no danger for 02 use. We discussed oxygen safety measures, ie. No smoking, or using vappor rubs, petroleum jelly, or oil-based hand lotion, have a fire extinguisher nearby. Encouraged patient/caregiver to communicate with physician and call this RN if having any questions/concerns. This RN will continue to follow. LEANNA Barr OPCM      Interventions:  Continued education on COPD and Fall prevention     Plan:  Continue education on COPD and Fall prevention and oxygen safety       OPCM Self-Management Care Plan was reviewed with the patient. Goals were reviewed with the patient and the patient has agreed to work towards these goals to improve his/her overall well-being. Patient verbalized understanding of the care plan, goals, and all of today's instructions. Encouraged patient/caregiver to communicate with his/her physician and health care team about health conditions and the treatment plan.  Provided my contact information today and encouraged patient/caregiver to call me with any questions as needed.

## 2018-11-02 LAB
BACTERIA #/AREA URNS AUTO: ABNORMAL /HPF
BILIRUB UR QL STRIP: NEGATIVE
CLARITY UR REFRACT.AUTO: CLEAR
COLOR UR AUTO: YELLOW
GLUCOSE UR QL STRIP: NEGATIVE
HGB UR QL STRIP: NEGATIVE
HYALINE CASTS UR QL AUTO: 4 /LPF
KETONES UR QL STRIP: NEGATIVE
LEUKOCYTE ESTERASE UR QL STRIP: NEGATIVE
MICROSCOPIC COMMENT: ABNORMAL
NITRITE UR QL STRIP: NEGATIVE
NON-SQ EPI CELLS #/AREA URNS AUTO: <1 /HPF
PH UR STRIP: 7 [PH] (ref 5–8)
PROT UR QL STRIP: ABNORMAL
RBC #/AREA URNS AUTO: 0 /HPF (ref 0–4)
SP GR UR STRIP: 1.01 (ref 1–1.03)
SQUAMOUS #/AREA URNS AUTO: 1 /HPF
URN SPEC COLLECT METH UR: ABNORMAL
WBC #/AREA URNS AUTO: 4 /HPF (ref 0–5)

## 2018-11-06 ENCOUNTER — OFFICE VISIT (OUTPATIENT)
Dept: NEPHROLOGY | Facility: CLINIC | Age: 68
End: 2018-11-06
Payer: MEDICARE

## 2018-11-06 VITALS
HEART RATE: 67 BPM | HEIGHT: 65 IN | WEIGHT: 113.13 LBS | DIASTOLIC BLOOD PRESSURE: 76 MMHG | BODY MASS INDEX: 18.85 KG/M2 | SYSTOLIC BLOOD PRESSURE: 100 MMHG

## 2018-11-06 DIAGNOSIS — I71.40 ABDOMINAL AORTIC ANEURYSM (AAA) WITHOUT RUPTURE: ICD-10-CM

## 2018-11-06 DIAGNOSIS — E87.20 ACIDOSIS: ICD-10-CM

## 2018-11-06 DIAGNOSIS — E87.5 HYPERKALEMIA: ICD-10-CM

## 2018-11-06 DIAGNOSIS — N18.30 STAGE 3 CHRONIC KIDNEY DISEASE: Primary | ICD-10-CM

## 2018-11-06 DIAGNOSIS — M1A.3710 CHRONIC GOUT DUE TO RENAL IMPAIRMENT INVOLVING TOE OF RIGHT FOOT WITHOUT TOPHUS: ICD-10-CM

## 2018-11-06 DIAGNOSIS — R80.1 PERSISTENT PROTEINURIA: ICD-10-CM

## 2018-11-06 DIAGNOSIS — E83.52 HYPERCALCEMIA: ICD-10-CM

## 2018-11-06 DIAGNOSIS — J44.9 MODERATE COPD (CHRONIC OBSTRUCTIVE PULMONARY DISEASE): ICD-10-CM

## 2018-11-06 DIAGNOSIS — N25.81 HYPERPARATHYROIDISM, SECONDARY RENAL: ICD-10-CM

## 2018-11-06 PROCEDURE — 99214 OFFICE O/P EST MOD 30 MIN: CPT | Mod: S$GLB,,, | Performed by: INTERNAL MEDICINE

## 2018-11-06 PROCEDURE — 99999 PR PBB SHADOW E&M-EST. PATIENT-LVL III: CPT | Mod: PBBFAC,,, | Performed by: INTERNAL MEDICINE

## 2018-11-06 PROCEDURE — 3074F SYST BP LT 130 MM HG: CPT | Mod: CPTII,S$GLB,, | Performed by: INTERNAL MEDICINE

## 2018-11-06 PROCEDURE — 1101F PT FALLS ASSESS-DOCD LE1/YR: CPT | Mod: CPTII,S$GLB,, | Performed by: INTERNAL MEDICINE

## 2018-11-06 PROCEDURE — 99499 UNLISTED E&M SERVICE: CPT | Mod: HCNC,S$GLB,, | Performed by: INTERNAL MEDICINE

## 2018-11-06 PROCEDURE — 3078F DIAST BP <80 MM HG: CPT | Mod: CPTII,S$GLB,, | Performed by: INTERNAL MEDICINE

## 2018-11-06 RX ORDER — AMLODIPINE BESYLATE 2.5 MG/1
2.5 TABLET ORAL DAILY
Qty: 90 TABLET | Refills: 3 | Status: SHIPPED | OUTPATIENT
Start: 2018-11-06 | End: 2019-11-06

## 2018-11-06 RX ORDER — CALCITRIOL 0.25 UG/1
0.25 CAPSULE ORAL
Qty: 30 CAPSULE | Refills: 5 | Status: SHIPPED | OUTPATIENT
Start: 2018-11-07

## 2018-11-06 RX ORDER — DIPHENOXYLATE HYDROCHLORIDE AND ATROPINE SULFATE 2.5; .025 MG/1; MG/1
TABLET ORAL
Qty: 60 TABLET | Refills: 2 | Status: SHIPPED | OUTPATIENT
Start: 2018-11-06

## 2018-11-06 NOTE — PROGRESS NOTES
Subjective:       Patient ID: Ibeth Schroeder is a 68 y.o. White female who presents for follow up of Chronic Kidney Disease and Edema    Hypertension   Pertinent negatives include no chest pain, headaches, palpitations or shortness of breath.   Anemia   There has been no bruising/bleeding easily, fever or palpitations.   Edema   Pertinent negatives include no arthralgias, chest pain, chills, congestion, coughing, diaphoresis, fatigue, fever, headaches, joint swelling, nausea, numbness, rash or vomiting.        Patient is a white female with severe COPD and history of acute kidney injury status post hemodialysis in the year 2012.  She has been followed in the nephrology division.  She was last seen by Dr. Jara in August 2016 when her creatinine was higher.  Today she comes back for follow-up as a new patient to me.  Patient has been having some wheezing.  Patient has lost 5 pounds in last 6 months unintentionally.  Patient has not been drinking very well.  Her creatinine is higher at 1.9.  Patient overall is a very poor historian.  Denies any fevers and chills.  Denied any urinary symptoms.  Denies any cough or sputum.    September 2016 acute kidney injury--- ACE inhibitor was stopped    October 2016 creatinine came down to 1.6; renal ultrasound showed echogenic kidneys with possible renal artery stenosis; AAA of size of 4.0 cm    January 2017: Recurrent UTI instructions given, mild hypercalcemia over-the-counter vitamin D stopped, continued with calcitriol for hyperparathyroidism    April 2017 Norvasc was stopped due to relative hypotension    August 2017 GFR is 28% based on Cystatin C with a corresponding creatinine of 1.6; creatinine in the last 6 months have been fluctuating between 1.6 and 2.2.  There was one creatinine of 0.9 which could have been a lab error. S/p fall with pelvic # and left wrist # and s/p plate ( dr. Lizarraga) pending holter today       April 2018 patient seen for follow-up: Reviewed records  from pain management, rheumatology for Prolia and management of gout, internal medicine records reviewed, urology records Dr. Garcia reviewed with stone protocol CT scan done in January 2018 showing renal cyst, pulmonary, psychiatry, hematology records also reviewed.  Last labs are from February 2018.  Calcium has gone up to 11.0.  Creatinine has gone up from 1.2 up to 1.7       July 2018 patient was hospitalized for acute shortness of breath due to pneumonia.  Patient was treated with bronchodilators and oxygen along with IV antibiotics.  Patient did not qualify for home oxygen.  Maximum creatinine was 1.7    August 2018 creatinine down to 1.3 GFR 36%.  Vitamin-D level is normal.  UA negative. ER visit for SZ ( dr. Bagley) due to levaquin/ tramadol/ buspar now on keppra pending follow up with dr. Dowd     Review of Systems   Constitutional: Negative for activity change, appetite change, chills, diaphoresis, fatigue, fever and unexpected weight change.   HENT: Negative for congestion, dental problem, drooling, postnasal drip, rhinorrhea and voice change.    Eyes: Negative for discharge.   Respiratory: Negative for apnea, cough, choking, chest tightness, shortness of breath, wheezing and stridor.    Cardiovascular: Negative for chest pain, palpitations and leg swelling.   Gastrointestinal: Negative for abdominal distention, blood in stool, constipation, diarrhea, nausea, rectal pain and vomiting.   Endocrine: Negative for cold intolerance, heat intolerance, polydipsia and polyuria.   Genitourinary: Negative for decreased urine volume, difficulty urinating, dysuria, enuresis, flank pain, frequency, hematuria and urgency.   Musculoskeletal: Negative for arthralgias, back pain, gait problem and joint swelling.   Skin: Negative for rash.   Allergic/Immunologic: Negative for food allergies and immunocompromised state.   Neurological: Negative for dizziness, tremors, syncope, numbness and headaches.   Hematological:  "Does not bruise/bleed easily.   Psychiatric/Behavioral: Negative for agitation, behavioral problems and self-injury. The patient is not nervous/anxious and is not hyperactive.    All other systems reviewed and are negative.      Objective:     /76   Pulse 67   Ht 5' 5" (1.651 m)   Wt 51.3 kg (113 lb 1.5 oz)   LMP  (LMP Unknown)   BMI 18.82 kg/m²      Physical Exam   Constitutional: She is oriented to person, place, and time. She appears well-developed and well-nourished.   HENT:   Head: Normocephalic and atraumatic.   Eyes: Conjunctivae and EOM are normal. Pupils are equal, round, and reactive to light.   Neck: Normal range of motion and full passive range of motion without pain. Neck supple. Carotid bruit is not present. No edema present. No thyroid mass and no thyromegaly present.   Cardiovascular: Normal rate, regular rhythm, S1 normal, S2 normal, normal heart sounds, intact distal pulses and normal pulses. PMI is not displaced. Exam reveals no friction rub.   No murmur heard.  Pulmonary/Chest: Effort normal and breath sounds normal. No accessory muscle usage. No respiratory distress. She has no wheezes. She has no rales. She exhibits no tenderness.   Abdominal: Soft. Bowel sounds are normal. She exhibits no distension and no mass. There is no hepatosplenomegaly. There is no tenderness. There is no rebound and no CVA tenderness. No hernia.   Musculoskeletal: Normal range of motion. She exhibits no edema or tenderness.   Neurological: She is alert and oriented to person, place, and time. She has normal reflexes. She displays normal reflexes. No cranial nerve deficit or sensory deficit. She exhibits normal muscle tone. Coordination normal.   Skin: Skin is warm and dry. No bruising, no ecchymosis and no rash noted. No cyanosis or erythema. No pallor. Nails show no clubbing.   Psychiatric: She has a normal mood and affect. Her speech is normal and behavior is normal. Judgment and thought content normal.    "      Lab Results   Component Value Date    CREATININE 1.3 11/01/2018    BUN 18 11/01/2018     11/01/2018    K 3.9 11/01/2018     11/01/2018    CO2 28 11/01/2018     Lab Results   Component Value Date    WBC 8.17 11/01/2018    HGB 11.3 (L) 11/01/2018    HCT 36.0 (L) 11/01/2018    MCV 97 11/01/2018     11/01/2018     Lab Results   Component Value Date    .0 (H) 11/01/2018    CALCIUM 7.2 (L) 11/01/2018    CAION 0.90 (L) 09/18/2012    PHOS 3.4 11/01/2018     Lab Results   Component Value Date    URICACID 4.8 11/01/2018       Assessment:    )    1. Stage 3 chronic kidney disease    2. Hyperparathyroidism, secondary renal    3. Hyperkalemia    4. Acidosis    5. Persistent proteinuria    6. 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 Hypercalcemia    8. Moderate COPD (chronic obstructive pulmonary disease)    9. Abdominal aortic aneurysm (AAA) without rupture        Plan:         1. Chronic kidney disease stage III: No heavy proteinuria.  Continue current conservative management.  No intervention for renal artery stenosis at this time.  Avoid ACE inhibitor and ARB at this time.  GFR 36% with a corresponding creatinine of 1.3.  Last months acute kidney injury with a creatinine up to 1.7 was due to shortness of breath from pneumonia. USD reviewed from 7/2018     2.  Hypertension with edema :   Norvasc lower 2.5 mg daily      3.  Anemia:on PO iron and IV iron per hematology --  follow up with dr. Mar      4  Proteinuria: Stable without  ARB or ACE inhibitor= 09G/24 hours    5. Hyperparathyroidism with hypercalcemia status post Prolia: restart calcitriol 0.25 mg MWF ( had high calcium last time and low today )       7. Gout: Continue on allopurinol 100 mg ---life long with uric acid target < 6 mg/ dl and it protects kidney and prevents gout : followed by rheumatology    8. CBD oil is ok             Follow up 3 months     Cristina Crowder MD

## 2018-11-16 ENCOUNTER — OUTPATIENT CASE MANAGEMENT (OUTPATIENT)
Dept: ADMINISTRATIVE | Facility: OTHER | Age: 68
End: 2018-11-16

## 2018-11-16 NOTE — PROGRESS NOTES
11/15  Summary:  Contacted patient's  Marino Schroeder by phone today for follow up visit. Patients  reports patient had f/u appt with Renal MD and he decreased her BP medication (Norvasc) and added Ca as her level was low.  denies s/s of COPD exacerbation.  reports patient continues to only using 02 at night and sometimes during the day if her 02 sat drops as they have pulse oximeter at home.  states he does not think the Trelegy is working as well for patient as her previous medication. Encouraged  to keepDr. Barrett informed so he can make adjustments to her regimen as needed and  voiced understanding/agreement.  states he will make an appointment with Dr. Barrett if patient starts feeling bad.  denies falls since our last visit and reports patient continues to participate in physical therapy. Encouraged patient/caregiver to communicate with physician and call this RN if having any questions/concerns. This RN will continue to follow. LEANNA Barr OPCM      Interventions:  Continued education on COPD and Fall prevention      Plan:  Continue education on COPD and Fall prevention         OPCM Self-Management Care Plan was reviewed with the patient. Goals were reviewed with the patient and the patient has agreed to work towards these goals to improve his/her overall well-being. Patient verbalized understanding of the care plan, goals, and all of today's instructions. Encouraged patient/caregiver to communicate with his/her physician and health care team about health conditions and the treatment plan.  Provided my contact information today and encouraged patient/caregiver to call me with any questions as needed.

## 2018-11-28 ENCOUNTER — PATIENT OUTREACH (OUTPATIENT)
Dept: PULMONOLOGY | Facility: CLINIC | Age: 68
End: 2018-11-28

## 2018-11-28 NOTE — TELEPHONE ENCOUNTER
Chronic Disease Management  Called patient to complete Pulmonary Disease Management Questionnaire. Spoke with patients .  He stated she is doing well at this time.  Uses nebulized tx less than once per week.  Scheduled 6 month f/stephen appt.     Time  spent with patient: 5 Minutes

## 2018-12-03 ENCOUNTER — OUTPATIENT CASE MANAGEMENT (OUTPATIENT)
Dept: ADMINISTRATIVE | Facility: OTHER | Age: 68
End: 2018-12-03

## 2018-12-03 NOTE — PROGRESS NOTES
12/3  Summary:  Contacted patient's  Marino Schroeder by phone today for follow up visit. Patients  reports patient is doing well w/ no s/s of COPD exacerbation.  reports patient continues to only use her 02 at night and  during the day as needed.  is aware of upcoming appointment with Dr. Barrett on 1/16 at 3:30. Encouraged patient/caregiver to communicate with physician and call this RN if having any questions/concerns.         Plan:  This patient will be closed by the OPCM RN, as the nursing centered patient goals have been met.

## 2019-01-02 RX ORDER — PRAVASTATIN SODIUM 10 MG/1
10 TABLET ORAL DAILY
Qty: 90 TABLET | Refills: 0 | Status: SHIPPED | OUTPATIENT
Start: 2019-01-02 | End: 2019-04-03 | Stop reason: SDUPTHER

## 2019-01-10 ENCOUNTER — TELEPHONE (OUTPATIENT)
Dept: PULMONOLOGY | Facility: CLINIC | Age: 69
End: 2019-01-10

## 2019-01-11 ENCOUNTER — PATIENT OUTREACH (OUTPATIENT)
Dept: PULMONOLOGY | Facility: CLINIC | Age: 69
End: 2019-01-11

## 2019-01-11 DIAGNOSIS — J44.9 STAGE 3 SEVERE COPD BY GOLD CLASSIFICATION: Chronic | ICD-10-CM

## 2019-01-11 NOTE — TELEPHONE ENCOUNTER
Chronic Disease Management  Called patient to complete Pulmonary Disease Management Questionnaire.    Stage 3 severe COPD by GOLD classification  Pulmonary heart disease  Exercise hypoxemia  Hypoxemia requiring supplemental oxygen    Patient's  (Marino) stated that he has to pay 2 co-pays for the Trelegy and wanted to know if there was any other medication that was less expensive. He also stated that his wife is doing better since on the Trelegy.     I called [facility] and they stated they would try to get prior authorization for Trelegy and work to see if they can reduce out of pocket expense for the patient.  Time  spent with patient:  20 Minutes

## 2019-01-14 RX ORDER — LEVOTHYROXINE SODIUM 88 UG/1
TABLET ORAL
Qty: 90 TABLET | Refills: 0 | Status: SHIPPED | OUTPATIENT
Start: 2019-01-14

## 2019-01-14 NOTE — TELEPHONE ENCOUNTER
HOW TO QUIT SMOKING    Smoking is one of the hardest habits to break. About half of all those who have ever smoked have been able to quit, and most of those (about 70%) who still smoke want to quit. Here are some of the best ways to stop smoking.          KEEP TRYING:  It takes most smokers about 8 tries before they are finally able to fully quit. So, the more often you try and fail, the better your chance of quitting the next time! So, don't give up!    GO COLD TURKEY:  Most ex-smokers quit cold turkey. Trying to cut back gradually doesn't seem to work as well, perhaps because it continues the smoking habit. Also, it is possible to fool yourself by inhaling more while smoking fewer cigarettes. This results in the same amount of nicotine in your body!    GET SUPPORT:  Support programs can make an important difference, especially for the heavy smoker. These groups offer lectures, methods to change your behavior and peer support. Call the free national Quitline for more information. 800-QUIT-NOW (327-297-6231). Low-cost or free programs are offered by many hospitals, local chapters of the American Lung Association (964-312-4515) and the American Cancer Society (801-905-4045). Support at home is important too. Non-smokers can help by offering praise and encouragement. If the smoker fails to quit, encourage them to try again!    OVER-THE-COUNTER MEDICINES:  For those who can't quit on their own, Nicotine Replacement Therapy (NRT) may make quitting much easier. Certain aids such as the nicotine patch, gum and lozenge are available without a prescription. However, it is best to use these under the guidance of your doctor. The skin patch provides a steady supply of nicotine to the body. Nicotine gum and lozenge gives temporary bursts of low levels of nicotine. Both methods take the edge off the craving for cigarettes. WARNING: If you feel symptoms of nicotine overdose, such as nausea, vomiting, dizziness, weakness, or fast  Spoke to patient's , Marino, informed him that patients Levothyroxine was sent to the pharmacy but she is due for current TSH level. He verbalized understanding and states pt will come in this week.   heartbeat, stop using these and see your doctor.    PRESCRIPTION MEDICINES:  After evaluating your smoking patterns and prior attempts at quitting, your doctor may offer a prescription medicine such as bupropion (Zyban, Wellbutrin), varenicline (Chantix, Champix), a niocotine inhaler or nasal spray. Each has its unique advantage and side effects which your doctor can review with you.    HEALTH BENEFITS OF QUITTING:  The benefits of quitting start right away and keep improving the longer you go without smoking:  · 20 minutes: blood pressure and pulse return to normal  · 8 hours: oxygen levels return to normal  · 2 days: ability to smell and taste begins to improve as damaged nerves start to regrow  · 2-3 weeks: circulation and lung function improves  · 1-9 months: decreased cough, congestion and shortness of breath; less tired  · 1 year: risk of heart attack decreases by half  · 5 years: risk of lung cancer decreases by half; risk of stroke becomes the same as a non-smoker    For information about how to quit smoking, visit the following links:  · [employer] , “ Clearing the Air, Quit Smoking Today ” - an online booklet. http://www.smokefree.gov/pubs/clearing_the_air.pdf  · Smokefree.gov http://smokefree.gov/  · QuitNet http://www.quitnet.com/    © 4505-7984 [employer], [address] [place] [address], [place] [address]. All rights reserved. This information is not intended as a substitute for professional medical care. Always follow your healthcare professional's instructions.

## 2019-01-16 ENCOUNTER — OFFICE VISIT (OUTPATIENT)
Dept: PULMONOLOGY | Facility: CLINIC | Age: 69
End: 2019-01-16
Payer: MEDICARE

## 2019-01-16 ENCOUNTER — HOSPITAL ENCOUNTER (OUTPATIENT)
Dept: RADIOLOGY | Facility: HOSPITAL | Age: 69
Discharge: HOME | End: 2019-01-16
Attending: INTERNAL MEDICINE
Payer: MEDICARE

## 2019-01-16 ENCOUNTER — PROCEDURE VISIT (OUTPATIENT)
Dept: PULMONOLOGY | Facility: CLINIC | Age: 69
End: 2019-01-16
Payer: MEDICARE

## 2019-01-16 VITALS
OXYGEN SATURATION: 94 % | BODY MASS INDEX: 18.38 KG/M2 | HEART RATE: 66 BPM | RESPIRATION RATE: 16 BRPM | HEIGHT: 65 IN | SYSTOLIC BLOOD PRESSURE: 116 MMHG | WEIGHT: 110.31 LBS | DIASTOLIC BLOOD PRESSURE: 82 MMHG

## 2019-01-16 DIAGNOSIS — R09.02 HYPOXEMIA REQUIRING SUPPLEMENTAL OXYGEN: ICD-10-CM

## 2019-01-16 DIAGNOSIS — Z99.81 HYPOXEMIA REQUIRING SUPPLEMENTAL OXYGEN: ICD-10-CM

## 2019-01-16 DIAGNOSIS — J44.9 STAGE 3 SEVERE COPD BY GOLD CLASSIFICATION: Primary | Chronic | ICD-10-CM

## 2019-01-16 DIAGNOSIS — J43.9 NOCTURNAL HYPOXEMIA DUE TO EMPHYSEMA: Chronic | ICD-10-CM

## 2019-01-16 DIAGNOSIS — J44.9 STAGE 3 SEVERE COPD BY GOLD CLASSIFICATION: Chronic | ICD-10-CM

## 2019-01-16 DIAGNOSIS — G47.36 NOCTURNAL HYPOXEMIA DUE TO EMPHYSEMA: Chronic | ICD-10-CM

## 2019-01-16 PROCEDURE — 1101F PR PT FALLS ASSESS DOC 0-1 FALLS W/OUT INJ PAST YR: ICD-10-PCS | Mod: CPTII,HCNC,S$GLB, | Performed by: INTERNAL MEDICINE

## 2019-01-16 PROCEDURE — 3074F PR MOST RECENT SYSTOLIC BLOOD PRESSURE < 130 MM HG: ICD-10-PCS | Mod: CPTII,HCNC,S$GLB, | Performed by: INTERNAL MEDICINE

## 2019-01-16 PROCEDURE — 71046 X-RAY EXAM CHEST 2 VIEWS: CPT | Mod: TC,HCNC

## 2019-01-16 PROCEDURE — 99214 PR OFFICE/OUTPT VISIT, EST, LEVL IV, 30-39 MIN: ICD-10-PCS | Mod: HCNC,S$GLB,, | Performed by: INTERNAL MEDICINE

## 2019-01-16 PROCEDURE — 99499 RISK ADDL DX/OHS AUDIT: ICD-10-PCS | Mod: S$GLB,,, | Performed by: INTERNAL MEDICINE

## 2019-01-16 PROCEDURE — 99499 UNLISTED E&M SERVICE: CPT | Mod: S$GLB,,, | Performed by: INTERNAL MEDICINE

## 2019-01-16 PROCEDURE — 1101F PT FALLS ASSESS-DOCD LE1/YR: CPT | Mod: CPTII,HCNC,S$GLB, | Performed by: INTERNAL MEDICINE

## 2019-01-16 PROCEDURE — 71046 XR CHEST PA AND LATERAL: ICD-10-PCS | Mod: 26,HCNC,, | Performed by: RADIOLOGY

## 2019-01-16 PROCEDURE — 71046 X-RAY EXAM CHEST 2 VIEWS: CPT | Mod: 26,HCNC,, | Performed by: RADIOLOGY

## 2019-01-16 PROCEDURE — 3079F PR MOST RECENT DIASTOLIC BLOOD PRESSURE 80-89 MM HG: ICD-10-PCS | Mod: CPTII,HCNC,S$GLB, | Performed by: INTERNAL MEDICINE

## 2019-01-16 PROCEDURE — 3074F SYST BP LT 130 MM HG: CPT | Mod: CPTII,HCNC,S$GLB, | Performed by: INTERNAL MEDICINE

## 2019-01-16 PROCEDURE — 3079F DIAST BP 80-89 MM HG: CPT | Mod: CPTII,HCNC,S$GLB, | Performed by: INTERNAL MEDICINE

## 2019-01-16 PROCEDURE — 99999 PR PBB SHADOW E&M-EST. PATIENT-LVL III: CPT | Mod: PBBFAC,HCNC,, | Performed by: INTERNAL MEDICINE

## 2019-01-16 PROCEDURE — 99999 PR PBB SHADOW E&M-EST. PATIENT-LVL III: ICD-10-PCS | Mod: PBBFAC,HCNC,, | Performed by: INTERNAL MEDICINE

## 2019-01-16 PROCEDURE — 99214 OFFICE O/P EST MOD 30 MIN: CPT | Mod: HCNC,S$GLB,, | Performed by: INTERNAL MEDICINE

## 2019-01-16 NOTE — PROGRESS NOTES
Subjective:      Ibeth Schroeder is a 68 y.o. female with known Moderate to Severe COPD  Follow-up appointment. Last visit 09/05/2018 accompanied by  spouse with her  Has been doing well.  No respiratory symptoms  Complains TRELEGY expensive but will get it at 47$ at [facility]  CAT score 20  mRC score 2  No Cough and No BRADY ,  no wheezing    Main concern: poor memory, poor exercise tolerance  On TRELEGY  She has nocturnal oxygen 3.0 LPM  She has oxygen  I have reviewed the patient's medical history in detail and updated the computerized patient record.      The following portions of the patient's history were reviewed and updated as appropriate:   She  has a past medical history of Acute on chronic respiratory failure with hypoxia (7/30/2018), Acute pancreatitis, Alcohol dependence, Allergy, Anemia, Anxiety, Arthritis (6/24/2013), Asthma, Back pain, Closed avulsion fracture of anterior inferior iliac spine of pelvis (7/17/2017), Closed fracture of right iliac wing (7/18/2017), Colon polyp, Colon polyp, COPD (chronic obstructive pulmonary disease), Dependence on renal dialysis (2012), Depression, Disorder of kidney and ureter, Diverticulosis, Diverticulosis, Hiatal hernia, Hyperlipidemia, Hypertension, Hypocalcemia (7/6/2016), Hypothyroidism (6/24/2013), Kidney cysts, Nocturnal hypoxemia due to emphysema (9/24/2014), Osteoporosis, Pneumonia due to infectious organism (7/29/2018), Pulmonary embolism, Recurrent major depressive disorder, in partial remission (4/20/2016), Restless leg syndrome, RLS (restless legs syndrome), Seizure (6/24/2013), Stage 3 severe COPD by GOLD classification (3/16/2016), Stroke, Tobacco dependence, and Trouble in sleeping.  She does not have any pertinent problems on file.  She  has a past surgical history that includes Cholecystectomy; Colonoscopy (2013); Oophorectomy (1977 approx); Fracture surgery (Left, 07/12/2017); Fracture surgery (Left, 09/12/2017); Tonsillectomy (1960 approx);  Adenoidectomy (1960 approx); Joint replacement (Left, 2000 approx); Joint replacement (Left, 2004 approx); Joint replacement (Right, 2002 approx); Hysterectomy (1977 approx); Hernia repair (1999 approx); Appendectomy; amputation left 2nd finger tip (Left, 2012); na hole/ ventriculostomy (Right, 08/24/2012); and isaut filter placement.  Her family history includes Alcohol abuse in her maternal uncle; Asthma in her father; Breast cancer in her maternal aunt and maternal aunt; Cancer in her maternal aunt, maternal aunt, and maternal grandmother; Colon cancer in her maternal grandmother; Diabetes in her mother and son; Heart disease in her father, maternal aunt, and maternal aunt; Hypertension in her mother and son; Kidney disease in her mother; Parkinsonism in her mother.  She  reports that she quit smoking about 8 years ago. Her smoking use included cigarettes. She has a 30.00 pack-year smoking history. she has never used smokeless tobacco. She reports that she drinks about 3.0 - 3.6 oz of alcohol per week. She reports that she does not use drugs.  She has a current medication list which includes the following prescription(s): albuterol, allopurinol, amlodipine, buspirone, calcitriol, diphenoxylate-atropine 2.5-0.025 mg, ferrous sulfate, flu vacc [id number](65yr up)/pf, fluticasone-umeclidin-vilanter, ipratropium-albuterol, levetiracetam, levothyroxine, metoprolol succinate, pantoprazole, pravastatin, ropinirole, shingrix (pf), and trazodone, and the following Facility-Administered Medications: denosumab.  Current Outpatient Medications on File Prior to Visit   Medication Sig Dispense Refill    albuterol (ACCUNEB) 0.63 mg/3 mL Nebu Take 3 mLs (0.63 mg total) by nebulization every 6 (six) hours as needed. Rescue 1 Box 0    allopurinol (ZYLOPRIM) 100 MG tablet Take 1 tablet (100 mg total) by mouth once daily. 90 tablet 5    amLODIPine (NORVASC) 2.5 MG tablet Take 1 tablet (2.5 mg total) by mouth once daily. 90  tablet 3    busPIRone (BUSPAR) 15 MG tablet Take 0.5 tablets (7.5 mg total) by mouth 2 (two) times daily. 30 tablet 2    calcitRIOL (ROCALTROL) 0.25 MCG Cap Take 1 capsule (0.25 mcg total) by mouth every Mon, Wed, Fri. 30 capsule 5    diphenoxylate-atropine 2.5-0.025 mg (LOMOTIL) 2.5-0.025 mg per tablet take 2 tablets by mouth four times a day if needed for diarrhea 60 tablet 2    ferrous sulfate 325 (65 FE) MG EC tablet Take 1 tablet (325 mg total) by mouth once daily. 90 tablet 3    flu vacc [id number],65yr up,/PF (FLUZONE HIGH-DOSE 2018-19, PF, IM) Inject into the muscle.      fluticasone-umeclidin-vilanter (TRELEGY ELLIPTA) 100-62.5-25 mcg DsDv Inhale 1 puff into the lungs once daily. 60 each 11    ipratropium-albuterol (COMBIVENT RESPIMAT)  mcg/actuation inhaler inhale 1 puff INTO THE LUNGS four times a day (Patient taking differently: Inhale 1 puff into the lungs once daily. inhale 1 puff INTO THE LUNGS four times a day) 3 Package 4    levETIRAcetam (KEPPRA) 500 MG Tab Take 1 tablet (500 mg total) by mouth 2 (two) times daily. 60 tablet 0    levothyroxine (SYNTHROID) 88 MCG tablet TAKE 1 TABLET EVERY DAY 90 tablet 0    metoprolol succinate (TOPROL-XL) 50 MG 24 hr tablet TAKE 1 TABLET TWICE DAILY 180 tablet 3    pantoprazole (PROTONIX) 40 MG tablet TAKE 1 TABLET EVERY DAY 90 tablet 3    pravastatin (PRAVACHOL) 10 MG tablet Take 1 tablet (10 mg total) by mouth once daily. 90 tablet 0    ropinirole (REQUIP) 1 MG tablet Take 1 mg by mouth nightly as needed.       SHINGRIX, PF, 50 mcg/0.5 mL injection inject 0.5 milliliter intramuscularly  0    traZODone (DESYREL) 50 MG tablet Take 1/2 to 1 tablet at bedtime as needed. 30 tablet 2     Current Facility-Administered Medications on File Prior to Visit   Medication Dose Route Frequency Provider Last Rate Last Dose    denosumab (PROLIA) injection 60 mg  60 mg Subcutaneous Q6 Months Patria Melara PA-C   60 mg at 08/29/18 1441     She is allergic  "to nsaids (non-steroidal anti-inflammatory drug); pcn [penicillins]; sulfa (sulfonamide antibiotics); aspirin; levaquin [levofloxacin]; macrodantin [nitrofurantoin macrocrystalline]; and wellbutrin [bupropion hcl]..    Review of Systems  A comprehensive review of systems was negative.       Objective:      /82   Pulse 66   Resp 16   Ht 5' 5" (1.651 m)   Wt 50 kg (110 lb 5.4 oz)   LMP  (LMP Unknown)   SpO2 (!) 94%   BMI 18.36 kg/m²       Physical Exam   HENT:   Head: Normocephalic.   Eyes: Pupils are equal, round, and reactive to light.   Neurological: She is alert.   Skin: Skin is warm.   Nursing note and vitals reviewed.      CXR  Prominent right hiatal hernia is seen.  Cardiomediastinal silhouette remains slightly enlarged.  Aortic atherosclerosis is noted.  Subsegmental atelectasis or scarring in the lingula.  No focal pulmonary consolidation.  Left-sided rib fractures are again noted.  Degenerative changes of the thoracic spine.       Assessment:      Problem List Items Addressed This Visit     Nocturnal hypoxemia due to emphysema (Chronic)    Stage 3 severe COPD by GOLD classification - Primary (Chronic)     CAT score 20  FEV 1 : 0.71 L  29.3% ( improved by 13.4%)with bronchodilator  Immunizations current  mRC score 2  30 pack year smoker  Continue TRELEGY LABA/LAMA/ICS         Hypoxemia requiring supplemental oxygen     Adherence with night time Oxygen              STEPH Index Score = 4    Interpretation: This result is indicative of a 52 month mortality of approximately 60%.    Results:  Total Criteria Point Count:    STEPH INDEX: 7  Approximate 4 Year Survival Interpretation    7-10 Points: 18%    Plan:      Follow-up in about 8 weeks (around 3/13/2019) for Incruse/Breo/Anoro / Trelegy/ Symbicort coupon.    PH likely related to Chr resp failure COPD, Hypoxemia  Hx of +ve JACLYN and Anti DDNA  Continue maintenance meds    This note was prepared using voice recognition system and is likely to have " sound alike errors that may have been overlooked even after proof reading.  Please call me with any questions    Discussed diagnosis, its evaluation, treatment and usual course. All questions answered.  Thank you for the courtesy of participating in the care of this patient    Andrea Barrett MD

## 2019-01-17 NOTE — ASSESSMENT & PLAN NOTE
CAT score 20  FEV 1 : 0.71 L  29.3% ( improved by 13.4%)with bronchodilator  Immunizations current  mRC score 2  30 pack year smoker  Continue TRELEGY LABA/LAMA/ICS

## 2019-01-17 NOTE — PROGRESS NOTES
Pulmonary Disease Management  [facility]  Initial Visit  6 month follow up    Ibeth Schroeder  was seen 1/16/2019  4:20 PM  in the Pulmonary Disease management clinic for evaluation, educate and reinforce self management techniques and exacerbation action plan.    Celina Rai    Past Medical History:   Diagnosis Date    Acute on chronic respiratory failure with hypoxia 7/30/2018    Acute pancreatitis     Alcohol dependence     per patient in the past    Allergy     Anemia     Anxiety     Arthritis 6/24/2013    Asthma     per patient    Back pain     Closed avulsion fracture of anterior inferior iliac spine of pelvis 7/17/2017 7/16/17 CT abd pelvis- Acute comminuted fracture involving the left iliac bone, new since the prior exam.  No other pelvic fractures seen.    Closed fracture of right iliac wing 7/18/2017    Colon polyp     Colon polyp     colonoscopy 8/26/2013    COPD (chronic obstructive pulmonary disease)     Dependence on renal dialysis 2012    when in liver failure    Depression     Disorder of kidney and ureter     ckd4    Diverticulosis     Diverticulosis     colonoscopy 8/26/2013    Hiatal hernia     CXR 2/25/2015--Large hiatal hernia.     Hyperlipidemia     Hypertension     Hypocalcemia 7/6/2016    Hypothyroidism 6/24/2013    Kidney cysts     us retro 7/18/2018-Simple bilateral renal cysts as above.  No acute findings.   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 FEV1 is 1.08 (44% predicted) FVC is 2.25 (71% predicted) FEV1/FVC is 48  She uses 2 pillows at night. Patient currently is on oxygen at 2 L/min per nasal cannula at night  Last visit overnight sat result:Overnight sat: 9 hours of sampling with room air . Her saturation was below 88% for 30 minutes  2/25/15 Overnight sat  indicated supplementary O2 r    Osteoporosis     Pneumonia due to infectious organism 7/29/2018    Pulmonary embolism      per patient unsure of date    Recurrent major depressive disorder, in partial remission 4/20/2016    Restless leg syndrome     RLS (restless legs syndrome)     Seizure 6/24/2013    Stage 3 severe COPD by GOLD classification 3/16/2016    Spirometry 3/16/2016---Physician Interpretation Moderately severe obstruction (FEV1>50 and <59% of predicted).compared to previous study performed 2/25/2015 FEV1 improved by 24%, FVC improved by 15%.    Stroke     ischemic injury due to hypotension    Tobacco dependence     resolved    Trouble in sleeping           Medication List           Accurate as of 1/16/19 11:59 PM. If you have any questions, ask your nurse or doctor.               CHANGE how you take these medications    ipratropium-albuterol  mcg/actuation inhaler  Commonly known as:  COMBIVENT RESPIMAT  inhale 1 puff INTO THE LUNGS four times a day  What changed:    · how much to take  · how to take this  · when to take this  · additional instructions        CONTINUE taking these medications    albuterol 0.63 mg/3 mL Nebu  Commonly known as:  ACCUNEB  Take 3 mLs (0.63 mg total) by nebulization every 6 (six) hours as needed. Rescue     allopurinol 100 MG tablet  Commonly known as:  ZYLOPRIM  Take 1 tablet (100 mg total) by mouth once daily.     amLODIPine 2.5 MG tablet  Commonly known as:  NORVASC  Take 1 tablet (2.5 mg total) by mouth once daily.     busPIRone 15 MG tablet  Commonly known as:  BUSPAR  Take 0.5 tablets (7.5 mg total) by mouth 2 (two) times daily.     calcitRIOL 0.25 MCG Cap  Commonly known as:  ROCALTROL  Take 1 capsule (0.25 mcg total) by mouth every Mon, Wed, Fri.     diphenoxylate-atropine 2.5-0.025 mg 2.5-0.025 mg per tablet  Commonly known as:  LOMOTIL  take 2 tablets by mouth four times a day if needed for diarrhea     ferrous sulfate 325 (65 FE) MG EC tablet  Take 1 tablet (325 mg total) by mouth once daily.     FLUZONE HIGH-DOSE 2018-19 (PF) IM     levETIRAcetam 500 MG Tab  Commonly  known as:  KEPPRA  Take 1 tablet (500 mg total) by mouth 2 (two) times daily.     levothyroxine 88 MCG tablet  Commonly known as:  SYNTHROID  TAKE 1 TABLET EVERY DAY     metoprolol succinate 50 MG 24 hr tablet  Commonly known as:  TOPROL-XL  TAKE 1 TABLET TWICE DAILY     pantoprazole 40 MG tablet  Commonly known as:  PROTONIX  TAKE 1 TABLET EVERY DAY     pravastatin 10 MG tablet  Commonly known as:  PRAVACHOL  Take 1 tablet (10 mg total) by mouth once daily.     rOPINIRole 1 MG tablet  Commonly known as:  REQUIP     SHINGRIX (PF) 50 mcg/0.5 mL injection  Generic drug:  varicella-zoster [id number] (PF)     traZODone 50 MG tablet  Commonly known as:  DESYREL  Take 1/2 to 1 tablet at bedtime as needed.     TRELEGY ELLIPTA 100-62.5-25 mcg Dsdv  Generic drug:  fluticasone-umeclidin-vilanter  Inhale 1 puff into the lungs once daily.                Educational assessment:   [x]            Good  []            Fair  []            Poor    Readiness to learn:   []            Good  []            Fair  []            Poor    Vision Status:   [x]            Good  []            Fair  []            Poor    Reading Ability Ability:  [x]            Good  []            Fair  []            Poor    Knowledge of condition:   [x]            Good  []            Fair  []            Poor    Language Barriers:   []            Good  []            Fair  []            Poor    Cognitive/ Physical Barriers:   []            Good  []            Fair  []            Poor    Learning best by:                       []            Seeing  []            Hearing  []            Reading                         []            Doing    Describe any barrier /Limitation or financial implications of care choices identified     []            Financial  []            Emotional  []            Education  []            Vision/Hearing  []            Physical  []                TOPIC /CONTENT FOR TODAY:    []            MDI with or without spacer  [x]            Dry power  inhaler  []            Accapella   []           Peak Flow meter  []            COPD action plan  []            Nebuliser use  []            Oxygen use safety  []            Breathing and cough techniques  []            Energy coservation  []            Infection prevention  []           OTHER________________________        Learner:    [x]            Patient   [x]            Caregiver    Method:    [x]            Verbal explanation  []            Audio visual    []            Literature  []            Teach back      Evaluation:    []            Teach back  [x]            Demonsatrate  []            Follow up phone call    []            2 weeks     [x]            4 weeks   []            PRN    Additional comments:   Patient is using Trelegy. Patient is aware that there prescription is at [facility].

## 2019-01-18 ENCOUNTER — HOSPITAL ENCOUNTER (OUTPATIENT)
Dept: RADIOLOGY | Facility: HOSPITAL | Age: 69
Discharge: HOME | End: 2019-01-18
Attending: UROLOGY
Payer: MEDICARE

## 2019-01-18 DIAGNOSIS — R31.9 HEMATURIA, UNSPECIFIED TYPE: ICD-10-CM

## 2019-01-18 PROCEDURE — 76770 US RETROPERITONEAL COMPLETE: ICD-10-PCS | Mod: 26,HCNC,, | Performed by: RADIOLOGY

## 2019-01-18 PROCEDURE — 76770 US EXAM ABDO BACK WALL COMP: CPT | Mod: 26,HCNC,, | Performed by: RADIOLOGY

## 2019-01-18 PROCEDURE — 76770 US EXAM ABDO BACK WALL COMP: CPT | Mod: TC,HCNC

## 2019-01-23 ENCOUNTER — OFFICE VISIT (OUTPATIENT)
Dept: UROLOGY | Facility: CLINIC | Age: 69
End: 2019-01-23
Payer: MEDICARE

## 2019-01-23 VITALS
SYSTOLIC BLOOD PRESSURE: 148 MMHG | BODY MASS INDEX: 18.77 KG/M2 | HEIGHT: 65 IN | HEART RATE: 70 BPM | WEIGHT: 112.63 LBS | DIASTOLIC BLOOD PRESSURE: 86 MMHG

## 2019-01-23 DIAGNOSIS — N28.1 RENAL CYST: Primary | ICD-10-CM

## 2019-01-23 DIAGNOSIS — R82.90 ABNORMAL URINE SEDIMENT: ICD-10-CM

## 2019-01-23 LAB
BILIRUB SERPL-MCNC: NORMAL MG/DL
BLOOD URINE, POC: NORMAL
COLOR, POC UA: YELLOW
GLUCOSE UR QL STRIP: NORMAL
KETONES UR QL STRIP: NORMAL
LEUKOCYTE ESTERASE URINE, POC: NORMAL
MICROSCOPIC COMMENT: NORMAL
NITRITE, POC UA: NORMAL
PH, POC UA: 7
PROTEIN, POC: NORMAL
RBC #/AREA URNS AUTO: 2 /HPF (ref 0–4)
SPECIFIC GRAVITY, POC UA: 1.01
UROBILINOGEN, POC UA: NORMAL
WBC #/AREA URNS AUTO: 0 /HPF (ref 0–5)

## 2019-01-23 PROCEDURE — 99214 PR OFFICE/OUTPT VISIT, EST, LEVL IV, 30-39 MIN: ICD-10-PCS | Mod: 25,HCNC,S$GLB, | Performed by: UROLOGY

## 2019-01-23 PROCEDURE — 81002 URINALYSIS NONAUTO W/O SCOPE: CPT | Mod: HCNC,S$GLB,, | Performed by: UROLOGY

## 2019-01-23 PROCEDURE — 81001 URINALYSIS AUTO W/SCOPE: CPT | Mod: HCNC

## 2019-01-23 PROCEDURE — 99999 PR PBB SHADOW E&M-EST. PATIENT-LVL III: CPT | Mod: PBBFAC,HCNC,, | Performed by: UROLOGY

## 2019-01-23 PROCEDURE — 1101F PT FALLS ASSESS-DOCD LE1/YR: CPT | Mod: HCNC,CPTII,S$GLB, | Performed by: UROLOGY

## 2019-01-23 PROCEDURE — 3077F SYST BP >= 140 MM HG: CPT | Mod: HCNC,CPTII,S$GLB, | Performed by: UROLOGY

## 2019-01-23 PROCEDURE — 1101F PR PT FALLS ASSESS DOC 0-1 FALLS W/OUT INJ PAST YR: ICD-10-PCS | Mod: HCNC,CPTII,S$GLB, | Performed by: UROLOGY

## 2019-01-23 PROCEDURE — 3077F PR MOST RECENT SYSTOLIC BLOOD PRESSURE >= 140 MM HG: ICD-10-PCS | Mod: HCNC,CPTII,S$GLB, | Performed by: UROLOGY

## 2019-01-23 PROCEDURE — 99999 PR PBB SHADOW E&M-EST. PATIENT-LVL III: ICD-10-PCS | Mod: PBBFAC,HCNC,, | Performed by: UROLOGY

## 2019-01-23 PROCEDURE — 87086 URINE CULTURE/COLONY COUNT: CPT | Mod: HCNC

## 2019-01-23 PROCEDURE — 3079F DIAST BP 80-89 MM HG: CPT | Mod: HCNC,CPTII,S$GLB, | Performed by: UROLOGY

## 2019-01-23 PROCEDURE — 81002 POCT URINE DIPSTICK WITHOUT MICROSCOPE: ICD-10-PCS | Mod: HCNC,S$GLB,, | Performed by: UROLOGY

## 2019-01-23 PROCEDURE — 99214 OFFICE O/P EST MOD 30 MIN: CPT | Mod: 25,HCNC,S$GLB, | Performed by: UROLOGY

## 2019-01-23 PROCEDURE — 3079F PR MOST RECENT DIASTOLIC BLOOD PRESSURE 80-89 MM HG: ICD-10-PCS | Mod: HCNC,CPTII,S$GLB, | Performed by: UROLOGY

## 2019-01-23 RX ORDER — BIMATOPROST 0.3 MG/ML
1 SOLUTION/ DROPS OPHTHALMIC NIGHTLY
COMMUNITY

## 2019-01-23 RX ORDER — OXYCODONE HYDROCHLORIDE 5 MG/1
5 CAPSULE ORAL EVERY 4 HOURS PRN
COMMUNITY

## 2019-01-23 NOTE — PROGRESS NOTES
..Using sterile technique, pt was catheterized per orders of Dr. Garcia.  Urine collected and sent to lab.  Pt tolerated procedure well.

## 2019-01-23 NOTE — PROGRESS NOTES
Chief Complaint: Hematuria    HPI:   1/23/19: US confirms bilateral small renal cysts reassuring.  Cysto normal last visit.  Showed me a strip of grey material on her toilet paper can't say if it is from the vagina or the urine.  UA normal exc tr blood.  1/18/18: Cr elevated so could not get a urogram, CT RSS reassuring no obvious abnormalities, no stones.  1/2/18: 66 yo woman referred by PCP for hematuria without UTI.  Also has had gross hematuria a few months ago, looked like old brown blood.  No anticoagulation.  No abd/pelvic pain and no exac/rel factors.  No urolithiasis.  No urinary bother.  No  history.  Normal sexual function.  Fell and fractured her pelvis a few months ago but not at the time of the hematuria.    Allergies:  Nsaids (non-steroidal anti-inflammatory drug); Pcn [penicillins]; Sulfa (sulfonamide antibiotics); Aspirin; Levaquin [levofloxacin]; Macrodantin [nitrofurantoin macrocrystalline]; and Wellbutrin [bupropion hcl]    Medications: has a current medication list which includes the following prescription(s): albuterol, allopurinol, amlodipine, bimatoprost, buspirone, calcitriol, diphenoxylate-atropine 2.5-0.025 mg, ferrous sulfate, fluticasone-umeclidin-vilanter, ipratropium-albuterol, levothyroxine, metoprolol succinate, oxycodone, pantoprazole, pravastatin, ropinirole, shingrix (pf), trazodone, flu vacc [id number](65yr up)/pf, and levetiracetam, and the following Facility-Administered Medications: denosumab.    Review of Systems:  General: No fever, chills, fatigability, or weight loss.  Skin: No rashes, itching, or changes in color or texture of skin.  Chest: Denies BRADY, cyanosis, wheezing, cough, and sputum production.  Abdomen: Appetite fine. No weight loss. Denies diarrhea, abdominal pain, hematemesis, or blood in stool.  Musculoskeletal: Chronic joint stiffness or swelling. Chronic back pain.  : As above.  All other review of systems negative.    PMH:   has a past medical history of  Acute on chronic respiratory failure with hypoxia (7/30/2018), Acute pancreatitis, Alcohol dependence, Allergy, Anemia, Anxiety, Arthritis (6/24/2013), Asthma, Back pain, Closed avulsion fracture of anterior inferior iliac spine of pelvis (7/17/2017), Closed fracture of right iliac wing (7/18/2017), Colon polyp, Colon polyp, COPD (chronic obstructive pulmonary disease), Dependence on renal dialysis (2012), Depression, Disorder of kidney and ureter, Diverticulosis, Diverticulosis, Hiatal hernia, Hyperlipidemia, Hypertension, Hypocalcemia (7/6/2016), Hypothyroidism (6/24/2013), Kidney cysts, Nocturnal hypoxemia due to emphysema (9/24/2014), Osteoporosis, Pneumonia due to infectious organism (7/29/2018), Pulmonary embolism, Recurrent major depressive disorder, in partial remission (4/20/2016), Restless leg syndrome, RLS (restless legs syndrome), Seizure (6/24/2013), Stage 3 severe COPD by GOLD classification (3/16/2016), Stroke, Tobacco dependence, and Trouble in sleeping.    PSH:   has a past surgical history that includes Cholecystectomy; Colonoscopy (2013); Oophorectomy (1977 approx); Fracture surgery (Left, 07/12/2017); Fracture surgery (Left, 09/12/2017); Tonsillectomy (1960 approx); Adenoidectomy (1960 approx); Joint replacement (Left, 2000 approx); Joint replacement (Left, 2004 approx); Joint replacement (Right, 2002 approx); Hysterectomy (1977 approx); Hernia repair (1999 approx); Appendectomy; amputation left 2nd finger tip (Left, 2012); na hole/ ventriculostomy (Right, 08/24/2012); and isatu filter placement.    FamHx: family history includes Alcohol abuse in her maternal uncle; Asthma in her father; Breast cancer in her maternal aunt and maternal aunt; Cancer in her maternal aunt, maternal aunt, and maternal grandmother; Colon cancer in her maternal grandmother; Diabetes in her mother and son; Heart disease in her father, maternal aunt, and maternal aunt; Hypertension in her mother and son; Kidney  disease in her mother; Parkinsonism in her mother.    SocHx:  reports that she quit smoking about 8 years ago. Her smoking use included cigarettes. She has a 30.00 pack-year smoking history. she has never used smokeless tobacco. She reports that she drinks about 3.0 - 3.6 oz of alcohol per week. She reports that she does not use drugs.     Physical Exam:  Vitals:   Vitals:    01/23/19 0838   BP: (!) 148/86   Pulse: 70     General: A&Ox3. No apparent distress. No deformities.  Neck: No masses. Normal thyroid.  Lungs: normal inspiration. No use of accessory muscles.  Heart: normal pulse. No arrhythmias.  Abdomen: Soft. NT. ND.  Skin: The skin is warm and dry. No jaundice.  Ext: No c/c/e.  : External genitalia normal.     Labs/Studies:     Impression/Plan:   1. Cath UA/UCx and US/RTC 1 year.

## 2019-01-24 ENCOUNTER — OFFICE VISIT (OUTPATIENT)
Dept: PSYCHIATRY | Facility: CLINIC | Age: 69
End: 2019-01-24
Payer: MEDICARE

## 2019-01-24 DIAGNOSIS — G47.00 INSOMNIA, UNSPECIFIED TYPE: Primary | ICD-10-CM

## 2019-01-24 DIAGNOSIS — F03.90 MAJOR NEUROCOGNITIVE DISORDER: ICD-10-CM

## 2019-01-24 PROCEDURE — 1101F PT FALLS ASSESS-DOCD LE1/YR: CPT | Mod: HCNC,CPTII,S$GLB, | Performed by: PSYCHIATRY & NEUROLOGY

## 2019-01-24 PROCEDURE — 99499 RISK ADDL DX/OHS AUDIT: ICD-10-PCS | Mod: S$GLB,,, | Performed by: PSYCHIATRY & NEUROLOGY

## 2019-01-24 PROCEDURE — 1101F PR PT FALLS ASSESS DOC 0-1 FALLS W/OUT INJ PAST YR: ICD-10-PCS | Mod: HCNC,CPTII,S$GLB, | Performed by: PSYCHIATRY & NEUROLOGY

## 2019-01-24 PROCEDURE — 99213 OFFICE O/P EST LOW 20 MIN: CPT | Mod: HCNC,S$GLB,, | Performed by: PSYCHIATRY & NEUROLOGY

## 2019-01-24 PROCEDURE — 99499 UNLISTED E&M SERVICE: CPT | Mod: S$GLB,,, | Performed by: PSYCHIATRY & NEUROLOGY

## 2019-01-24 PROCEDURE — 99213 PR OFFICE/OUTPT VISIT, EST, LEVL III, 20-29 MIN: ICD-10-PCS | Mod: HCNC,S$GLB,, | Performed by: PSYCHIATRY & NEUROLOGY

## 2019-01-24 PROCEDURE — 99999 PR PBB SHADOW E&M-EST. PATIENT-LVL I: CPT | Mod: PBBFAC,HCNC,, | Performed by: PSYCHIATRY & NEUROLOGY

## 2019-01-24 PROCEDURE — 99999 PR PBB SHADOW E&M-EST. PATIENT-LVL I: ICD-10-PCS | Mod: PBBFAC,HCNC,, | Performed by: PSYCHIATRY & NEUROLOGY

## 2019-01-24 RX ORDER — BUSPIRONE HYDROCHLORIDE 15 MG/1
7.5 TABLET ORAL 2 TIMES DAILY
Qty: 90 TABLET | Refills: 1 | Status: SHIPPED | OUTPATIENT
Start: 2019-01-24

## 2019-01-24 RX ORDER — TRAZODONE HYDROCHLORIDE 50 MG/1
TABLET ORAL
Qty: 90 TABLET | Refills: 1 | Status: SHIPPED | OUTPATIENT
Start: 2019-01-24
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
## 2019-01-25 LAB — BACTERIA UR CULT: NO GROWTH

## 2019-02-18 ENCOUNTER — LAB VISIT (OUTPATIENT)
Dept: LAB | Facility: HOSPITAL | Age: 69
End: 2019-02-18
Attending: INTERNAL MEDICINE
Payer: MEDICARE

## 2019-02-18 DIAGNOSIS — J44.9 MODERATE COPD (CHRONIC OBSTRUCTIVE PULMONARY DISEASE): ICD-10-CM

## 2019-02-18 DIAGNOSIS — I71.40 ABDOMINAL AORTIC ANEURYSM (AAA) WITHOUT RUPTURE: ICD-10-CM

## 2019-02-18 DIAGNOSIS — E87.5 HYPERKALEMIA: ICD-10-CM

## 2019-02-18 DIAGNOSIS — N25.81 HYPERPARATHYROIDISM, SECONDARY RENAL: ICD-10-CM

## 2019-02-18 DIAGNOSIS — R80.1 PERSISTENT PROTEINURIA: ICD-10-CM

## 2019-02-18 DIAGNOSIS — E87.20 ACIDOSIS: ICD-10-CM

## 2019-02-18 DIAGNOSIS — E83.52 HYPERCALCEMIA: ICD-10-CM

## 2019-02-18 DIAGNOSIS — N18.30 STAGE 3 CHRONIC KIDNEY DISEASE: ICD-10-CM

## 2019-02-18 DIAGNOSIS — M1A.3710 CHRONIC GOUT DUE TO RENAL IMPAIRMENT INVOLVING TOE OF RIGHT FOOT WITHOUT TOPHUS: ICD-10-CM

## 2019-02-18 LAB
ALBUMIN SERPL BCP-MCNC: 3.3 G/DL
ANION GAP SERPL CALC-SCNC: 9 MMOL/L
BASOPHILS # BLD AUTO: 0.03 K/UL
BASOPHILS NFR BLD: 0.4 %
BUN SERPL-MCNC: 20 MG/DL
CALCIUM SERPL-MCNC: 8.7 MG/DL
CHLORIDE SERPL-SCNC: 104 MMOL/L
CO2 SERPL-SCNC: 30 MMOL/L
CREAT SERPL-MCNC: 1.4 MG/DL
DIFFERENTIAL METHOD: ABNORMAL
EOSINOPHIL # BLD AUTO: 0.2 K/UL
EOSINOPHIL NFR BLD: 2.3 %
ERYTHROCYTE [DISTWIDTH] IN BLOOD BY AUTOMATED COUNT: 14.7 %
EST. GFR  (AFRICAN AMERICAN): 45.2 ML/MIN/1.73 M^2
EST. GFR  (NON AFRICAN AMERICAN): 39.2 ML/MIN/1.73 M^2
GLUCOSE SERPL-MCNC: 116 MG/DL
HCT VFR BLD AUTO: 39.7 %
HGB BLD-MCNC: 12.3 G/DL
IMM GRANULOCYTES # BLD AUTO: 0.03 K/UL
IMM GRANULOCYTES NFR BLD AUTO: 0.4 %
LYMPHOCYTES # BLD AUTO: 2.2 K/UL
LYMPHOCYTES NFR BLD: 27.5 %
MCH RBC QN AUTO: 30.8 PG
MCHC RBC AUTO-ENTMCNC: 31 G/DL
MCV RBC AUTO: 100 FL
MONOCYTES # BLD AUTO: 0.5 K/UL
MONOCYTES NFR BLD: 6.7 %
NEUTROPHILS # BLD AUTO: 4.9 K/UL
NEUTROPHILS NFR BLD: 62.7 %
NRBC BLD-RTO: 0 /100 WBC
PHOSPHATE SERPL-MCNC: 3.8 MG/DL
PLATELET # BLD AUTO: 252 K/UL
PMV BLD AUTO: 11.6 FL
POTASSIUM SERPL-SCNC: 3.6 MMOL/L
RBC # BLD AUTO: 3.99 M/UL
SODIUM SERPL-SCNC: 143 MMOL/L
WBC # BLD AUTO: 7.81 K/UL

## 2019-02-18 PROCEDURE — 80069 RENAL FUNCTION PANEL: CPT | Mod: HCNC

## 2019-02-18 PROCEDURE — 36415 COLL VENOUS BLD VENIPUNCTURE: CPT | Mod: HCNC,PO

## 2019-02-18 PROCEDURE — 85025 COMPLETE CBC W/AUTO DIFF WBC: CPT | Mod: HCNC

## 2019-02-25 ENCOUNTER — OFFICE VISIT (OUTPATIENT)
Dept: NEPHROLOGY | Facility: CLINIC | Age: 69
End: 2019-02-25
Payer: MEDICARE

## 2019-02-25 VITALS
BODY MASS INDEX: 18.3 KG/M2 | SYSTOLIC BLOOD PRESSURE: 120 MMHG | WEIGHT: 109.81 LBS | DIASTOLIC BLOOD PRESSURE: 90 MMHG | HEART RATE: 70 BPM | HEIGHT: 65 IN

## 2019-02-25 DIAGNOSIS — E83.52 HYPERCALCEMIA: ICD-10-CM

## 2019-02-25 DIAGNOSIS — N18.30 STAGE 3 CHRONIC KIDNEY DISEASE: Primary | ICD-10-CM

## 2019-02-25 DIAGNOSIS — N25.81 HYPERPARATHYROIDISM, SECONDARY RENAL: ICD-10-CM

## 2019-02-25 DIAGNOSIS — R80.1 PERSISTENT PROTEINURIA: ICD-10-CM

## 2019-02-25 DIAGNOSIS — E87.5 HYPERKALEMIA: ICD-10-CM

## 2019-02-25 DIAGNOSIS — M1A.3710 CHRONIC GOUT DUE TO RENAL IMPAIRMENT INVOLVING TOE OF RIGHT FOOT WITHOUT TOPHUS: ICD-10-CM

## 2019-02-25 DIAGNOSIS — E87.20 ACIDOSIS: ICD-10-CM

## 2019-02-25 PROCEDURE — 99499 RISK ADDL DX/OHS AUDIT: ICD-10-PCS | Mod: S$GLB,,, | Performed by: INTERNAL MEDICINE

## 2019-02-25 PROCEDURE — 1101F PT FALLS ASSESS-DOCD LE1/YR: CPT | Mod: HCNC,CPTII,S$GLB, | Performed by: INTERNAL MEDICINE

## 2019-02-25 PROCEDURE — 99214 OFFICE O/P EST MOD 30 MIN: CPT | Mod: HCNC,S$GLB,, | Performed by: INTERNAL MEDICINE

## 2019-02-25 PROCEDURE — 3074F SYST BP LT 130 MM HG: CPT | Mod: HCNC,CPTII,S$GLB, | Performed by: INTERNAL MEDICINE

## 2019-02-25 PROCEDURE — 99999 PR PBB SHADOW E&M-EST. PATIENT-LVL III: CPT | Mod: PBBFAC,HCNC,, | Performed by: INTERNAL MEDICINE

## 2019-02-25 PROCEDURE — 3080F DIAST BP >= 90 MM HG: CPT | Mod: HCNC,CPTII,S$GLB, | Performed by: INTERNAL MEDICINE

## 2019-02-25 PROCEDURE — 99999 PR PBB SHADOW E&M-EST. PATIENT-LVL III: ICD-10-PCS | Mod: PBBFAC,HCNC,, | Performed by: INTERNAL MEDICINE

## 2019-02-25 PROCEDURE — 99499 UNLISTED E&M SERVICE: CPT | Mod: S$GLB,,, | Performed by: INTERNAL MEDICINE

## 2019-02-25 PROCEDURE — 3074F PR MOST RECENT SYSTOLIC BLOOD PRESSURE < 130 MM HG: ICD-10-PCS | Mod: HCNC,CPTII,S$GLB, | Performed by: INTERNAL MEDICINE

## 2019-02-25 PROCEDURE — 3080F PR MOST RECENT DIASTOLIC BLOOD PRESSURE >= 90 MM HG: ICD-10-PCS | Mod: HCNC,CPTII,S$GLB, | Performed by: INTERNAL MEDICINE

## 2019-02-25 PROCEDURE — 99214 PR OFFICE/OUTPT VISIT, EST, LEVL IV, 30-39 MIN: ICD-10-PCS | Mod: HCNC,S$GLB,, | Performed by: INTERNAL MEDICINE

## 2019-02-25 PROCEDURE — 1101F PR PT FALLS ASSESS DOC 0-1 FALLS W/OUT INJ PAST YR: ICD-10-PCS | Mod: HCNC,CPTII,S$GLB, | Performed by: INTERNAL MEDICINE

## 2019-02-25 NOTE — PROGRESS NOTES
Subjective:       Patient ID: Ibeth Schroeder is a 68 y.o. White female who presents for follow up of Chronic Kidney Disease and Proteinuria    Edema   Pertinent negatives include no arthralgias, chest pain, chills, congestion, coughing, diaphoresis, fatigue, fever, headaches, joint swelling, nausea, numbness, rash or vomiting.   Hypertension   Pertinent negatives include no chest pain, headaches, palpitations or shortness of breath.   Anemia   There has been no bruising/bleeding easily, fever or palpitations.        Patient is a white female with severe COPD and history of acute kidney injury status post hemodialysis in the year 2012.  She has been followed in the nephrology division.  She was last seen by Dr. Jara in August 2016 when her creatinine was higher.  Today she comes back for follow-up as a new patient to me.  Patient has been having some wheezing.  Patient has lost 5 pounds in last 6 months unintentionally.  Patient has not been drinking very well.  Her creatinine is higher at 1.9.  Patient overall is a very poor historian.  Denies any fevers and chills.  Denied any urinary symptoms.  Denies any cough or sputum.    September 2016 acute kidney injury--- ACE inhibitor was stopped    October 2016 creatinine came down to 1.6; renal ultrasound showed echogenic kidneys with possible renal artery stenosis; AAA of size of 4.0 cm    January 2017: Recurrent UTI instructions given, mild hypercalcemia over-the-counter vitamin D stopped, continued with calcitriol for hyperparathyroidism    April 2017 Norvasc was stopped due to relative hypotension    August 2017 GFR is 28% based on Cystatin C with a corresponding creatinine of 1.6; creatinine in the last 6 months have been fluctuating between 1.6 and 2.2.  There was one creatinine of 0.9 which could have been a lab error. S/p fall with pelvic # and left wrist # and s/p plate ( dr. Lizarraga) pending holter today       April 2018 patient seen for follow-up: Reviewed  records from pain management, rheumatology for Prolia and management of gout, internal medicine records reviewed, urology records Dr. Garcia reviewed with stone protocol CT scan done in January 2018 showing renal cyst, pulmonary, psychiatry, hematology records also reviewed.  Last labs are from February 2018.  Calcium has gone up to 11.0.  Creatinine has gone up from 1.2 up to 1.7       July 2018 patient was hospitalized for acute shortness of breath due to pneumonia.  Patient was treated with bronchodilators and oxygen along with IV antibiotics.  Patient did not qualify for home oxygen.  Maximum creatinine was 1.7    August 2018 creatinine down to 1.3 GFR 36%.  Vitamin-D level is normal.  UA negative. ER visit for SZ ( dr. Bagley) due to levaquin/ tramadol/ buspar now on keppra pending follow up with dr. Dowd     Review of Systems   Constitutional: Negative for activity change, appetite change, chills, diaphoresis, fatigue, fever and unexpected weight change.   HENT: Negative for congestion, dental problem, drooling, postnasal drip, rhinorrhea and voice change.    Eyes: Negative for discharge.   Respiratory: Negative for apnea, cough, choking, chest tightness, shortness of breath, wheezing and stridor.    Cardiovascular: Negative for chest pain, palpitations and leg swelling.   Gastrointestinal: Negative for abdominal distention, blood in stool, constipation, diarrhea, nausea, rectal pain and vomiting.   Endocrine: Negative for cold intolerance, heat intolerance, polydipsia and polyuria.   Genitourinary: Negative for decreased urine volume, difficulty urinating, dysuria, enuresis, flank pain, frequency, hematuria and urgency.   Musculoskeletal: Negative for arthralgias, back pain, gait problem and joint swelling.   Skin: Negative for rash.   Allergic/Immunologic: Negative for food allergies and immunocompromised state.   Neurological: Negative for dizziness, tremors, syncope, numbness and headaches.  "  Hematological: Does not bruise/bleed easily.   Psychiatric/Behavioral: Negative for agitation, behavioral problems and self-injury. The patient is not nervous/anxious and is not hyperactive.    All other systems reviewed and are negative.      Objective:     BP (!) 120/90   Pulse 70   Ht 5' 5" (1.651 m)   Wt 49.8 kg (109 lb 12.6 oz)   LMP  (LMP Unknown)   BMI 18.27 kg/m²      Physical Exam   Constitutional: She is oriented to person, place, and time. She appears well-developed and well-nourished.   HENT:   Head: Normocephalic and atraumatic.   Eyes: Conjunctivae and EOM are normal. Pupils are equal, round, and reactive to light.   Neck: Normal range of motion and full passive range of motion without pain. Neck supple. Carotid bruit is not present. No edema present. No thyroid mass and no thyromegaly present.   Cardiovascular: Normal rate, regular rhythm, S1 normal, S2 normal, normal heart sounds, intact distal pulses and normal pulses. PMI is not displaced. Exam reveals no friction rub.   No murmur heard.  Pulmonary/Chest: Effort normal and breath sounds normal. No accessory muscle usage. No respiratory distress. She has no wheezes. She has no rales. She exhibits no tenderness.   Abdominal: Soft. Bowel sounds are normal. She exhibits no distension and no mass. There is no hepatosplenomegaly. There is no tenderness. There is no rebound and no CVA tenderness. No hernia.   Musculoskeletal: Normal range of motion. She exhibits no edema or tenderness.   Neurological: She is alert and oriented to person, place, and time. She has normal reflexes. She displays normal reflexes. No cranial nerve deficit or sensory deficit. She exhibits normal muscle tone. Coordination normal.   Skin: Skin is warm and dry. No bruising, no ecchymosis and no rash noted. No cyanosis or erythema. No pallor. Nails show no clubbing.   Psychiatric: She has a normal mood and affect. Her speech is normal and behavior is normal. Judgment and " thought content normal.         Lab Results   Component Value Date    CREATININE 1.4 02/18/2019    BUN 20 02/18/2019     02/18/2019    K 3.6 02/18/2019     02/18/2019    CO2 30 (H) 02/18/2019     Lab Results   Component Value Date    WBC 7.81 02/18/2019    HGB 12.3 02/18/2019    HCT 39.7 02/18/2019     (H) 02/18/2019     02/18/2019     Lab Results   Component Value Date    .0 (H) 11/01/2018    CALCIUM 8.7 02/18/2019    CAION 0.90 (L) 09/18/2012    PHOS 3.8 02/18/2019     Lab Results   Component Value Date    URICACID 4.8 11/01/2018       Assessment:    )    1. Stage 3 chronic kidney disease    2. Hyperparathyroidism, secondary renal    3. Hyperkalemia    4. Acidosis    5. Persistent proteinuria    6. 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 Hypercalcemia        Plan:         1. Chronic kidney disease stage III: No heavy proteinuria.  No intervention for renal artery stenosis at this time.  Avoid ACE inhibitor and ARB at this time.  GFR 36% with a corresponding creatinine of 1.3 ( creatinine 1.4 now ) . USD reviewed from 1/20189    2.  Hypertension with edema :   Norvasc lower 2.5 mg daily      3.  Anemia:on PO iron and IV iron per hematology --  follow up with dr. Mar      4  Proteinuria: Stable without  ARB or ACE inhibitor= 0.99 G/24 hours    5. Hyperparathyroidism with hypercalcemia status post Prolia: Keep calcitriol 0.25 mg MWF ( had high calcium last time and low today )       7. Gout: Continue on allopurinol 100 mg ---life long with uric acid target < 6 mg/ dl and it protects kidney and prevents gout : followed by rheumatology    8. CBD oil is ok             Follow up 6 months     Cristina Crowder MD

## 2019-02-26 ENCOUNTER — TELEPHONE (OUTPATIENT)
Dept: PULMONOLOGY | Facility: CLINIC | Age: 69
End: 2019-02-26

## 2019-03-01 ENCOUNTER — PATIENT OUTREACH (OUTPATIENT)
Dept: PULMONOLOGY | Facility: CLINIC | Age: 69
End: 2019-03-01

## 2019-03-01 NOTE — TELEPHONE ENCOUNTER
Chronic Disease Management  Called patient to complete Pulmonary Disease Management Questionnaire.    Reminded patient of patient assistance program that may help with medication costs.  Time  spent with patient: 5 Minutes

## 2019-03-04 ENCOUNTER — INFUSION (OUTPATIENT)
Dept: RHEUMATOLOGY | Facility: HOSPITAL | Age: 69
End: 2019-03-04
Attending: PHYSICIAN ASSISTANT
Payer: MEDICARE

## 2019-03-04 ENCOUNTER — OFFICE VISIT (OUTPATIENT)
Dept: RHEUMATOLOGY | Facility: CLINIC | Age: 69
End: 2019-03-04
Payer: MEDICARE

## 2019-03-04 ENCOUNTER — APPOINTMENT (OUTPATIENT)
Dept: RADIOLOGY | Facility: HOSPITAL | Age: 69
End: 2019-03-04
Attending: PHYSICIAN ASSISTANT
Payer: MEDICARE

## 2019-03-04 VITALS
BODY MASS INDEX: 18.15 KG/M2 | HEIGHT: 65 IN | WEIGHT: 108.94 LBS | HEART RATE: 63 BPM | DIASTOLIC BLOOD PRESSURE: 94 MMHG | SYSTOLIC BLOOD PRESSURE: 146 MMHG

## 2019-03-04 DIAGNOSIS — M81.8 OSTEOPOROSIS, IDIOPATHIC: Chronic | ICD-10-CM

## 2019-03-04 DIAGNOSIS — N18.30 CHRONIC KIDNEY DISEASE, STAGE 3: Chronic | ICD-10-CM

## 2019-03-04 DIAGNOSIS — M81.8 OSTEOPOROSIS, IDIOPATHIC: Primary | ICD-10-CM

## 2019-03-04 DIAGNOSIS — M81.8 OSTEOPOROSIS, IDIOPATHIC: Primary | Chronic | ICD-10-CM

## 2019-03-04 DIAGNOSIS — M15.9 PRIMARY OSTEOARTHRITIS INVOLVING MULTIPLE JOINTS: ICD-10-CM

## 2019-03-04 DIAGNOSIS — Z51.81 MEDICATION MONITORING ENCOUNTER: ICD-10-CM

## 2019-03-04 PROCEDURE — 1101F PT FALLS ASSESS-DOCD LE1/YR: CPT | Mod: HCNC,CPTII,S$GLB, | Performed by: PHYSICIAN ASSISTANT

## 2019-03-04 PROCEDURE — 96372 THER/PROPH/DIAG INJ SC/IM: CPT | Mod: HCNC

## 2019-03-04 PROCEDURE — 3080F DIAST BP >= 90 MM HG: CPT | Mod: HCNC,CPTII,S$GLB, | Performed by: PHYSICIAN ASSISTANT

## 2019-03-04 PROCEDURE — 99214 OFFICE O/P EST MOD 30 MIN: CPT | Mod: HCNC,S$GLB,, | Performed by: PHYSICIAN ASSISTANT

## 2019-03-04 PROCEDURE — 99499 UNLISTED E&M SERVICE: CPT | Mod: S$GLB,,, | Performed by: PHYSICIAN ASSISTANT

## 2019-03-04 PROCEDURE — 99999 PR PBB SHADOW E&M-EST. PATIENT-LVL IV: CPT | Mod: PBBFAC,HCNC,, | Performed by: PHYSICIAN ASSISTANT

## 2019-03-04 PROCEDURE — 3077F SYST BP >= 140 MM HG: CPT | Mod: HCNC,CPTII,S$GLB, | Performed by: PHYSICIAN ASSISTANT

## 2019-03-04 PROCEDURE — 77080 DXA BONE DENSITY AXIAL: CPT | Mod: 26,HCNC,, | Performed by: RADIOLOGY

## 2019-03-04 PROCEDURE — 99214 PR OFFICE/OUTPT VISIT, EST, LEVL IV, 30-39 MIN: ICD-10-PCS | Mod: HCNC,S$GLB,, | Performed by: PHYSICIAN ASSISTANT

## 2019-03-04 PROCEDURE — 3077F PR MOST RECENT SYSTOLIC BLOOD PRESSURE >= 140 MM HG: ICD-10-PCS | Mod: HCNC,CPTII,S$GLB, | Performed by: PHYSICIAN ASSISTANT

## 2019-03-04 PROCEDURE — 99499 RISK ADDL DX/OHS AUDIT: ICD-10-PCS | Mod: S$GLB,,, | Performed by: PHYSICIAN ASSISTANT

## 2019-03-04 PROCEDURE — 1101F PR PT FALLS ASSESS DOC 0-1 FALLS W/OUT INJ PAST YR: ICD-10-PCS | Mod: HCNC,CPTII,S$GLB, | Performed by: PHYSICIAN ASSISTANT

## 2019-03-04 PROCEDURE — 77080 DXA BONE DENSITY AXIAL: CPT | Mod: TC,HCNC

## 2019-03-04 PROCEDURE — 3080F PR MOST RECENT DIASTOLIC BLOOD PRESSURE >= 90 MM HG: ICD-10-PCS | Mod: HCNC,CPTII,S$GLB, | Performed by: PHYSICIAN ASSISTANT

## 2019-03-04 PROCEDURE — 77080 DEXA BONE DENSITY SPINE HIP: ICD-10-PCS | Mod: 26,HCNC,, | Performed by: RADIOLOGY

## 2019-03-04 PROCEDURE — 63600175 PHARM REV CODE 636 W HCPCS: Mod: HCNC,JG | Performed by: PHYSICIAN ASSISTANT

## 2019-03-04 PROCEDURE — 99999 PR PBB SHADOW E&M-EST. PATIENT-LVL IV: ICD-10-PCS | Mod: PBBFAC,HCNC,, | Performed by: PHYSICIAN ASSISTANT

## 2019-03-04 RX ADMIN — DENOSUMAB 60 MG: 60 INJECTION SUBCUTANEOUS at 04:03

## 2019-03-04 NOTE — NURSING
Prolia 60 mg q 6 months  Last dose given- 8/29/19    Any invasive dental procedures in past 3 months or upcoming 3 months: denies    Last Rheumatology provider visit- Seen by KEV España on 3/4/19    Recent labs? 3/4/19;  CKD pt needing repeat labs in 10 days- Yes. Labs scheduled for 3/14/19.    Lab Results   Component Value Date    CALCIUM 9.5 03/04/2019     Lab Results   Component Value Date    CREATININE 1.4 03/04/2019     Lab Results   Component Value Date    ESTGFRAFRICA 45 (A) 03/04/2019     Lab Results   Component Value Date    EGFRNONAA 39 (A) 03/04/2019     Lab Results   Component Value Date    EXWYMSCB98NN 41 08/07/2018          Lot #- [id number]  Expiration Date- 06/21      Prolia 60 mg/ml administered SQ to Left lower abdominal quadrant. Tolerated without any complaints. No adverse reaction noted or reported after 15 minutes of administration. No redness, swelling, or drainage noted to site. Pt instructed on signs and symptoms of reaction to report. Verbalizes understanding.

## 2019-03-04 NOTE — PLAN OF CARE
Problem: Adult Inpatient Plan of Care  Goal: Absence of Hospital-Acquired Illness or Injury    Intervention: Identify and Manage Fall Risk  Fall precautions maintained.

## 2019-03-04 NOTE — PROGRESS NOTES
Subjective:       Patient ID: Ibeth Schroeder is a 68 y.o. female.    Chief Complaint: Osteoporosis    Ibeth is back for rheumatology follow-up     Osteoporosis now on  Prolia for osteoporosis.  She does have some chronic kidney disease Stage III-IV, bisphosphonates contraindicated. In the past had low Ca. Renal had her on Calcitrol mon/wed/fri.   2016 sustained a pelvic fracture and left distal radial fx. prolia was started 6/2016. Last prolia done 8/29/18  1 fall since last visit, no fx. 2018 she completed  PT for balance and strengthening.     She does have a history of hyperuricemia with gout attacks but none recently it's been almost 8 years since her last attack.  She is on allopurinol 100 mg/d per renal.  Her pain level today 0/10. She does have osteoarthritis affecting multiple joints as well as her spine. Has gotten epidural injections in the past with some help.      Osteoarthritis   Pertinent negatives include no abdominal pain, arthralgias, chest pain, chills, fatigue, fever, joint swelling, myalgias, nausea, neck pain, rash, vomiting or weakness.     Review of Systems   Constitutional: Negative.  Negative for activity change, appetite change, chills, fatigue and fever.   HENT: Negative.  Negative for mouth sores and trouble swallowing.         No dry mouth   Eyes: Negative.  Negative for photophobia, pain and redness.        No swollen or red eyes, no dry eye     Respiratory: Negative.  Negative for chest tightness, shortness of breath, wheezing and stridor.    Cardiovascular: Negative.  Negative for chest pain.   Gastrointestinal: Negative.  Negative for abdominal pain, blood in stool, diarrhea, nausea and vomiting.   Genitourinary: Negative.  Negative for dysuria, frequency, hematuria and urgency.   Musculoskeletal: Negative for arthralgias, back pain, gait problem, joint swelling, myalgias, neck pain and neck stiffness.   Skin: Negative.  Negative for color change, pallor and rash.   Neurological:  "Negative.  Negative for weakness.   Hematological: Negative for adenopathy.   Psychiatric/Behavioral: Negative for suicidal ideas.         Objective:     BP (!) 146/94   Pulse 63   Ht 5' 5" (1.651 m)   Wt 49.4 kg (108 lb 14.5 oz)   LMP  (LMP Unknown)   BMI 18.12 kg/m²      Physical Exam   Constitutional: She is oriented to person, place, and time and well-developed, well-nourished, and in no distress. No distress.   HENT:   Head: Normocephalic and atraumatic.   Right Ear: External ear normal.   Left Ear: External ear normal.   Mouth/Throat: No oropharyngeal exudate.   Eyes: Conjunctivae and EOM are normal. Pupils are equal, round, and reactive to light. No scleral icterus.   Neck: Normal range of motion. Neck supple. No thyromegaly present.   Cardiovascular: Normal rate, regular rhythm and normal heart sounds.    No murmur heard.  Pulmonary/Chest: Effort normal and breath sounds normal. She exhibits no tenderness.   Abdominal: Soft. Bowel sounds are normal.   Lymphadenopathy:     She has no cervical adenopathy.   Neurological: She is alert and oriented to person, place, and time. She displays normal reflexes. No cranial nerve deficit. She exhibits normal muscle tone. Gait normal.   Skin: Skin is warm and dry. No rash noted.     Musculoskeletal: Normal range of motion. She exhibits deformity. She exhibits no edema or tenderness.   She does have some kyphosis and scoliosis in the thoracic spine but no tenderness to palpation  Some mild osteoarthritic changes in her hands                 Recent Results (from the past 168 hour(s))   Comprehensive metabolic panel    Collection Time: 03/04/19  2:03 PM   Result Value Ref Range    Sodium 144 136 - 145 mmol/L    Potassium 3.8 3.5 - 5.1 mmol/L    Chloride 101 95 - 110 mmol/L    CO2 31 (H) 23 - 29 mmol/L    Glucose 136 (H) 70 - 110 mg/dL    BUN, Bld 20 8 - 23 mg/dL    Creatinine 1.4 0.5 - 1.4 mg/dL    Calcium 9.5 8.7 - 10.5 mg/dL    Total Protein 7.1 6.0 - 8.4 g/dL    " Albumin 3.5 3.5 - 5.2 g/dL    Total Bilirubin 0.5 0.1 - 1.0 mg/dL    Alkaline Phosphatase 83 55 - 135 U/L    AST 16 10 - 40 U/L    ALT 6 (L) 10 - 44 U/L    Anion Gap 12 8 - 16 mmol/L    eGFR if African American 45 (A) >60 mL/min/1.73 m^2    eGFR if non African American 39 (A) >60 mL/min/1.73 m^2   Uric acid    Collection Time: 03/04/19  2:03 PM   Result Value Ref Range    Uric Acid 5.0 2.4 - 5.7 mg/dL       DEXA 3/4/19  total femur T score -2.6, femur neck -3.0, spine +1.9 impression osteoporosis    DEXA 7/6/16 total femur T score -2.7, femur neck -2.7, spine +1.9 impression osteoporosis    DEXA 6/5/13 total femur T score -3.1, spine +1.7 impression osteoporosis    Assessment:       1. Osteoporosis, idiopathic    2. Chronic kidney disease, stage 3    3. Primary osteoarthritis involving multiple joints    4. Medication monitoring encounter          1.  Severe osteoporosis at the hip--Prolia X 1.5 years after 3 years no treatment, pt lost to follow up  2016 pelvic fracture and left distal radial fx- prolia therapy started 7/2016    2.  Hyperuricemia with no recent gout attacks- on l allopurinol 100 mg daily     3.  Chronic kidney disease monitored by nephrology     4.  Generalized osteoarthritis    5.  Medication Monitoring- no current issues, no evidence of toxicity    6. Hypercalcemia- ckd - no wnl off supplemental Calcitrol and daily mv       Plan:         Labs reviewed and done within past 14 days  Ca 9.5, Creat 1.4, eGFR 39  No contraindication for Prolia today, ok for nurse to administer today  Re-evaluate patient again in 6 months to determine if Prolia still medically indicated and appropriate to administer.    KDIGO lab monitoring will be followed according to KDIGO 2017 Clinical Practice Guideline Update for the Diagnosis, Evaluation, Prevention, and Treatment of Chronic Kidney Disease-Mineral and Bone Disorder (CKD-MBD)  Chapter 3.1: Diagnosis of CKD-MBD: biochemical abnormalities    Add 2 tums daily X 14  days  cmp in 10 days    dexa 2020    C/w PT for strength and balance- peak performance    Allopurinol 100 mg/d per renal, no gout attacks    Avoid non-steroidal's, okay for Tylenol for pain    Return to clinic in 6 months with labs- cmp and vit D and dexa     Call with any questions, changes, or concerns.

## 2019-03-13 ENCOUNTER — CLINICAL SUPPORT (OUTPATIENT)
Dept: CARDIOLOGY | Facility: CLINIC | Age: 69
End: 2019-03-13
Attending: INTERNAL MEDICINE
Payer: MEDICARE

## 2019-03-13 DIAGNOSIS — I27.9 PULMONARY HEART DISEASE: Chronic | ICD-10-CM

## 2019-03-13 PROCEDURE — 93306 TTE W/DOPPLER COMPLETE: CPT | Mod: HCNC,S$GLB,, | Performed by: INTERNAL MEDICINE

## 2019-03-13 PROCEDURE — 93306 2D ECHO WITH COLOR FLOW DOPPLER: ICD-10-PCS | Mod: HCNC,S$GLB,, | Performed by: INTERNAL MEDICINE

## 2019-03-14 ENCOUNTER — LAB VISIT (OUTPATIENT)
Dept: LAB | Facility: HOSPITAL | Age: 69
End: 2019-03-14
Attending: PHYSICIAN ASSISTANT
Payer: MEDICARE

## 2019-03-14 DIAGNOSIS — M81.8 OSTEOPOROSIS, IDIOPATHIC: Chronic | ICD-10-CM

## 2019-03-14 DIAGNOSIS — N18.30 CHRONIC KIDNEY DISEASE, STAGE 3: Chronic | ICD-10-CM

## 2019-03-14 DIAGNOSIS — E83.52 HYPERCALCEMIA: ICD-10-CM

## 2019-03-14 LAB
ALBUMIN SERPL BCP-MCNC: 3.7 G/DL
ALP SERPL-CCNC: 90 U/L
ALT SERPL W/O P-5'-P-CCNC: 7 U/L
ANION GAP SERPL CALC-SCNC: 6 MMOL/L
AST SERPL-CCNC: 20 U/L
BILIRUB SERPL-MCNC: 0.6 MG/DL
BUN SERPL-MCNC: 24 MG/DL
CALCIUM SERPL-MCNC: 9.4 MG/DL
CHLORIDE SERPL-SCNC: 98 MMOL/L
CO2 SERPL-SCNC: 34 MMOL/L
CREAT SERPL-MCNC: 1.5 MG/DL
DIASTOLIC DYSFUNCTION: YES
EST. GFR  (AFRICAN AMERICAN): 41.6 ML/MIN/1.73 M^2
EST. GFR  (NON AFRICAN AMERICAN): 36 ML/MIN/1.73 M^2
ESTIMATED PA SYSTOLIC PRESSURE: 28.52
GLUCOSE SERPL-MCNC: 101 MG/DL
POTASSIUM SERPL-SCNC: 3.7 MMOL/L
PROT SERPL-MCNC: 7 G/DL
RETIRED EF AND QEF - SEE NOTES: 60 (ref 55–65)
SODIUM SERPL-SCNC: 138 MMOL/L

## 2019-03-14 PROCEDURE — 80053 COMPREHEN METABOLIC PANEL: CPT | Mod: HCNC

## 2019-03-14 PROCEDURE — 36415 COLL VENOUS BLD VENIPUNCTURE: CPT | Mod: HCNC,PO

## 2019-03-18 ENCOUNTER — OFFICE VISIT (OUTPATIENT)
Dept: URGENT CARE | Facility: CLINIC | Age: 69
End: 2019-03-18
Payer: MEDICARE

## 2019-03-18 ENCOUNTER — HOSPITAL ENCOUNTER (OUTPATIENT)
Dept: RADIOLOGY | Facility: HOSPITAL | Age: 69
Discharge: HOME | End: 2019-03-18
Attending: NURSE PRACTITIONER
Payer: MEDICARE

## 2019-03-18 VITALS
HEART RATE: 93 BPM | TEMPERATURE: 98 F | RESPIRATION RATE: 14 BRPM | DIASTOLIC BLOOD PRESSURE: 60 MMHG | SYSTOLIC BLOOD PRESSURE: 114 MMHG | BODY MASS INDEX: 18.27 KG/M2 | WEIGHT: 109.81 LBS | OXYGEN SATURATION: 89 %

## 2019-03-18 DIAGNOSIS — R05.9 COUGH: ICD-10-CM

## 2019-03-18 DIAGNOSIS — R06.02 SOB (SHORTNESS OF BREATH): ICD-10-CM

## 2019-03-18 DIAGNOSIS — R06.2 WHEEZING: ICD-10-CM

## 2019-03-18 DIAGNOSIS — B96.89 ACUTE BACTERIAL SINUSITIS: ICD-10-CM

## 2019-03-18 DIAGNOSIS — J01.90 ACUTE BACTERIAL SINUSITIS: ICD-10-CM

## 2019-03-18 DIAGNOSIS — R06.02 SOB (SHORTNESS OF BREATH): Primary | ICD-10-CM

## 2019-03-18 PROCEDURE — 71046 XR CHEST PA AND LATERAL: ICD-10-PCS | Mod: 26,HCNC,, | Performed by: RADIOLOGY

## 2019-03-18 PROCEDURE — 94640 AIRWAY INHALATION TREATMENT: CPT | Mod: HCNC,S$GLB,, | Performed by: NURSE PRACTITIONER

## 2019-03-18 PROCEDURE — 1101F PT FALLS ASSESS-DOCD LE1/YR: CPT | Mod: HCNC,CPTII,S$GLB, | Performed by: NURSE PRACTITIONER

## 2019-03-18 PROCEDURE — 71046 X-RAY EXAM CHEST 2 VIEWS: CPT | Mod: 26,HCNC,, | Performed by: RADIOLOGY

## 2019-03-18 PROCEDURE — 71046 X-RAY EXAM CHEST 2 VIEWS: CPT | Mod: TC,HCNC,PO

## 2019-03-18 PROCEDURE — 99999 PR PBB SHADOW E&M-EST. PATIENT-LVL V: ICD-10-PCS | Mod: PBBFAC,HCNC,, | Performed by: NURSE PRACTITIONER

## 2019-03-18 PROCEDURE — 94640 PR INHAL RX, AIRWAY OBST/DX SPUTUM INDUCT: ICD-10-PCS | Mod: HCNC,S$GLB,, | Performed by: NURSE PRACTITIONER

## 2019-03-18 PROCEDURE — 3078F PR MOST RECENT DIASTOLIC BLOOD PRESSURE < 80 MM HG: ICD-10-PCS | Mod: HCNC,CPTII,S$GLB, | Performed by: NURSE PRACTITIONER

## 2019-03-18 PROCEDURE — 3074F SYST BP LT 130 MM HG: CPT | Mod: HCNC,CPTII,S$GLB, | Performed by: NURSE PRACTITIONER

## 2019-03-18 PROCEDURE — 99214 PR OFFICE/OUTPT VISIT, EST, LEVL IV, 30-39 MIN: ICD-10-PCS | Mod: 25,HCNC,S$GLB, | Performed by: NURSE PRACTITIONER

## 2019-03-18 PROCEDURE — 99214 OFFICE O/P EST MOD 30 MIN: CPT | Mod: 25,HCNC,S$GLB, | Performed by: NURSE PRACTITIONER

## 2019-03-18 PROCEDURE — 3078F DIAST BP <80 MM HG: CPT | Mod: HCNC,CPTII,S$GLB, | Performed by: NURSE PRACTITIONER

## 2019-03-18 PROCEDURE — 1101F PR PT FALLS ASSESS DOC 0-1 FALLS W/OUT INJ PAST YR: ICD-10-PCS | Mod: HCNC,CPTII,S$GLB, | Performed by: NURSE PRACTITIONER

## 2019-03-18 PROCEDURE — 99999 PR PBB SHADOW E&M-EST. PATIENT-LVL V: CPT | Mod: PBBFAC,HCNC,, | Performed by: NURSE PRACTITIONER

## 2019-03-18 PROCEDURE — 3074F PR MOST RECENT SYSTOLIC BLOOD PRESSURE < 130 MM HG: ICD-10-PCS | Mod: HCNC,CPTII,S$GLB, | Performed by: NURSE PRACTITIONER

## 2019-03-18 RX ORDER — LEVALBUTEROL INHALATION SOLUTION 1.25 MG/3ML
1.25 SOLUTION RESPIRATORY (INHALATION)
Status: COMPLETED | OUTPATIENT
Start: 2019-03-18 | End: 2019-03-18

## 2019-03-18 RX ORDER — PROMETHAZINE HYDROCHLORIDE AND DEXTROMETHORPHAN HYDROBROMIDE 6.25; 15 MG/5ML; MG/5ML
5 SYRUP ORAL
Qty: 120 ML | Refills: 0 | Status: SHIPPED | OUTPATIENT
Start: 2019-03-18 | End: 2019-04-03

## 2019-03-18 RX ORDER — AZITHROMYCIN 250 MG/1
TABLET, FILM COATED ORAL
Qty: 6 TABLET | Refills: 0 | Status: SHIPPED | OUTPATIENT
Start: 2019-03-18 | End: 2019-03-23

## 2019-03-18 RX ORDER — PREDNISONE 20 MG/1
20 TABLET ORAL 2 TIMES DAILY
Qty: 10 TABLET | Refills: 0 | Status: SHIPPED | OUTPATIENT
Start: 2019-03-18 | End: 2019-03-23

## 2019-03-18 RX ADMIN — LEVALBUTEROL INHALATION SOLUTION 1.25 MG: 1.25 SOLUTION RESPIRATORY (INHALATION) at 04:03

## 2019-03-18 RX ADMIN — LEVALBUTEROL INHALATION SOLUTION 1.25 MG: 1.25 SOLUTION RESPIRATORY (INHALATION) at 05:03

## 2019-03-18 NOTE — PATIENT INSTRUCTIONS
PLAN: CXR,   Nebulizer with Xopenex 1.25 for 15 and clinic now x 2  Drink plenty of clear fluids--at least 64 ounces of water/juice & rest  Simply saline nasal wash or flonase to irrigate sinuses and for congestion/runny nose.  Cool mist humidifier/vaporizer.  Meds:  Zithromax, prednisone, and Phenergan DM no refills  Practice good handwashing..  Mucinex for cough and chest congestion.  Tylenol  for fever, headache and body aches.  Warm salt water gargles for throat comfort.  Chloraseptic spray or lozenges for throat comfort.  Advise follow up with PCP  Advise go to ER if symptoms worsen or fail to improve with treatment.  AVS provided and reviewed with patient including supportive care, follow up, and red flag symptoms.   Patient verbalizes understanding and agrees with treatment plan. Discharged from Urgent Care in stable condition.

## 2019-03-18 NOTE — PROGRESS NOTES
Chief complaint/reason for visit: Nasal congestion, postnasal drip, cough and fever    HISTORY OF PRESENT ILLNESS:    69 y/o female with  complains of nasal congestion, postnasal drip brownish mucus, shortness of breath, productive cough and chills onset 1 week.  Patient complains cough with wheezing worse at night.  Admits tried medication with no relief.  Admits has a a history of COPD.  Admits tried inhalers at home with no relief.  Denies chest pain, nausea, vomiting, diarrhea, fever with body aches.     Past Medical History:   Diagnosis Date    Acute on chronic respiratory failure with hypoxia 7/30/2018    Acute pancreatitis     Alcohol dependence     per patient in the past    Allergy     Anemia     Anxiety     Arthritis 6/24/2013    Asthma     per patient    Back pain     Closed avulsion fracture of anterior inferior iliac spine of pelvis 7/17/2017 7/16/17 CT abd pelvis- Acute comminuted fracture involving the left iliac bone, new since the prior exam.  No other pelvic fractures seen.    Closed fracture of right iliac wing 7/18/2017    Colon polyp     Colon polyp     colonoscopy 8/26/2013    COPD (chronic obstructive pulmonary disease)     Dependence on renal dialysis 2012    when in liver failure    Depression     Disorder of kidney and ureter     ckd4    Diverticulosis     Diverticulosis     colonoscopy 8/26/2013    Hiatal hernia     CXR 2/25/2015--Large hiatal hernia.     Hyperlipidemia     Hypertension     Hypocalcemia 7/6/2016    Hypothyroidism 6/24/2013    Kidney cysts     us retro 7/18/2018-Simple bilateral renal cysts as above.  No acute findings.   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 FEV1 is 1.08 (44% predicted) FVC is 2.25 (71% predicted) FEV1/FVC is 48  She uses 2 pillows at night. Patient currently is on oxygen at 2 L/min per nasal cannula at night  Last visit overnight sat result:Overnight sat: 9  hours of sampling with room air . Her saturation was below 88% for 30 minutes  2/25/15 Overnight sat  indicated supplementary O2 r    Osteoporosis     Pneumonia due to infectious organism 7/29/2018    Pulmonary embolism     per patient unsure of date    Recurrent major depressive disorder, in partial remission 4/20/2016    Restless leg syndrome     RLS (restless legs syndrome)     Seizure 6/24/2013    Stage 3 severe COPD by GOLD classification 3/16/2016    Spirometry 3/16/2016---Physician Interpretation Moderately severe obstruction (FEV1>50 and <59% of predicted).compared to previous study performed 2/25/2015 FEV1 improved by 24%, FVC improved by 15%.    Stroke     ischemic injury due to hypotension    Tobacco dependence     resolved    Trouble in sleeping        Past Surgical History:   Procedure Laterality Date    ADENOIDECTOMY  1960 approx    amputation left 2nd finger tip Left 2012    for blood clot/ after liver failure admit ? gangrene    APPENDECTOMY      incidental with another surgery    na hole/ ventriculostomy Right 08/24/2012    frontal after  liver failure    CHOLECYSTECTOMY      COLONOSCOPY  2013    COLONOSCOPY N/A 8/26/2013    Performed by Rajat [facility]r [facility] at [facility]O    EXCISION-CYST Left 9/6/2017    Performed by Binu Lizarraga Sr., [facility] at [facility] OR    FRACTURE SURGERY Left 07/12/2017    radius and ulna fracture    FRACTURE SURGERY Left 09/12/2017    wrist removal of hardware    BRENDEN FILTER PLACEMENT      HERNIA REPAIR  1999 approx    incisional hernia     HYSTERECTOMY  1977 approx    ARS/BSO  for endometriosis and tubular pregnancy     JOINT REPLACEMENT Left 2000 approx    hip for arthritis     JOINT REPLACEMENT Left 2004 approx    hip, fell then recurent dislocations - 4 times relocarted under mild anesthesia then re replaced    JOINT REPLACEMENT Right 2002 approx    knee for arthritis    OOPHORECTOMY  1977 approx    OPEN REDUCTION INTERNAL FIXATION-RADIUS Left  2017    Performed [facility] Binu [facility] Sr[facility]    OPEN REDUCTION INTERNAL FIXATION-ULNA Left 2017    Performed by Binu [facility]    REMOVAL-HARDWARE Left 2017    [facility]    SYNOVECTOMY-WRIST Left 2017    [facility]    TONSILLECTOMY  1960 approx            Family History   Problem Relation Age of Onset    Diabetes Mother     Hypertension Mother     Kidney disease Mother         stones    Parkinsonism Mother     Asthma Father     Heart disease Father     Diabetes Son     Hypertension Son     Heart disease Maternal Aunt         CAD     Cancer Maternal Aunt         Breast Ca    Breast cancer Maternal Aunt     Colon cancer Maternal Grandmother     Cancer Maternal Grandmother     Cancer Maternal Aunt         breast    Heart disease Maternal Aunt     Breast cancer Maternal Aunt     Alcohol abuse Maternal Uncle     Stroke Neg Hx     Drug abuse Neg Hx     Mental retardation Neg Hx     Mental illness Neg Hx             Social History     Socioeconomic History    Marital status:      Spouse name: Not on file    Number of children: Not on file    Years of education: Not on file    Highest education level: Not on file   Social Needs    Financial resource strain: Not on file    Food insecurity - worry: Not on file    Food insecurity - inability: Not on file    Transportation needs - medical: Not on file    Transportation needs - non-medical: Not on file   Occupational History    Not on file   Tobacco Use    Smoking status: Former Smoker     Packs/day: 1.00     Years: 30.00     Pack years: 30.00     Types: Cigarettes     Last attempt to quit: 2010     Years since quittin.6    Smokeless tobacco: Never Used    Tobacco comment: used nicorette gum in past and prn   Substance and Sexual Activity    Alcohol use: Yes     Alcohol/week: 3.0 - 3.6 oz     Types: 5 - 6  Shots of liquor per week    Drug use: No    Sexual activity: Yes     Partners: Male     Birth control/protection: See Surgical Hx   Other Topics Concern    Not on file   Social History Narrative    . One son  In good health. Does not drive Poor vision left eye. Retired from [employer] work/[employer]. Did AD when in ICU in 2012.        ROS:  GENERAL: Reports chills and fatigue.   SKIN: No rashes, itching or changes in color or texture of skin.  HEENT: Reports nasal congestion, postnasal drip brownish mucus, hoarseness.   NODES: No masses or lesions. Denies swollen glands.  CHEST: Reports productive cough. & wheezing  CARDIOVASCULAR: Denies chest pain, shortness of breath  ABDOMEN: Appetite fair, No weight loss.  MUSCULOSKELETAL: reports no back pain.  NEUROLOGIC: No history of seizures, paralysis, alteration of gait or coordination.  PSYCHIATRIC: Edward mood swings, depression.    PE:   APPEARANCE: Well nourished, well developed, in moderate distress  V/S: Reviewed.  SKIN: Normal skin turgor, no lesions.  HEENT: Turbinates red, Minimal red pharynx, TMs poor light reflex bilateral.  CHEST: expiratory wheezing with rhonchi on auscultation.  CARDIOVASCULAR: Regular rate and rhythm.   ABDOMEN:  Soft. No tenderness or masses. No CVA tenderness.  MUSCULOSKELETAL: RAM without difficulty  NEUROLOGIC: No sensory deficits. Gait & Posture: normal, No cerebellar signs.  MENTAL STATUS: Patient alert, oriented x 3 & conversant.    PLAN: CXR,   Nebulizer with Xopenex 1.25 for 15 and clinic now x 2  Drink plenty of clear fluids--at least 64 ounces of water/juice & rest  Simply saline nasal wash or Flonase to irrigate sinuses and for congestion/runny nose.  Cool mist humidifier/vaporizer.  Meds:  Zithromax, prednisone, and Phenergan DM no refills  Practice good handwashing..  Mucinex for cough and chest congestion.  Tylenol  for fever, headache and body aches.  Warm salt water gargles for throat comfort.  Chloraseptic spray or  lozenges for throat comfort.  Advise follow up with PCP  Advise go to ER if symptoms worsen or fail to improve with treatment.  AVS provided and reviewed with patient including supportive care, follow up, and red flag symptoms.   Patient verbalizes understanding and agrees with treatment plan. Discharged from Urgent Care in stable condition.      DIAGNOSIS:  SOB  Fatigue  Cough vs Bronchitis   Acute bacterial sinusitis

## 2019-03-27 ENCOUNTER — HOSPITAL ENCOUNTER (INPATIENT)
Facility: HOSPITAL | Age: 69
LOS: 2 days | Discharge: HOME | DRG: 189 | End: 2019-03-29
Attending: EMERGENCY MEDICINE | Admitting: INTERNAL MEDICINE
Payer: MEDICARE

## 2019-03-27 DIAGNOSIS — Z86.711 HISTORY OF PULMONARY EMBOLUS (PE): Chronic | ICD-10-CM

## 2019-03-27 DIAGNOSIS — I10 HYPERTENSION, UNSPECIFIED TYPE: ICD-10-CM

## 2019-03-27 DIAGNOSIS — Z86.59 HISTORY OF MAJOR DEPRESSION: Chronic | ICD-10-CM

## 2019-03-27 DIAGNOSIS — E03.9 HYPOTHYROIDISM, UNSPECIFIED TYPE: Chronic | ICD-10-CM

## 2019-03-27 DIAGNOSIS — E87.8 ELECTROLYTE IMBALANCE: ICD-10-CM

## 2019-03-27 DIAGNOSIS — I10 ESSENTIAL HYPERTENSION: Chronic | ICD-10-CM

## 2019-03-27 DIAGNOSIS — J96.21 ACUTE ON CHRONIC RESPIRATORY FAILURE WITH HYPOXIA: ICD-10-CM

## 2019-03-27 DIAGNOSIS — R56.9 SEIZURE: Primary | ICD-10-CM

## 2019-03-27 DIAGNOSIS — N18.30 CHRONIC KIDNEY DISEASE, STAGE 3: Chronic | ICD-10-CM

## 2019-03-27 DIAGNOSIS — F10.11 HISTORY OF ETOH ABUSE: Chronic | ICD-10-CM

## 2019-03-27 DIAGNOSIS — J44.1 COPD EXACERBATION: ICD-10-CM

## 2019-03-27 DIAGNOSIS — R09.02 HYPOXIA: ICD-10-CM

## 2019-03-27 DIAGNOSIS — R93.89 ABNORMAL CHEST CT: ICD-10-CM

## 2019-03-27 DIAGNOSIS — E87.6 HYPOKALEMIA: ICD-10-CM

## 2019-03-27 DIAGNOSIS — K44.9 LARGE HIATAL HERNIA: ICD-10-CM

## 2019-03-27 DIAGNOSIS — J44.9 STAGE 3 SEVERE COPD BY GOLD CLASSIFICATION: Chronic | ICD-10-CM

## 2019-03-27 PROBLEM — J96.22 ACUTE ON CHRONIC RESPIRATORY FAILURE WITH HYPOXIA AND HYPERCAPNIA: Status: ACTIVE | Noted: 2018-07-29

## 2019-03-27 LAB
ALBUMIN SERPL BCP-MCNC: 3.6 G/DL (ref 3.5–5.2)
ALLENS TEST: ABNORMAL
ALP SERPL-CCNC: 79 U/L (ref 55–135)
ALT SERPL W/O P-5'-P-CCNC: 12 U/L (ref 10–44)
ANION GAP SERPL CALC-SCNC: 12 MMOL/L (ref 8–16)
ANION GAP SERPL CALC-SCNC: 12 MMOL/L (ref 8–16)
ANION GAP SERPL CALC-SCNC: 18 MMOL/L (ref 8–16)
APTT BLDCRRT: 24.8 SEC (ref 21–32)
AST SERPL-CCNC: 17 U/L (ref 10–40)
BACTERIA #/AREA URNS HPF: NORMAL /HPF
BASOPHILS # BLD AUTO: 0.01 K/UL (ref 0–0.2)
BASOPHILS NFR BLD: 0.1 % (ref 0–1.9)
BILIRUB SERPL-MCNC: 0.7 MG/DL (ref 0.1–1)
BILIRUB UR QL STRIP: NEGATIVE
BNP SERPL-MCNC: 156 PG/ML (ref 0–99)
BUN SERPL-MCNC: 24 MG/DL (ref 8–23)
BUN SERPL-MCNC: 26 MG/DL (ref 8–23)
BUN SERPL-MCNC: 30 MG/DL (ref 8–23)
CALCIUM SERPL-MCNC: 6.5 MG/DL (ref 8.7–10.5)
CALCIUM SERPL-MCNC: 6.6 MG/DL (ref 8.7–10.5)
CALCIUM SERPL-MCNC: 7.4 MG/DL (ref 8.7–10.5)
CHLORIDE SERPL-SCNC: 100 MMOL/L (ref 95–110)
CHLORIDE SERPL-SCNC: 102 MMOL/L (ref 95–110)
CHLORIDE SERPL-SCNC: 98 MMOL/L (ref 95–110)
CLARITY UR: CLEAR
CO2 SERPL-SCNC: 22 MMOL/L (ref 23–29)
CO2 SERPL-SCNC: 23 MMOL/L (ref 23–29)
CO2 SERPL-SCNC: 25 MMOL/L (ref 23–29)
COLOR UR: YELLOW
CREAT SERPL-MCNC: 1.2 MG/DL (ref 0.5–1.4)
CREAT SERPL-MCNC: 1.3 MG/DL (ref 0.5–1.4)
CREAT SERPL-MCNC: 1.4 MG/DL (ref 0.5–1.4)
D DIMER PPP IA.FEU-MCNC: 8.1 MG/L FEU
DELSYS: ABNORMAL
DIFFERENTIAL METHOD: ABNORMAL
EOSINOPHIL # BLD AUTO: 0.1 K/UL (ref 0–0.5)
EOSINOPHIL NFR BLD: 0.6 % (ref 0–8)
ERYTHROCYTE [DISTWIDTH] IN BLOOD BY AUTOMATED COUNT: 14.9 % (ref 11.5–14.5)
EST. GFR  (AFRICAN AMERICAN): 45 ML/MIN/1.73 M^2
EST. GFR  (AFRICAN AMERICAN): 49 ML/MIN/1.73 M^2
EST. GFR  (AFRICAN AMERICAN): 54 ML/MIN/1.73 M^2
EST. GFR  (NON AFRICAN AMERICAN): 39 ML/MIN/1.73 M^2
EST. GFR  (NON AFRICAN AMERICAN): 43 ML/MIN/1.73 M^2
EST. GFR  (NON AFRICAN AMERICAN): 47 ML/MIN/1.73 M^2
ETHANOL SERPL-MCNC: <10 MG/DL
FIO2: 32
FIO2: 32
FIO2: 65
FLOW: 3
FLOW: 3
FLOW: 30
GLUCOSE SERPL-MCNC: 162 MG/DL (ref 70–110)
GLUCOSE SERPL-MCNC: 163 MG/DL (ref 70–110)
GLUCOSE SERPL-MCNC: 214 MG/DL (ref 70–110)
GLUCOSE UR QL STRIP: NEGATIVE
HCO3 UR-SCNC: 24.1 MMOL/L (ref 24–28)
HCO3 UR-SCNC: 25.7 MMOL/L (ref 24–28)
HCO3 UR-SCNC: 32.3 MMOL/L (ref 24–28)
HCT VFR BLD AUTO: 40.9 % (ref 37–48.5)
HGB BLD-MCNC: 13.8 G/DL (ref 12–16)
HGB UR QL STRIP: ABNORMAL
HYALINE CASTS #/AREA URNS LPF: 0 /LPF
INFLUENZA A, MOLECULAR: NEGATIVE
INFLUENZA B, MOLECULAR: NEGATIVE
INR PPP: 1 (ref 0.8–1.2)
KETONES UR QL STRIP: NEGATIVE
LACTATE SERPL-SCNC: 1.8 MMOL/L (ref 0.5–2.2)
LACTATE SERPL-SCNC: 2.1 MMOL/L (ref 0.5–2.2)
LEUKOCYTE ESTERASE UR QL STRIP: NEGATIVE
LIPASE SERPL-CCNC: 57 U/L (ref 4–60)
LYMPHOCYTES # BLD AUTO: 1.5 K/UL (ref 1–4.8)
LYMPHOCYTES NFR BLD: 9.2 % (ref 18–48)
MAGNESIUM SERPL-MCNC: 0.7 MG/DL (ref 1.6–2.6)
MAGNESIUM SERPL-MCNC: 0.9 MG/DL (ref 1.6–2.6)
MAGNESIUM SERPL-MCNC: 3.7 MG/DL (ref 1.6–2.6)
MCH RBC QN AUTO: 31.4 PG (ref 27–31)
MCHC RBC AUTO-ENTMCNC: 33.7 G/DL (ref 32–36)
MCV RBC AUTO: 93 FL (ref 82–98)
MICROSCOPIC COMMENT: NORMAL
MODE: ABNORMAL
MONOCYTES # BLD AUTO: 0.7 K/UL (ref 0.3–1)
MONOCYTES NFR BLD: 4.5 % (ref 4–15)
NEUTROPHILS # BLD AUTO: 14.1 K/UL (ref 1.8–7.7)
NEUTROPHILS NFR BLD: 85.6 % (ref 38–73)
NITRITE UR QL STRIP: NEGATIVE
PCO2 BLDA: 35.5 MMHG (ref 35–45)
PCO2 BLDA: 45 MMHG (ref 35–45)
PCO2 BLDA: 57.6 MMHG (ref 35–45)
PH SMN: 7.36 [PH] (ref 7.35–7.45)
PH SMN: 7.37 [PH] (ref 7.35–7.45)
PH SMN: 7.44 [PH] (ref 7.35–7.45)
PH UR STRIP: 7 [PH] (ref 5–8)
PLATELET # BLD AUTO: 200 K/UL (ref 150–350)
PMV BLD AUTO: 10.3 FL (ref 9.2–12.9)
PO2 BLDA: 28 MMHG (ref 40–60)
PO2 BLDA: 52 MMHG (ref 80–100)
PO2 BLDA: 71 MMHG (ref 80–100)
POC BE: 0 MMOL/L
POC BE: 0 MMOL/L
POC BE: 7 MMOL/L
POC SATURATED O2: 48 % (ref 95–100)
POC SATURATED O2: 85 % (ref 95–100)
POC SATURATED O2: 95 % (ref 95–100)
POTASSIUM SERPL-SCNC: 3 MMOL/L (ref 3.5–5.1)
POTASSIUM SERPL-SCNC: 3.1 MMOL/L (ref 3.5–5.1)
POTASSIUM SERPL-SCNC: 3.3 MMOL/L (ref 3.5–5.1)
PROCALCITONIN SERPL IA-MCNC: 0.02 NG/ML
PROT SERPL-MCNC: 6.9 G/DL (ref 6–8.4)
PROT UR QL STRIP: ABNORMAL
PROTHROMBIN TIME: 10.9 SEC (ref 9–12.5)
RBC # BLD AUTO: 4.39 M/UL (ref 4–5.4)
RBC #/AREA URNS HPF: 1 /HPF (ref 0–4)
SAMPLE: ABNORMAL
SITE: ABNORMAL
SODIUM SERPL-SCNC: 135 MMOL/L (ref 136–145)
SODIUM SERPL-SCNC: 136 MMOL/L (ref 136–145)
SODIUM SERPL-SCNC: 141 MMOL/L (ref 136–145)
SP GR UR STRIP: 1.01 (ref 1–1.03)
SP02: 93
SP02: 93
SPECIMEN SOURCE: NORMAL
TROPONIN I SERPL DL<=0.01 NG/ML-MCNC: 0.02 NG/ML (ref 0–0.03)
URN SPEC COLLECT METH UR: ABNORMAL
UROBILINOGEN UR STRIP-ACNC: NEGATIVE EU/DL
WBC # BLD AUTO: 16.43 K/UL (ref 3.9–12.7)
WBC #/AREA URNS HPF: 1 /HPF (ref 0–5)

## 2019-03-27 PROCEDURE — 80048 BASIC METABOLIC PNL TOTAL CA: CPT | Mod: 91,HCNC

## 2019-03-27 PROCEDURE — 93010 ELECTROCARDIOGRAM REPORT: CPT | Mod: HCNC,,, | Performed by: INTERNAL MEDICINE

## 2019-03-27 PROCEDURE — 80053 COMPREHEN METABOLIC PANEL: CPT | Mod: HCNC

## 2019-03-27 PROCEDURE — 36600 WITHDRAWAL OF ARTERIAL BLOOD: CPT | Mod: HCNC

## 2019-03-27 PROCEDURE — 96365 THER/PROPH/DIAG IV INF INIT: CPT

## 2019-03-27 PROCEDURE — 94640 AIRWAY INHALATION TREATMENT: CPT | Mod: HCNC

## 2019-03-27 PROCEDURE — 83880 ASSAY OF NATRIURETIC PEPTIDE: CPT | Mod: HCNC

## 2019-03-27 PROCEDURE — 20000000 HC ICU ROOM: Mod: HCNC

## 2019-03-27 PROCEDURE — 96375 TX/PRO/DX INJ NEW DRUG ADDON: CPT

## 2019-03-27 PROCEDURE — 85610 PROTHROMBIN TIME: CPT | Mod: HCNC

## 2019-03-27 PROCEDURE — 85730 THROMBOPLASTIN TIME PARTIAL: CPT | Mod: HCNC

## 2019-03-27 PROCEDURE — 25000003 PHARM REV CODE 250: Mod: HCNC | Performed by: NURSE PRACTITIONER

## 2019-03-27 PROCEDURE — 83735 ASSAY OF MAGNESIUM: CPT | Mod: HCNC

## 2019-03-27 PROCEDURE — 63600175 PHARM REV CODE 636 W HCPCS: Mod: HCNC | Performed by: EMERGENCY MEDICINE

## 2019-03-27 PROCEDURE — 81000 URINALYSIS NONAUTO W/SCOPE: CPT | Mod: HCNC

## 2019-03-27 PROCEDURE — 25000242 PHARM REV CODE 250 ALT 637 W/ HCPCS: Mod: HCNC | Performed by: NURSE PRACTITIONER

## 2019-03-27 PROCEDURE — 83690 ASSAY OF LIPASE: CPT | Mod: HCNC

## 2019-03-27 PROCEDURE — 83605 ASSAY OF LACTIC ACID: CPT | Mod: HCNC

## 2019-03-27 PROCEDURE — 85379 FIBRIN DEGRADATION QUANT: CPT | Mod: HCNC

## 2019-03-27 PROCEDURE — 84145 PROCALCITONIN (PCT): CPT | Mod: HCNC

## 2019-03-27 PROCEDURE — 63600175 PHARM REV CODE 636 W HCPCS: Mod: HCNC | Performed by: NURSE PRACTITIONER

## 2019-03-27 PROCEDURE — 25500020 PHARM REV CODE 255: Mod: HCNC | Performed by: INTERNAL MEDICINE

## 2019-03-27 PROCEDURE — 84484 ASSAY OF TROPONIN QUANT: CPT | Mod: HCNC

## 2019-03-27 PROCEDURE — 99291 CRITICAL CARE FIRST HOUR: CPT | Mod: HCNC,,, | Performed by: PSYCHIATRY & NEUROLOGY

## 2019-03-27 PROCEDURE — 27100171 HC OXYGEN HIGH FLOW UP TO 24 HOURS: Mod: HCNC

## 2019-03-27 PROCEDURE — 85025 COMPLETE CBC W/AUTO DIFF WBC: CPT | Mod: HCNC

## 2019-03-27 PROCEDURE — 93005 ELECTROCARDIOGRAM TRACING: CPT | Mod: HCNC

## 2019-03-27 PROCEDURE — 96367 TX/PROPH/DG ADDL SEQ IV INF: CPT

## 2019-03-27 PROCEDURE — 87040 BLOOD CULTURE FOR BACTERIA: CPT | Mod: HCNC

## 2019-03-27 PROCEDURE — 25000003 PHARM REV CODE 250: Mod: HCNC | Performed by: EMERGENCY MEDICINE

## 2019-03-27 PROCEDURE — 99291 CRITICAL CARE FIRST HOUR: CPT | Mod: HCNC,,, | Performed by: NURSE PRACTITIONER

## 2019-03-27 PROCEDURE — 82803 BLOOD GASES ANY COMBINATION: CPT | Mod: HCNC

## 2019-03-27 PROCEDURE — 93010 EKG 12-LEAD: ICD-10-PCS | Mod: HCNC,,, | Performed by: INTERNAL MEDICINE

## 2019-03-27 PROCEDURE — 63600175 PHARM REV CODE 636 W HCPCS: Mod: HCNC | Performed by: INTERNAL MEDICINE

## 2019-03-27 PROCEDURE — 99291 PR CRITICAL CARE, E/M 30-74 MINUTES: ICD-10-PCS | Mod: HCNC,,, | Performed by: PSYCHIATRY & NEUROLOGY

## 2019-03-27 PROCEDURE — 99900035 HC TECH TIME PER 15 MIN (STAT): Mod: HCNC

## 2019-03-27 PROCEDURE — 99285 EMERGENCY DEPT VISIT HI MDM: CPT | Mod: 25

## 2019-03-27 PROCEDURE — 99291 PR CRITICAL CARE, E/M 30-74 MINUTES: ICD-10-PCS | Mod: HCNC,,, | Performed by: NURSE PRACTITIONER

## 2019-03-27 PROCEDURE — 96361 HYDRATE IV INFUSION ADD-ON: CPT

## 2019-03-27 PROCEDURE — 27000221 HC OXYGEN, UP TO 24 HOURS: Mod: HCNC

## 2019-03-27 PROCEDURE — 87502 INFLUENZA DNA AMP PROBE: CPT | Mod: HCNC

## 2019-03-27 PROCEDURE — 25000242 PHARM REV CODE 250 ALT 637 W/ HCPCS: Mod: HCNC | Performed by: EMERGENCY MEDICINE

## 2019-03-27 PROCEDURE — 27100092 HC HIGH FLOW DELIVERY CANNULA: Mod: HCNC

## 2019-03-27 PROCEDURE — 83735 ASSAY OF MAGNESIUM: CPT | Mod: 91,HCNC

## 2019-03-27 PROCEDURE — 36415 COLL VENOUS BLD VENIPUNCTURE: CPT | Mod: HCNC

## 2019-03-27 PROCEDURE — 80320 DRUG SCREEN QUANTALCOHOLS: CPT | Mod: HCNC

## 2019-03-27 RX ORDER — IPRATROPIUM BROMIDE AND ALBUTEROL SULFATE 2.5; .5 MG/3ML; MG/3ML
3 SOLUTION RESPIRATORY (INHALATION) EVERY 4 HOURS PRN
Status: DISCONTINUED | OUTPATIENT
Start: 2019-03-27 | End: 2019-03-27

## 2019-03-27 RX ORDER — LEVETIRACETAM 5 MG/ML
500 INJECTION INTRAVASCULAR EVERY 12 HOURS
Status: DISCONTINUED | OUTPATIENT
Start: 2019-03-28 | End: 2019-03-29 | Stop reason: HOSPADM

## 2019-03-27 RX ORDER — PANTOPRAZOLE SODIUM 40 MG/1
40 TABLET, DELAYED RELEASE ORAL DAILY
Status: DISCONTINUED | OUTPATIENT
Start: 2019-03-27 | End: 2019-03-29 | Stop reason: HOSPADM

## 2019-03-27 RX ORDER — SODIUM CHLORIDE AND POTASSIUM CHLORIDE 150; 450 MG/100ML; MG/100ML
INJECTION, SOLUTION INTRAVENOUS CONTINUOUS
Status: DISCONTINUED | OUTPATIENT
Start: 2019-03-27 | End: 2019-03-28

## 2019-03-27 RX ORDER — MAGNESIUM SULFATE 1 G/100ML
1 INJECTION INTRAVENOUS ONCE
Status: COMPLETED | OUTPATIENT
Start: 2019-03-27 | End: 2019-03-27

## 2019-03-27 RX ORDER — MAGNESIUM SULFATE 1 G/100ML
3 INJECTION INTRAVENOUS
Status: DISCONTINUED | OUTPATIENT
Start: 2019-03-27 | End: 2019-03-27 | Stop reason: SDUPTHER

## 2019-03-27 RX ORDER — IPRATROPIUM BROMIDE AND ALBUTEROL SULFATE 2.5; .5 MG/3ML; MG/3ML
3 SOLUTION RESPIRATORY (INHALATION)
Status: COMPLETED | OUTPATIENT
Start: 2019-03-27 | End: 2019-03-27

## 2019-03-27 RX ORDER — LEVETIRACETAM 5 MG/ML
500 INJECTION INTRAVASCULAR EVERY 12 HOURS
Status: DISCONTINUED | OUTPATIENT
Start: 2019-03-27 | End: 2019-03-27

## 2019-03-27 RX ORDER — FOLIC ACID 1 MG/1
1 TABLET ORAL DAILY
Status: DISCONTINUED | OUTPATIENT
Start: 2019-03-27 | End: 2019-03-29 | Stop reason: HOSPADM

## 2019-03-27 RX ORDER — THIAMINE HCL 100 MG
100 TABLET ORAL DAILY
Status: DISCONTINUED | OUTPATIENT
Start: 2019-03-27 | End: 2019-03-29 | Stop reason: HOSPADM

## 2019-03-27 RX ORDER — MAGNESIUM SULFATE HEPTAHYDRATE 40 MG/ML
2 INJECTION, SOLUTION INTRAVENOUS ONCE
Status: COMPLETED | OUTPATIENT
Start: 2019-03-27 | End: 2019-03-27

## 2019-03-27 RX ORDER — ROPINIROLE 1 MG/1
1 TABLET, FILM COATED ORAL NIGHTLY
Status: DISCONTINUED | OUTPATIENT
Start: 2019-03-27 | End: 2019-03-29 | Stop reason: HOSPADM

## 2019-03-27 RX ORDER — BISACODYL 10 MG
10 SUPPOSITORY, RECTAL RECTAL DAILY PRN
Status: DISCONTINUED | OUTPATIENT
Start: 2019-03-27 | End: 2019-03-29 | Stop reason: HOSPADM

## 2019-03-27 RX ORDER — IPRATROPIUM BROMIDE AND ALBUTEROL SULFATE 2.5; .5 MG/3ML; MG/3ML
3 SOLUTION RESPIRATORY (INHALATION)
Status: DISCONTINUED | OUTPATIENT
Start: 2019-03-27 | End: 2019-03-29 | Stop reason: HOSPADM

## 2019-03-27 RX ORDER — METOPROLOL SUCCINATE 50 MG/1
50 TABLET, EXTENDED RELEASE ORAL 2 TIMES DAILY
Status: DISCONTINUED | OUTPATIENT
Start: 2019-03-27 | End: 2019-03-29 | Stop reason: HOSPADM

## 2019-03-27 RX ORDER — HEPARIN SODIUM 5000 [USP'U]/ML
5000 INJECTION, SOLUTION INTRAVENOUS; SUBCUTANEOUS EVERY 8 HOURS
Status: DISCONTINUED | OUTPATIENT
Start: 2019-03-27 | End: 2019-03-29 | Stop reason: HOSPADM

## 2019-03-27 RX ORDER — BUDESONIDE 0.5 MG/2ML
0.5 INHALANT ORAL EVERY 12 HOURS
Status: DISCONTINUED | OUTPATIENT
Start: 2019-03-27 | End: 2019-03-29 | Stop reason: HOSPADM

## 2019-03-27 RX ORDER — DOCUSATE SODIUM 100 MG/1
100 CAPSULE, LIQUID FILLED ORAL DAILY
Status: DISCONTINUED | OUTPATIENT
Start: 2019-03-27 | End: 2019-03-29 | Stop reason: HOSPADM

## 2019-03-27 RX ORDER — DIPHENOXYLATE HYDROCHLORIDE AND ATROPINE SULFATE 2.5; .025 MG/1; MG/1
2 TABLET ORAL 4 TIMES DAILY PRN
Status: DISCONTINUED | OUTPATIENT
Start: 2019-03-27 | End: 2019-03-29 | Stop reason: HOSPADM

## 2019-03-27 RX ORDER — TRAZODONE HYDROCHLORIDE 50 MG/1
50 TABLET ORAL NIGHTLY PRN
Status: DISCONTINUED | OUTPATIENT
Start: 2019-03-27 | End: 2019-03-29 | Stop reason: HOSPADM

## 2019-03-27 RX ORDER — PRAVASTATIN SODIUM 10 MG/1
10 TABLET ORAL DAILY
Status: DISCONTINUED | OUTPATIENT
Start: 2019-03-27 | End: 2019-03-29 | Stop reason: HOSPADM

## 2019-03-27 RX ORDER — LANOLIN ALCOHOL/MO/W.PET/CERES
400 CREAM (GRAM) TOPICAL
Status: ACTIVE | OUTPATIENT
Start: 2019-03-27 | End: 2019-03-27

## 2019-03-27 RX ORDER — LEVETIRACETAM 10 MG/ML
1000 INJECTION INTRAVASCULAR
Status: COMPLETED | OUTPATIENT
Start: 2019-03-27 | End: 2019-03-27

## 2019-03-27 RX ORDER — HYDRALAZINE HYDROCHLORIDE 20 MG/ML
10 INJECTION INTRAMUSCULAR; INTRAVENOUS
Status: COMPLETED | OUTPATIENT
Start: 2019-03-27 | End: 2019-03-27

## 2019-03-27 RX ORDER — SODIUM CHLORIDE 0.9 % (FLUSH) 0.9 %
5 SYRINGE (ML) INJECTION
Status: DISCONTINUED | OUTPATIENT
Start: 2019-03-27 | End: 2019-03-29 | Stop reason: HOSPADM

## 2019-03-27 RX ORDER — POTASSIUM CHLORIDE 20 MEQ/15ML
20 SOLUTION ORAL
Status: DISCONTINUED | OUTPATIENT
Start: 2019-03-27 | End: 2019-03-27

## 2019-03-27 RX ORDER — LEVOTHYROXINE SODIUM 88 UG/1
88 TABLET ORAL DAILY
Status: DISCONTINUED | OUTPATIENT
Start: 2019-03-27 | End: 2019-03-29 | Stop reason: HOSPADM

## 2019-03-27 RX ORDER — POTASSIUM CHLORIDE 7.45 MG/ML
10 INJECTION INTRAVENOUS
Status: COMPLETED | OUTPATIENT
Start: 2019-03-27 | End: 2019-03-28

## 2019-03-27 RX ORDER — AMLODIPINE BESYLATE 2.5 MG/1
2.5 TABLET ORAL DAILY
Status: DISCONTINUED | OUTPATIENT
Start: 2019-03-27 | End: 2019-03-29 | Stop reason: HOSPADM

## 2019-03-27 RX ORDER — ACETAMINOPHEN 650 MG/1
650 SUPPOSITORY RECTAL
Status: ACTIVE | OUTPATIENT
Start: 2019-03-27 | End: 2019-03-27

## 2019-03-27 RX ORDER — ARFORMOTEROL TARTRATE 15 UG/2ML
15 SOLUTION RESPIRATORY (INHALATION) 2 TIMES DAILY
Status: DISCONTINUED | OUTPATIENT
Start: 2019-03-27 | End: 2019-03-29 | Stop reason: HOSPADM

## 2019-03-27 RX ORDER — METHYLPREDNISOLONE SOD SUCC 125 MG
125 VIAL (EA) INJECTION
Status: COMPLETED | OUTPATIENT
Start: 2019-03-27 | End: 2019-03-27

## 2019-03-27 RX ORDER — POTASSIUM CHLORIDE 20 MEQ/15ML
20 SOLUTION ORAL
Status: COMPLETED | OUTPATIENT
Start: 2019-03-27 | End: 2019-03-27

## 2019-03-27 RX ADMIN — POTASSIUM CHLORIDE 10 MEQ: 7.46 INJECTION, SOLUTION INTRAVENOUS at 09:03

## 2019-03-27 RX ADMIN — PRAVASTATIN SODIUM 10 MG: 10 TABLET ORAL at 09:03

## 2019-03-27 RX ADMIN — DOXYCYCLINE 100 MG: 100 INJECTION, POWDER, LYOPHILIZED, FOR SOLUTION INTRAVENOUS at 09:03

## 2019-03-27 RX ADMIN — LORAZEPAM 1 MG: 2 INJECTION INTRAMUSCULAR; INTRAVENOUS at 10:03

## 2019-03-27 RX ADMIN — HYDRALAZINE HYDROCHLORIDE 10 MG: 20 INJECTION INTRAMUSCULAR; INTRAVENOUS at 08:03

## 2019-03-27 RX ADMIN — IPRATROPIUM BROMIDE AND ALBUTEROL SULFATE 3 ML: .5; 3 SOLUTION RESPIRATORY (INHALATION) at 02:03

## 2019-03-27 RX ADMIN — ARFORMOTEROL TARTRATE 15 MCG: 15 SOLUTION RESPIRATORY (INHALATION) at 10:03

## 2019-03-27 RX ADMIN — SODIUM CHLORIDE 1506 ML: 0.9 INJECTION, SOLUTION INTRAVENOUS at 07:03

## 2019-03-27 RX ADMIN — IPRATROPIUM BROMIDE AND ALBUTEROL SULFATE 3 ML: .5; 3 SOLUTION RESPIRATORY (INHALATION) at 08:03

## 2019-03-27 RX ADMIN — BUDESONIDE 0.5 MG: 0.5 SUSPENSION RESPIRATORY (INHALATION) at 08:03

## 2019-03-27 RX ADMIN — POTASSIUM CHLORIDE 20 MEQ: 20 SOLUTION ORAL at 08:03

## 2019-03-27 RX ADMIN — POTASSIUM CHLORIDE 10 MEQ: 7.46 INJECTION, SOLUTION INTRAVENOUS at 10:03

## 2019-03-27 RX ADMIN — LEVETIRACETAM INJECTION 1000 MG: 10 INJECTION INTRAVENOUS at 05:03

## 2019-03-27 RX ADMIN — MAGNESIUM SULFATE IN DEXTROSE 1 G: 10 INJECTION, SOLUTION INTRAVENOUS at 06:03

## 2019-03-27 RX ADMIN — ROPINIROLE HYDROCHLORIDE 1 MG: 1 TABLET, FILM COATED ORAL at 10:03

## 2019-03-27 RX ADMIN — FOLIC ACID 1 MG: 1 TABLET ORAL at 09:03

## 2019-03-27 RX ADMIN — POTASSIUM CHLORIDE 10 MEQ: 7.46 INJECTION, SOLUTION INTRAVENOUS at 11:03

## 2019-03-27 RX ADMIN — MAGNESIUM SULFATE IN WATER 2 G: 40 INJECTION, SOLUTION INTRAVENOUS at 07:03

## 2019-03-27 RX ADMIN — AMLODIPINE BESYLATE 2.5 MG: 2.5 TABLET ORAL at 09:03

## 2019-03-27 RX ADMIN — POTASSIUM CHLORIDE AND SODIUM CHLORIDE: 450; 150 INJECTION, SOLUTION INTRAVENOUS at 03:03

## 2019-03-27 RX ADMIN — METHYLPREDNISOLONE SODIUM SUCCINATE 125 MG: 125 INJECTION, POWDER, FOR SOLUTION INTRAMUSCULAR; INTRAVENOUS at 08:03

## 2019-03-27 RX ADMIN — HEPARIN SODIUM 5000 UNITS: 5000 INJECTION, SOLUTION INTRAVENOUS; SUBCUTANEOUS at 09:03

## 2019-03-27 RX ADMIN — IPRATROPIUM BROMIDE AND ALBUTEROL SULFATE 3 ML: .5; 3 SOLUTION RESPIRATORY (INHALATION) at 09:03

## 2019-03-27 RX ADMIN — IOHEXOL 100 ML: 350 INJECTION, SOLUTION INTRAVENOUS at 01:03

## 2019-03-27 RX ADMIN — PANTOPRAZOLE SODIUM 40 MG: 40 TABLET, DELAYED RELEASE ORAL at 09:03

## 2019-03-27 RX ADMIN — HEPARIN SODIUM 5000 UNITS: 5000 INJECTION, SOLUTION INTRAVENOUS; SUBCUTANEOUS at 03:03

## 2019-03-27 RX ADMIN — METOPROLOL SUCCINATE 50 MG: 50 TABLET, EXTENDED RELEASE ORAL at 10:03

## 2019-03-27 RX ADMIN — Medication 100 MG: at 09:03

## 2019-03-27 RX ADMIN — MAGNESIUM SULFATE IN DEXTROSE 1 G: 10 INJECTION, SOLUTION INTRAVENOUS at 09:03

## 2019-03-27 RX ADMIN — MAGNESIUM SULFATE IN WATER 2 G: 40 INJECTION, SOLUTION INTRAVENOUS at 03:03

## 2019-03-27 RX ADMIN — LEVOTHYROXINE SODIUM 88 MCG: 88 TABLET ORAL at 09:03

## 2019-03-27 RX ADMIN — ARFORMOTEROL TARTRATE 15 MCG: 15 SOLUTION RESPIRATORY (INHALATION) at 08:03

## 2019-03-27 NOTE — HPI
69 y/o female with PMHx of COPD, seizures, HTN, HLD, ETOH abuse (resolved per spouse), tobacco use (resolved per spouse), and stroke who presented to the ED with c/o seizure that onset suddenly early this morning. Spouse is at bedside. Patient has hx of seizures but it was felt the seizure's were induced by medication, so Keppra was stopped, approximately August, 2018. Spouse reports patient has not had a seizure since that time until today. Seizure was sudden and moderate in severity. No mitigating or exacerbating factors reported. Patient's respiratory status declined while in ED and she was put on Vapotherm. RR 30, Pulse 125, ABG shows pO2 52, pCO2 45. Patient's spouse reports she has not been sick, denies fever, chills, cough, congestion, and exposure to sick individuals. Per spouse, patient has not worn her home oxygen for the last 3-4 nights. She is a full code and her surrogate decision maker is her , Marino.

## 2019-03-27 NOTE — ASSESSMENT & PLAN NOTE
WBC 16 and Afeb  Just completed course of outpt Alexalex  Has PCN, Sulfa and Flouroquinolone allergies  Cont Doxy for now  Review CT chest with Dr. Barrett for possible bronch  Cont nebs

## 2019-03-27 NOTE — CONSULTS
Consult Note  Neurological ICU      Consult Requested By: Isaias Schroeder MD  Reason for Consult: Seizure at home    SUBJECTIVE:     History of Present Illness:  The patient's medical records are reviewed for the history as patient unable to give history and  not at the bedside.      The patient was brought to the hospital in the early morning hours today after she had experienced an apparent seizure at home.  She has prior history of having a seizure over a year ago when she had severe acute illness.  That seizure was an isolated event, but she was placed on Keppra.  Subsequently the Keppra was removed several months later when she remained asymptomatic.  No further seizures until today, when she had seizure at home and another seizure like episode in the ER.    She was given lorazepam and placed back on Keppra.  Since admit, she has remained somnolent, with minimal arousal to verbal stimulation.  CT scan brain did not show any acute changes.  CT scan of the chest has shown basilar changes, concerning for neoplasm or chronic aspiration/pneumonia.  Also noted is the very low serum Mg++ as well as other electrolyte disturbance.    Review of patient's allergies indicates:   Allergen Reactions    Nsaids (non-steroidal anti-inflammatory drug) Hives and Shortness Of Breath    Pcn [penicillins] Hives and Shortness Of Breath    Sulfa (sulfonamide antibiotics) Hives and Shortness Of Breath    Aspirin      Due to Liver        Levaquin [levofloxacin] Other (See Comments)     Seizure      Macrodantin [nitrofurantoin macrocrystalline] Hives    Wellbutrin [bupropion hcl]      Due to liver         Past Medical History:   Diagnosis Date    Acute on chronic respiratory failure with hypoxia 7/30/2018    Acute pancreatitis     Alcohol dependence     per patient in the past    Allergy     Anemia     Anxiety     Arthritis 6/24/2013    Asthma     per patient    Back pain     Closed avulsion fracture of anterior  inferior iliac spine of pelvis 7/17/2017 7/16/17 CT abd pelvis- Acute comminuted fracture involving the left iliac bone, new since the prior exam.  No other pelvic fractures seen.    Closed fracture of right iliac wing 7/18/2017    Colon polyp     Colon polyp     colonoscopy 8/26/2013    COPD (chronic obstructive pulmonary disease)     Dependence on renal dialysis 2012    when in liver failure    Depression     Disorder of kidney and ureter     ckd4    Diverticulosis     Diverticulosis     colonoscopy 8/26/2013    Hiatal hernia     CXR 2/25/2015--Large hiatal hernia.     Hyperlipidemia     Hypertension     Hypocalcemia 7/6/2016    Hypothyroidism 6/24/2013    Kidney cysts     us retro 7/18/2018-Simple bilateral renal cysts as above.  No acute findings.   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 FEV1 is 1.08 (44% predicted) FVC is 2.25 (71% predicted) FEV1/FVC is 48  She uses 2 pillows at night. Patient currently is on oxygen at 2 L/min per nasal cannula at night  Last visit overnight sat result:Overnight sat: 9 hours of sampling with room air . Her saturation was below 88% for 30 minutes  2/25/15 Overnight sat  indicated supplementary O2 r    Osteoporosis     Pneumonia due to infectious organism 7/29/2018    Pulmonary embolism     per patient unsure of date    Recurrent major depressive disorder, in partial remission 4/20/2016    Restless leg syndrome     RLS (restless legs syndrome)     Seizure 6/24/2013    Stage 3 severe COPD by GOLD classification 3/16/2016    Spirometry 3/16/2016---Physician Interpretation Moderately severe obstruction (FEV1>50 and <59% of predicted).compared to previous study performed 2/25/2015 FEV1 improved by 24%, FVC improved by 15%.    Stroke     ischemic injury due to hypotension    Tobacco dependence     resolved    Trouble in sleeping      Past Surgical History:   Procedure Laterality Date     ADENOIDECTOMY  1960 approx    amputation left 2nd finger tip Left 2012    for blood clot/ after liver failure admit ? gangrene    APPENDECTOMY      incidental with another surgery    na hole/ ventriculostomy Right 08/24/2012    frontal after  liver failure    CHOLECYSTECTOMY      COLONOSCOPY  2013    COLONOSCOPY N/A 8/26/2013    [facility] at [facility]    EXCISION-CYST Left 9/6/2017    Performed by Binu Lizarraga Sr., [facility] at [facility] OR    FRACTURE SURGERY Left 07/12/2017    radius and ulna fracture    FRACTURE SURGERY Left 09/12/2017    wrist removal of hardware    BRENDEN FILTER PLACEMENT      HERNIA REPAIR  1999 approx    incisional hernia     HYSTERECTOMY  1977 approx    RAS/BSO  for endometriosis and tubular pregnancy     JOINT REPLACEMENT Left 2000 approx    hip for arthritis     JOINT REPLACEMENT Left 2004 approx    hip, fell then recurent dislocations - 4 times relocarted under mild anesthesia then re replaced    JOINT REPLACEMENT Right 2002 approx    knee for arthritis    OOPHORECTOMY  1977 approx    OPEN REDUCTION INTERNAL FIXATION-RADIUS Left 7/12/2017    [facility] at [facility]    OPEN REDUCTION INTERNAL FIXATION-ULNA Left 7/12/2017    Per[facility] Sr[facility] at [facility] OR    REMOVAL-HARDWARE Left 9/6/2017    [facility] at [facility] OR    SYNOVECTOMY-WRIST Left 9/6/2017    Per[facility] Sr[facility] at [facility] OR    TONSILLECTOMY  1960 approx     Family History   Problem Relation Age of Onset    Diabetes Mother     Hypertension Mother     Kidney disease Mother         stones    Parkinsonism Mother     Asthma Father     Heart disease Father     Diabetes Son     Hypertension Son     Heart disease Maternal Aunt         CAD     Cancer Maternal Aunt         Breast Ca    Breast cancer Maternal Aunt     Colon cancer Maternal Grandmother     Cancer Maternal Grandmother     Cancer Maternal Aunt          breast    Heart disease Maternal Aunt     Breast cancer Maternal Aunt     Alcohol abuse Maternal Uncle     Stroke Neg Hx     Drug abuse Neg Hx     Mental retardation Neg Hx     Mental illness Neg Hx      Social History     Tobacco Use    Smoking status: Former Smoker     Packs/day: 1.00     Years: 30.00     Pack years: 30.00     Types: Cigarettes     Last attempt to quit: 2010     Years since quittin.6    Smokeless tobacco: Never Used    Tobacco comment: used nicorette gum in past and prn   Substance Use Topics    Alcohol use: Yes     Alcohol/week: 3.0 - 3.6 oz     Types: 5 - 6 Shots of liquor per week    Drug use: No        Review of Systems:  Review of systems not obtained due to patient factors Patient is somnolent, difficult to arouse.    OBJECTIVE:     Vital Signs (Most Recent)  Temp: 99.6 °F (37.6 °C) (19 0719)  Pulse: 98 (19 1415)  Resp: (!) 26 (19 1415)  BP: 103/68 (19 1245)  SpO2: (!) 92 % (19 1415)    Vital Signs Range (Last 24H):  Temp:  [99.4 °F (37.4 °C)-99.6 °F (37.6 °C)]   Pulse:  []   Resp:  [18-32]   BP: (101-174)/()   SpO2:  [86 %-100 %]   Ventilator Data (Last 24H):     Oxygen Concentration (%):  [28-75] 28    Hemodynamic Parameters (Last 24H):       Physical Exam:  General: appears acutely ill, frail, chronically ill  Head: normocephalic, atraumatic  Eyes:  pupils responsive  Neck: supple, symmetrical, trachea midline, no JVD  Lungs:  diminished breath sounds bibasilar  Heart: regular rate and rhythm  Abdomen: abnormal findings:  hypoactive bowel sounds  Neurologic: Mental status: alertness: stuperous  Cranial nerves: II: pupils direct pupil reaction to light bilaterally, III,IV,VI: extraocular muscles extra-ocular motions intact, V,VII: corneal reflex present bilaterally  Sensory: Responds to pain with active withdrawal from pain stimulus, both hands, both feet.  Motor:Active movement seen in both upper and lower extremities, but not  to command.  Reflexes: Stretch reflexes hypoactive generally.  Plantar stimulation is flexor bilaterally with withdrawal response.    Lines/Drains:       Peripheral IV - Single Lumen 03/27/19 0715 Left Antecubital (Active)   Site Assessment Clean;Dry;Intact;No redness;No swelling 3/27/2019  7:15 AM   Line Status Blood return noted;Flushed;Infusing 3/27/2019  7:15 AM   Dressing Status Clean;Dry;Intact 3/27/2019  7:15 AM   Dressing Intervention New dressing 3/27/2019  7:15 AM   Number of days: 0       Laboratory:  CBC:   Recent Labs   Lab 03/27/19 0615   WBC 16.43*   RBC 4.39   HGB 13.8   HCT 40.9      MCV 93   MCH 31.4*   MCHC 33.7     CMP:   Recent Labs   Lab 03/27/19 0615 03/27/19  1417   * 163*   CALCIUM 7.4* 6.5*   ALBUMIN 3.6  --    PROT 6.9  --     136   K 3.0* 3.1*   CO2 25 22*   CL 98 102   BUN 30* 24*   CREATININE 1.4 1.2   ALKPHOS 79  --    ALT 12  --    AST 17  --    BILITOT 0.7  --        Chest X-Ray: infiltrates: lower lobe bilaterally    Diagnostic Results:  CT: CT brain does not show any acute changes.    ASSESSMENT/PLAN:     1.  Convulsion, multiple factors (hypomagnesemia), acute illness  2.  COPD  3.  History of alcohol abuse    Plan:  Correct the magnesium.  Will interpret EEG when it is done.  Continued active medical support.    Critical Care Time greater than: 1 Hour

## 2019-03-27 NOTE — PLAN OF CARE
Problem: Adult Inpatient Plan of Care  Goal: Plan of Care Review  Outcome: Ongoing (interventions implemented as appropriate)  vapo therm on Sb, pt currently on 2 LPM. Keep SATs between 88 % 92% per kelby ,NP. Nebs. Rt will cont. To monitor

## 2019-03-27 NOTE — ASSESSMENT & PLAN NOTE
Cont PPI  Per CT chest lg HH noted to produce mass effect on left atrium  Consider surgery consult but would be high risk

## 2019-03-27 NOTE — ED NOTES
Family member reports that patient just had another seizure last a few seconds. Patient postictal at this time and nonverbal but has purposeful eye movement,  Notified and gave verbal order for 1g of keppra

## 2019-03-27 NOTE — SUBJECTIVE & OBJECTIVE
Past Medical History:   Diagnosis Date    Acute on chronic respiratory failure with hypoxia 7/30/2018    Acute pancreatitis     Alcohol dependence     per patient in the past    Allergy     Anemia     Anxiety     Arthritis 6/24/2013    Asthma     per patient    Back pain     Closed avulsion fracture of anterior inferior iliac spine of pelvis 7/17/2017 7/16/17 CT abd pelvis- Acute comminuted fracture involving the left iliac bone, new since the prior exam.  No other pelvic fractures seen.    Closed fracture of right iliac wing 7/18/2017    Colon polyp     Colon polyp     colonoscopy 8/26/2013    COPD (chronic obstructive pulmonary disease)     Dependence on renal dialysis 2012    when in liver failure    Depression     Disorder of kidney and ureter     ckd4    Diverticulosis     Diverticulosis     colonoscopy 8/26/2013    Hiatal hernia     CXR 2/25/2015--Large hiatal hernia.     Hyperlipidemia     Hypertension     Hypocalcemia 7/6/2016    Hypothyroidism 6/24/2013    Kidney cysts     us retro 7/18/2018-Simple bilateral renal cysts as above.  No acute findings.   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 FEV1 is 1.08 (44% predicted) FVC is 2.25 (71% predicted) FEV1/FVC is 48  She uses 2 pillows at night. Patient currently is on oxygen at 2 L/min per nasal cannula at night  Last visit overnight sat result:Overnight sat: 9 hours of sampling with room air . Her saturation was below 88% for 30 minutes  2/25/15 Overnight sat  indicated supplementary O2 r    Osteoporosis     Pneumonia due to infectious organism 7/29/2018    Pulmonary embolism     per patient unsure of date    Recurrent major depressive disorder, in partial remission 4/20/2016    Restless leg syndrome     RLS (restless legs syndrome)     Seizure 6/24/2013    Stage 3 severe COPD by GOLD classification 3/16/2016    Spirometry 3/16/2016---Physician Interpretation  Moderately severe obstruction (FEV1>50 and <59% of predicted).compared to previous study performed 2/25/2015 FEV1 improved by 24%, FVC improved by 15%.    Stroke     ischemic injury due to hypotension    Tobacco dependence     resolved    Trouble in sleeping        Past Surgical History:   Procedure Laterality Date    ADENOIDECTOMY  1960 approx    amputation left 2nd finger tip Left 2012    for blood clot/ after liver failure admit ? gangrene    APPENDECTOMY      incidental with another surgery    na hole/ ventriculostomy Right 08/24/2012    frontal after  liver failure    CHOLECYSTECTOMY      COLONOSCOPY  2013    COLONOSCOPY N/A 8/26/2013    [facility]formed [facility] at [facility] END[facility]    EXCISION-CYST Left 9/6/2017    Performed by Binu Lizarraga Sr., MD at [facility] OR    FRACTURE SURGERY Left 07/12/2017    radius and ulna fracture    FRACTURE SURGERY Left 09/12/2017    wrist removal of hardware    BRENDEN FILTER PLACEMENT      HERNIA REPAIR  1999 approx    incisional hernia     HYSTERECTOMY  1977 approx    RAS/BSO  for endometriosis and tubular pregnancy     JOINT REPLACEMENT Left 2000 approx    hip for arthritis     JOINT REPLACEMENT Left 2004 approx    hip, fell then recurent dislocations - 4 times relocarted under mild anesthesia then re replaced    JOINT REPLACEMENT Right 2002 approx    knee for arthritis    OOPHORECTOMY  1977 approx    OPEN REDUCTION INTERNAL FIXATION-RADIUS Left 7/12/2017    Performed by [facility] Sr[facility] at [facility] OR    OPEN REDUCTION INTERNAL FIXATION-ULNA Left 7/12/2017    Performed by Binu [facility]aga Sr[facility] MD at [facility] OR    REMOVAL-HARDWARE Left 9/6/2017    Performed by Binu [facility] Sr[facility] MD at [facility] OR    SYNOVECTOMY-WRIST Left 9/6/2017    Performed by Binu Liz[facility]aga Sr., MD at [facility] OR    TONSILLECTOMY  1960 approx       Review of patient's allergies indicates:   Allergen Reactions    Nsaids (non-steroidal anti-inflammatory drug) Hives and Shortness Of Breath     Pcn [penicillins] Hives and Shortness Of Breath    Sulfa (sulfonamide antibiotics) Hives and Shortness Of Breath    Aspirin      Due to Liver        Levaquin [levofloxacin] Other (See Comments)     Seizure      Macrodantin [nitrofurantoin macrocrystalline] Hives    Wellbutrin [bupropion hcl]      Due to liver         No current facility-administered medications on file prior to encounter.      Current Outpatient Medications on File Prior to Encounter   Medication Sig    albuterol (ACCUNEB) 0.63 mg/3 mL Nebu Take 3 mLs (0.63 mg total) by nebulization every 6 (six) hours as needed. Rescue    allopurinol (ZYLOPRIM) 100 MG tablet Take 1 tablet (100 mg total) by mouth once daily.    amLODIPine (NORVASC) 2.5 MG tablet Take 1 tablet (2.5 mg total) by mouth once daily.    busPIRone (BUSPAR) 15 MG tablet Take 0.5 tablets (7.5 mg total) by mouth 2 (two) times daily.    calcitRIOL (ROCALTROL) 0.25 MCG Cap Take 1 capsule (0.25 mcg total) by mouth every Mon, Wed, Fri.    diphenoxylate-atropine 2.5-0.025 mg (LOMOTIL) 2.5-0.025 mg per tablet take 2 tablets by mouth four times a day if needed for diarrhea    ferrous sulfate 325 (65 FE) MG EC tablet Take 1 tablet (325 mg total) by mouth once daily.    fluticasone-umeclidin-vilanter (TRELEGY ELLIPTA) 100-62.5-25 mcg DsDv Inhale 1 puff into the lungs once daily.    ipratropium-albuterol (COMBIVENT RESPIMAT)  mcg/actuation inhaler inhale 1 puff INTO THE LUNGS four times a day (Patient taking differently: Inhale 1 puff into the lungs once daily. inhale 1 puff INTO THE LUNGS four times a day)    levETIRAcetam (KEPPRA) 500 MG Tab Take 1 tablet (500 mg total) by mouth 2 (two) times daily.    levothyroxine (SYNTHROID) 88 MCG tablet TAKE 1 TABLET EVERY DAY    metoprolol succinate (TOPROL-XL) 50 MG 24 hr tablet TAKE 1 TABLET TWICE DAILY    pantoprazole (PROTONIX) 40 MG tablet TAKE 1 TABLET EVERY DAY    pravastatin (PRAVACHOL) 10 MG tablet Take 1 tablet (10 mg  total) by mouth once daily.    traZODone (DESYREL) 50 MG tablet Take 1/2 to 1 tablet at bedtime as needed.    bimatoprost (LUMIGAN) 0.03 % ophthalmic drops Place 1 drop into both eyes every evening.     flu vacc [id number] up,/PF (FLUZONE HIGH-DOSE , PF, IM) Inject into the muscle.    oxyCODONE (OXY-IR) 5 mg Cap Take 5 mg by mouth every 4 (four) hours as needed for Pain. Take half pill every 4 hours as needed    promethazine-dextromethorphan (PROMETHAZINE-DM) 6.25-15 mg/5 mL Syrp Take 5 mLs by mouth every 6 to 8 hours as needed.    ropinirole (REQUIP) 1 MG tablet Take 1 mg by mouth nightly as needed.     SHINGRIX, PF, 50 mcg/0.5 mL injection inject 0.5 milliliter intramuscularly     Family History     Problem Relation (Age of Onset)    Alcohol abuse Maternal Uncle    Asthma Father    Breast cancer Maternal Aunt, Maternal Aunt    Cancer Maternal Aunt, Maternal Grandmother, Maternal Aunt    Colon cancer Maternal Grandmother    Diabetes Mother, Son    Heart disease Father, Maternal Aunt, Maternal Aunt    Hypertension Mother, Son    Kidney disease Mother    Parkinsonism Mother        Tobacco Use    Smoking status: Former Smoker     Packs/day: 1.00     Years: 30.00     Pack years: 30.00     Types: Cigarettes     Last attempt to quit: 2010     Years since quittin.6    Smokeless tobacco: Never Used    Tobacco comment: used nicorette gum in past and prn   Substance and Sexual Activity    Alcohol use: Yes     Alcohol/week: 3.0 - 3.6 oz     Types: 5 - 6 Shots of liquor per week    Drug use: No    Sexual activity: Yes     Partners: Male     Birth control/protection: See Surgical Hx     Review of Systems   Unable to perform ROS: Acuity of condition     Objective:     Vital Signs (Most Recent):  Temp: 99.6 °F (37.6 °C) (19 0719)  Pulse: (!) 125 (19 1005)  Resp: (!) 30 (19 1005)  BP: (!) 150/86 (19 1000)  SpO2: 95 % (19 1005) Vital Signs (24h Range):  Temp:  [99.4 °F  (37.4 °C)-99.6 °F (37.6 °C)] 99.6 °F (37.6 °C)  Pulse:  [] 125  Resp:  [18-32] 30  SpO2:  [86 %-95 %] 95 %  BP: (133-174)/() 150/86     Weight: 50.2 kg (110 lb 11.2 oz)  Body mass index is 19 kg/m².    Physical Exam   Constitutional: She appears well-developed. She appears distressed.   HENT:   Head: Normocephalic and atraumatic.   Nose: Nose normal.   Eyes: EOM are normal. No scleral icterus.   Neck: Normal range of motion. Neck supple. No JVD present.   Cardiovascular: Intact distal pulses. Tachycardia present.   Pulmonary/Chest: She is in respiratory distress. She has no wheezes. She has rales (to bilat bases).   Decreased throughout   Abdominal: Soft. Bowel sounds are normal. She exhibits no distension. There is no tenderness.   Genitourinary:   Genitourinary Comments: deferred   Neurological: She is alert.   Skin: Skin is warm and dry. Capillary refill takes 2 to 3 seconds.   Nursing note and vitals reviewed.        CRANIAL NERVES     CN III, IV, VI   Extraocular motions are normal.        Significant Labs:   Recent Lab Results       03/27/19  0856   03/27/19  0654   03/27/19  0652   03/27/19  0620   03/27/19  0615        Influenza A, Molecular   Negative           Influenza B, Molecular   Negative           Procalcitonin         0.02  Comment:  A concentration < 0.25 ng/mL represents a low risk bacterial   infection.  Procalcitonin may not be accurate among patients with localized   infection, recent trauma or major surgery, immunosuppressed state,   invasive fungal infection, renal dysfunction. Decisions regarding   initiation or continuation of antibiotic therapy should not be based   solely on procalcitonin levels.       Albumin         3.6     Alcohol, Medical, Serum         <10     Alkaline Phosphatase         79     Allens Test Pass   N/A         ALT         12     Anion Gap         18     Appearance, UA       Clear       aPTT         24.8  Comment:  aPTT therapeutic range = 39-69 seconds      AST         17     Bacteria, UA       None       Baso #         0.01     Basophil%         0.1     Bilirubin (UA)       Negative       Total Bilirubin         0.7  Comment:  For infants and newborns, interpretation of results should be based  on gestational age, weight and in agreement with clinical  observations.  Premature Infant recommended reference ranges:  Up to 24 hours.............<8.0 mg/dL  Up to 48 hours............<12.0 mg/dL  3-5 days..................<15.0 mg/dL  6-29 days.................<15.0 mg/dL       BNP         156  Comment:  Values of less than 100 pg/ml are consistent with non-CHF populations.     Site LR   Other         BUN, Bld         30     Calcium         7.4     Chloride         98     CO2         25     Color, UA       Yellow       Creatinine         1.4     DelSys Nasal Can   Nasal Can         Differential Method         Automated     eGFR if          45     eGFR if non          39  Comment:  Calculation used to obtain the estimated glomerular filtration  rate (eGFR) is the CKD-EPI equation.        Eos #         0.1     Eosinophil%         0.6     FiO2 32   32         Flow 3   3         Flu A & B Source   Nasal swab           Glucose         162     Glucose, UA       Negative       Gran # (ANC)         14.1     Gran%         85.6     Hematocrit         40.9     Hemoglobin         13.8     Hyaline Casts, UA       0       Coumadin Monitoring INR         1.0  Comment:  Coumadin Therapy:  2.0 - 3.0 for INR for all indicators except mechanical heart valves  and antiphospholipid syndromes which should use 2.5 - 3.5.       Ketones, UA       Negative       Lactate, Xavi   1.8  Comment:  Falsely low lactic acid results can be found in samples   containing >=13.0 mg/dL total bilirubin and/or >=3.5 mg/dL   direct bilirubin.             Leukocytes, UA       Negative       Lipase         57     Lymph #         1.5     Lymph%         9.2     Magnesium          0.9  Comment:  MAGNESIUM  critical result(s) called and verbal readback obtained   from TOM DORSEY RN, 03/27/2019 09:11       MCH         31.4     MCHC         33.7     MCV         93     Microscopic Comment       SEE COMMENT  Comment:  Other formed elements not mentioned in the report are not   present in the microscopic examination.          Mode SPONT   SPONT         Mono #         0.7     Mono%         4.5     MPV         10.3     Nitrite, UA       Negative       Occult Blood UA       1+       pH, UA       7.0       Platelets         200     POC BE 0   7         POC HCO3 25.7   32.3         POC PCO2 45.0   57.6         POC PH 7.365   7.356         POC PO2 52   28         POC SATURATED O2 85   48         Potassium         3.0     Total Protein         6.9     Protein, UA       2+  Comment:  Recommend a 24 hour urine protein or a urine   protein/creatinine ratio if globulin induced proteinuria is  clinically suspected.         Protime         10.9     RBC         4.39     RBC, UA       1       RDW         14.9     Sample ARTERIAL   VENOUS         Sodium         141     Sp02 93   93         Specific [place], UA       1.015       Specimen UA       Urine, Clean Catch       Troponin I         0.023  Comment:  The reference interval for Troponin I represents the 99th percentile   cutoff   for our facility and is consistent with 3rd generation assay   performance.       Urobilinogen, UA       Negative       WBC, UA       1       WBC         16.43                        All pertinent labs within the past 24 hours have been reviewed.    Significant Imaging: I have reviewed all pertinent imaging results/findings within the past 24 hours.

## 2019-03-27 NOTE — ASSESSMENT & PLAN NOTE
Likely due to hypokalemia and hypomag  Replace electrolytes and monitor for further replacement  ICU neuro monitoring  [facility]

## 2019-03-27 NOTE — ED PROVIDER NOTES
SCRIBE #1 NOTE: I, Leela Parks, am scribing for, and in the presence of, Napoleon Elias MD. I have scribed the entire note.       History     Chief Complaint   Patient presents with    Seizures     Family member reports that patient had a seizure prior to arrival, approxiately 0440. GCS 14 per [facility].     Review of patient's allergies indicates:   Allergen Reactions    Nsaids (non-steroidal anti-inflammatory drug) Hives and Shortness Of Breath    Pcn [penicillins] Hives and Shortness Of Breath    Sulfa (sulfonamide antibiotics) Hives and Shortness Of Breath    Aspirin      Due to Liver        Levaquin [levofloxacin] Other (See Comments)     Seizure      Macrodantin [nitrofurantoin macrocrystalline] Hives    Wellbutrin [bupropion hcl]      Due to liver           History of Present Illness     HPI    3/27/2019, 6:06 AM  History obtained from the    HPI limited due to pt's post-ictal condition      History of Present Illness: Ibeth Schroeder is a 68 y.o. female patient who presents to the Emergency Department for evaluation following a seizure which occurred PTA, per the . He states pt had one seizure last year due to all of the medications she takes. Pt is not dx with a seizure disorder and does not take any medications for seizures. Symptoms are episodic and moderate in severity. No mitigating or exacerbating factors reported. Patient does not deny any sxs at this time. No further complaints or concerns at this time.       Arrival mode: [facility]    PCP: Geovanna Botello MD        Past Medical History:  Past Medical History:   Diagnosis Date    Acute on chronic respiratory failure with hypoxia 7/30/2018    Acute pancreatitis     Alcohol dependence     per patient in the past    Allergy     Anemia     Anxiety     Arthritis 6/24/2013    Asthma     per patient    Back pain     Closed avulsion fracture of anterior inferior iliac spine of pelvis 7/17/2017 7/16/17 CT abd  pelvis- Acute comminuted fracture involving the left iliac bone, new since the prior exam.  No other pelvic fractures seen.    Closed fracture of right iliac wing 7/18/2017    Colon polyp     Colon polyp     colonoscopy 8/26/2013    COPD (chronic obstructive pulmonary disease)     Dependence on renal dialysis 2012    when in liver failure    Depression     Disorder of kidney and ureter     ckd4    Diverticulosis     Diverticulosis     colonoscopy 8/26/2013    Hiatal hernia     CXR 2/25/2015--Large hiatal hernia.     Hyperlipidemia     Hypertension     Hypocalcemia 7/6/2016    Hypothyroidism 6/24/2013    Kidney cysts     us retro 7/18/2018-Simple bilateral renal cysts as above.  No acute findings.   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 FEV1 is 1.08 (44% predicted) FVC is 2.25 (71% predicted) FEV1/FVC is 48  She uses 2 pillows at night. Patient currently is on oxygen at 2 L/min per nasal cannula at night  Last visit overnight sat result:Overnight sat: 9 hours of sampling with room air . Her saturation was below 88% for 30 minutes  2/25/15 Overnight sat  indicated supplementary O2 r    Osteoporosis     Pneumonia due to infectious organism 7/29/2018    Pulmonary embolism     per patient unsure of date    Recurrent major depressive disorder, in partial remission 4/20/2016    Restless leg syndrome     RLS (restless legs syndrome)     Seizure 6/24/2013    Stage 3 severe COPD by GOLD classification 3/16/2016    Spirometry 3/16/2016---Physician Interpretation Moderately severe obstruction (FEV1>50 and <59% of predicted).compared to previous study performed 2/25/2015 FEV1 improved by 24%, FVC improved by 15%.    Stroke     ischemic injury due to hypotension    Tobacco dependence     resolved    Trouble in sleeping        Past Surgical History:  Past Surgical History:   Procedure Laterality Date    ADENOIDECTOMY  1960 approx     amputation left 2nd finger tip Left 2012    for blood clot/ after liver failure admit ? gangrene    APPENDECTOMY      incidental with another surgery    na hole/ ventriculostomy Right 08/24/2012    frontal after  liver failure    CHOLECYSTECTOMY      COLONOSCOPY  2013    COLONOSCOPY N/A 8/26/2013    Performed by [facility]at Orr [facility] at [facility] ENDO    EXCISION-CYST Left 9/6/2017    Performed by Binu Lizarraga Sr., MD at [facility] OR    FRACTURE SURGERY Left 07/12/2017    radius and ulna fracture    FRACTURE SURGERY Left 09/12/2017    wrist removal of hardware    BRENDEN FILTER PLACEMENT      HERNIA REPAIR  1999 approx    incisional hernia     HYSTERECTOMY  1977 approx    RAS/BSO  for endometriosis and tubular pregnancy     JOINT REPLACEMENT Left 2000 approx    hip for arthritis     JOINT REPLACEMENT Left 2004 approx    hip, fell then recurent dislocations - 4 times relocarted under mild anesthesia then re replaced    JOINT REPLACEMENT Right 2002 approx    knee for arthritis    OOPHORECTOMY  1977 approx    OPEN REDUCTION INTERNAL FIXATION-RADIUS Left 7/12/2017    Performed by Binu Lizarraga Sr[facility] at [facility] OR    OPEN REDUCTION INTERNAL FIXATION-ULNA Left 7/12/2017    Performed by Binu Lizarraga Sr., MD at [facility] OR    REMOVAL-HARDWARE Left 9/6/2017    Performed by Binu Lizarraga Sr[facility] at [facility] OR    SYNOVECTOMY-WRIST Left 9/6/2017    Performed by Binu Lizarraga Sr., [facility] at [facility] OR    TONSILLECTOMY  1960 approx         Family History:  Family History   Problem Relation Age of Onset    Diabetes Mother     Hypertension Mother     Kidney disease Mother         stones    Parkinsonism Mother     Asthma Father     Heart disease Father     Diabetes Son     Hypertension Son     Heart disease Maternal Aunt         CAD     Cancer Maternal Aunt         Breast Ca    Breast cancer Maternal Aunt     Colon cancer Maternal Grandmother     Cancer Maternal Grandmother     Cancer Maternal Aunt         breast     Heart disease Maternal Aunt     Breast cancer Maternal Aunt     Alcohol abuse Maternal Uncle     Stroke Neg Hx     Drug abuse Neg Hx     Mental retardation Neg Hx     Mental illness Neg Hx        Social History:  Social History     Tobacco Use    Smoking status: Former Smoker     Packs/day: 1.00     Years: 30.00     Pack years: 30.00     Types: Cigarettes     Last attempt to quit: 2010     Years since quittin.6    Smokeless tobacco: Never Used    Tobacco comment: used nicorette gum in past and prn   Substance and Sexual Activity    Alcohol use: Yes     Alcohol/week: 3.0 - 3.6 oz     Types: 5 - 6 Shots of liquor per week    Drug use: No    Sexual activity: Yes     Partners: Male     Birth control/protection: See Surgical Hx        Review of Systems     Review of Systems   Neurological: Positive for seizures.   ROS limited due to pt post-ictal condition     Physical Exam     Initial Vitals   BP Pulse Resp Temp SpO2   19 0501 19 0501 19 0501 19 0501 19 0520   (!) 153/118 84 18 99.4 °F (37.4 °C) (!) 86 %      MAP       --                 Physical Exam  Nursing Notes and Vital Signs Reviewed.  Constitutional: Patient is in no acute distress. Elderly. Frail. Somnolent. Non-verbal. Chronically ill-appearing.  Head: Atraumatic. Normocephalic.  Eyes: PERRL. EOM intact. Conjunctivae are not pale. No scleral icterus.  ENT: Mucous membranes are moist. Oropharynx is clear and symmetric.    Neck: Supple. Full ROM. No lymphadenopathy.  Cardiovascular: Regular rate. Regular rhythm. No murmurs, rubs, or gallops. Distal pulses are 2+ and symmetric.  Pulmonary/Chest: No respiratory distress. Clear to auscultation bilaterally. No wheezing or rales.  Abdominal: Soft and non-distended.  There is no tenderness.  No rebound, guarding, or rigidity.   Musculoskeletal: Moves all extremities. No obvious deformities. No edema. No calf tenderness.  Skin: Warm and dry.  Neurological:  "Post-ictal  Psychiatric: Limited due to pt condition.     ED Course   Critical Care  Date/Time: 3/27/2019 10:03 AM  Performed by: Napoleon Elias MD  Authorized by: Napoleon Elias MD   Direct patient critical care time: 15 minutes  Ordering / reviewing critical care time: 15 minutes  Documentation critical care time: 15 minutes  Total critical care time (exclusive of procedural time) : 45 minutes  Critical care time was exclusive of separately billable procedures and treating other patients and teaching time.  Critical care was necessary to treat or prevent imminent or life-threatening deterioration of the following conditions: respiratory failure.  Critical care was time spent personally by me on the following activities: blood draw for specimens, development of treatment plan with patient or surrogate, interpretation of cardiac output measurements, evaluation of patient's response to treatment, examination of patient, obtaining history from patient or surrogate, ordering and performing treatments and interventions, ordering and review of laboratory studies, ordering and review of radiographic studies, re-evaluation of patient's condition, pulse oximetry and review of old charts.        ED Vital Signs:  Vitals:    03/27/19 0501 03/27/19 0520 03/27/19 0540 03/27/19 0650   BP: (!) 153/118      Pulse: 84  85    Resp: 18  (!) 22    Temp: 99.4 °F (37.4 °C)      TempSrc: Oral      SpO2:  (!) 86% (!) 89%    Weight:    50.2 kg (110 lb 11.2 oz)   Height: 5' 4" (1.626 m)       03/27/19 0719 03/27/19 0730 03/27/19 0830 03/27/19 0833   BP:  (!) 174/111 (!) 172/94    Pulse:  86 96 98   Resp:  (!) 26 (!) 30 (!) 22   Temp: 99.6 °F (37.6 °C)      TempSrc:       SpO2:  (!) 92% (!) 91% (!) 93%   Weight:       Height:           Abnormal Lab Results:  Labs Reviewed   CBC W/ AUTO DIFFERENTIAL - Abnormal; Notable for the following components:       Result Value    WBC 16.43 (*)     MCH 31.4 (*)     RDW 14.9 (*)     " Gran # (ANC) 14.1 (*)     Gran% 85.6 (*)     Lymph% 9.2 (*)     All other components within normal limits   COMPREHENSIVE METABOLIC PANEL - Abnormal; Notable for the following components:    Potassium 3.0 (*)     Glucose 162 (*)     BUN, Bld 30 (*)     Calcium 7.4 (*)     Anion Gap 18 (*)     eGFR if  45 (*)     eGFR if non  39 (*)     All other components within normal limits   URINALYSIS, REFLEX TO URINE CULTURE - Abnormal; Notable for the following components:    Protein, UA 2+ (*)     Occult Blood UA 1+ (*)     All other components within normal limits    Narrative:     Preferred Collection Type->Urine, Clean Catch   B-TYPE NATRIURETIC PEPTIDE - Abnormal; Notable for the following components:     (*)     All other components within normal limits   ISTAT PROCEDURE - Abnormal; Notable for the following components:    POC PCO2 57.6 (*)     POC PO2 28 (*)     POC HCO3 32.3 (*)     POC SATURATED O2 48 (*)     All other components within normal limits   INFLUENZA A & B BY MOLECULAR   CULTURE, BLOOD   CULTURE, BLOOD   LACTIC ACID, PLASMA   PROTIME-INR   APTT   LIPASE   TROPONIN I   PROCALCITONIN   URINALYSIS MICROSCOPIC    Narrative:     Preferred Collection Type->Urine, Clean Catch   ALCOHOL,MEDICAL (ETHANOL)   ALCOHOL,MEDICAL (ETHANOL)        All Lab Results:  Results for orders placed or performed during the hospital encounter of 03/27/19   Influenza A & B by Molecular   Result Value Ref Range    Influenza A, Molecular Negative Negative    Influenza B, Molecular Negative Negative    Flu A & B Source Nasal swab    CBC auto differential   Result Value Ref Range    WBC 16.43 (H) 3.90 - 12.70 K/uL    RBC 4.39 4.00 - 5.40 M/uL    Hemoglobin 13.8 12.0 - 16.0 g/dL    Hematocrit 40.9 37.0 - 48.5 %    MCV 93 82 - 98 fL    MCH 31.4 (H) 27.0 - 31.0 pg    MCHC 33.7 32.0 - 36.0 g/dL    RDW 14.9 (H) 11.5 - 14.5 %    Platelets 200 150 - 350 K/uL    MPV 10.3 9.2 - 12.9 fL    Gran # (ANC) 14.1  (H) 1.8 - 7.7 K/uL    Lymph # 1.5 1.0 - 4.8 K/uL    Mono # 0.7 0.3 - 1.0 K/uL    Eos # 0.1 0.0 - 0.5 K/uL    Baso # 0.01 0.00 - 0.20 K/uL    Gran% 85.6 (H) 38.0 - 73.0 %    Lymph% 9.2 (L) 18.0 - 48.0 %    Mono% 4.5 4.0 - 15.0 %    Eosinophil% 0.6 0.0 - 8.0 %    Basophil% 0.1 0.0 - 1.9 %    Differential Method Automated    Comprehensive metabolic panel   Result Value Ref Range    Sodium 141 136 - 145 mmol/L    Potassium 3.0 (L) 3.5 - 5.1 mmol/L    Chloride 98 95 - 110 mmol/L    CO2 25 23 - 29 mmol/L    Glucose 162 (H) 70 - 110 mg/dL    BUN, Bld 30 (H) 8 - 23 mg/dL    Creatinine 1.4 0.5 - 1.4 mg/dL    Calcium 7.4 (L) 8.7 - 10.5 mg/dL    Total Protein 6.9 6.0 - 8.4 g/dL    Albumin 3.6 3.5 - 5.2 g/dL    Total Bilirubin 0.7 0.1 - 1.0 mg/dL    Alkaline Phosphatase 79 55 - 135 U/L    AST 17 10 - 40 U/L    ALT 12 10 - 44 U/L    Anion Gap 18 (H) 8 - 16 mmol/L    eGFR if African American 45 (A) >60 mL/min/1.73 m^2    eGFR if non African American 39 (A) >60 mL/min/1.73 m^2   Lactic acid, plasma #1   Result Value Ref Range    Lactate (Lactic Acid) 1.8 0.5 - 2.2 mmol/L   Urinalysis, Reflex to Urine Culture Urine, Clean Catch   Result Value Ref Range    Specimen UA Urine, Clean Catch     Color, UA Yellow Yellow, Straw, Gina    Appearance, UA Clear Clear    pH, UA 7.0 5.0 - 8.0    Specific Gravity, UA 1.015 1.005 - 1.030    Protein, UA 2+ (A) Negative    Glucose, UA Negative Negative    Ketones, UA Negative Negative    Bilirubin (UA) Negative Negative    Occult Blood UA 1+ (A) Negative    Nitrite, UA Negative Negative    Urobilinogen, UA Negative <2.0 EU/dL    Leukocytes, UA Negative Negative   Brain natriuretic peptide   Result Value Ref Range     (H) 0 - 99 pg/mL   Protime-INR   Result Value Ref Range    Prothrombin Time 10.9 9.0 - 12.5 sec    INR 1.0 0.8 - 1.2   APTT   Result Value Ref Range    aPTT 24.8 21.0 - 32.0 sec   Lipase   Result Value Ref Range    Lipase 57 4 - 60 U/L   Troponin I   Result Value Ref Range     Troponin I 0.023 0.000 - 0.026 ng/mL   Procalcitonin   Result Value Ref Range    Procalcitonin 0.02 <0.25 ng/mL   Urinalysis Microscopic   Result Value Ref Range    RBC, UA 1 0 - 4 /hpf    WBC, UA 1 0 - 5 /hpf    Bacteria, UA None None-Occ /hpf    Hyaline Casts, UA 0 0-1/lpf /lpf    Microscopic Comment SEE COMMENT    ISTAT PROCEDURE   Result Value Ref Range    POC PH 7.356 7.35 - 7.45    POC PCO2 57.6 (H) 35 - 45 mmHg    POC PO2 28 (LL) 40 - 60 mmHg    POC HCO3 32.3 (H) 24 - 28 mmol/L    POC BE 7 -2 to 2 mmol/L    POC SATURATED O2 48 (L) 95 - 100 %    Sample VENOUS     Site Other     Allens Test N/A     DelSys Nasal Can     Mode SPONT     Flow 3     FiO2 32     Sp02 93        Imaging Results:  Imaging Results          X-Ray Chest AP Portable (Final result)  Result time 03/27/19 07:36:58    Final result by Ray De Luna MD (03/27/19 07:36:58)                 Impression:      At least 3 left-sided rib fractures are suspected posterolaterally involving the left-sided ribs..  These findings were noted on prior exam.  See additional findings above.      Electronically signed by: Ray De Luna MD  Date:    03/27/2019  Time:    07:36             Narrative:    EXAMINATION:  XR CHEST AP PORTABLE    CLINICAL HISTORY:  Sepsis;    FINDINGS:  Single view of the chest.  Aorta demonstrates atherosclerotic disease.    Cardiac silhouette is normal.  The lungs demonstrate no evidence of active disease.  Emphysematous changes are noted.  Trace left pleural effusion.  No pneumothorax.  Bones demonstrate advanced degenerative changes.  At least 3 left-sided rib fractures are suspected posterolaterally involving the left-sided ribs.  Correlate with point tenderness.                               CT Head Without Contrast (Final result)  Result time 03/27/19 07:45:40    Final result by Ray De Luna MD (03/27/19 07:45:40)                 Impression:      Chronic microvascular ischemic changes.  No acute abnormalities.    Motion  limited    All CT scans at this facility use dose modulation, iterative reconstruction, and/or weight based dosing when appropriate to reduce radiation dose to as low as reasonable achievable.      Electronically signed by: Ray De Luna MD  Date:    03/27/2019  Time:    07:45             Narrative:    EXAMINATION:  CT HEAD WITHOUT CONTRAST    CLINICAL HISTORY:  Seizures new or progressive; Unspecified convulsions    TECHNIQUE:  Low dose axial CT images obtained throughout the head without intravenous contrast. Sagittal and coronal reconstructions were performed.    COMPARISON:  08/01/2018.    FINDINGS:  Intracranial compartment: Motion limited.    The brain parenchyma demonstrates areas of decreased attenuation with moderate to severe periventricular white matter consistent with chronic microvascular ischemic changes.  Remote right frontal infarct suspected with areas of encephalomalacia.  Bilateral remote lacunar infarcts are suspected.  No parenchymal mass, hemorrhage, edema or major vascular distribution infarct.  Vascular calcifications are noted.    Moderate prominence of the sulci and ventricles are consistent with age-related involutional changes.  Findings are similar to prior.    No extra-axial blood or fluid collections.    Skull/extracranial contents (limited evaluation): Question prior ventriculostomy tract within the right frontal bone.  No fracture. Mastoid air cells and paranasal sinuses are essentially clear.                                 The EKG was ordered, reviewed, and independently interpreted by the ED provider.  Interpretation time: 0711  Rate: 86 BPM  Rhythm: Sinus rhythm with Possible premature atrial complexes with aberrant conduction  Interpretation: Nonspecific ST and T wave abnormality. Prolonged QT. No STEMI.             The Emergency Provider reviewed the vital signs and test results, which are outlined above.     ED Discussion     8:04 AM: Re-evaluated pt. Pt is tachypneic with  wheezing. She has a hx of COPD. Pt is more alert and is answering questions appropriately.  D/w pt all pertinent results. D/w pt any concerns expressed at this time. Answered all questions. Pt expresses understanding at this time.    8:34 AM: Discussed case with Kelly Mccallum NP ([facility]). Dr. Schroeder agrees with current care and management of pt and accepts admission.   Admitting Service: Hospital Medicine  Admitting Physician: Dr. Schroeder  Admit to: Obs    8:39 AM: Re-evaluated pt. I have discussed test results, shared treatment plan, and the need for admission with patient and family at bedside. Pt and family express understanding at this time and agree with all information. All questions answered. Pt and family have no further questions or concerns at this time. Pt is ready for admit.    9:00 AM: Re-evaluated pt. Pt is still wheezing following breathing tx. Labored breathing, will adjust admission to ICU. Pt in resp distress.     ED Medication(s):  Medications   acetaminophen suppository 650 mg (650 mg Rectal Not Given 3/27/19 0822)   potassium chloride 10% oral solution 20 mEq (has no administration in time range)   albuterol-ipratropium 2.5 mg-0.5 mg/3 mL nebulizer solution 3 mL (has no administration in time range)   doxycycline (VIBRAMYCIN) 100mg in dextrose 5% 250 mL IVPB (ready to mix) (has no administration in time range)   thiamine tablet 100 mg (has no administration in time range)   folic acid tablet 1 mg (has no administration in time range)   levETIRAcetam in NaCl (iso-os) IVPB 1,000 mg (0 mg Intravenous Stopped 3/27/19 0655)   sodium chloride 0.9% bolus 1,506 mL (1,506 mLs Intravenous New Bag 3/27/19 0731)   albuterol-ipratropium 2.5 mg-0.5 mg/3 mL nebulizer solution 3 mL (3 mLs Nebulization Given 3/27/19 0833)   methylPREDNISolone sodium succinate injection 125 mg (125 mg Intravenous Given 3/27/19 0838)   hydrALAZINE injection 10 mg (10 mg Intravenous Given 3/27/19 0838)       New  Prescriptions    No medications on file                 Medical Decision Making:   Clinical Tests:   Lab Tests: Ordered and Reviewed  Radiological Study: Ordered and Reviewed  Medical Tests: Ordered and Reviewed             Scribe Attestation:   Scribe #1: I performed the above scribed service and the documentation accurately describes the services I performed. I attest to the accuracy of the note.     Attending:   Physician Attestation Statement for Scribe #1: I, Napoleon Elias MD, personally performed the services described in this documentation, as scribed by Leela Parks, in my presence, and it is both accurate and complete.           Clinical Impression       ICD-10-CM ICD-9-CM   1. Seizure R56.9 780.39   2. COPD exacerbation J44.1 491.21   3. Hypoxia R09.02 799.02   4. Hypokalemia E87.6 276.8   5. Hypertension, unspecified type I10 401.9       Disposition:   Disposition: Admitted  Condition: Serious         Napoleon Elias MD  03/27/19 [id number]

## 2019-03-27 NOTE — ASSESSMENT & PLAN NOTE
--admit to ICU  --vapotherm  --pulmonology consulted  --duo nebs, brovana, budesonide via nebulizer  --PRN ativan

## 2019-03-27 NOTE — CONSULTS
[facility]  Critical Care Medicine  Consult Note    Patient Name: Ibeth Schroeder  MRN: [id number]  Admission Date: 3/27/2019  Hospital Length of Stay: 0 days  Code Status: Full Code  Attending Physician: Isaias Schroeder MD   Primary Care Provider: Geovanna Botello MD   Principal Problem: Seizure      Subjective:     HPI:  Ms Schroeder is a 69 yo WF with a PMH of CBP, Secondary Hyperparathyroidism, Chronic Hypoxic and Hypercapnic Resp Failure on home O2 NC 3 lpm, Severe COPD, CKD3, Hypothyroidism, HTN, HLD, CVA, Gout and RLS.  She has documented hx of seizure which spouse stated was due to acute liver failure last year that resolved and is not on any home antiepileptic meds.  She was last seen in Pulm clinic by Dr. Barrett 1/16/19 for severe COPD with FEV1 29%.  She was seen in medicine clinic 3/18/19 for nasal congestion, cough, fever, SOB > baseline and wheezes.  CXR done without acute process and Dx w/ Bronchitis and given script for Zithromax and Prednisone.  Spouse claims she was doing much better from lung standpoint and only needing O2 at night.  Today she had a reported seizure at home and presented to [facility] ED post ictal.  Reportedly last seizure was a year ago as noted above.  In ED RA SAT 86% with tachypnea and wheezes that did not improved with bronchodilators.  She was placed on Vapotherm and admitted to ICU.  CT head done neg for acute process as was CXR (except left sided rib fractures noted).  In ED Mg 0.9, K+ 3.0, WBC 16, D-Dimer 8 therefore CTA chest done revealing 3 cm LLL soft tissue thickening.  She has a hx of etoh abuse and still drinks daily but unclear as to how much.     Hospital/ICU Course:  3/27 - Arrived to ICU lethargic post receiving 1 mg Ativan in ED for anxiety, awakens but nonverbal with VSS and no distress.  Spouse at bedside contributed to HPI.     Past Medical History:   Diagnosis Date    Acute on chronic respiratory failure with hypoxia 7/30/2018     Acute pancreatitis     Alcohol dependence     per patient in the past    Allergy     Anemia     Anxiety     Arthritis 6/24/2013    Asthma     per patient    Back pain     Closed avulsion fracture of anterior inferior iliac spine of pelvis 7/17/2017 7/16/17 CT abd pelvis- Acute comminuted fracture involving the left iliac bone, new since the prior exam.  No other pelvic fractures seen.    Closed fracture of right iliac wing 7/18/2017    Colon polyp     Colon polyp     colonoscopy 8/26/2013    COPD (chronic obstructive pulmonary disease)     Dependence on renal dialysis 2012    when in liver failure    Depression     Disorder of kidney and ureter     ckd4    Diverticulosis     Diverticulosis     colonoscopy 8/26/2013    Hiatal hernia     CXR 2/25/2015--Large hiatal hernia.     Hyperlipidemia     Hypertension     Hypocalcemia 7/6/2016    Hypothyroidism 6/24/2013    Kidney cysts     us retro 7/18/2018-Simple bilateral renal cysts as above.  No acute findings.   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 FEV1 is 1.08 (44% predicted) FVC is 2.25 (71% predicted) FEV1/FVC is 48  She uses 2 pillows at night. Patient currently is on oxygen at 2 L/min per nasal cannula at night  Last visit overnight sat result:Overnight sat: 9 hours of sampling with room air . Her saturation was below 88% for 30 minutes  2/25/15 Overnight sat  indicated supplementary O2 r    Osteoporosis     Pneumonia due to infectious organism 7/29/2018    Pulmonary embolism     per patient unsure of date    Recurrent major depressive disorder, in partial remission 4/20/2016    Restless leg syndrome     RLS (restless legs syndrome)     Seizure 6/24/2013    Stage 3 severe COPD by GOLD classification 3/16/2016    Spirometry 3/16/2016---Physician Interpretation Moderately severe obstruction (FEV1>50 and <59% of predicted).compared to previous study performed 2/25/2015  FEV1 improved by 24%, FVC improved by 15%.    Stroke     ischemic injury due to hypotension    Tobacco dependence     resolved    Trouble in sleeping        Past Surgical History:   Procedure Laterality Date    ADENOIDECTOMY  1960 approx    amputation left 2nd finger tip Left 2012    for blood clot/ after liver failure admit ? gangrene    APPENDECTOMY      incidental with another surgery    na hole/ ventriculostomy Right 08/24/2012    frontal after  liver failure    CHOLECYSTECTOMY      COLONOSCOPY  2013    COLONOSCOPY N/A 8/26/2013    Performed by [facility]r [facility]O    EXCISION-CYST Left 9/6/2017    Performed by Binu Lizarraga Sr., [facility] at [facility] OR    FRACTURE SURGERY Left 07/12/2017    radius and ulna fracture    FRACTURE SURGERY Left 09/12/2017    wrist removal of hardware    BRENDEN FILTER PLACEMENT      HERNIA REPAIR  1999 approx    incisional hernia     HYSTERECTOMY  1977 approx    RAS/BSO  for endometriosis and tubular pregnancy     JOINT REPLACEMENT Left 2000 approx    hip for arthritis     JOINT REPLACEMENT Left 2004 approx    hip, fell then recurent dislocations - 4 times relocarted under mild anesthesia then re replaced    JOINT REPLACEMENT Right 2002 approx    knee for arthritis    OOPHORECTOMY  1977 approx    OPEN REDUCTION INTERNAL FIXATION-RADIUS Left 7/12/2017    [facility] Bin[facility] Sr[facility]    OPEN REDUCTION INTERNAL FIXATION-ULNA Left 7/12/2017    Performed [facility] Bin[facility] Sr[facility] OR    REMOVAL-HARDWARE Left 9/6/2017    [facility] Sr[facility] OR    SYNOVECTOMY-WRIST Left 9/6/2017    [facility] Sr[facility] OR    TONSILLECTOMY  1960 approx       Review of patient's allergies indicates:   Allergen Reactions    Nsaids (non-steroidal anti-inflammatory drug) Hives and Shortness Of Breath    Pcn [penicillins] Hives and Shortness Of Breath    Sulfa (sulfonamide antibiotics) Hives and Shortness Of  Breath    Aspirin      Due to Liver        Levaquin [levofloxacin] Other (See Comments)     Seizure      Macrodantin [nitrofurantoin macrocrystalline] Hives    Wellbutrin [bupropion hcl]      Due to liver         Family History     Problem Relation (Age of Onset)    Alcohol abuse Maternal Uncle    Asthma Father    Breast cancer Maternal Aunt, Maternal Aunt    Cancer Maternal Aunt, Maternal Grandmother, Maternal Aunt    Colon cancer Maternal Grandmother    Diabetes Mother, Son    Heart disease Father, Maternal Aunt, Maternal Aunt    Hypertension Mother, Son    Kidney disease Mother    Parkinsonism Mother        Tobacco Use    Smoking status: Former Smoker     Packs/day: 1.00     Years: 30.00     Pack years: 30.00     Types: Cigarettes     Last attempt to quit: 2010     Years since quittin.6    Smokeless tobacco: Never Used    Tobacco comment: used nicorette gum in past and prn   Substance and Sexual Activity    Alcohol use: Yes     Alcohol/week: 3.0 - 3.6 oz     Types: 5 - 6 Shots of liquor per week    Drug use: No    Sexual activity: Yes     Partners: Male     Birth control/protection: See Surgical Hx         Review of Systems   Unable to perform ROS: Mental status change     Objective:     Vital Signs (Most Recent):  Temp: 99.6 °F (37.6 °C) (19 0719)  Pulse: 98 (19 1415)  Resp: (!) 26 (19 1415)  BP: 103/68 (19 1245)  SpO2: (!) 92 % (19 1415) Vital Signs (24h Range):  Temp:  [99.4 °F (37.4 °C)-99.6 °F (37.6 °C)] 99.6 °F (37.6 °C)  Pulse:  [] 98  Resp:  [18-32] 26  SpO2:  [86 %-100 %] 92 %  BP: (101-174)/() 103/68     Weight: 50.2 kg (110 lb 11.2 oz)  Body mass index is 19 kg/m².      Intake/Output Summary (Last 24 hours) at 3/27/2019 1442  Last data filed at 3/27/2019 1030  Gross per 24 hour   Intake 1756 ml   Output --   Net 1756 ml       Physical Exam   Constitutional: Vital signs are normal. She appears well-developed. She appears lethargic. She is  easily aroused.  Non-toxic appearance. She has a sickly appearance. She appears ill. No distress. She is not intubated. Nasal cannula in place.   HENT:   Head: Normocephalic and atraumatic.   Mouth/Throat: Oropharynx is clear and moist and mucous membranes are normal.   Eyes: Pupils are equal, round, and reactive to light. EOM and lids are normal.   Neck: Trachea normal and full passive range of motion without pain. Carotid bruit is not present.   Cardiovascular: Normal rate, regular rhythm and normal heart sounds.   Pulses:       Radial pulses are 1+ on the right side, and 1+ on the left side.        Dorsalis pedis pulses are 1+ on the right side, and 1+ on the left side.   Pulmonary/Chest: Effort normal. No accessory muscle usage. No tachypnea. She is not intubated. No respiratory distress. She has decreased breath sounds (throughout but more so in bases). She has no wheezes.   Abdominal: Soft. Normal appearance. She exhibits no distension. Bowel sounds are decreased. There is no tenderness.   Musculoskeletal: Normal range of motion.        Right foot: There is no deformity.        Left foot: There is no deformity.   No edema   Lymphadenopathy:     She has no cervical adenopathy.   Neurological: She is easily aroused. She appears lethargic.   Opens eyes and tracts with stimuli but only moans with questions and nonverbal but given Ativan in ED.    Skin: Skin is warm, dry and intact. Capillary refill takes less than 2 seconds. No rash noted. No cyanosis.       Vents:  Oxygen Concentration (%): 28 (03/27/19 1415)    Lines/Drains/Airways     Epidural Line                 Perineural Analgesia/Anesthesia Assessment (using dermatomes) Epidural 09/06/17 1246 567 days          Peripheral Intravenous Line                 Peripheral IV - Single Lumen 03/27/19 0715 Left Antecubital less than 1 day                Significant Labs:    CBC/Anemia Profile:  Recent Labs   Lab 03/27/19  0615   WBC 16.43*   HGB 13.8   HCT 40.9   PLT  200   MCV 93   RDW 14.9*        Chemistries:  Recent Labs   Lab 03/27/19  0615      K 3.0*   CL 98   CO2 25   BUN 30*   CREATININE 1.4   CALCIUM 7.4*   ALBUMIN 3.6   PROT 6.9   BILITOT 0.7   ALKPHOS 79   ALT 12   AST 17   MG 0.9*       ABGs:   Recent Labs   Lab 03/27/19  1126   PH 7.440   PCO2 35.5   HCO3 24.1   POCSATURATED 95   BE 0     Lactic Acid:   Recent Labs   Lab 03/27/19  0654 03/27/19  1040   LACTATE 1.8 2.1     Troponin:   Recent Labs   Lab 03/27/19  0615   TROPONINI 0.023     Urine Studies:   Recent Labs   Lab 03/27/19  0620   COLORU Yellow   APPEARANCEUA Clear   PHUR 7.0   SPECGRAV 1.015   PROTEINUA 2+*   GLUCUA Negative   KETONESU Negative   BILIRUBINUA Negative   OCCULTUA 1+*   NITRITE Negative   UROBILINOGEN Negative   LEUKOCYTESUR Negative   RBCUA 1   WBCUA 1   BACTERIA None   HYALINECASTS 0     All pertinent labs within the past 24 hours have been reviewed.    Significant Imaging:   CT: I have reviewed all pertinent results/findings within the past 24 hours and my personal findings are:  Left infrahilar soft tissue thickening extending into the left lower lobe along the bronchovascular bundle measuring approximately 3.8 x 3.4 cm on axial images in maximum diameter..  Neoplastic mass is not excluded.  Consolidation is differential consideration.  Diffuse interstitial thickening is seen throughout the left lower lobe with additional areas of consolidation within the posterior basilar segment of the left lower lobe.  Findings could reflect consolidation with areas of interstitial pneumonia versus lymphangitic carcinomatosis.  Diffuse interstitial thickening is seen throughout the right lower lobe with subpleural nodularity.  Again findings could reflect interstitial pneumonia or chronic aspiration.  Lymphangitic carcinomatosis is a differential consideration.       ABG  Recent Labs   Lab 03/27/19  1126   PH 7.440   PO2 71*   PCO2 35.5   HCO3 24.1   BE 0     Assessment/Plan:     Neuro  *  Seizure  Likely due to hypokalemia and hypomag  Replace electrolytes and monitor for further replacement  ICU neuro monitoring  [facility]  EEG pending  Neuro consult pending    Psychiatric  History of major depression  Cont Buspirone    History of ETOH abuse  Admit etoh < 10  Cont thiamine and folic acid  Monitor for DTs    Pulmonary  Stage 3 severe COPD by GOLD classification  Cont O2, BOSSMAN, LABA and ICS  outpt follow up in pulm clinic    Acute on chronic respiratory failure with hypoxia  Vapotherm weaned to low flow NC   Titrate O2 for SAT 88-92%  ICU pulm monitoring    Cardiac/Vascular  Essential hypertension  Cont Toprol XL and Norvasc    Renal/  Chronic kidney disease, stage 3  At baseline creatine  IVFs till more alert and H/L when tolerating PO  BMP in AM    Electrolyte imbalance  Monitor and replace electrolytes as needed.     Hematology  History of pulmonary embolus (PE); s/p IVC filter  CTA chest unremarkable for PE  No therapeutic anticoagulation needed.     Endocrine  Hypothyroidism  Cont Levothyroxine    GI  Large hiatal hernia  Cont PPI  Per CT chest lg HH noted to produce mass effect on left atrium  Consider surgery consult but would be high risk    Other  Abnormal chest CT  WBC 16 and Afeb  Just completed course of outpt Zithro  Has PCN, Sulfa and Flouroquinolone allergies  Cont Doxy for now  Review CT chest with Dr. Barrett for possible bronch  Cont nebs       Preventive Measures and Monitoring:   Stress Ulcer: PPI  Nutrition: Renal diet  Glucose control: stable  Bowel prophylaxis: Colace and PRN Dulcolax  DVT prophylaxis: SQ hep  Hx CAD on B-Blocker: Toprol  Head of Bed/Reposition: Elevate HOB and turn Q1-2 hours   Early Mobility: bed rest  IVAB Day: #1  Code Status: Full  Pneumonia Vaccine: UTD  Flu Vaccine: ordered    Counseling/Consultation:I have discussed the care of this patient in detail with the bedside nursing staff and Dr. Barrett and Dr. Schroeder    Critical Care Time: 55  minutes  Critical secondary to Patient has a condition that poses threat to life and bodily function: Acute on Chronic Hypoxic Resp Failure  Patient has an abrupt change in neurologic status: Seizure     Critical care was time spent personally by me on the following activities: development of treatment plan with patient or surrogate and bedside caregivers, discussions with consultants, evaluation of patient's response to treatment, examination of patient, ordering and performing treatments and interventions, ordering and review of laboratory studies, ordering and review of radiographic studies, pulse oximetry, re-evaluation of patient's condition. This critical care time did not overlap with that of any other provider or involve time for any procedures.    Thank you for your consult. I will follow-up with patient. Please contact us if you have any additional questions.     Joseph Prado NP  Critical Care Medicine  [facility]

## 2019-03-27 NOTE — PLAN OF CARE
Problem: Adult Inpatient Plan of Care  Goal: Plan of Care Review  Outcome: Ongoing (interventions implemented as appropriate)  Pt remains lethargic; responsive to verbal stimuli & pain; disoriented x 4; replacing Mg and K+; pt's  brought pt's personal supply of buspar, as there is a nationwide shortage; VSS; NADN.

## 2019-03-27 NOTE — PROGRESS NOTES
Spoke to NELSON Mendoza about patient's respiratory status. She's currently labored and short of breath on 4L NC.   Vapotherm started and repeat ABG per NELSON Mendoza. She was placed on 30L 75% vapotherm. Spo2 94%.

## 2019-03-27 NOTE — PLAN OF CARE
Patient asleep ( obtunded)  and no family at the bedside. CM to f/u for safe transitional plan.     03/27/19 5221   Discharge Assessment   Assessment Type Discharge Planning Assessment   Confirmed/corrected address and phone number on facesheet? No   Assessment information obtained from? Medical Record   Expected Length of Stay (days)   (TBD)   Communicated expected length of stay with patient/caregiver no   Prior to hospitilization cognitive status: Unable to Assess   Current cognitive status: Unable to Assess   Lives With other (see comments)   Able to Return to Prior Arrangements other (see comments)   Is patient able to care for self after discharge? Unable to determine at this time (comments)   Patient's perception of discharge disposition other (comments)   Readmission Within the Last 30 Days other (see comments)   Patient currently being followed by outpatient case management? No   Patient currently receives any other outside agency services? No   Equipment Currently Used at Home walker, rolling;oxygen;wheelchair   Do you have any problems affording any of your prescribed medications? TBD   Is the patient taking medications as prescribed? no   Does the patient have transportation home? Yes   Transportation Anticipated family or friend will provide   Does the patient receive services at the Coumadin Clinic? No   Discharge Plan A Other   Discharge Plan B Other   DME Needed Upon Discharge  other (see comments)   Patient/Family in Agreement with Plan unable to assess

## 2019-03-27 NOTE — PLAN OF CARE
03/27/19 1158   CAMILO Message   Medicare Outpatient and Observation Notification regarding financial responsibility Given to patient/caregiver;Explained to patient/caregiver;Signed/date by patient/caregiver   Date CAMILO was signed 03/27/19   Time CAMILO was signed 1152

## 2019-03-27 NOTE — H&P
[facility] Medicine  History & Physical    Patient Name: Ibeth Schroeder  MRN: [id number]  Admission Date: 3/27/2019  Attending Physician: Isaias Schroeder MD   Primary Care Provider: Geovanna Botello MD         Patient information was obtained from patient, spouse/SO, past medical records and ER records.     Subjective:     Principal Problem:Seizure    Chief Complaint:   Chief Complaint   Patient presents with    Seizures     Family member reports that patient had a seizure prior to arrival, approxiately 0440. GCS 14 per [facility].        HPI: 67 y/o female with PMHx of COPD, seizures, HTN, HLD, ETOH abuse (resolved per spouse), tobacco use (resolved per spouse), and stroke who presented to the ED with c/o seizure that onset suddenly early this morning. Spouse is at bedside. Patient has hx of seizures but it was felt the seizure's were induced by medication, so Keppra was stopped, approximately August, 2018. Spouse reports patient has not had a seizure since that time until today. Seizure was sudden and moderate in severity. No mitigating or exacerbating factors reported. Patient's respiratory status declined while in ED and she was put on Vapotherm. RR 30, Pulse 125, ABG shows pO2 52, pCO2 45. Patient's spouse reports she has not been sick, denies fever, chills, cough, congestion, and exposure to sick individuals. Per spouse, patient has not worn her home oxygen for the last 3-4 nights. She is a full code and her surrogate decision maker is her , Marino.    Past Medical History:   Diagnosis Date    Acute on chronic respiratory failure with hypoxia 7/30/2018    Acute pancreatitis     Alcohol dependence     per patient in the past    Allergy     Anemia     Anxiety     Arthritis 6/24/2013    Asthma     per patient    Back pain     Closed avulsion fracture of anterior inferior iliac spine of pelvis 7/17/2017 7/16/17 CT abd pelvis- Acute comminuted fracture involving  the left iliac bone, new since the prior exam.  No other pelvic fractures seen.    Closed fracture of right iliac wing 7/18/2017    Colon polyp     Colon polyp     colonoscopy 8/26/2013    COPD (chronic obstructive pulmonary disease)     Dependence on renal dialysis 2012    when in liver failure    Depression     Disorder of kidney and ureter     ckd4    Diverticulosis     Diverticulosis     colonoscopy 8/26/2013    Hiatal hernia     CXR 2/25/2015--Large hiatal hernia.     Hyperlipidemia     Hypertension     Hypocalcemia 7/6/2016    Hypothyroidism 6/24/2013    Kidney cysts     us retro 7/18/2018-Simple bilateral renal cysts as above.  No acute findings.   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 FEV1 is 1.08 (44% predicted) FVC is 2.25 (71% predicted) FEV1/FVC is 48  She uses 2 pillows at night. Patient currently is on oxygen at 2 L/min per nasal cannula at night  Last visit overnight sat result:Overnight sat: 9 hours of sampling with room air . Her saturation was below 88% for 30 minutes  2/25/15 Overnight sat  indicated supplementary O2 r    Osteoporosis     Pneumonia due to infectious organism 7/29/2018    Pulmonary embolism     per patient unsure of date    Recurrent major depressive disorder, in partial remission 4/20/2016    Restless leg syndrome     RLS (restless legs syndrome)     Seizure 6/24/2013    Stage 3 severe COPD by GOLD classification 3/16/2016    Spirometry 3/16/2016---Physician Interpretation Moderately severe obstruction (FEV1>50 and <59% of predicted).compared to previous study performed 2/25/2015 FEV1 improved by 24%, FVC improved by 15%.    Stroke     ischemic injury due to hypotension    Tobacco dependence     resolved    Trouble in sleeping        Past Surgical History:   Procedure Laterality Date    ADENOIDECTOMY  1960 approx    amputation left 2nd finger tip Left 2012    for blood clot/ after liver  failure admit ? gangrene    APPENDECTOMY      incidental with another surgery    na hole/ ventriculostomy Right 08/24/2012    frontal after  liver failure    CHOLECYSTECTOMY      COLONOSCOPY  2013    COLONOSCOPY N/A 8/26/2013    Performed [facility]r [facility] at [facility]    EXCISION-CYST Left 9/6/2017    Performed by Binu Lizarraga Sr., [facility] at [facility] OR    FRACTURE SURGERY Left 07/12/2017    radius and ulna fracture    FRACTURE SURGERY Left 09/12/2017    wrist removal of hardware    BRENDEN FILTER PLACEMENT      HERNIA REPAIR  1999 approx    incisional hernia     HYSTERECTOMY  1977 approx    RAS/BSO  for endometriosis and tubular pregnancy     JOINT REPLACEMENT Left 2000 approx    hip for arthritis     JOINT REPLACEMENT Left 2004 approx    hip, fell then recurent dislocations - 4 times relocarted under mild anesthesia then re replaced    JOINT REPLACEMENT Right 2002 approx    knee for arthritis    OOPHORECTOMY  1977 approx    OPEN REDUCTION INTERNAL FIXATION-RADIUS Left 7/12/2017    Performed by Binu [facility]arraga Sr., [facility]    OPEN REDUCTION INTERNAL FIXATION-ULNA Left 7/12/2017    Performed by Bin[facility]arr[facility] Sr[facility]    REMOVAL-HARDWARE Left 9/6/2017    [facility]    SYNOVECTOMY-WRIST Left 9/6/2017    [facility]    TONSILLECTOMY  1960 approx       Review of patient's allergies indicates:   Allergen Reactions    Nsaids (non-steroidal anti-inflammatory drug) Hives and Shortness Of Breath    Pcn [penicillins] Hives and Shortness Of Breath    Sulfa (sulfonamide antibiotics) Hives and Shortness Of Breath    Aspirin      Due to Liver        Levaquin [levofloxacin] Other (See Comments)     Seizure      Macrodantin [nitrofurantoin macrocrystalline] Hives    Wellbutrin [bupropion hcl]      Due to liver         No current facility-administered medications on file prior to encounter.      Current Outpatient  Medications on File Prior to Encounter   Medication Sig    albuterol (ACCUNEB) 0.63 mg/3 mL Nebu Take 3 mLs (0.63 mg total) by nebulization every 6 (six) hours as needed. Rescue    allopurinol (ZYLOPRIM) 100 MG tablet Take 1 tablet (100 mg total) by mouth once daily.    amLODIPine (NORVASC) 2.5 MG tablet Take 1 tablet (2.5 mg total) by mouth once daily.    busPIRone (BUSPAR) 15 MG tablet Take 0.5 tablets (7.5 mg total) by mouth 2 (two) times daily.    calcitRIOL (ROCALTROL) 0.25 MCG Cap Take 1 capsule (0.25 mcg total) by mouth every Mon, Wed, Fri.    diphenoxylate-atropine 2.5-0.025 mg (LOMOTIL) 2.5-0.025 mg per tablet take 2 tablets by mouth four times a day if needed for diarrhea    ferrous sulfate 325 (65 FE) MG EC tablet Take 1 tablet (325 mg total) by mouth once daily.    fluticasone-umeclidin-vilanter (TRELEGY ELLIPTA) 100-62.5-25 mcg DsDv Inhale 1 puff into the lungs once daily.    ipratropium-albuterol (COMBIVENT RESPIMAT)  mcg/actuation inhaler inhale 1 puff INTO THE LUNGS four times a day (Patient taking differently: Inhale 1 puff into the lungs once daily. inhale 1 puff INTO THE LUNGS four times a day)    levETIRAcetam (KEPPRA) 500 MG Tab Take 1 tablet (500 mg total) by mouth 2 (two) times daily.    levothyroxine (SYNTHROID) 88 MCG tablet TAKE 1 TABLET EVERY DAY    metoprolol succinate (TOPROL-XL) 50 MG 24 hr tablet TAKE 1 TABLET TWICE DAILY    pantoprazole (PROTONIX) 40 MG tablet TAKE 1 TABLET EVERY DAY    pravastatin (PRAVACHOL) 10 MG tablet Take 1 tablet (10 mg total) by mouth once daily.    traZODone (DESYREL) 50 MG tablet Take 1/2 to 1 tablet at bedtime as needed.    bimatoprost (LUMIGAN) 0.03 % ophthalmic drops Place 1 drop into both eyes every evening.     flu vacc [id number],65yr up,/PF (FLUZONE HIGH-DOSE 2018-19, PF, IM) Inject into the muscle.    oxyCODONE (OXY-IR) 5 mg Cap Take 5 mg by mouth every 4 (four) hours as needed for Pain. Take half pill every 4 hours as needed     promethazine-dextromethorphan (PROMETHAZINE-DM) 6.25-15 mg/5 mL Syrp Take 5 mLs by mouth every 6 to 8 hours as needed.    ropinirole (REQUIP) 1 MG tablet Take 1 mg by mouth nightly as needed.     SHINGRIX, PF, 50 mcg/0.5 mL injection inject 0.5 milliliter intramuscularly     Family History     Problem Relation (Age of Onset)    Alcohol abuse Maternal Uncle    Asthma Father    Breast cancer Maternal Aunt, Maternal Aunt    Cancer Maternal Aunt, Maternal Grandmother, Maternal Aunt    Colon cancer Maternal Grandmother    Diabetes Mother, Son    Heart disease Father, Maternal Aunt, Maternal Aunt    Hypertension Mother, Son    Kidney disease Mother    Parkinsonism Mother        Tobacco Use    Smoking status: Former Smoker     Packs/day: 1.00     Years: 30.00     Pack years: 30.00     Types: Cigarettes     Last attempt to quit: 2010     Years since quittin.6    Smokeless tobacco: Never Used    Tobacco comment: used nicorette gum in past and prn   Substance and Sexual Activity    Alcohol use: Yes     Alcohol/week: 3.0 - 3.6 oz     Types: 5 - 6 Shots of liquor per week    Drug use: No    Sexual activity: Yes     Partners: Male     Birth control/protection: See Surgical Hx     Review of Systems   Unable to perform ROS: Acuity of condition     Objective:     Vital Signs (Most Recent):  Temp: 99.6 °F (37.6 °C) (19 0719)  Pulse: (!) 125 (19 1005)  Resp: (!) 30 (19 1005)  BP: (!) 150/86 (19 1000)  SpO2: 95 % (19 1005) Vital Signs (24h Range):  Temp:  [99.4 °F (37.4 °C)-99.6 °F (37.6 °C)] 99.6 °F (37.6 °C)  Pulse:  [] 125  Resp:  [18-32] 30  SpO2:  [86 %-95 %] 95 %  BP: (133-174)/() 150/86     Weight: 50.2 kg (110 lb 11.2 oz)  Body mass index is 19 kg/m².    Physical Exam   Constitutional: She appears well-developed. She appears distressed.   HENT:   Head: Normocephalic and atraumatic.   Nose: Nose normal.   Eyes: EOM are normal. No scleral icterus.   Neck: Normal range  of motion. Neck supple. No JVD present.   Cardiovascular: Intact distal pulses. Tachycardia present.   Pulmonary/Chest: She is in respiratory distress. She has no wheezes. She has rales (to bilat bases).   Decreased throughout   Abdominal: Soft. Bowel sounds are normal. She exhibits no distension. There is no tenderness.   Genitourinary:   Genitourinary Comments: deferred   Neurological: She is alert.   Skin: Skin is warm and dry. Capillary refill takes 2 to 3 seconds.   Nursing note and vitals reviewed.        CRANIAL NERVES     CN III, IV, VI   Extraocular motions are normal.        Significant Labs:   Recent Lab Results       03/27/19  0856   03/27/19  0654   03/27/19  0652   03/27/19  0620   03/27/19  0615        Influenza A, Molecular   Negative           Influenza B, Molecular   Negative           Procalcitonin         0.02  Comment:  A concentration < 0.25 ng/mL represents a low risk bacterial   infection.  Procalcitonin may not be accurate among patients with localized   infection, recent trauma or major surgery, immunosuppressed state,   invasive fungal infection, renal dysfunction. Decisions regarding   initiation or continuation of antibiotic therapy should not be based   solely on procalcitonin levels.       Albumin         3.6     Alcohol, Medical, Serum         <10     Alkaline Phosphatase         79     Allens Test Pass   N/A         ALT         12     Anion Gap         18     Appearance, UA       Clear       aPTT         24.8  Comment:  aPTT therapeutic range = 39-69 seconds     AST         17     Bacteria, UA       None       Baso #         0.01     Basophil%         0.1     Bilirubin (UA)       Negative       Total Bilirubin         0.7  Comment:  For infants and newborns, interpretation of results should be based  on gestational age, weight and in agreement with clinical  observations.  Premature Infant recommended reference ranges:  Up to 24 hours.............<8.0 mg/dL  Up to 48  hours............<12.0 mg/dL  3-5 days..................<15.0 mg/dL  6-29 days.................<15.0 mg/dL       BNP         156  Comment:  Values of less than 100 pg/ml are consistent with non-CHF populations.     Site LR   Other         BUN, Bld         30     Calcium         7.4     Chloride         98     CO2         25     Color, UA       Yellow       Creatinine         1.4     DelSys Nasal Can   Nasal Can         Differential Method         Automated     eGFR if          45     eGFR if non          39  Comment:  Calculation used to obtain the estimated glomerular filtration  rate (eGFR) is the CKD-EPI equation.        Eos #         0.1     Eosinophil%         0.6     FiO2 32   32         Flow 3   3         Flu A & B Source   Nasal swab           Glucose         162     Glucose, UA       Negative       Gran # (ANC)         14.1     Gran%         85.6     Hematocrit         40.9     Hemoglobin         13.8     Hyaline Casts, UA       0       Coumadin Monitoring INR         1.0  Comment:  Coumadin Therapy:  2.0 - 3.0 for INR for all indicators except mechanical heart valves  and antiphospholipid syndromes which should use 2.5 - 3.5.       Ketones, UA       Negative       Lactate, Xavi   1.8  Comment:  Falsely low lactic acid results can be found in samples   containing >=13.0 mg/dL total bilirubin and/or >=3.5 mg/dL   direct bilirubin.             Leukocytes, UA       Negative       Lipase         57     Lymph #         1.5     Lymph%         9.2     Magnesium         0.9  Comment:  MAGNESIUM  critical result(s) called and verbal readback obtained   from TOM DORSEY RN, 03/27/2019 09:11       MCH         31.4     MCHC         33.7     MCV         93     Microscopic Comment       SEE COMMENT  Comment:  Other formed elements not mentioned in the report are not   present in the microscopic examination.          Mode SPONT   SPONT         Mono #         0.7     Mono%         4.5      MPV         10.3     Nitrite, UA       Negative       Occult Blood UA       1+       pH, UA       7.0       Platelets         200     POC BE 0   7         POC HCO3 25.7   32.3         POC PCO2 45.0   57.6         POC PH 7.365   7.356         POC PO2 52   28         POC SATURATED O2 85   48         Potassium         3.0     Total Protein         6.9     Protein, UA       2+  Comment:  Recommend a 24 hour urine protein or a urine   protein/creatinine ratio if globulin induced proteinuria is  clinically suspected.         Protime         10.9     RBC         4.39     RBC, UA       1       RDW         14.9     Sample ARTERIAL   VENOUS         Sodium         141     Sp02 93   93         Specific [place], UA       1.015       Specimen UA       Urine, Clean Catch       Troponin I         0.023  Comment:  The reference interval for Troponin I represents the 99th percentile   cutoff   for our facility and is consistent with 3rd generation assay   performance.       Urobilinogen, UA       Negative       WBC, UA       1       WBC         16.43                        All pertinent labs within the past 24 hours have been reviewed.    Significant Imaging: I have reviewed all pertinent imaging results/findings within the past 24 hours.    Assessment/Plan:     * Seizure  --IV keppra given in ED  --EEG pending  --neurology consulted      Hypokalemia  --replete and monitor      Acute on chronic respiratory failure with hypoxia and hypercapnia  --admit to ICU  --vapotherm  --pulmonology consulted  --duo nebs, brovana, budesonide via nebulizer  --PRN ativan      Stage 3 severe COPD by GOLD classification  --Duo nebs, budesonide, brovana via neb  --pulmonology consulted  --vapotherm      Essential hypertension  --continue home metoprolol and amlodipine  --cardiac monitoring        VTE Risk Mitigation (From admission, onward)        Ordered     IP VTE LOW RISK PATIENT  Once      03/27/19 0850     Place sequential compression device   Until discontinued      03/27/19 0850     Place MALICK hose  Until discontinued      03/27/19 0850         Critical care time spent on the evaluation and treatment of severe organ dysfunction, review of pertinent labs and imaging studies, discussions with consulting providers and discussions with patient/family: 55 minutes.        SJ Huntley-C  Department of Hospital Medicine   [facility]

## 2019-03-27 NOTE — SUBJECTIVE & OBJECTIVE
Past Medical History:   Diagnosis Date    Acute on chronic respiratory failure with hypoxia 7/30/2018    Acute pancreatitis     Alcohol dependence     per patient in the past    Allergy     Anemia     Anxiety     Arthritis 6/24/2013    Asthma     per patient    Back pain     Closed avulsion fracture of anterior inferior iliac spine of pelvis 7/17/2017 7/16/17 CT abd pelvis- Acute comminuted fracture involving the left iliac bone, new since the prior exam.  No other pelvic fractures seen.    Closed fracture of right iliac wing 7/18/2017    Colon polyp     Colon polyp     colonoscopy 8/26/2013    COPD (chronic obstructive pulmonary disease)     Dependence on renal dialysis 2012    when in liver failure    Depression     Disorder of kidney and ureter     ckd4    Diverticulosis     Diverticulosis     colonoscopy 8/26/2013    Hiatal hernia     CXR 2/25/2015--Large hiatal hernia.     Hyperlipidemia     Hypertension     Hypocalcemia 7/6/2016    Hypothyroidism 6/24/2013    Kidney cysts     us retro 7/18/2018-Simple bilateral renal cysts as above.  No acute findings.   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 FEV1 is 1.08 (44% predicted) FVC is 2.25 (71% predicted) FEV1/FVC is 48  She uses 2 pillows at night. Patient currently is on oxygen at 2 L/min per nasal cannula at night  Last visit overnight sat result:Overnight sat: 9 hours of sampling with room air . Her saturation was below 88% for 30 minutes  2/25/15 Overnight sat  indicated supplementary O2 r    Osteoporosis     Pneumonia due to infectious organism 7/29/2018    Pulmonary embolism     per patient unsure of date    Recurrent major depressive disorder, in partial remission 4/20/2016    Restless leg syndrome     RLS (restless legs syndrome)     Seizure 6/24/2013    Stage 3 severe COPD by GOLD classification 3/16/2016    Spirometry 3/16/2016---Physician Interpretation  Moderately severe obstruction (FEV1>50 and <59% of predicted).compared to previous study performed 2/25/2015 FEV1 improved by 24%, FVC improved by 15%.    Stroke     ischemic injury due to hypotension    Tobacco dependence     resolved    Trouble in sleeping        Past Surgical History:   Procedure Laterality Date    ADENOIDECTOMY  1960 approx    amputation left 2nd finger tip Left 2012    for blood clot/ after liver failure admit ? gangrene    APPENDECTOMY      incidental with another surgery    na hole/ ventriculostomy Right 08/24/2012    frontal after  liver failure    CHOLECYSTECTOMY      COLONOSCOPY  2013    COLONOSCOPY N/A 8/26/2013    Performed by Rajat Orr [facility] at [facility]    EXCISION-CYST Left 9/6/2017    Performed by Bniu Lizarraga Sr., MD at [facility] OR    FRACTURE SURGERY Left 07/12/2017    radius and ulna fracture    FRACTURE SURGERY Left 09/12/2017    wrist removal of hardware    BRENDEN FILTER PLACEMENT      HERNIA REPAIR  1999 approx    incisional hernia     HYSTERECTOMY  1977 approx    RAS/BSO  for endometriosis and tubular pregnancy     JOINT REPLACEMENT Left 2000 approx    hip for arthritis     JOINT REPLACEMENT Left 2004 approx    hip, fell then recurent dislocations - 4 times relocarted under mild anesthesia then re replaced    JOINT REPLACEMENT Right 2002 approx    knee for arthritis    OOPHORECTOMY  1977 approx    OPEN REDUCTION INTERNAL FIXATION-RADIUS Left 7/12/2017    Performed by Binu Lizarraga Sr., MD at [facility]    OPEN REDUCTION INTERNAL FIXATION-ULNA Left 7/12/2017    Performed by Binu Lizarraga Sr., MD at [facility] OR    REMOVAL-HARDWARE Left 9/6/2017    Performed by Binu Lizarraga Sr., MD at [facility] OR    SYNOVECTOMY-WRIST Left 9/6/2017    Performed by Binu Lizarraga Sr., MD at [facility] OR    TONSILLECTOMY  1960 approx       Review of patient's allergies indicates:   Allergen Reactions    Nsaids (non-steroidal anti-inflammatory drug) Hives and Shortness Of Breath     Pcn [penicillins] Hives and Shortness Of Breath    Sulfa (sulfonamide antibiotics) Hives and Shortness Of Breath    Aspirin      Due to Liver        Levaquin [levofloxacin] Other (See Comments)     Seizure      Macrodantin [nitrofurantoin macrocrystalline] Hives    Wellbutrin [bupropion hcl]      Due to liver         Family History     Problem Relation (Age of Onset)    Alcohol abuse Maternal Uncle    Asthma Father    Breast cancer Maternal Aunt, Maternal Aunt    Cancer Maternal Aunt, Maternal Grandmother, Maternal Aunt    Colon cancer Maternal Grandmother    Diabetes Mother, Son    Heart disease Father, Maternal Aunt, Maternal Aunt    Hypertension Mother, Son    Kidney disease Mother    Parkinsonism Mother        Tobacco Use    Smoking status: Former Smoker     Packs/day: 1.00     Years: 30.00     Pack years: 30.00     Types: Cigarettes     Last attempt to quit: 2010     Years since quittin.6    Smokeless tobacco: Never Used    Tobacco comment: used nicorette gum in past and prn   Substance and Sexual Activity    Alcohol use: Yes     Alcohol/week: 3.0 - 3.6 oz     Types: 5 - 6 Shots of liquor per week    Drug use: No    Sexual activity: Yes     Partners: Male     Birth control/protection: See Surgical Hx         Review of Systems   Unable to perform ROS: Mental status change     Objective:     Vital Signs (Most Recent):  Temp: 99.6 °F (37.6 °C) (19 0719)  Pulse: 98 (19 1415)  Resp: (!) 26 (19 1415)  BP: 103/68 (19 1245)  SpO2: (!) 92 % (19 1415) Vital Signs (24h Range):  Temp:  [99.4 °F (37.4 °C)-99.6 °F (37.6 °C)] 99.6 °F (37.6 °C)  Pulse:  [] 98  Resp:  [18-32] 26  SpO2:  [86 %-100 %] 92 %  BP: (101-174)/() 103/68     Weight: 50.2 kg (110 lb 11.2 oz)  Body mass index is 19 kg/m².      Intake/Output Summary (Last 24 hours) at 3/27/2019 1442  Last data filed at 3/27/2019 1030  Gross per 24 hour   Intake 1756 ml   Output --   Net 1756 ml       Physical  Exam   Constitutional: Vital signs are normal. She appears well-developed. She appears lethargic. She is easily aroused.  Non-toxic appearance. She has a sickly appearance. She appears ill. No distress. She is not intubated. Nasal cannula in place.   HENT:   Head: Normocephalic and atraumatic.   Mouth/Throat: Oropharynx is clear and moist and mucous membranes are normal.   Eyes: Pupils are equal, round, and reactive to light. EOM and lids are normal.   Neck: Trachea normal and full passive range of motion without pain. Carotid bruit is not present.   Cardiovascular: Normal rate, regular rhythm and normal heart sounds.   Pulses:       Radial pulses are 1+ on the right side, and 1+ on the left side.        Dorsalis pedis pulses are 1+ on the right side, and 1+ on the left side.   Pulmonary/Chest: Effort normal. No accessory muscle usage. No tachypnea. She is not intubated. No respiratory distress. She has decreased breath sounds (throughout but more so in bases). She has no wheezes.   Abdominal: Soft. Normal appearance. She exhibits no distension. Bowel sounds are decreased. There is no tenderness.   Musculoskeletal: Normal range of motion.        Right foot: There is no deformity.        Left foot: There is no deformity.   No edema   Lymphadenopathy:     She has no cervical adenopathy.   Neurological: She is easily aroused. She appears lethargic.   Opens eyes and tracts with stimuli but only moans with questions and nonverbal but given Ativan in ED.    Skin: Skin is warm, dry and intact. Capillary refill takes less than 2 seconds. No rash noted. No cyanosis.       Vents:  Oxygen Concentration (%): 28 (03/27/19 1415)    Lines/Drains/Airways     Epidural Line                 Perineural Analgesia/Anesthesia Assessment (using dermatomes) Epidural 09/06/17 1246 567 days          Peripheral Intravenous Line                 Peripheral IV - Single Lumen 03/27/19 0715 Left Antecubital less than 1 day                 Significant Labs:    CBC/Anemia Profile:  Recent Labs   Lab 03/27/19  0615   WBC 16.43*   HGB 13.8   HCT 40.9      MCV 93   RDW 14.9*        Chemistries:  Recent Labs   Lab 03/27/19  0615      K 3.0*   CL 98   CO2 25   BUN 30*   CREATININE 1.4   CALCIUM 7.4*   ALBUMIN 3.6   PROT 6.9   BILITOT 0.7   ALKPHOS 79   ALT 12   AST 17   MG 0.9*       ABGs:   Recent Labs   Lab 03/27/19  1126   PH 7.440   PCO2 35.5   HCO3 24.1   POCSATURATED 95   BE 0     Lactic Acid:   Recent Labs   Lab 03/27/19  0654 03/27/19  1040   LACTATE 1.8 2.1     Troponin:   Recent Labs   Lab 03/27/19  0615   TROPONINI 0.023     Urine Studies:   Recent Labs   Lab 03/27/19  0620   COLORU Yellow   APPEARANCEUA Clear   PHUR 7.0   SPECGRAV 1.015   PROTEINUA 2+*   GLUCUA Negative   KETONESU Negative   BILIRUBINUA Negative   OCCULTUA 1+*   NITRITE Negative   UROBILINOGEN Negative   LEUKOCYTESUR Negative   RBCUA 1   WBCUA 1   BACTERIA None   HYALINECASTS 0     All pertinent labs within the past 24 hours have been reviewed.    Significant Imaging:   CT: I have reviewed all pertinent results/findings within the past 24 hours and my personal findings are:  Left infrahilar soft tissue thickening extending into the left lower lobe along the bronchovascular bundle measuring approximately 3.8 x 3.4 cm on axial images in maximum diameter..  Neoplastic mass is not excluded.  Consolidation is differential consideration.  Diffuse interstitial thickening is seen throughout the left lower lobe with additional areas of consolidation within the posterior basilar segment of the left lower lobe.  Findings could reflect consolidation with areas of interstitial pneumonia versus lymphangitic carcinomatosis.  Diffuse interstitial thickening is seen throughout the right lower lobe with subpleural nodularity.  Again findings could reflect interstitial pneumonia or chronic aspiration.  Lymphangitic carcinomatosis is a differential consideration.

## 2019-03-27 NOTE — HOSPITAL COURSE
3/27 - Arrived to ICU lethargic post receiving 1 mg Ativan in ED for anxiety, awakens but nonverbal with VSS and no distress.  Spouse at bedside contributed to HPI.   3/28 - no additional seizure activity since admission; no acute changes reported overnight; afebrile with ngtd on cultures but with rising wbc now nearing 29k  03/29: examined at bedside, No concerns at baseline: No seizure, K , Mag Normal,Wbcc 20 k, culture NGTD

## 2019-03-27 NOTE — HPI
Ms Schroeder is a 67 yo WF with a PMH of CBP, Secondary Hyperparathyroidism, Chronic Hypoxic and Hypercapnic Resp Failure on home O2 NC 3 lpm, Severe COPD, CKD3, Hypothyroidism, HTN, HLD, CVA, Gout and RLS.  She has documented hx of seizure which spouse stated was due to acute liver failure last year that resolved and is not on any home antiepileptic meds.  She was last seen in Pulm clinic by Dr. Barrett 1/16/19 for severe COPD with FEV1 29%.  She was seen in medicine clinic 3/18/19 for nasal congestion, cough, fever, SOB > baseline and wheezes.  CXR done without acute process and Dx w/ Bronchitis and given script for Zithromax and Prednisone.  Spouse claims she was doing much better from lung standpoint and only needing O2 at night.  Today she had a reported seizure at home and presented to [facility] ED post ictal.  Reportedly last seizure was a year ago as noted above.  In ED RA SAT 86% with tachypnea and wheezes that did not improved with bronchodilators.  She was placed on Vapotherm and admitted to ICU.  CT head done neg for acute process as was CXR (except left sided rib fractures noted).  In ED Mg 0.9, K+ 3.0, WBC 16, D-Dimer 8 therefore CTA chest done revealing 3 cm LLL soft tissue thickening.  She has a hx of etoh abuse and still drinks daily but unclear as to how much.

## 2019-03-27 NOTE — ED NOTES
Patient remains non-verbal at this time, patient making purposeful eye movement. Patient able to make audible grunts when asked questions

## 2019-03-28 LAB
ALBUMIN SERPL BCP-MCNC: 2.7 G/DL (ref 3.5–5.2)
ALP SERPL-CCNC: 55 U/L (ref 55–135)
ALT SERPL W/O P-5'-P-CCNC: 8 U/L (ref 10–44)
ANION GAP SERPL CALC-SCNC: 10 MMOL/L (ref 8–16)
AST SERPL-CCNC: 12 U/L (ref 10–40)
BASOPHILS # BLD AUTO: 0 K/UL (ref 0–0.2)
BASOPHILS NFR BLD: 0 % (ref 0–1.9)
BILIRUB SERPL-MCNC: 0.6 MG/DL (ref 0.1–1)
BUN SERPL-MCNC: 27 MG/DL (ref 8–23)
CALCIUM SERPL-MCNC: 6.5 MG/DL (ref 8.7–10.5)
CHLORIDE SERPL-SCNC: 101 MMOL/L (ref 95–110)
CO2 SERPL-SCNC: 20 MMOL/L (ref 23–29)
CREAT SERPL-MCNC: 1.2 MG/DL (ref 0.5–1.4)
DIFFERENTIAL METHOD: ABNORMAL
EOSINOPHIL # BLD AUTO: 0 K/UL (ref 0–0.5)
EOSINOPHIL NFR BLD: 0 % (ref 0–8)
ERYTHROCYTE [DISTWIDTH] IN BLOOD BY AUTOMATED COUNT: 15.1 % (ref 11.5–14.5)
EST. GFR  (AFRICAN AMERICAN): 54 ML/MIN/1.73 M^2
EST. GFR  (NON AFRICAN AMERICAN): 47 ML/MIN/1.73 M^2
GLUCOSE SERPL-MCNC: 129 MG/DL (ref 70–110)
HCT VFR BLD AUTO: 34.6 % (ref 37–48.5)
HGB BLD-MCNC: 11.5 G/DL (ref 12–16)
LYMPHOCYTES # BLD AUTO: 0.8 K/UL (ref 1–4.8)
LYMPHOCYTES NFR BLD: 2.9 % (ref 18–48)
MAGNESIUM SERPL-MCNC: 3.7 MG/DL (ref 1.6–2.6)
MCH RBC QN AUTO: 30.7 PG (ref 27–31)
MCHC RBC AUTO-ENTMCNC: 33.2 G/DL (ref 32–36)
MCV RBC AUTO: 92 FL (ref 82–98)
MONOCYTES # BLD AUTO: 0.7 K/UL (ref 0.3–1)
MONOCYTES NFR BLD: 2.5 % (ref 4–15)
NEUTROPHILS # BLD AUTO: 27.4 K/UL (ref 1.8–7.7)
NEUTROPHILS NFR BLD: 94.9 % (ref 38–73)
PLATELET # BLD AUTO: 154 K/UL (ref 150–350)
PMV BLD AUTO: 10.7 FL (ref 9.2–12.9)
POTASSIUM SERPL-SCNC: 4.6 MMOL/L (ref 3.5–5.1)
PROT SERPL-MCNC: 5.5 G/DL (ref 6–8.4)
RBC # BLD AUTO: 3.75 M/UL (ref 4–5.4)
SODIUM SERPL-SCNC: 131 MMOL/L (ref 136–145)
WBC # BLD AUTO: 28.96 K/UL (ref 3.9–12.7)

## 2019-03-28 PROCEDURE — 63600175 PHARM REV CODE 636 W HCPCS: Mod: HCNC | Performed by: INTERNAL MEDICINE

## 2019-03-28 PROCEDURE — 25000003 PHARM REV CODE 250: Mod: HCNC | Performed by: EMERGENCY MEDICINE

## 2019-03-28 PROCEDURE — 99233 PR SUBSEQUENT HOSPITAL CARE,LEVL III: ICD-10-PCS | Mod: HCNC,,, | Performed by: NURSE PRACTITIONER

## 2019-03-28 PROCEDURE — 99233 PR SUBSEQUENT HOSPITAL CARE,LEVL III: ICD-10-PCS | Mod: HCNC,,, | Performed by: PSYCHIATRY & NEUROLOGY

## 2019-03-28 PROCEDURE — 94640 AIRWAY INHALATION TREATMENT: CPT | Mod: HCNC

## 2019-03-28 PROCEDURE — 63600175 PHARM REV CODE 636 W HCPCS: Mod: HCNC | Performed by: NURSE PRACTITIONER

## 2019-03-28 PROCEDURE — 99233 SBSQ HOSP IP/OBS HIGH 50: CPT | Mod: HCNC,,, | Performed by: NURSE PRACTITIONER

## 2019-03-28 PROCEDURE — 27000221 HC OXYGEN, UP TO 24 HOURS: Mod: HCNC

## 2019-03-28 PROCEDURE — 36415 COLL VENOUS BLD VENIPUNCTURE: CPT | Mod: HCNC

## 2019-03-28 PROCEDURE — 25000242 PHARM REV CODE 250 ALT 637 W/ HCPCS: Mod: HCNC | Performed by: NURSE PRACTITIONER

## 2019-03-28 PROCEDURE — 83735 ASSAY OF MAGNESIUM: CPT | Mod: HCNC

## 2019-03-28 PROCEDURE — 25000003 PHARM REV CODE 250: Mod: HCNC | Performed by: INTERNAL MEDICINE

## 2019-03-28 PROCEDURE — 85025 COMPLETE CBC W/AUTO DIFF WBC: CPT | Mod: HCNC

## 2019-03-28 PROCEDURE — 25000003 PHARM REV CODE 250: Mod: HCNC | Performed by: NURSE PRACTITIONER

## 2019-03-28 PROCEDURE — 25000242 PHARM REV CODE 250 ALT 637 W/ HCPCS: Mod: HCNC | Performed by: INTERNAL MEDICINE

## 2019-03-28 PROCEDURE — 99233 SBSQ HOSP IP/OBS HIGH 50: CPT | Mod: HCNC,,, | Performed by: PSYCHIATRY & NEUROLOGY

## 2019-03-28 PROCEDURE — 25000003 PHARM REV CODE 250: Mod: HCNC | Performed by: STUDENT IN AN ORGANIZED HEALTH CARE EDUCATION/TRAINING PROGRAM

## 2019-03-28 PROCEDURE — 80053 COMPREHEN METABOLIC PANEL: CPT | Mod: HCNC

## 2019-03-28 PROCEDURE — 20000000 HC ICU ROOM: Mod: HCNC

## 2019-03-28 RX ORDER — FERROUS SULFATE, DRIED 160(50) MG
1 TABLET, EXTENDED RELEASE ORAL ONCE
Status: COMPLETED | OUTPATIENT
Start: 2019-03-28 | End: 2019-03-28

## 2019-03-28 RX ADMIN — IPRATROPIUM BROMIDE AND ALBUTEROL SULFATE 3 ML: .5; 3 SOLUTION RESPIRATORY (INHALATION) at 07:03

## 2019-03-28 RX ADMIN — OYSTER SHELL CALCIUM WITH VITAMIN D 1 TABLET: 500; 200 TABLET, FILM COATED ORAL at 06:03

## 2019-03-28 RX ADMIN — IPRATROPIUM BROMIDE AND ALBUTEROL SULFATE 3 ML: .5; 3 SOLUTION RESPIRATORY (INHALATION) at 08:03

## 2019-03-28 RX ADMIN — PANTOPRAZOLE SODIUM 40 MG: 40 TABLET, DELAYED RELEASE ORAL at 09:03

## 2019-03-28 RX ADMIN — DOCUSATE SODIUM 100 MG: 100 CAPSULE, LIQUID FILLED ORAL at 09:03

## 2019-03-28 RX ADMIN — BUDESONIDE 0.5 MG: 0.5 SUSPENSION RESPIRATORY (INHALATION) at 08:03

## 2019-03-28 RX ADMIN — ARFORMOTEROL TARTRATE 15 MCG: 15 SOLUTION RESPIRATORY (INHALATION) at 08:03

## 2019-03-28 RX ADMIN — FOLIC ACID 1 MG: 1 TABLET ORAL at 09:03

## 2019-03-28 RX ADMIN — ROPINIROLE HYDROCHLORIDE 1 MG: 1 TABLET, FILM COATED ORAL at 08:03

## 2019-03-28 RX ADMIN — BUDESONIDE 0.5 MG: 0.5 SUSPENSION RESPIRATORY (INHALATION) at 07:03

## 2019-03-28 RX ADMIN — METOPROLOL SUCCINATE 50 MG: 50 TABLET, EXTENDED RELEASE ORAL at 08:03

## 2019-03-28 RX ADMIN — HEPARIN SODIUM 5000 UNITS: 5000 INJECTION, SOLUTION INTRAVENOUS; SUBCUTANEOUS at 02:03

## 2019-03-28 RX ADMIN — POTASSIUM CHLORIDE 10 MEQ: 7.46 INJECTION, SOLUTION INTRAVENOUS at 12:03

## 2019-03-28 RX ADMIN — HEPARIN SODIUM 5000 UNITS: 5000 INJECTION, SOLUTION INTRAVENOUS; SUBCUTANEOUS at 10:03

## 2019-03-28 RX ADMIN — IPRATROPIUM BROMIDE AND ALBUTEROL SULFATE 3 ML: .5; 3 SOLUTION RESPIRATORY (INHALATION) at 01:03

## 2019-03-28 RX ADMIN — ARFORMOTEROL TARTRATE 15 MCG: 15 SOLUTION RESPIRATORY (INHALATION) at 07:03

## 2019-03-28 RX ADMIN — PRAVASTATIN SODIUM 10 MG: 10 TABLET ORAL at 09:03

## 2019-03-28 RX ADMIN — LEVOTHYROXINE SODIUM 88 MCG: 88 TABLET ORAL at 09:03

## 2019-03-28 RX ADMIN — DOXYCYCLINE 100 MG: 100 INJECTION, POWDER, LYOPHILIZED, FOR SOLUTION INTRAVENOUS at 08:03

## 2019-03-28 RX ADMIN — DOXYCYCLINE 100 MG: 100 INJECTION, POWDER, LYOPHILIZED, FOR SOLUTION INTRAVENOUS at 09:03

## 2019-03-28 RX ADMIN — LEVETIRACETAM INJECTION 500 MG: 5 INJECTION INTRAVENOUS at 05:03

## 2019-03-28 RX ADMIN — POTASSIUM CHLORIDE 10 MEQ: 7.46 INJECTION, SOLUTION INTRAVENOUS at 01:03

## 2019-03-28 RX ADMIN — BUSPIRONE HYDROCHLORIDE 7.5 MG: 5 TABLET ORAL at 08:03

## 2019-03-28 RX ADMIN — POTASSIUM CHLORIDE AND SODIUM CHLORIDE: 450; 150 INJECTION, SOLUTION INTRAVENOUS at 05:03

## 2019-03-28 RX ADMIN — BUSPIRONE HYDROCHLORIDE 7.5 MG: 5 TABLET ORAL at 09:03

## 2019-03-28 RX ADMIN — Medication 100 MG: at 09:03

## 2019-03-28 RX ADMIN — HEPARIN SODIUM 5000 UNITS: 5000 INJECTION, SOLUTION INTRAVENOUS; SUBCUTANEOUS at 05:03

## 2019-03-28 RX ADMIN — AMLODIPINE BESYLATE 2.5 MG: 2.5 TABLET ORAL at 09:03

## 2019-03-28 RX ADMIN — METOPROLOL SUCCINATE 50 MG: 50 TABLET, EXTENDED RELEASE ORAL at 09:03

## 2019-03-28 NOTE — PLAN OF CARE
Problem: Adult Inpatient Plan of Care  Goal: Plan of Care Review  Outcome: Ongoing (interventions implemented as appropriate)  Patient currently resting quietly in bed. VS currently stable. Patient NSR on monitor at this time. Patient currently receiving oxygen via 2L nasal cannula. Patient has no complaints of shortness of breathe at this time. Patient encouraged to turn in bed every 2 hours to avoid skin breakdown to back side and prevent wound breakdown. Patient positioned with pillows and turns independently. No sign of wound development or skin breakdown noted. Patient encouraged to use call light for all needs and educated on fall prevention. Patient bed alarm set. Call light within reach. Patient free of falls. Patient free of seizure activity at this time. Patient currently on 1/2 normal saline with 20mEq of potassium at 75mL/hr this time and tolerating well. Plan of care updated with patient and family. There are no further questions after updated on plan of care at this time. Will continue to monitor.

## 2019-03-28 NOTE — ASSESSMENT & PLAN NOTE
Just completed course of outpt Zithro; afebrile but wbc rising  Has PCN, Sulfa and Flouroquinolone allergies  Cont Doxy for now  abnl ct chest, consider bronch if leukocytosis trend continues or hypoxia worsens  Cont nebs

## 2019-03-28 NOTE — PLAN OF CARE
Problem: Adult Inpatient Plan of Care  Goal: Plan of Care Review  Outcome: Ongoing (interventions implemented as appropriate)  Pt AAOx4; VSS; up to chair for approx 2.5 hours; impulsive; instructed to call for assist, but keeps forgetting;  stated pt has short-term memory problems; pt up to toilet with assist x 1; BM x 1; Purewick external urine cath placed, but would not stay in place due to pt moving about in bed; pt can sometimes feel the urge to void or have a BM, but occasionally cannot tell; eating 50-75% of meals with good oral intake; now tele status; Dr. Dowd in to see pt today; EEG cancelled, but pt remains on Keppra per his orders;  in to visit; KATHLEEN; PAUL

## 2019-03-28 NOTE — SUBJECTIVE & OBJECTIVE
Review of Systems   Constitutional: Negative for chills and fever.   HENT: Negative for congestion.    Respiratory: Negative for cough and shortness of breath.    Cardiovascular: Negative for chest pain and palpitations.   Gastrointestinal: Negative.    Musculoskeletal: Negative.    Neurological: Negative for tremors.   Psychiatric/Behavioral: The patient is not nervous/anxious.          Objective:     Vital Signs (Most Recent):  Temp: 98.1 °F (36.7 °C) (03/28/19 0305)  Pulse: 75 (03/28/19 1318)  Resp: 18 (03/28/19 1318)  BP: 117/67 (03/28/19 0600)  SpO2: 100 % (03/28/19 1318) Vital Signs (24h Range):  Temp:  [97.6 °F (36.4 °C)-99.3 °F (37.4 °C)] 98.1 °F (36.7 °C)  Pulse:  [] 75  Resp:  [15-27] 18  SpO2:  [90 %-100 %] 100 %  BP: ()/(59-95) 117/67     Weight: 49.4 kg (108 lb 14.5 oz)  Body mass index is 18.69 kg/m².      Intake/Output Summary (Last 24 hours) at 3/28/2019 1439  Last data filed at 3/28/2019 0600  Gross per 24 hour   Intake 2458.75 ml   Output 1 ml   Net 2457.75 ml       Physical Exam   Constitutional:  Non-toxic appearance. She has a sickly appearance. She appears ill. No distress. She is not intubated. Nasal cannula in place.   Thin, frail, appears older than recorded age   HENT:   Head: Atraumatic.   Mouth/Throat: Mucous membranes are normal.   Eyes: Pupils are equal, round, and reactive to light. Conjunctivae and lids are normal.   Neck: Trachea normal and full passive range of motion without pain. No JVD present. Carotid bruit is not present.   Cardiovascular: Normal rate and regular rhythm.   Pulses:       Radial pulses are 2+ on the right side, and 2+ on the left side.        Dorsalis pedis pulses are 1+ on the right side, and 1+ on the left side.   Pulmonary/Chest: Effort normal. No accessory muscle usage. No tachypnea. She is not intubated. No respiratory distress. She has decreased breath sounds in the right lower field and the left lower field. She has no wheezes. She has no  rales.   Abdominal: Soft. Normal appearance and bowel sounds are normal. She exhibits no distension. There is no tenderness.   Musculoskeletal: She exhibits no edema.   Neurological: She is alert. GCS eye subscore is 4. GCS verbal subscore is 5. GCS motor subscore is 6.       Skin: Skin is warm, dry and intact. Capillary refill takes less than 2 seconds. No rash noted. No cyanosis.       Vents:  Oxygen Concentration (%): 28 (03/28/19 1318)    Lines/Drains/Airways     Drain            Female External Urinary Catheter 03/28/19 0700 less than 1 day          Epidural Line                 Perineural Analgesia/Anesthesia Assessment (using dermatomes) Epidural 09/06/17 1246 568 days          Peripheral Intravenous Line                 Peripheral IV - Single Lumen 03/27/19 0715 Right Antecubital 1 day         Peripheral IV - Single Lumen 03/27/19 1350 Left Upper Arm 1 day                Significant Labs:    CBC/Anemia Profile:  Recent Labs   Lab 03/27/19  0615 03/28/19  0400   WBC 16.43* 28.96*   HGB 13.8 11.5*   HCT 40.9 34.6*    154   MCV 93 92   RDW 14.9* 15.1*        Chemistries:  Recent Labs   Lab 03/27/19  0615 03/27/19  1417 03/27/19  2135 03/28/19  0400    136 135* 131*   K 3.0* 3.1* 3.3* 4.6   CL 98 102 100 101   CO2 25 22* 23 20*   BUN 30* 24* 26* 27*   CREATININE 1.4 1.2 1.3 1.2   CALCIUM 7.4* 6.5* 6.6* 6.5*   ALBUMIN 3.6  --   --  2.7*   PROT 6.9  --   --  5.5*   BILITOT 0.7  --   --  0.6   ALKPHOS 79  --   --  55   ALT 12  --   --  8*   AST 17  --   --  12   MG 0.9* 0.7* 3.7* 3.7*       All pertinent labs within the past 24 hours have been reviewed.    Significant Imaging:  I have reviewed all pertinent imaging results/findings within the past 24 hours.

## 2019-03-28 NOTE — PROGRESS NOTES
[facility]  Critical Care Medicine  Progress Note    Patient Name: Ibeth Schroeder  MRN: [id number]  Admission Date: 3/27/2019  Hospital Length of Stay: 1 days  Code Status: Full Code  Attending Provider: Kosta Ramachandran MD  Primary Care Provider: Geovanna Botello MD   Principal Problem: Seizure    Subjective:     HPI:  Ms Schroeder is a 67 yo WF with a PMH of CBP, Secondary Hyperparathyroidism, Chronic Hypoxic and Hypercapnic Resp Failure on home O2 NC 3 lpm, Severe COPD, CKD3, Hypothyroidism, HTN, HLD, CVA, Gout and RLS.  She has documented hx of seizure which spouse stated was due to acute liver failure last year that resolved and is not on any home antiepileptic meds.  She was last seen in Pulm clinic by Dr. Barrett 1/16/19 for severe COPD with FEV1 29%.  She was seen in medicine clinic 3/18/19 for nasal congestion, cough, fever, SOB > baseline and wheezes.  CXR done without acute process and Dx w/ Bronchitis and given script for Zithromax and Prednisone.  Spouse claims she was doing much better from lung standpoint and only needing O2 at night.  Today she had a reported seizure at home and presented to [facility] ED post ictal.  Reportedly last seizure was a year ago as noted above.  In ED RA SAT 86% with tachypnea and wheezes that did not improved with bronchodilators.  She was placed on Vapotherm and admitted to ICU.  CT head done neg for acute process as was CXR (except left sided rib fractures noted).  In ED Mg 0.9, K+ 3.0, WBC 16, D-Dimer 8 therefore CTA chest done revealing 3 cm LLL soft tissue thickening.  She has a hx of etoh abuse and still drinks daily but unclear as to how much.     Hospital/ICU Course:  3/27 - Arrived to ICU lethargic post receiving 1 mg Ativan in ED for anxiety, awakens but nonverbal with VSS and no distress.  Spouse at bedside contributed to HPI.   3/28 - no additional seizure activity since admission; no acute changes reported overnight; afebrile with  ngtd on cultures but with rising wbc now nearing 29k    Review of Systems   Constitutional: Negative for chills and fever.   HENT: Negative for congestion.    Respiratory: Negative for cough and shortness of breath.    Cardiovascular: Negative for chest pain and palpitations.   Gastrointestinal: Negative.    Musculoskeletal: Negative.    Neurological: Negative for tremors.   Psychiatric/Behavioral: The patient is not nervous/anxious.          Objective:     Vital Signs (Most Recent):  Temp: 98.1 °F (36.7 °C) (03/28/19 0305)  Pulse: 75 (03/28/19 1318)  Resp: 18 (03/28/19 1318)  BP: 117/67 (03/28/19 0600)  SpO2: 100 % (03/28/19 1318) Vital Signs (24h Range):  Temp:  [97.6 °F (36.4 °C)-99.3 °F (37.4 °C)] 98.1 °F (36.7 °C)  Pulse:  [] 75  Resp:  [15-27] 18  SpO2:  [90 %-100 %] 100 %  BP: ()/(59-95) 117/67     Weight: 49.4 kg (108 lb 14.5 oz)  Body mass index is 18.69 kg/m².      Intake/Output Summary (Last 24 hours) at 3/28/2019 1439  Last data filed at 3/28/2019 0600  Gross per 24 hour   Intake 2458.75 ml   Output 1 ml   Net 2457.75 ml       Physical Exam   Constitutional:  Non-toxic appearance. She has a sickly appearance. She appears ill. No distress. She is not intubated. Nasal cannula in place.   Thin, frail, appears older than recorded age   HENT:   Head: Atraumatic.   Mouth/Throat: Mucous membranes are normal.   Eyes: Pupils are equal, round, and reactive to light. Conjunctivae and lids are normal.   Neck: Trachea normal and full passive range of motion without pain. No JVD present. Carotid bruit is not present.   Cardiovascular: Normal rate and regular rhythm.   Pulses:       Radial pulses are 2+ on the right side, and 2+ on the left side.        Dorsalis pedis pulses are 1+ on the right side, and 1+ on the left side.   Pulmonary/Chest: Effort normal. No accessory muscle usage. No tachypnea. She is not intubated. No respiratory distress. She has decreased breath sounds in the right lower field and  the left lower field. She has no wheezes. She has no rales.   Abdominal: Soft. Normal appearance and bowel sounds are normal. She exhibits no distension. There is no tenderness.   Musculoskeletal: She exhibits no edema.   Neurological: She is alert. GCS eye subscore is 4. GCS verbal subscore is 5. GCS motor subscore is 6.       Skin: Skin is warm, dry and intact. Capillary refill takes less than 2 seconds. No rash noted. No cyanosis.       Vents:  Oxygen Concentration (%): 28 (03/28/19 1318)    Lines/Drains/Airways     Drain            Female External Urinary Catheter 03/28/19 0700 less than 1 day          Epidural Line                 Perineural Analgesia/Anesthesia Assessment (using dermatomes) Epidural 09/06/17 1246 568 days          Peripheral Intravenous Line                 Peripheral IV - Single Lumen 03/27/19 0715 Right Antecubital 1 day         Peripheral IV - Single Lumen 03/27/19 1350 Left Upper Arm 1 day                Significant Labs:    CBC/Anemia Profile:  Recent Labs   Lab 03/27/19  0615 03/28/19  0400   WBC 16.43* 28.96*   HGB 13.8 11.5*   HCT 40.9 34.6*    154   MCV 93 92   RDW 14.9* 15.1*        Chemistries:  Recent Labs   Lab 03/27/19  0615 03/27/19  1417 03/27/19  2135 03/28/19  0400    136 135* 131*   K 3.0* 3.1* 3.3* 4.6   CL 98 102 100 101   CO2 25 22* 23 20*   BUN 30* 24* 26* 27*   CREATININE 1.4 1.2 1.3 1.2   CALCIUM 7.4* 6.5* 6.6* 6.5*   ALBUMIN 3.6  --   --  2.7*   PROT 6.9  --   --  5.5*   BILITOT 0.7  --   --  0.6   ALKPHOS 79  --   --  55   ALT 12  --   --  8*   AST 17  --   --  12   MG 0.9* 0.7* 3.7* 3.7*       All pertinent labs within the past 24 hours have been reviewed.    Significant Imaging:  I have reviewed all pertinent imaging results/findings within the past 24 hours.      ABG  Recent Labs   Lab 03/27/19  1126   PH 7.440   PO2 71*   PCO2 35.5   HCO3 24.1   BE 0     Assessment/Plan:     Neuro  * Seizure  Likely due to electrolyte deficiencies  No noted activity  since admission  Neurology following  Continue keppra    Psychiatric  History of ETOH abuse  Admit etoh < 10 and  denies regular or heavy use currently  Cont thiamine and folic acid  Monitor for DTs    History of major depression  Cont Buspirone    Pulmonary  Acute on chronic respiratory failure with hypoxia  NC nocturnal and prn for goal SAT 88-92%  mobilize    Stage 3 severe COPD by GOLD classification  Cont O2, BOSSMAN, LABA and ICS  outpt follow up in pulm clinic    Cardiac/Vascular  Essential hypertension  Cont Toprol XL and Norvasc    Renal/  Electrolyte imbalance  Responded appropriately to replacement    Chronic kidney disease, stage 3  At baseline  Stop IVF, encourage PO intake  Monitor creatinine    Hematology  History of pulmonary embolus (PE); s/p IVC filter  CTA chest unremarkable for PE  No therapeutic anticoagulation needed.     Endocrine  Hypothyroidism  Cont Levothyroxine    GI  Large hiatal hernia  Cont PPI  Per CT chest lg HH noted to produce mass effect on left atrium  Consider surgery consult but would be high risk    Other  Abnormal chest CT  Just completed course of outpt Zithro; afebrile but wbc rising  Has PCN, Sulfa and Flouroquinolone allergies  Cont Doxy for now  abnl ct chest, consider bronch if leukocytosis trend continues or hypoxia worsens  Cont nebs    Preventive Measures:   Nutrition: renal diet  Stress Ulcer: PPI  DVT: heparin  BB: metoprolol  Bowel Prophylaxis: docusate  Head of Bed/Reposition: Elevate HOB and turn Q1-2 hours    Mobility: OOB with assist  IVAB Day: 2  Code Status: Full    Counseling/Consultation: I have discussed the care of this patient in detail with nursing staff and Dr. Barrett. Status and plan of care discussed with team on multidisciplinary rounds.      NATALIA Estrada-BC  Critical Care Medicine  [facility]

## 2019-03-28 NOTE — PROGRESS NOTES
The patient this AM is back to her baseline.  The somnolence noted last PM was probably related to the lorazepam given for the apparent seizure event.  This AM, her primary complaint is mild dyspnea.  She is alert, eating without difficulty.  EXAMINATION:  APPEARANCE: Well nourished, well developed, in no acute distress.    HEAD: Normocephalic, atraumatic.  EYES: PERRL. EOMI.  Non-icteric sclerae.    EARS: TM's intact. Light reflex normal. No retraction or perforation.    NOSE: Mucosa pink. Airway clear.  MOUTH & THROAT: No tonsillar enlargement  NECK: Supple. No bruits.  CHEST: Lungs clear to auscultation.  CARDIOVASCULAR: Regular rhythm without significant murmurs.  MUSCULOSKELETAL:  No bony deformity seen.   NEUROLOGIC:   Mental Status:  The patient is well oriented to person, time, place, and situation.  The patient is attentive to the environment and cooperative for the exam.  Cranial Nerves: II-XII grossly intact. Fundoscopic exam is normal.  No hemorrhage, exudate or papilledema is present. The extraocular muscles are intact in the cardinal directions of gaze.  No ptosis is present. Facial features are symmetrical.  Speech is normal in fluency, diction, and phrasing.  Tongue protrudes in the midline.    Gait and Station:  Patient is confined to her bed.  Motor:  No downdrift of either arm when held at shoulder level.  Manual muscle testing of proximal and distal muscles of both upper and lower extremities shows only generalized weakness without focal or lateralized features.  Sensory:  Intact both upper and lower extremities to pin prick, touch, and vibration.  Cerebellar:  Finger to nose done well.  Alternating movements intact.  No involuntary movements or tremor seen.  Reflexes:  Stretch reflexes are 2+ both upper and lower extremities.  Plantar stimulation is flexor bilaterally and no pathological reflexes are seen       ASSESSMENT:  1.  Seizure probably related to hypomagnesemia in patient with multiple  chronic illnesses.    RECOMMENDATIONS  1.  Again, I do not think chronic anticonvulsant therapy is needed.   2.  Follow up in the office in 4 weeks.

## 2019-03-28 NOTE — ASSESSMENT & PLAN NOTE
Likely due to electrolyte deficiencies  No noted activity since admission  Neurology following  Continue keppra

## 2019-03-28 NOTE — EICU
New admit, notified of calcium of 6.5    67 y/o F history of seizure, ETOH abuse presented with seizure episode thought to be secondary to medication changes.    Calcium 6.5 albuin 2.7  Corrected calcium 7.5    · Ordered calcium tablet  · Other electrolyte abnormalities corrected

## 2019-03-28 NOTE — ASSESSMENT & PLAN NOTE
Admit etoh < 10 and  denies regular or heavy use currently  Cont thiamine and folic acid  Monitor for DTs

## 2019-03-29 VITALS
HEART RATE: 79 BPM | RESPIRATION RATE: 17 BRPM | OXYGEN SATURATION: 93 % | HEIGHT: 64 IN | DIASTOLIC BLOOD PRESSURE: 95 MMHG | SYSTOLIC BLOOD PRESSURE: 144 MMHG | WEIGHT: 108.94 LBS | TEMPERATURE: 98 F | BODY MASS INDEX: 18.6 KG/M2

## 2019-03-29 LAB
ALBUMIN SERPL BCP-MCNC: 2.7 G/DL (ref 3.5–5.2)
ALP SERPL-CCNC: 59 U/L (ref 55–135)
ALT SERPL W/O P-5'-P-CCNC: 8 U/L (ref 10–44)
ANION GAP SERPL CALC-SCNC: 9 MMOL/L (ref 8–16)
AST SERPL-CCNC: 10 U/L (ref 10–40)
BASOPHILS # BLD AUTO: 0.01 K/UL (ref 0–0.2)
BASOPHILS NFR BLD: 0 % (ref 0–1.9)
BILIRUB SERPL-MCNC: 0.5 MG/DL (ref 0.1–1)
BUN SERPL-MCNC: 25 MG/DL (ref 8–23)
CALCIUM SERPL-MCNC: 6.9 MG/DL (ref 8.7–10.5)
CHLORIDE SERPL-SCNC: 106 MMOL/L (ref 95–110)
CO2 SERPL-SCNC: 22 MMOL/L (ref 23–29)
CREAT SERPL-MCNC: 1.1 MG/DL (ref 0.5–1.4)
DIFFERENTIAL METHOD: ABNORMAL
EOSINOPHIL # BLD AUTO: 0 K/UL (ref 0–0.5)
EOSINOPHIL NFR BLD: 0.1 % (ref 0–8)
ERYTHROCYTE [DISTWIDTH] IN BLOOD BY AUTOMATED COUNT: 15.5 % (ref 11.5–14.5)
EST. GFR  (AFRICAN AMERICAN): >60 ML/MIN/1.73 M^2
EST. GFR  (NON AFRICAN AMERICAN): 52 ML/MIN/1.73 M^2
GLUCOSE SERPL-MCNC: 90 MG/DL (ref 70–110)
HCT VFR BLD AUTO: 34 % (ref 37–48.5)
HGB BLD-MCNC: 11.1 G/DL (ref 12–16)
LYMPHOCYTES # BLD AUTO: 1.5 K/UL (ref 1–4.8)
LYMPHOCYTES NFR BLD: 7.4 % (ref 18–48)
MAGNESIUM SERPL-MCNC: 2.4 MG/DL (ref 1.6–2.6)
MCH RBC QN AUTO: 30.6 PG (ref 27–31)
MCHC RBC AUTO-ENTMCNC: 32.6 G/DL (ref 32–36)
MCV RBC AUTO: 94 FL (ref 82–98)
MONOCYTES # BLD AUTO: 0.9 K/UL (ref 0.3–1)
MONOCYTES NFR BLD: 4.2 % (ref 4–15)
NEUTROPHILS # BLD AUTO: 18.3 K/UL (ref 1.8–7.7)
NEUTROPHILS NFR BLD: 88.3 % (ref 38–73)
PLATELET # BLD AUTO: 150 K/UL (ref 150–350)
PMV BLD AUTO: 10.4 FL (ref 9.2–12.9)
POTASSIUM SERPL-SCNC: 4.1 MMOL/L (ref 3.5–5.1)
PROT SERPL-MCNC: 5.6 G/DL (ref 6–8.4)
RBC # BLD AUTO: 3.63 M/UL (ref 4–5.4)
SODIUM SERPL-SCNC: 137 MMOL/L (ref 136–145)
WBC # BLD AUTO: 20.68 K/UL (ref 3.9–12.7)

## 2019-03-29 PROCEDURE — 25000003 PHARM REV CODE 250: Mod: HCNC | Performed by: INTERNAL MEDICINE

## 2019-03-29 PROCEDURE — 99233 PR SUBSEQUENT HOSPITAL CARE,LEVL III: ICD-10-PCS | Mod: HCNC,,, | Performed by: INTERNAL MEDICINE

## 2019-03-29 PROCEDURE — 63600175 PHARM REV CODE 636 W HCPCS: Mod: HCNC | Performed by: INTERNAL MEDICINE

## 2019-03-29 PROCEDURE — 85025 COMPLETE CBC W/AUTO DIFF WBC: CPT | Mod: HCNC

## 2019-03-29 PROCEDURE — 36415 COLL VENOUS BLD VENIPUNCTURE: CPT | Mod: HCNC

## 2019-03-29 PROCEDURE — 99233 SBSQ HOSP IP/OBS HIGH 50: CPT | Mod: HCNC,,, | Performed by: INTERNAL MEDICINE

## 2019-03-29 PROCEDURE — 94640 AIRWAY INHALATION TREATMENT: CPT | Mod: HCNC

## 2019-03-29 PROCEDURE — 25000242 PHARM REV CODE 250 ALT 637 W/ HCPCS: Mod: HCNC | Performed by: INTERNAL MEDICINE

## 2019-03-29 PROCEDURE — 83735 ASSAY OF MAGNESIUM: CPT | Mod: HCNC

## 2019-03-29 PROCEDURE — 80053 COMPREHEN METABOLIC PANEL: CPT | Mod: HCNC

## 2019-03-29 PROCEDURE — 90686 IIV4 VACC NO PRSV 0.5 ML IM: CPT | Mod: HCNC | Performed by: INTERNAL MEDICINE

## 2019-03-29 PROCEDURE — 25000003 PHARM REV CODE 250: Mod: HCNC | Performed by: NURSE PRACTITIONER

## 2019-03-29 PROCEDURE — 90471 IMMUNIZATION ADMIN: CPT | Mod: HCNC | Performed by: INTERNAL MEDICINE

## 2019-03-29 PROCEDURE — G0008 ADMIN INFLUENZA VIRUS VAC: HCPCS | Mod: HCNC | Performed by: INTERNAL MEDICINE

## 2019-03-29 RX ORDER — LANOLIN ALCOHOL/MO/W.PET/CERES
400 CREAM (GRAM) TOPICAL DAILY
Refills: 0 | COMMUNITY
Start: 2019-03-29

## 2019-03-29 RX ORDER — DOXYCYCLINE 100 MG/1
100 CAPSULE ORAL EVERY 12 HOURS
Qty: 14 CAPSULE | Refills: 0 | Status: SHIPPED | OUTPATIENT
Start: 2019-03-29 | End: 2019-04-05

## 2019-03-29 RX ADMIN — DOXYCYCLINE 100 MG: 100 INJECTION, POWDER, LYOPHILIZED, FOR SOLUTION INTRAVENOUS at 08:03

## 2019-03-29 RX ADMIN — AMLODIPINE BESYLATE 2.5 MG: 2.5 TABLET ORAL at 08:03

## 2019-03-29 RX ADMIN — Medication 100 MG: at 08:03

## 2019-03-29 RX ADMIN — PANTOPRAZOLE SODIUM 40 MG: 40 TABLET, DELAYED RELEASE ORAL at 08:03

## 2019-03-29 RX ADMIN — IPRATROPIUM BROMIDE AND ALBUTEROL SULFATE 3 ML: .5; 3 SOLUTION RESPIRATORY (INHALATION) at 02:03

## 2019-03-29 RX ADMIN — PRAVASTATIN SODIUM 10 MG: 10 TABLET ORAL at 08:03

## 2019-03-29 RX ADMIN — DOCUSATE SODIUM 100 MG: 100 CAPSULE, LIQUID FILLED ORAL at 08:03

## 2019-03-29 RX ADMIN — LEVETIRACETAM INJECTION 500 MG: 5 INJECTION INTRAVENOUS at 05:03

## 2019-03-29 RX ADMIN — INFLUENZA A VIRUS A/MICHIGAN/45/2015 X-275 (H1N1) ANTIGEN (FORMALDEHYDE INACTIVATED), INFLUENZA A VIRUS A/SINGAPORE/INFIMH-16-0019/2016 IVR-186 (H3N2) ANTIGEN (FORMALDEHYDE INACTIVATED), INFLUENZA B VIRUS B/PHUKET/3073/2013 ANTIGEN (FORMALDEHYDE INACTIVATED), AND INFLUENZA B VIRUS B/MARYLAND/15/2016 BX-69A ANTIGEN (FORMALDEHYDE INACTIVATED) 0.5 ML: 15; 15; 15; 15 INJECTION, SUSPENSION INTRAMUSCULAR at 02:03

## 2019-03-29 RX ADMIN — METOPROLOL SUCCINATE 50 MG: 50 TABLET, EXTENDED RELEASE ORAL at 08:03

## 2019-03-29 RX ADMIN — BUSPIRONE HYDROCHLORIDE 7.5 MG: 5 TABLET ORAL at 08:03

## 2019-03-29 RX ADMIN — ARFORMOTEROL TARTRATE 15 MCG: 15 SOLUTION RESPIRATORY (INHALATION) at 08:03

## 2019-03-29 RX ADMIN — FOLIC ACID 1 MG: 1 TABLET ORAL at 08:03

## 2019-03-29 RX ADMIN — BUDESONIDE 0.5 MG: 0.5 SUSPENSION RESPIRATORY (INHALATION) at 07:03

## 2019-03-29 RX ADMIN — LEVOTHYROXINE SODIUM 88 MCG: 88 TABLET ORAL at 08:03

## 2019-03-29 RX ADMIN — HEPARIN SODIUM 5000 UNITS: 5000 INJECTION, SOLUTION INTRAVENOUS; SUBCUTANEOUS at 05:03

## 2019-03-29 RX ADMIN — IPRATROPIUM BROMIDE AND ALBUTEROL SULFATE 3 ML: .5; 3 SOLUTION RESPIRATORY (INHALATION) at 07:03

## 2019-03-29 NOTE — HOSPITAL COURSE
Admitted for evaluation and treatment of acute seizure.  Evaluation by neurology.  Probable cause related to very low Magnesium level <0.7 on admission.  Corrected electrolytes and remained stable.  Counseled on alcohol use.  Discharge plan to supplement daily Magnesium.  Follow up with Neurology and stop Levetiracetam.  Follow up with Pulmonary medicine and complete a course of Doxycycline.

## 2019-03-29 NOTE — ASSESSMENT & PLAN NOTE
Likely due to electrolyte deficiencies  No noted activity since admission  Neurology following  Continue keppra 500mg BID  PO Thiamine

## 2019-03-29 NOTE — PLAN OF CARE
03/29/19 1313   Medicare Message   Important Message from Medicare regarding Discharge Appeal Rights Given to patient/caregiver;Explained to patient/caregiver;Signed/date by patient/caregiver   Date IMM was signed 03/29/19   Time IMM was signed 1313

## 2019-03-29 NOTE — SUBJECTIVE & OBJECTIVE
Interval History:  No acute problems overnight.  No recurrence of seizure.  Retrograde amnesia over the last 24 to 36 hours.    Review of Systems   Constitutional: Negative for chills and fever.   HENT: Negative for congestion and sore throat.    Eyes: Negative for visual disturbance.   Respiratory: Negative for cough, shortness of breath and wheezing.    Cardiovascular: Negative for chest pain, palpitations and leg swelling.   Gastrointestinal: Negative for abdominal pain, blood in stool, constipation, diarrhea, nausea and vomiting.   Genitourinary: Negative for dysuria and hematuria.   Musculoskeletal: Negative for arthralgias and back pain.   Skin: Negative for rash and wound.   Neurological: Negative for dizziness, weakness, light-headedness and numbness.   Hematological: Negative for adenopathy.     Objective:     Vital Signs (Most Recent):  Temp: 98.1 °F (36.7 °C) (03/28/19 1900)  Pulse: 87 (03/28/19 1947)  Resp: 16 (03/28/19 1947)  BP: (!) 119/103 (03/28/19 1900)  SpO2: 97 % (03/28/19 1947) Vital Signs (24h Range):  Temp:  [97.5 °F (36.4 °C)-98.1 °F (36.7 °C)] 98.1 °F (36.7 °C)  Pulse:  [64-96] 87  Resp:  [15-22] 16  SpO2:  [90 %-100 %] 97 %  BP: (104-137)/() 119/103     Weight: 49.4 kg (108 lb 14.5 oz)  Body mass index is 18.69 kg/m².    Intake/Output Summary (Last 24 hours) at 3/28/2019 2116  Last data filed at 3/28/2019 1230  Gross per 24 hour   Intake 2476.25 ml   Output --   Net 2476.25 ml      Physical Exam   Constitutional: She is oriented to person, place, and time. She appears well-developed and well-nourished. No distress.   HENT:   Head: Normocephalic and atraumatic.   Mouth/Throat: Oropharynx is clear and moist.   Eyes: Pupils are equal, round, and reactive to light. Conjunctivae and EOM are normal.   Neck: Neck supple. No JVD present. No thyromegaly present.   Cardiovascular: Normal rate and regular rhythm. Exam reveals no gallop and no friction rub.   No murmur heard.  Pulmonary/Chest:  Effort normal and breath sounds normal. She has no wheezes. She has no rales.   Abdominal: Soft. Bowel sounds are normal. She exhibits no distension. There is no tenderness. There is no rebound and no guarding.   Musculoskeletal: Normal range of motion. She exhibits no edema or deformity.   Lymphadenopathy:     She has no cervical adenopathy.   Neurological: She is alert and oriented to person, place, and time. She has normal reflexes.   Skin: Skin is warm and dry. No rash noted.   Psychiatric: She has a normal mood and affect. Her behavior is normal. Judgment and thought content normal.   Nursing note and vitals reviewed.      Significant Labs: All pertinent labs within the past 24 hours have been reviewed.    Significant Imaging: I have reviewed all pertinent imaging results/findings within the past 24 hours.

## 2019-03-29 NOTE — PLAN OF CARE
CM spoke with patient and spouse. They refused any further care and will f/u with MD.      03/29/19 6854   Final Note   Assessment Type Final Discharge Note   Anticipated Discharge Disposition Home   Discharge plans and expectations educations in teach back method with documentation complete? Yes   Right Care Referral Info   Post Acute Recommendation No Care

## 2019-03-29 NOTE — SUBJECTIVE & OBJECTIVE
Review of Systems   Constitutional: Negative for chills and fever.   HENT: Negative for congestion.    Respiratory: Negative for cough and shortness of breath.    Cardiovascular: Negative for chest pain and palpitations.   Gastrointestinal: Negative.    Musculoskeletal: Negative.    Neurological: Negative for tremors.   Psychiatric/Behavioral: The patient is not nervous/anxious.          Objective:     Vital Signs (Most Recent):  Temp: 98.2 °F (36.8 °C) (03/29/19 0713)  Pulse: 85 (03/29/19 0801)  Resp: 15 (03/29/19 0801)  BP: (!) 133/98 (03/29/19 0713)  SpO2: 95 % (03/29/19 0801) Vital Signs (24h Range):  Temp:  [98 °F (36.7 °C)-98.2 °F (36.8 °C)] 98.2 °F (36.8 °C)  Pulse:  [66-96] 85  Resp:  [15-30] 15  SpO2:  [91 %-100 %] 95 %  BP: (105-163)/() 133/98     Weight: 49.4 kg (108 lb 14.5 oz)  Body mass index is 18.69 kg/m².      Intake/Output Summary (Last 24 hours) at 3/29/2019 1031  Last data filed at 3/29/2019 0546  Gross per 24 hour   Intake 810 ml   Output 520 ml   Net 290 ml       Physical Exam   Constitutional: She is oriented to person, place, and time. Vital signs are normal. She is cooperative.  Non-toxic appearance. She has a sickly appearance. She does not appear ill. No distress. She is not intubated. Nasal cannula in place.   Thin, frail   HENT:   Head: Normocephalic and atraumatic.   Mouth/Throat: Mucous membranes are normal.   Eyes: Pupils are equal, round, and reactive to light. Conjunctivae and lids are normal.   Neck: Trachea normal and full passive range of motion without pain. No JVD present. Carotid bruit is not present.   Cardiovascular: Normal rate and regular rhythm.   Pulses:       Radial pulses are 2+ on the right side, and 2+ on the left side.        Dorsalis pedis pulses are 1+ on the right side, and 1+ on the left side.   Pulmonary/Chest: Effort normal. No accessory muscle usage. No tachypnea. She is not intubated. No respiratory distress. She has decreased breath sounds in the  right lower field and the left lower field. She has no wheezes. She has no rales.   Abdominal: Soft. Normal appearance and bowel sounds are normal. She exhibits no distension. There is no tenderness.   Musculoskeletal: She exhibits no edema.   Neurological: She is alert and oriented to person, place, and time. She has normal strength and normal reflexes. No cranial nerve deficit or sensory deficit. GCS eye subscore is 4. GCS verbal subscore is 5. GCS motor subscore is 6.       Skin: Skin is warm, dry and intact. Capillary refill takes less than 2 seconds. No rash noted. No cyanosis.       Vents:  Oxygen Concentration (%): 28 (03/28/19 1318)    Lines/Drains/Airways     Drain            Female External Urinary Catheter 03/28/19 0700 1 day          Epidural Line                 Perineural Analgesia/Anesthesia Assessment (using dermatomes) Epidural 09/06/17 1246 568 days          Peripheral Intravenous Line                 Peripheral IV - Single Lumen 03/27/19 0715 Right Antecubital 2 days         Peripheral IV - Single Lumen 03/27/19 1350 Left Upper Arm 1 day                Significant Labs:    CBC/Anemia Profile:  Recent Labs   Lab 03/28/19  0400 03/29/19  0404   WBC 28.96* 20.68*   HGB 11.5* 11.1*   HCT 34.6* 34.0*    150   MCV 92 94   RDW 15.1* 15.5*        Chemistries:  Recent Labs   Lab 03/27/19  2135 03/28/19  0400 03/29/19  0404   * 131* 137   K 3.3* 4.6 4.1    101 106   CO2 23 20* 22*   BUN 26* 27* 25*   CREATININE 1.3 1.2 1.1   CALCIUM 6.6* 6.5* 6.9*   ALBUMIN  --  2.7* 2.7*   PROT  --  5.5* 5.6*   BILITOT  --  0.6 0.5   ALKPHOS  --  55 59   ALT  --  8* 8*   AST  --  12 10   MG 3.7* 3.7* 2.4       All pertinent labs within the past 24 hours have been reviewed.    Significant Imaging:  I have reviewed all pertinent imaging results/findings within the past 24 hours.

## 2019-03-29 NOTE — ASSESSMENT & PLAN NOTE
Just completed course of outpt Zithro; afebrile but wbc rising  Has PCN, Sulfa and Flouroquinolone allergies  Cont Doxy for now  Incentive spirometry  Follow up repeat imaging in 8-12 weeks

## 2019-03-29 NOTE — PROGRESS NOTES
[facility] Hospital Medicine  Progress Note    Patient Name: Ibeth Schroeder  MRN: [id number]  Patient Class: IP- Inpatient   Admission Date: 3/27/2019  Length of Stay: 1 days  Attending Physician: Kosta Ramachandran MD  Primary Care Provider: Geovanna Botello MD        Subjective:     Principal Problem:Seizure    HPI:  67 y/o female with PMHx of COPD, seizures, HTN, HLD, ETOH abuse (resolved per spouse), tobacco use (resolved per spouse), and stroke who presented to the ED with c/o seizure that onset suddenly early this morning. Spouse is at bedside. Patient has hx of seizures but it was felt the seizure's were induced by medication, so Keppra was stopped, approximately August, 2018. Spouse reports patient has not had a seizure since that time until today. Seizure was sudden and moderate in severity. No mitigating or exacerbating factors reported. Patient's respiratory status declined while in ED and she was put on Vapotherm. RR 30, Pulse 125, ABG shows pO2 52, pCO2 45. Patient's spouse reports she has not been sick, denies fever, chills, cough, congestion, and exposure to sick individuals. Per spouse, patient has not worn her home oxygen for the last 3-4 nights. She is a full code and her surrogate decision maker is her , Marino.    Hospital Course:  Admitted for evaluation and treatment of acute seizure.  Evaluation by neurology.    Interval History:  No acute problems overnight.  No recurrence of seizure.  Retrograde amnesia over the last 24 to 36 hours.    Review of Systems   Constitutional: Negative for chills and fever.   HENT: Negative for congestion and sore throat.    Eyes: Negative for visual disturbance.   Respiratory: Negative for cough, shortness of breath and wheezing.    Cardiovascular: Negative for chest pain, palpitations and leg swelling.   Gastrointestinal: Negative for abdominal pain, blood in stool, constipation, diarrhea, nausea and vomiting.   Genitourinary:  Negative for dysuria and hematuria.   Musculoskeletal: Negative for arthralgias and back pain.   Skin: Negative for rash and wound.   Neurological: Negative for dizziness, weakness, light-headedness and numbness.   Hematological: Negative for adenopathy.     Objective:     Vital Signs (Most Recent):  Temp: 98.1 °F (36.7 °C) (03/28/19 1900)  Pulse: 87 (03/28/19 1947)  Resp: 16 (03/28/19 1947)  BP: (!) 119/103 (03/28/19 1900)  SpO2: 97 % (03/28/19 1947) Vital Signs (24h Range):  Temp:  [97.5 °F (36.4 °C)-98.1 °F (36.7 °C)] 98.1 °F (36.7 °C)  Pulse:  [64-96] 87  Resp:  [15-22] 16  SpO2:  [90 %-100 %] 97 %  BP: (104-137)/() 119/103     Weight: 49.4 kg (108 lb 14.5 oz)  Body mass index is 18.69 kg/m².    Intake/Output Summary (Last 24 hours) at 3/28/2019 2116  Last data filed at 3/28/2019 1230  Gross per 24 hour   Intake 2476.25 ml   Output --   Net 2476.25 ml      Physical Exam   Constitutional: She is oriented to person, place, and time. She appears well-developed and well-nourished. No distress.   HENT:   Head: Normocephalic and atraumatic.   Mouth/Throat: Oropharynx is clear and moist.   Eyes: Pupils are equal, round, and reactive to light. Conjunctivae and EOM are normal.   Neck: Neck supple. No JVD present. No thyromegaly present.   Cardiovascular: Normal rate and regular rhythm. Exam reveals no gallop and no friction rub.   No murmur heard.  Pulmonary/Chest: Effort normal and breath sounds normal. She has no wheezes. She has no rales.   Abdominal: Soft. Bowel sounds are normal. She exhibits no distension. There is no tenderness. There is no rebound and no guarding.   Musculoskeletal: Normal range of motion. She exhibits no edema or deformity.   Lymphadenopathy:     She has no cervical adenopathy.   Neurological: She is alert and oriented to person, place, and time. She has normal reflexes.   Skin: Skin is warm and dry. No rash noted.   Psychiatric: She has a normal mood and affect. Her behavior is normal.  Judgment and thought content normal.   Nursing note and vitals reviewed.      Significant Labs: All pertinent labs within the past 24 hours have been reviewed.    Significant Imaging: I have reviewed all pertinent imaging results/findings within the past 24 hours.    Assessment/Plan:      * Seizure  IV keppra given in ED.  Awaiting evaluation by neurology.    Electrolyte imbalance  --replete and monitor      Acute on chronic respiratory failure with hypoxia  --admit to ICU  --vapotherm  --pulmonology consulted  --duo nebs, brovana, budesonide via nebulizer  --PRN ativan      Stage 3 severe COPD by GOLD classification  --Duo nebs, budesonide, brovana via neb  --pulmonology consulted  --vapotherm      Essential hypertension  --continue home metoprolol and amlodipine  --cardiac monitoring        VTE Risk Mitigation (From admission, onward)        Ordered     heparin (porcine) injection 5,000 Units  Every 8 hours      03/27/19 1413     IP VTE LOW RISK PATIENT  Once      03/27/19 0850     Place sequential compression device  Until discontinued      03/27/19 0850     Place MALICK hose  Until discontinued      03/27/19 0850          Critical care time spent on the evaluation and treatment of severe organ dysfunction, review of pertinent labs and imaging studies, discussions with consulting providers and discussions with patient/family: 30 minutes.    Kosta Ramachandran MD  Department of Hospital Medicine   [facility]

## 2019-03-29 NOTE — PROGRESS NOTES
[facility]  Critical Care Medicine  Progress Note    Patient Name: Ibeth Schroeder  MRN: [id number]  Admission Date: 3/27/2019  Hospital Length of Stay: 2 days  Code Status: Full Code  Attending Provider: Kosta Ramachandran MD  Primary Care Provider: Geovanna Botello MD   Principal Problem: Seizure    Subjective:     HPI:  Ms Schroeder is a 69 yo WF with a PMH of CBP, Secondary Hyperparathyroidism, Chronic Hypoxic and Hypercapnic Resp Failure on home O2 NC 3 lpm, Severe COPD, CKD3, Hypothyroidism, HTN, HLD, CVA, Gout and RLS.  She has documented hx of seizure which spouse stated was due to acute liver failure last year that resolved and is not on any home antiepileptic meds.  She was last seen in Pulm clinic by Dr. Barrett 1/16/19 for severe COPD with FEV1 29%.  She was seen in medicine clinic 3/18/19 for nasal congestion, cough, fever, SOB > baseline and wheezes.  CXR done without acute process and Dx w/ Bronchitis and given script for Zithromax and Prednisone.  Spouse claims she was doing much better from lung standpoint and only needing O2 at night.  Today she had a reported seizure at home and presented to [facility] ED post ictal.  Reportedly last seizure was a year ago as noted above.  In ED RA SAT 86% with tachypnea and wheezes that did not improved with bronchodilators.  She was placed on Vapotherm and admitted to ICU.  CT head done neg for acute process as was CXR (except left sided rib fractures noted).  In ED Mg 0.9, K+ 3.0, WBC 16, D-Dimer 8 therefore CTA chest done revealing 3 cm LLL soft tissue thickening.  She has a hx of etoh abuse and still drinks daily but unclear as to how much.     Hospital/ICU Course:  3/27 - Arrived to ICU lethargic post receiving 1 mg Ativan in ED for anxiety, awakens but nonverbal with VSS and no distress.  Spouse at bedside contributed to HPI.   3/28 - no additional seizure activity since admission; no acute changes reported overnight; afebrile with  ngtd on cultures but with rising wbc now nearing 29k  03/29: examined at bedside, No concerns at baseline: No seizure, K , Mag Normal,Wbcc 20 k, culture NGTD    Review of Systems   Constitutional: Negative for chills and fever.   HENT: Negative for congestion.    Respiratory: Negative for cough and shortness of breath.    Cardiovascular: Negative for chest pain and palpitations.   Gastrointestinal: Negative.    Musculoskeletal: Negative.    Neurological: Negative for tremors.   Psychiatric/Behavioral: The patient is not nervous/anxious.          Objective:     Vital Signs (Most Recent):  Temp: 98.2 °F (36.8 °C) (03/29/19 0713)  Pulse: 85 (03/29/19 0801)  Resp: 15 (03/29/19 0801)  BP: (!) 133/98 (03/29/19 0713)  SpO2: 95 % (03/29/19 0801) Vital Signs (24h Range):  Temp:  [98 °F (36.7 °C)-98.2 °F (36.8 °C)] 98.2 °F (36.8 °C)  Pulse:  [66-96] 85  Resp:  [15-30] 15  SpO2:  [91 %-100 %] 95 %  BP: (105-163)/() 133/98     Weight: 49.4 kg (108 lb 14.5 oz)  Body mass index is 18.69 kg/m².      Intake/Output Summary (Last 24 hours) at 3/29/2019 1031  Last data filed at 3/29/2019 0546  Gross per 24 hour   Intake 810 ml   Output 520 ml   Net 290 ml       Physical Exam   Constitutional: She is oriented to person, place, and time. Vital signs are normal. She is cooperative.  Non-toxic appearance. She has a sickly appearance. She does not appear ill. No distress. She is not intubated. Nasal cannula in place.   Thin, frail   HENT:   Head: Normocephalic and atraumatic.   Mouth/Throat: Mucous membranes are normal.   Eyes: Pupils are equal, round, and reactive to light. Conjunctivae and lids are normal.   Neck: Trachea normal and full passive range of motion without pain. No JVD present. Carotid bruit is not present.   Cardiovascular: Normal rate and regular rhythm.   Pulses:       Radial pulses are 2+ on the right side, and 2+ on the left side.        Dorsalis pedis pulses are 1+ on the right side, and 1+ on the left side.    Pulmonary/Chest: Effort normal. No accessory muscle usage. No tachypnea. She is not intubated. No respiratory distress. She has decreased breath sounds in the right lower field and the left lower field. She has no wheezes. She has no rales.   Abdominal: Soft. Normal appearance and bowel sounds are normal. She exhibits no distension. There is no tenderness.   Musculoskeletal: She exhibits no edema.   Neurological: She is alert and oriented to person, place, and time. She has normal strength and normal reflexes. No cranial nerve deficit or sensory deficit. GCS eye subscore is 4. GCS verbal subscore is 5. GCS motor subscore is 6.       Skin: Skin is warm, dry and intact. Capillary refill takes less than 2 seconds. No rash noted. No cyanosis.       Vents:  Oxygen Concentration (%): 28 (03/28/19 1318)    Lines/Drains/Airways     Drain            Female External Urinary Catheter 03/28/19 0700 1 day          Epidural Line                 Perineural Analgesia/Anesthesia Assessment (using dermatomes) Epidural 09/06/17 1246 568 days          Peripheral Intravenous Line                 Peripheral IV - Single Lumen 03/27/19 0715 Right Antecubital 2 days         Peripheral IV - Single Lumen 03/27/19 1350 Left Upper Arm 1 day                Significant Labs:    CBC/Anemia Profile:  Recent Labs   Lab 03/28/19  0400 03/29/19  0404   WBC 28.96* 20.68*   HGB 11.5* 11.1*   HCT 34.6* 34.0*    150   MCV 92 94   RDW 15.1* 15.5*        Chemistries:  Recent Labs   Lab 03/27/19  2135 03/28/19  0400 03/29/19  0404   * 131* 137   K 3.3* 4.6 4.1    101 106   CO2 23 20* 22*   BUN 26* 27* 25*   CREATININE 1.3 1.2 1.1   CALCIUM 6.6* 6.5* 6.9*   ALBUMIN  --  2.7* 2.7*   PROT  --  5.5* 5.6*   BILITOT  --  0.6 0.5   ALKPHOS  --  55 59   ALT  --  8* 8*   AST  --  12 10   MG 3.7* 3.7* 2.4       All pertinent labs within the past 24 hours have been reviewed.    Significant Imaging:  I have reviewed all pertinent imaging  results/findings within the past 24 hours.      ABG  Recent Labs   Lab 03/27/19  1126   PH 7.440   PO2 71*   PCO2 35.5   HCO3 24.1   BE 0     Assessment/Plan:     Neuro  * Seizure  Likely due to electrolyte deficiencies  No noted activity since admission  Neurology following  Continue keppra 500mg BID  PO Thiamine    Restless leg syndrome  Nightly ROPINOROLE 1 mg    Psychiatric  History of ETOH abuse  Admit etoh < 10 and  denies regular or heavy use currently  Cont thiamine and folic acid  Monitor for DTs    History of major depression  Cont Buspirone    Pulmonary  Acute on chronic respiratory failure with hypoxia  NC nocturnal and prn for goal SAT 88-92%  mobilize    Stage 3 severe COPD by GOLD classification  Cont O2, BOSSMAN, LABA and ICS: COMBIVENT AND TRELEGY  outpt follow up in pulm clinic  Follow in 2-4 weeks    Cardiac/Vascular  Essential hypertension  Cont Toprol XL and Norvasc    Renal/  Electrolyte imbalance  Responded appropriately to replacement  Monitor and replace K, mag and Ca    Chronic kidney disease, stage 3  At baseline  Stop IVF, encourage PO intake  Monitor creatinine    Hematology  History of pulmonary embolus (PE); s/p IVC filter  CTA chest unremarkable for PE  No therapeutic anticoagulation needed.     Endocrine  Hypothyroidism  Cont Levothyroxine    GI  Large hiatal hernia  Cont PPI  Per CT chest lg HH noted to produce mass effect on left atrium  Consider surgery consult but would be high risk    Other  Abnormal chest CT  Just completed course of outpt Zithro; afebrile but wbc rising  Has PCN, Sulfa and Flouroquinolone allergies  Cont Doxy for now  Incentive spirometry  Follow up repeat imaging in 8-12 weeks         I have reviewed all labs and imaging studies and compared to previous results. I have also discussed labs with all the teams in the medical care of the patient and my plan is outlined below          Andrea Barrett MD  Critical Care Medicine  [facility]

## 2019-03-29 NOTE — NURSING
Patient discharged home with .  Discharge instructions reviewed with . Taken to car via wheelchair.

## 2019-03-29 NOTE — PLAN OF CARE
Problem: Adult Inpatient Plan of Care  Goal: Plan of Care Review  Outcome: Ongoing (interventions implemented as appropriate)  Patient alert and disoriented to situation, intermittent periods of confusion. Frequent re-orientation provided. No seizure activity during shift. Patient voiding per bedpan at times. Bed alarm activated and maintained during shift. No injuries during shift. Call light within reach. Plan of care discussed with patient, will need reinforcement. Will continue to monitor.

## 2019-03-30 NOTE — DISCHARGE SUMMARY
[facility] Hospital Medicine  Discharge Summary      Patient Name: Ibeth Schroeder  MRN: [id number]  Admission Date: 3/27/2019  Hospital Length of Stay: 2 days  Discharge Date and Time: 3/29/2019  3:22 PM  Attending Physician:  Kosta Ramachandran MD  Discharging Provider: Kosta Ramachandran MD  Primary Care Provider: Geovanna Botello MD      HPI:   69 y/o female with PMHx of COPD, seizures, HTN, HLD, ETOH abuse (resolved per spouse), tobacco use (resolved per spouse), and stroke who presented to the ED with c/o seizure that onset suddenly early this morning. Spouse is at bedside. Patient has hx of seizures but it was felt the seizure's were induced by medication, so Keppra was stopped, approximately August, 2018. Spouse reports patient has not had a seizure since that time until today. Seizure was sudden and moderate in severity. No mitigating or exacerbating factors reported. Patient's respiratory status declined while in ED and she was put on Vapotherm. RR 30, Pulse 125, ABG shows pO2 52, pCO2 45. Patient's spouse reports she has not been sick, denies fever, chills, cough, congestion, and exposure to sick individuals. Per spouse, patient has not worn her home oxygen for the last 3-4 nights. She is a full code and her surrogate decision maker is her , Marino.    * No surgery found *      Hospital Course:   Admitted for evaluation and treatment of acute seizure.  Evaluation by neurology.  Probable cause related to very low Magnesium level <0.7 on admission.  Corrected electrolytes and remained stable.  Counseled on alcohol use.  Discharge plan to supplement daily Magnesium.  Follow up with Neurology and stop Levetiracetam.  Follow up with Pulmonary medicine and complete a course of Doxycycline.     Consults:   Consults (From admission, onward)        Status Ordering Provider     Inpatient consult to Neurology  Once     Provider:  Guido Dowd MD    Completed BENJAMIN MCKAY  E.     Inpatient consult to Pulmonology  Once     Provider:  Andrea Barrett MD    Completed BENJAMIN MCKAY            Final Active Diagnoses:    Diagnosis Date Noted POA    PRINCIPAL PROBLEM:  Seizure [R56.9] 08/01/2018 Yes    Electrolyte imbalance [E87.8] 03/27/2019 Yes    Large hiatal hernia [K44.9] 03/27/2019 Yes    Acute on chronic respiratory failure with hypoxia [J96.21] 07/29/2018 Yes    Abnormal chest CT [R93.89] 07/29/2018 Yes    History of pulmonary embolus (PE); s/p IVC filter [Z86.711] 08/16/2017 Yes     Chronic    History of ETOH abuse [Z87.898] 07/11/2017 Yes     Chronic    History of major depression [Z86.59] 04/20/2016 Yes     Chronic    Stage 3 severe COPD by GOLD classification [J44.9] 03/16/2016 Yes     Chronic    Chronic kidney disease, stage 3 [N18.3] 08/04/2014 Yes     Chronic    Restless leg syndrome [G25.81] 03/12/2014 Yes     Chronic    Hypothyroidism [E03.9] 06/24/2013 Yes     Chronic      Problems Resolved During this Admission:       Discharged Condition: good    Disposition: Home or Self Care    Follow Up:  Follow-up Information     Guido Dowd MD In 4 weeks.    Specialty:  Neurology  Why:  Hospital follow up seizure due to hypomagnesemia.  Contact information:  [address]  [place] [address]  [phone]             Geovanna Botello MD.    Specialty:  Family Medicine  Why:  As needed  Contact information:  [address] [place] [id number]  [phone]             Andrea Barrett MD In 8 weeks.    Specialty:  Pulmonary Disease  Why:  Follow up with imaging  Contact information:  [address]  [place] [address]  [phone]                 Patient Instructions:      Diet Cardiac     Activity as tolerated       Significant Diagnostic Studies: Labs: All labs within the past 24 hours have been reviewed    Pending Diagnostic Studies:     None         Medications:  Reconciled Home Medications:      Medication List       START taking these medications    doxycycline 100 MG Cap  Commonly known as:  VIBRAMYCIN  Take 1 capsule (100 mg total) by mouth every 12 (twelve) hours. for 7 days     magnesium oxide 400 mg (241.3 mg magnesium) tablet  Commonly known as:  MAG-OX  Take 1 tablet (400 mg total) by mouth once daily.        CHANGE how you take these medications    ipratropium-albuterol  mcg/actuation inhaler  Commonly known as:  COMBIVENT RESPIMAT  inhale 1 puff INTO THE LUNGS four times a day  What changed:    · how much to take  · how to take this  · when to take this  · additional instructions        CONTINUE taking these medications    albuterol 0.63 mg/3 mL Nebu  Commonly known as:  ACCUNEB  Take 3 mLs (0.63 mg total) by nebulization every 6 (six) hours as needed. Rescue     allopurinol 100 MG tablet  Commonly known as:  ZYLOPRIM  Take 1 tablet (100 mg total) by mouth once daily.     amLODIPine 2.5 MG tablet  Commonly known as:  NORVASC  Take 1 tablet (2.5 mg total) by mouth once daily.     bimatoprost 0.03 % ophthalmic drops  Commonly known as:  LUMIGAN  Place 1 drop into both eyes every evening.     busPIRone 15 MG tablet  Commonly known as:  BUSPAR  Take 0.5 tablets (7.5 mg total) by mouth 2 (two) times daily.     calcitRIOL 0.25 MCG Cap  Commonly known as:  ROCALTROL  Take 1 capsule (0.25 mcg total) by mouth every Mon, Wed, Fri.     diphenoxylate-atropine 2.5-0.025 mg 2.5-0.025 mg per tablet  Commonly known as:  LOMOTIL  take 2 tablets by mouth four times a day if needed for diarrhea     ferrous sulfate 325 (65 FE) MG EC tablet  Take 1 tablet (325 mg total) by mouth once daily.     FLUZONE HIGH-DOSE 2018-19 (PF) IM  Inject into the muscle.     levothyroxine 88 MCG tablet  Commonly known as:  SYNTHROID  TAKE 1 TABLET EVERY DAY     metoprolol succinate 50 MG 24 hr tablet  Commonly known as:  TOPROL-XL  TAKE 1 TABLET TWICE DAILY     oxyCODONE 5 mg Cap  Commonly known as:  OXY-IR  Take 5 mg by mouth every 4 (four) hours  as needed for Pain. Take half pill every 4 hours as needed     pantoprazole 40 MG tablet  Commonly known as:  PROTONIX  TAKE 1 TABLET EVERY DAY     pravastatin 10 MG tablet  Commonly known as:  PRAVACHOL  Take 1 tablet (10 mg total) by mouth once daily.     promethazine-dextromethorphan 6.25-15 mg/5 mL Syrp  Commonly known as:  PROMETHAZINE-DM  Take 5 mLs by mouth every 6 to 8 hours as needed.     rOPINIRole 1 MG tablet  Commonly known as:  REQUIP  Take 1 mg by mouth nightly as needed.     SHINGRIX (PF) 50 mcg/0.5 mL injection  Generic drug:  varicella-zoster [id number] (PF)  inject 0.5 milliliter intramuscularly     traZODone 50 MG tablet  Commonly known as:  DESYREL  Take 1/2 to 1 tablet at bedtime as needed.     TRELEGY ELLIPTA 100-62.5-25 mcg Dsdv  Generic drug:  fluticasone-umeclidin-vilanter  Inhale 1 puff into the lungs once daily.        STOP taking these medications    levETIRAcetam 500 MG Tab  Commonly known as:  KEPPRA            Indwelling Lines/Drains at time of discharge:   Lines/Drains/Airways          None          Time spent on the discharge of patient: 30 minutes  Patient was seen and examined on the date of discharge and determined to be suitable for discharge.    Critical care time spent on the evaluation and treatment of severe organ dysfunction, review of pertinent labs and imaging studies, discussions with consulting providers and discussions with patient/family: 30 minutes.     Kosta Ramachandran MD  Department of Hospital Medicine  [facility]

## 2019-04-01 ENCOUNTER — PATIENT OUTREACH (OUTPATIENT)
Dept: ADMINISTRATIVE | Facility: CLINIC | Age: 69
End: 2019-04-01

## 2019-04-01 LAB
BACTERIA BLD CULT: NORMAL
BACTERIA BLD CULT: NORMAL

## 2019-04-01 NOTE — PROGRESS NOTES
Patria Lewis RN attempted to contact patient. The following occurred:   C3 nurse attempted to contact Ibeth Schroeder for a TCC post hospital discharge follow up call. The patient is unable to conduct the call @ this time. The patient's , Marino requested a callback.    The patient does not have a scheduled HOSFU appointment with Geovanna Botello MD  within 7-14 days post hospital discharge date 3/29/19. Message sent to PCP staff to assist with HOSFU appointment scheduling.

## 2019-04-02 NOTE — PATIENT INSTRUCTIONS
"  Recurrent Seizure (Adult)    You have had another seizure today. A common cause of seizures that keep happening (recurrent seizures) is missing doses of seizure medicine. But sometimes seizures are hard to control even when you take the medicine correctly. If this is the case for you, your healthcare provider may need to increase your dosage. Or you may need to add or change to another medicine.  Home care  Follow these tips when caring for yourself at home. For this seizure:  · Seizures arent predictable. So avoid doing anything that might cause danger to you or other people if you have another seizure. Until the seizures are under good control, take these precautions:  ¨ Dont drive, ride a motorcycle, or ride a bike.  ¨ Dont operate dangerous equipment such as power tools  ¨ Take showers instead of baths.  ¨ Dont swim or climb ladders, trees, or roofs.  · Tell your close friends and relatives about your seizure. Teach them what to do for you if it happens again.  · If medicine was prescribed to prevent seizures, take it exactly as directed. It does not work when taken "as needed." Missing doses will increase the risk of having another seizure.  · If you miss a dose, take the missed dose as soon as you remember. If it is almost time for your next dose, skip the missed dose. Restart the medicine at your next scheduled time. Dont take extra medicine to make up the missed dose.  · Wear a "Medic-Alert" bracelet to let emergency personnel know about your condition.  · Follow a regular sleep schedule such that you get at least 6 to 8 hours of restful sleep every night. This is especially important when you are sick with a cold or flu and/or another type of infection.  For future seizures, if you are alone:  If you feel a seizure coming on, lie down on a bed or on the floor with something soft under your head. Lie on your left side, not on your back. This will keep you from falling. It will also let fluid drain out " of your mouth and prevent choking. Be sure you are clear of any objects that might injure you during the seizure. Call for help if there is time.  For future seizures, if someone is with you:  The person should help you get into a safe position and call for help. The person shouldnt try to force anything in your mouth once the seizure begins. This could harm your teeth or jaw.  Follow-up care  Follow up with your healthcare provider. Keep a seizure calendar to record how often you have a seizure. If you are being started on anti-seizure medicine, make sure that you use additional birth control. Seizure medicine can affect how well birth control pills work, and you could become pregnant. Avoid alcohol until your doctor tells you its OK.  Note: For the safety of yourself and others on the road, certain states require that the treating doctor tell the Public Health Department about any adult who is treated for a seizure and is at risk of more seizures. In this case, the Department of Motor Vehicles will be told. A restriction will be put on your s license until a doctor gives you medical clearance to drive again. Contact your treating doctor to find out if your state requires the reporting of patients with a seizures condition.  When to seek medical advice  Call your healthcare provider right away if any of these occur:  · Seizures happen more often or last longer than usual  · A seizure lasts over 5 minutes  · You dont wake up between seizures  · Confusion that lasts more than 30 minutes after a seizure  · Injury during a seizure  · Fever over 100.4ºF (38.0ºC), or as advised  · Unusual irritability, drowsiness, or confusion  · Stiff or painful neck  · Headache that gets worse   Date Last Reviewed: 8/1/2016  © 7743-0962 [employer]. [address] [place] [address]. All rights reserved. This information is not intended as a substitute for professional medical care. Always follow your  healthcare professional's instructions.

## 2019-04-03 ENCOUNTER — OFFICE VISIT (OUTPATIENT)
Dept: FAMILY MEDICINE | Facility: CLINIC | Age: 69
End: 2019-04-03
Attending: FAMILY MEDICINE
Payer: MEDICARE

## 2019-04-03 VITALS
HEIGHT: 64 IN | DIASTOLIC BLOOD PRESSURE: 70 MMHG | WEIGHT: 106.13 LBS | HEART RATE: 91 BPM | BODY MASS INDEX: 18.12 KG/M2 | SYSTOLIC BLOOD PRESSURE: 108 MMHG | TEMPERATURE: 98 F | OXYGEN SATURATION: 95 %

## 2019-04-03 DIAGNOSIS — E83.42 HYPOMAGNESEMIA: ICD-10-CM

## 2019-04-03 DIAGNOSIS — R56.9 SEIZURE: Primary | ICD-10-CM

## 2019-04-03 DIAGNOSIS — J44.9 CHRONIC OBSTRUCTIVE PULMONARY DISEASE, UNSPECIFIED COPD TYPE: ICD-10-CM

## 2019-04-03 DIAGNOSIS — I70.0 ATHEROSCLEROSIS OF AORTA: ICD-10-CM

## 2019-04-03 DIAGNOSIS — I71.40 ABDOMINAL AORTIC ANEURYSM (AAA) WITHOUT RUPTURE: ICD-10-CM

## 2019-04-03 PROCEDURE — 99499 RISK ADDL DX/OHS AUDIT: ICD-10-PCS | Mod: S$GLB,,, | Performed by: FAMILY MEDICINE

## 2019-04-03 PROCEDURE — 99499 UNLISTED E&M SERVICE: CPT | Mod: S$GLB,,, | Performed by: FAMILY MEDICINE

## 2019-04-03 PROCEDURE — 99495 TCM SERVICES (MODERATE COMPLEXITY): ICD-10-PCS | Mod: HCNC,S$GLB,, | Performed by: FAMILY MEDICINE

## 2019-04-03 PROCEDURE — 99495 TRANSJ CARE MGMT MOD F2F 14D: CPT | Mod: HCNC,S$GLB,, | Performed by: FAMILY MEDICINE

## 2019-04-03 PROCEDURE — 99999 PR PBB SHADOW E&M-EST. PATIENT-LVL III: CPT | Mod: PBBFAC,HCNC,, | Performed by: FAMILY MEDICINE

## 2019-04-03 PROCEDURE — 99999 PR PBB SHADOW E&M-EST. PATIENT-LVL III: ICD-10-PCS | Mod: PBBFAC,HCNC,, | Performed by: FAMILY MEDICINE

## 2019-04-03 RX ORDER — PRAVASTATIN SODIUM 10 MG/1
10 TABLET ORAL DAILY
Qty: 90 TABLET | Refills: 0 | Status: SHIPPED | OUTPATIENT
Start: 2019-04-03

## 2019-04-03 NOTE — PROGRESS NOTES
Subjective:       Patient ID: Ibeth Schroeder is a 68 y.o. female.    Chief Complaint: No chief complaint on file.  Transitional Care Note    Family and/or Caretaker present at visit?  Yes   Diagnostic tests reviewed/disposition: yes.  Disease/illness education: yes   Home health/community services discussion/referrals:yes   Establishment or re-establishment of referral orders for community resources: yes .   Discussion with other health care providers: No   68 y old female with hx of COPD, aortic aneurysm , Resp failure , Etoh abuse , hiatal  hernia here for f.u on hosp on 3/27 due to seizure . Pt was being treated for bronchitis with oral Zithromax 10 days prior seizure . Resp status  declined at er which require Vapotherm  ( RR 30, Pulse 125, ABG shows pO2 52, pCO2 45) . She was also found to have hypomagnesemia and Hypokalemia  With the 1st presumably being the cause of her seizure . Transferred  to ICU . She was continued on doxy. CT done due to persistent hypoxia showed :Findings could reflect consolidation with areas of interstitial pneumonia versus lymphangitic carcinomatosis.She was rec to rep CT in 8 w. Doing ok today . Feel a little tired. Has not been drinking ETH , no having seizures .                 Review of Systems   Constitutional: Positive for fatigue.   HENT: Negative.    Eyes: Negative.    Respiratory: Negative.    Cardiovascular: Negative.    Gastrointestinal: Negative.    Genitourinary: Negative.    Musculoskeletal: Negative.    Skin: Negative.    Hematological: Negative.        Objective:      Physical Exam   Constitutional: She is oriented to person, place, and time. No distress.   HENT:   Head: Normocephalic and atraumatic.   Right Ear: External ear normal.   Left Ear: External ear normal.   Mouth/Throat: No oropharyngeal exudate.   Eyes: Pupils are equal, round, and reactive to light. Conjunctivae and EOM are normal. Right eye exhibits no discharge. Left eye exhibits no discharge. No  scleral icterus.   Neck: Normal range of motion. Neck supple. No JVD present. No tracheal deviation present. No thyromegaly present.   Cardiovascular: Normal rate, regular rhythm and normal heart sounds. Exam reveals no gallop and no friction rub.   No murmur heard.  Pulmonary/Chest: Effort normal. No stridor. No respiratory distress. She has no wheezes. She has rales in the right lower field and the left lower field. She exhibits no tenderness.   Abdominal: Soft. Bowel sounds are normal. She exhibits no distension. There is no tenderness. There is no rebound and no guarding.   Musculoskeletal: Normal range of motion.   Lymphadenopathy:     She has no cervical adenopathy.   Neurological: She is alert and oriented to person, place, and time.   Skin: Skin is warm and dry. She is not diaphoretic.   Psychiatric: She has a normal mood and affect. Her behavior is normal. Judgment and thought content normal.       Assessment:     Ibeth was seen today for hospital follow up.    Diagnoses and all orders for this visit:    Seizure    Atherosclerosis of aorta    Abdominal aortic aneurysm (AAA) without rupture    Hypomagnesemia  -     Magnesium; Future    Chronic obstructive pulmonary disease, unspecified COPD type    Other orders  -     pravastatin (PRAVACHOL) 10 MG tablet; Take 1 tablet (10 mg total) by mouth once daily.      Plan:   Cont Oral MG . F.u with neuro   Statin   F.u with VS   Cont meds

## 2019-04-03 NOTE — PHYSICIAN QUERY
PT Name: Ibeth Schroeder  MR #: [id number]  Physician Query Form - CKD Clarification     CDS: Latasha Molina RN, CCDS       Contact information: [email or web address]  This form is a permanent document in the medical record.     Query Date: April 3, 2019    By submitting this query, we are merely seeking further clarification of documentation. Please utilize your independent clinical judgment when addressing the question(s) below.    The Medical record contains the following:     Indicators   Supporting Clinical Findings   Location in Medical Record   X    X CKD or Chronic Kidney (Renal) Failure / Disease Chronic kidney disease, stage 3    ckd 4   3/29-D/C Summary    3/27-H&P   X  X BUN/Creatinine/GFR BUN=30, Cr=1.4, GFR=39  BUN=25, Cr=1.1, GFR=52 3/27-Labs  3/29-Labs    Dehydration      Nausea / Vomiting      Dialysis / CRRT      Medication      Treatment      Other Chronic Conditions      Other       Provider, please further specify the stage of CKD.     Chronic Kidney Disease (CKD) (please specify stage below)      [employer] Definitions     Stage Description eGFR (mL/min)   [ X ]    III Moderately reduced kidney function 30-59   [   ]    IV Severely reduced kidney function 15-29   [   ] Other (please specify): _____________________________    [   ]  Clinically Undetermined          Please document in your progress notes daily for the duration of treatment until resolved and include in your discharge summary.

## 2019-05-08 ENCOUNTER — PATIENT OUTREACH (OUTPATIENT)
Dept: PULMONOLOGY | Facility: CLINIC | Age: 69
End: 2019-05-08

## 2019-05-08 NOTE — TELEPHONE ENCOUNTER
Chronic Disease Management  Called patient to complete Pulmonary Disease Management Questionnaire. Patient's  stated that the patient is currently in an LTACH in [place].    Time  spent with patient:  Minutes

## 2019-05-09 ENCOUNTER — TELEPHONE (OUTPATIENT)
Dept: PULMONOLOGY | Facility: CLINIC | Age: 69
End: 2019-05-09

## 2019-06-14 ENCOUNTER — PATIENT MESSAGE (OUTPATIENT)
Dept: PULMONOLOGY | Facility: CLINIC | Age: 69
End: 2019-06-14

## 2020-11-05 NOTE — PLAN OF CARE
Problem: Patient Care Overview  Goal: Plan of Care Review  Outcome: Ongoing (interventions implemented as appropriate)  Pt AAO x4.  VSS. Pt. Up from ER with sob. Hospital medicine consulted. Pt. Instructed on needing sputum culture, specimen cup at bedside. Solumedrol 40mg given. Scheduled nebs. Medications administered as ordered. Plan of care reviewed with patient. Patient verbalizes understanding. Bed low, side rails up x2, wheels locked, call light within reach. Pt. Instructed to call for assitance. Resting at this time. Will continue to monitor.          details… No joint pain, swelling or deformity; no limitation of movement
